# Patient Record
Sex: FEMALE | Race: BLACK OR AFRICAN AMERICAN | NOT HISPANIC OR LATINO | Employment: FULL TIME | URBAN - METROPOLITAN AREA
[De-identification: names, ages, dates, MRNs, and addresses within clinical notes are randomized per-mention and may not be internally consistent; named-entity substitution may affect disease eponyms.]

---

## 2017-01-23 RX ORDER — AMLODIPINE BESYLATE 10 MG/1
TABLET ORAL
Qty: 90 TABLET | Refills: 0 | Status: SHIPPED | OUTPATIENT
Start: 2017-01-23 | End: 2017-06-19 | Stop reason: SDUPTHER

## 2017-01-23 RX ORDER — HYDROCHLOROTHIAZIDE 25 MG/1
TABLET ORAL
Qty: 90 TABLET | Refills: 0 | Status: SHIPPED | OUTPATIENT
Start: 2017-01-23 | End: 2017-06-19 | Stop reason: SDUPTHER

## 2017-02-15 ENCOUNTER — HOSPITAL ENCOUNTER (INPATIENT)
Facility: OTHER | Age: 42
LOS: 7 days | Discharge: HOME | DRG: 075 | End: 2017-02-22
Attending: EMERGENCY MEDICINE | Admitting: HOSPITALIST
Payer: COMMERCIAL

## 2017-02-15 DIAGNOSIS — Z98.890 S/P MYOMECTOMY: ICD-10-CM

## 2017-02-15 DIAGNOSIS — Z72.0 TOBACCO ABUSE: ICD-10-CM

## 2017-02-15 DIAGNOSIS — G03.9 MENINGITIS: ICD-10-CM

## 2017-02-15 DIAGNOSIS — G47.00 INSOMNIA, UNSPECIFIED TYPE: ICD-10-CM

## 2017-02-15 DIAGNOSIS — I10 ESSENTIAL HYPERTENSION: ICD-10-CM

## 2017-02-15 DIAGNOSIS — R51.9 HEADACHE, UNSPECIFIED HEADACHE TYPE: ICD-10-CM

## 2017-02-15 DIAGNOSIS — E87.1 HYPONATREMIA: ICD-10-CM

## 2017-02-15 DIAGNOSIS — E87.6 HYPOKALEMIA: ICD-10-CM

## 2017-02-15 DIAGNOSIS — I63.9 STROKE: ICD-10-CM

## 2017-02-15 DIAGNOSIS — R47.01 APHASIA: Primary | ICD-10-CM

## 2017-02-15 LAB
ALBUMIN SERPL BCP-MCNC: 4.3 G/DL
ALP SERPL-CCNC: 93 U/L
ALT SERPL W/O P-5'-P-CCNC: 18 U/L
AMPHET+METHAMPHET UR QL: NEGATIVE
ANION GAP SERPL CALC-SCNC: 14 MMOL/L
APTT BLDCRRT: 28.5 SEC
AST SERPL-CCNC: 29 U/L
B-HCG UR QL: NEGATIVE
BARBITURATES UR QL SCN>200 NG/ML: NEGATIVE
BASOPHILS # BLD AUTO: 0.02 K/UL
BASOPHILS NFR BLD: 0.2 %
BENZODIAZ UR QL SCN>200 NG/ML: NEGATIVE
BILIRUB SERPL-MCNC: 1.4 MG/DL
BILIRUB UR QL STRIP: NEGATIVE
BUN SERPL-MCNC: 6 MG/DL
BZE UR QL SCN: NEGATIVE
CALCIUM SERPL-MCNC: 9.7 MG/DL
CANNABINOIDS UR QL SCN: NORMAL
CHLORIDE SERPL-SCNC: 100 MMOL/L
CLARITY CSF: ABNORMAL
CLARITY UR: CLEAR
CO2 SERPL-SCNC: 21 MMOL/L
COLOR CSF: COLORLESS
COLOR UR: YELLOW
CREAT SERPL-MCNC: 0.8 MG/DL
CREAT UR-MCNC: 148.4 MG/DL
CTP QC/QA: YES
DIFFERENTIAL METHOD: NORMAL
EOSINOPHIL # BLD AUTO: 0 K/UL
EOSINOPHIL NFR BLD: 0.1 %
ERYTHROCYTE [DISTWIDTH] IN BLOOD BY AUTOMATED COUNT: 13.8 %
EST. GFR  (AFRICAN AMERICAN): >60 ML/MIN/1.73 M^2
EST. GFR  (NON AFRICAN AMERICAN): >60 ML/MIN/1.73 M^2
GLUCOSE CSF-MCNC: 56 MG/DL
GLUCOSE SERPL-MCNC: 105 MG/DL
GLUCOSE UR QL STRIP: NEGATIVE
HCT VFR BLD AUTO: 41.2 %
HGB BLD-MCNC: 14 G/DL
HGB UR QL STRIP: ABNORMAL
INR PPP: 0.9
KETONES UR QL STRIP: NEGATIVE
LEUKOCYTE ESTERASE UR QL STRIP: NEGATIVE
LYMPHOCYTES # BLD AUTO: 2.4 K/UL
LYMPHOCYTES NFR BLD: 28.6 %
LYMPHOCYTES NFR CSF MANUAL: 65 %
MCH RBC QN AUTO: 31 PG
MCHC RBC AUTO-ENTMCNC: 34 %
MCV RBC AUTO: 91 FL
METHADONE UR QL SCN>300 NG/ML: NEGATIVE
MONOCYTES # BLD AUTO: 1 K/UL
MONOCYTES NFR BLD: 11.5 %
MONOS+MACROS NFR CSF MANUAL: 11 %
NEUTROPHILS # BLD AUTO: 4.9 K/UL
NEUTROPHILS NFR BLD: 59.4 %
NEUTROPHILS NFR CSF MANUAL: 24 %
NITRITE UR QL STRIP: NEGATIVE
OPIATES UR QL SCN: NEGATIVE
PCP UR QL SCN>25 NG/ML: NEGATIVE
PH UR STRIP: 7 [PH] (ref 5–8)
PLATELET # BLD AUTO: 246 K/UL
PMV BLD AUTO: 9.3 FL
POTASSIUM SERPL-SCNC: 4.2 MMOL/L
PROT CSF-MCNC: 50 MG/DL
PROT SERPL-MCNC: 8.7 G/DL
PROT UR QL STRIP: NEGATIVE
PROTHROMBIN TIME: 10.5 SEC
RBC # BLD AUTO: 4.52 M/UL
RBC # CSF: 1036.5 /CU MM
SODIUM SERPL-SCNC: 135 MMOL/L
SP GR UR STRIP: 1.01 (ref 1–1.03)
SPECIMEN VOL CSF: 2 ML
TOXICOLOGY INFORMATION: NORMAL
URN SPEC COLLECT METH UR: ABNORMAL
UROBILINOGEN UR STRIP-ACNC: NEGATIVE EU/DL
WBC # BLD AUTO: 8.28 K/UL
WBC # CSF: 23 /CU MM

## 2017-02-15 PROCEDURE — 80053 COMPREHEN METABOLIC PANEL: CPT

## 2017-02-15 PROCEDURE — 96375 TX/PRO/DX INJ NEW DRUG ADDON: CPT

## 2017-02-15 PROCEDURE — 11000001 HC ACUTE MED/SURG PRIVATE ROOM

## 2017-02-15 PROCEDURE — 82570 ASSAY OF URINE CREATININE: CPT

## 2017-02-15 PROCEDURE — 99285 EMERGENCY DEPT VISIT HI MDM: CPT | Mod: 25

## 2017-02-15 PROCEDURE — 009U3ZX DRAINAGE OF SPINAL CANAL, PERCUTANEOUS APPROACH, DIAGNOSTIC: ICD-10-PCS | Performed by: RADIOLOGY

## 2017-02-15 PROCEDURE — 87205 SMEAR GRAM STAIN: CPT

## 2017-02-15 PROCEDURE — 89051 BODY FLUID CELL COUNT: CPT

## 2017-02-15 PROCEDURE — 87529 HSV DNA AMP PROBE: CPT

## 2017-02-15 PROCEDURE — 81003 URINALYSIS AUTO W/O SCOPE: CPT

## 2017-02-15 PROCEDURE — 99223 1ST HOSP IP/OBS HIGH 75: CPT | Mod: ,,, | Performed by: INTERNAL MEDICINE

## 2017-02-15 PROCEDURE — 96361 HYDRATE IV INFUSION ADD-ON: CPT

## 2017-02-15 PROCEDURE — 87070 CULTURE OTHR SPECIMN AEROBIC: CPT

## 2017-02-15 PROCEDURE — 25500020 PHARM REV CODE 255: Performed by: EMERGENCY MEDICINE

## 2017-02-15 PROCEDURE — 85730 THROMBOPLASTIN TIME PARTIAL: CPT

## 2017-02-15 PROCEDURE — 85610 PROTHROMBIN TIME: CPT

## 2017-02-15 PROCEDURE — 25000003 PHARM REV CODE 250: Performed by: INTERNAL MEDICINE

## 2017-02-15 PROCEDURE — 63600175 PHARM REV CODE 636 W HCPCS: Performed by: INTERNAL MEDICINE

## 2017-02-15 PROCEDURE — 82945 GLUCOSE OTHER FLUID: CPT

## 2017-02-15 PROCEDURE — 84157 ASSAY OF PROTEIN OTHER: CPT

## 2017-02-15 PROCEDURE — 63600175 PHARM REV CODE 636 W HCPCS: Performed by: HOSPITALIST

## 2017-02-15 PROCEDURE — 25000003 PHARM REV CODE 250: Performed by: HOSPITALIST

## 2017-02-15 PROCEDURE — 63600175 PHARM REV CODE 636 W HCPCS

## 2017-02-15 PROCEDURE — 96374 THER/PROPH/DIAG INJ IV PUSH: CPT

## 2017-02-15 PROCEDURE — 93010 ELECTROCARDIOGRAM REPORT: CPT | Mod: ,,, | Performed by: INTERNAL MEDICINE

## 2017-02-15 PROCEDURE — 96376 TX/PRO/DX INJ SAME DRUG ADON: CPT

## 2017-02-15 PROCEDURE — 85025 COMPLETE CBC W/AUTO DIFF WBC: CPT

## 2017-02-15 PROCEDURE — A9585 GADOBUTROL INJECTION: HCPCS | Performed by: EMERGENCY MEDICINE

## 2017-02-15 RX ORDER — IBUPROFEN 200 MG
1 TABLET ORAL DAILY
Status: DISCONTINUED | OUTPATIENT
Start: 2017-02-16 | End: 2017-02-22 | Stop reason: HOSPADM

## 2017-02-15 RX ORDER — HYDROMORPHONE HYDROCHLORIDE 1 MG/ML
0.5 INJECTION, SOLUTION INTRAMUSCULAR; INTRAVENOUS; SUBCUTANEOUS EVERY 6 HOURS PRN
Status: DISCONTINUED | OUTPATIENT
Start: 2017-02-15 | End: 2017-02-22 | Stop reason: HOSPADM

## 2017-02-15 RX ORDER — SODIUM CHLORIDE 0.9 % (FLUSH) 0.9 %
3 SYRINGE (ML) INJECTION EVERY 8 HOURS
Status: DISCONTINUED | OUTPATIENT
Start: 2017-02-15 | End: 2017-02-18

## 2017-02-15 RX ORDER — METOCLOPRAMIDE HYDROCHLORIDE 5 MG/ML
10 INJECTION INTRAMUSCULAR; INTRAVENOUS
Status: COMPLETED | OUTPATIENT
Start: 2017-02-15 | End: 2017-02-15

## 2017-02-15 RX ORDER — ASPIRIN 325 MG
325 TABLET ORAL
Status: COMPLETED | OUTPATIENT
Start: 2017-02-15 | End: 2017-02-15

## 2017-02-15 RX ORDER — ENOXAPARIN SODIUM 100 MG/ML
40 INJECTION SUBCUTANEOUS EVERY 24 HOURS
Status: DISCONTINUED | OUTPATIENT
Start: 2017-02-15 | End: 2017-02-18

## 2017-02-15 RX ORDER — AMLODIPINE BESYLATE 5 MG/1
10 TABLET ORAL DAILY
Status: DISCONTINUED | OUTPATIENT
Start: 2017-02-16 | End: 2017-02-22 | Stop reason: HOSPADM

## 2017-02-15 RX ORDER — ASPIRIN 81 MG/1
81 TABLET ORAL DAILY
Status: DISCONTINUED | OUTPATIENT
Start: 2017-02-16 | End: 2017-02-15

## 2017-02-15 RX ORDER — OXYCODONE AND ACETAMINOPHEN 5; 325 MG/1; MG/1
1 TABLET ORAL ONCE
Status: DISCONTINUED | OUTPATIENT
Start: 2017-02-15 | End: 2017-02-22 | Stop reason: HOSPADM

## 2017-02-15 RX ORDER — DIPHENHYDRAMINE HYDROCHLORIDE 50 MG/ML
25 INJECTION INTRAMUSCULAR; INTRAVENOUS
Status: COMPLETED | OUTPATIENT
Start: 2017-02-15 | End: 2017-02-15

## 2017-02-15 RX ORDER — HYDROCHLOROTHIAZIDE 25 MG/1
25 TABLET ORAL DAILY
Status: DISCONTINUED | OUTPATIENT
Start: 2017-02-16 | End: 2017-02-18

## 2017-02-15 RX ORDER — DEXTROSE MONOHYDRATE AND SODIUM CHLORIDE 5; .45 G/100ML; G/100ML
INJECTION, SOLUTION INTRAVENOUS CONTINUOUS
Status: DISCONTINUED | OUTPATIENT
Start: 2017-02-15 | End: 2017-02-17

## 2017-02-15 RX ORDER — OXYCODONE AND ACETAMINOPHEN 7.5; 325 MG/1; MG/1
1 TABLET ORAL EVERY 4 HOURS PRN
Status: DISCONTINUED | OUTPATIENT
Start: 2017-02-15 | End: 2017-02-22 | Stop reason: HOSPADM

## 2017-02-15 RX ORDER — ATORVASTATIN CALCIUM 20 MG/1
40 TABLET, FILM COATED ORAL DAILY
Status: DISCONTINUED | OUTPATIENT
Start: 2017-02-16 | End: 2017-02-22 | Stop reason: HOSPADM

## 2017-02-15 RX ORDER — GADOBUTROL 604.72 MG/ML
10 INJECTION INTRAVENOUS
Status: COMPLETED | OUTPATIENT
Start: 2017-02-15 | End: 2017-02-15

## 2017-02-15 RX ORDER — ACETAMINOPHEN 325 MG/1
650 TABLET ORAL EVERY 6 HOURS PRN
Status: DISCONTINUED | OUTPATIENT
Start: 2017-02-15 | End: 2017-02-22 | Stop reason: HOSPADM

## 2017-02-15 RX ORDER — SODIUM CHLORIDE 9 MG/ML
1000 INJECTION, SOLUTION INTRAVENOUS
Status: COMPLETED | OUTPATIENT
Start: 2017-02-15 | End: 2017-02-15

## 2017-02-15 RX ADMIN — METOCLOPRAMIDE 10 MG: 5 INJECTION, SOLUTION INTRAMUSCULAR; INTRAVENOUS at 09:02

## 2017-02-15 RX ADMIN — DIPHENHYDRAMINE HYDROCHLORIDE 25 MG: 50 INJECTION, SOLUTION INTRAMUSCULAR; INTRAVENOUS at 09:02

## 2017-02-15 RX ADMIN — CEFTRIAXONE 2 G: 2 INJECTION, SOLUTION INTRAVENOUS at 09:02

## 2017-02-15 RX ADMIN — OXYCODONE AND ACETAMINOPHEN 1 TABLET: 7.5; 325 TABLET ORAL at 06:02

## 2017-02-15 RX ADMIN — VANCOMYCIN HYDROCHLORIDE 1000 MG: 1 INJECTION, POWDER, LYOPHILIZED, FOR SOLUTION INTRAVENOUS at 10:02

## 2017-02-15 RX ADMIN — SODIUM CHLORIDE 1000 ML: 0.9 INJECTION, SOLUTION INTRAVENOUS at 09:02

## 2017-02-15 RX ADMIN — SODIUM CHLORIDE, PRESERVATIVE FREE 3 ML: 5 INJECTION INTRAVENOUS at 09:02

## 2017-02-15 RX ADMIN — DIPHENHYDRAMINE HYDROCHLORIDE 25 MG: 50 INJECTION, SOLUTION INTRAMUSCULAR; INTRAVENOUS at 10:02

## 2017-02-15 RX ADMIN — DEXTROSE AND SODIUM CHLORIDE: 5; .45 INJECTION, SOLUTION INTRAVENOUS at 09:02

## 2017-02-15 RX ADMIN — ENOXAPARIN SODIUM 40 MG: 100 INJECTION SUBCUTANEOUS at 07:02

## 2017-02-15 RX ADMIN — OXYCODONE AND ACETAMINOPHEN 1 TABLET: 7.5; 325 TABLET ORAL at 10:02

## 2017-02-15 RX ADMIN — ACYCLOVIR SODIUM 570 MG: 50 INJECTION, SOLUTION INTRAVENOUS at 07:02

## 2017-02-15 RX ADMIN — GADOBUTROL 10 ML: 604.72 INJECTION INTRAVENOUS at 02:02

## 2017-02-15 RX ADMIN — ASPIRIN 325 MG ORAL TABLET 325 MG: 325 PILL ORAL at 11:02

## 2017-02-15 NOTE — H&P
[facility] Hospital Medicine  History & Physical    Patient Name: Tasha Rush  MRN: [id number]  Admission Date: 2/15/2017  Attending Physician: Jennifer Granda MD   Primary Care Provider: Franklin Gonzalez MD         Patient information was obtained from patient and ER records.     Subjective:     Principal Problem:Aphasia    Chief Complaint:   Chief Complaint   Patient presents with    Confusion     Patient stated she has been feeling confused since last night.  Patient stated she is having trouble remembering things.  Patient also c/o a headache, nasal congestion, cough and fatigue.          HPI: 42 y/o female with Hx of HTN presents with c/o of headache , confusion, and difficulty word finding for past 12 hours. Pt is accompanied by her mother. She woke up yesterday with a headache, went to work as normal. Took Excedrin and aleve without relief. Went to bed with the same headache. She has no prior hx of migraine. Rates the headache as 10/10 located mainly in front part of head. Least night felt she was getting sick ( fever, fatigue, cough)  so took an [place] before bed. When she woke up she felt confused and had difficulty finding the correct words she wanted to say. Denies any extremity weakness, gait problems, gi or  sx    Past Medical History   Diagnosis Date    Abnormal Pap smear of cervix     Bacterial vaginosis      RECURRENT    Cervical dysplasia     Hypertension        Past Surgical History   Procedure Laterality Date    Cervical biopsy  w/ loop electrode excision      Hernia repair  2009     umbilical    Fibroid removal         Review of patient's allergies indicates:   Allergen Reactions    Ace inhibitors Other (See Comments)     cough       No current facility-administered medications on file prior to encounter.      Current Outpatient Prescriptions on File Prior to Encounter   Medication Sig    amlodipine (NORVASC) 10 MG tablet TAKE 1 TABLET BY MOUTH EVERY DAY     hydrochlorothiazide (HYDRODIURIL) 25 MG tablet TAKE 1 TABLET BY MOUTH EVERY DAY    docusate sodium (COLACE) 100 MG capsule Take 1 capsule (100 mg total) by mouth 2 (two) times daily as needed for Constipation.    fluticasone (FLONASE) 50 mcg/actuation nasal spray 1 spray by Each Nare route 2 (two) times daily.    ketoconazole (NIZORAL) 2 % cream Apply topically 2 (two) times daily.     Family History     Problem Relation (Age of Onset)    Colon cancer Paternal Uncle    Diabetes Father, Mother    Hyperlipidemia Father    No Known Problems Brother        Social History Main Topics    Smoking status: Current Every Day Smoker    Smokeless tobacco: Never Used      Comment: 1 pack / week    Alcohol use Yes      Comment: occasional    Drug use: No    Sexual activity: No     Review of Systems   Constitutional: Positive for fatigue and fever. Negative for chills.   Respiratory: Positive for cough. Negative for chest tightness and shortness of breath.    Cardiovascular: Negative for chest pain, palpitations and leg swelling.   Gastrointestinal: Negative for abdominal pain and blood in stool.   Genitourinary: Negative for dysuria, flank pain and frequency.   Musculoskeletal: Negative for arthralgias, back pain and joint swelling.   Neurological: Positive for speech difficulty and headaches. Negative for seizures and syncope.   Hematological: Does not bruise/bleed easily.     Objective:     Vital Signs (Most Recent):  Temp: 100.1 °F (37.8 °C) (02/15/17 1220)  Pulse: 90 (02/15/17 1145)  Resp: 19 (02/15/17 1145)  BP: 136/77 (02/15/17 1145)  SpO2: 97 % (02/15/17 1145) Vital Signs (24h Range):  Temp:  [99 °F (37.2 °C)-100.1 °F (37.8 °C)] 100.1 °F (37.8 °C)  Pulse:  [87-92] 90  Resp:  [12-20] 19  SpO2:  [97 %-100 %] 97 %  BP: (136-159)/(77-96) 136/77     Weight: 77.1 kg (170 lb)  Body mass index is 28.29 kg/(m^2).    Physical Exam   Constitutional: She is oriented to person, place, and time. No distress.   HENT:   Head:  Normocephalic and atraumatic.   Eyes: Conjunctivae are normal.   Neck: Neck supple.   Cardiovascular: Regular rhythm and normal heart sounds.    No murmur heard.  Pulmonary/Chest: Effort normal and breath sounds normal.   Abdominal: Soft. Bowel sounds are normal. She exhibits no distension.   Musculoskeletal: She exhibits no edema.   Neurological: She is alert and oriented to person, place, and time. No cranial nerve deficit. Coordination normal.   Expressive aphasia   Skin: Skin is warm and dry. No rash noted.   Psychiatric: She has a normal mood and affect.        Significant Labs:   CBC:   Recent Labs  Lab 02/15/17  0903   WBC 8.28   HGB 14.0   HCT 41.2        CMP:   Recent Labs  Lab 02/15/17  0903   *   K 4.2      CO2 21*      BUN 6   CREATININE 0.8   CALCIUM 9.7   PROT 8.7*   ALBUMIN 4.3   BILITOT 1.4*   ALKPHOS 93   AST 29   ALT 18   ANIONGAP 14   EGFRNONAA >60       Significant Imaging: I have reviewed all pertinent imaging results/findings within the past 24 hours.     Neg head CT    Assessment/Plan:     * Aphasia  Pt presents with multiple neurologic sx of unclear etiology ( confusion, headache, and expressive aphasia)  Ddx includes CVA ( RF are HTN and smoking), complex migraine, encephalitis / meningitis ( given presence of low grade fever)  Initial CT neg.  Pt given ASA  Neurology consult  Obtain MRI and LP ( usual studies plus HSV)      Tobacco abuse  Nicotine patch  Cessation education      Essential hypertension  Home meds and monitor      Hyponatremia  Mild, monitor      Headache  Symptomatic treatment for now pending evaluation      VTE Risk Mitigation      Lovenox (after LP done)        Lloyd Walsh MD  Department of Hospital Medicine   [facility]

## 2017-02-15 NOTE — ED PROVIDER NOTES
Encounter Date: 2/15/2017    SCRIBE #1 NOTE: I, Polina Rob, am scribing for, and in the presence of, Dr. Granda.       History     Chief Complaint   Patient presents with    Confusion     Patient stated she has been feeling confused since last night.  Patient stated she is having trouble remembering things.  Patient also c/o a headache, nasal congestion, cough and fatigue.       Review of patient's allergies indicates:   Allergen Reactions    Ace inhibitors Other (See Comments)     cough     HPI Comments: Time seen by provider: 8:37 AM    This is a 41 y.o. female, with HTN, who presents with complaint of confusion that began last night. She describes the confusion as difficulty finding the words she wants to say and trouble with reading out loud. She is able to understand what the words mean, but she is unable to repeat the words. She complains of associated headache that also began last night. She took veronica seltzer plus without relief. She denies a history of headaches. Despite triage note, pt specifically denies cough, congestion. She also denies urinary symptoms.      The history is provided by the patient and a parent (mother).     Past Medical History   Diagnosis Date    Abnormal Pap smear of cervix     Bacterial vaginosis      RECURRENT    Cervical dysplasia     Hypertension      No past medical history pertinent negatives.  Past Surgical History   Procedure Laterality Date    Cervical biopsy  w/ loop electrode excision      Hernia repair  2009     umbilical    Fibroid removal       Family History   Problem Relation Age of Onset    Diabetes Father     Hyperlipidemia Father     Diabetes Mother     No Known Problems Brother     Colon cancer Paternal Uncle     Breast cancer Neg Hx     Ovarian cancer Neg Hx      Social History   Substance Use Topics    Smoking status: Current Every Day Smoker    Smokeless tobacco: Never Used      Comment: 1 pack / week    Alcohol use Yes      Comment: occasional      Review of Systems   Constitutional: Negative for chills.   HENT: Negative for congestion.    Respiratory: Negative for cough.    Cardiovascular: Negative for chest pain.   Gastrointestinal: Negative for abdominal pain, nausea and vomiting.   Endocrine: Negative for polyuria.   Genitourinary: Negative for dysuria and frequency.   Musculoskeletal: Negative for myalgias.   Skin: Negative for rash.   Neurological: Positive for headaches. Negative for dizziness.   Psychiatric/Behavioral: Positive for confusion.       Physical Exam   Initial Vitals   BP Pulse Resp Temp SpO2   02/15/17 0807 02/15/17 0807 02/15/17 0807 02/15/17 0807 02/15/17 0807   143/91 87 12 99 °F (37.2 °C) 100 %     Physical Exam    Nursing note and vitals reviewed.  Constitutional: She appears well-developed and well-nourished. She is not diaphoretic. No distress.   HENT:   Head: Normocephalic and atraumatic.   Right Ear: External ear normal.   Left Ear: External ear normal.   Eyes: EOM are normal. Pupils are equal, round, and reactive to light. Right eye exhibits no discharge. Left eye exhibits no discharge.   Pupils 4-mm and reactive.    Neck: Normal range of motion. Neck supple.   Cardiovascular: Normal rate, regular rhythm and normal heart sounds.   Pulmonary/Chest: Breath sounds normal. No respiratory distress. She has no wheezes. She has no rhonchi. She has no rales.   Abdominal: Soft. Bowel sounds are normal. She exhibits no distension. There is no tenderness. There is no rebound and no guarding.   Musculoskeletal: Normal range of motion. She exhibits no edema or tenderness.   Neurological: She is alert and oriented to person, place, and time.   Normal strength in all four extremities. Sensations intact. Normal finger-to-nose. No facial asymmetry. No dysarthria. Mild expressive aphasia in conversation, though more pronounced aphasia with reading.     Skin: Skin is warm and dry. No rash and no abscess noted. No erythema. No pallor.    Psychiatric: She has a normal mood and affect. Her behavior is normal. Judgment and thought content normal.         ED Course   Procedures  Labs Reviewed   COMPREHENSIVE METABOLIC PANEL - Abnormal; Notable for the following:        Result Value    Sodium 135 (*)     CO2 21 (*)     Total Protein 8.7 (*)     Total Bilirubin 1.4 (*)     All other components within normal limits   URINALYSIS - Abnormal; Notable for the following:     Occult Blood UA Trace (*)     All other components within normal limits   CBC W/ AUTO DIFFERENTIAL   APTT   PROTIME-INR   DRUG SCREEN PANEL, URINE EMERGENCY   DRUG SCREEN PANEL, URINE EMERGENCY   POCT URINE PREGNANCY     Imaging Results         CT Head Without Contrast (Final result) Result time:  02/15/17 10:01:28    Final result by Scott Carcamo MD (02/15/17 10:01:28)    Impression:        CT scan of the head within normal limits.      Electronically signed by: SCOTT CARCAMO MD  Date:     02/15/17  Time:    10:01     Narrative:    History: Aphasia and confusion.    Procedure: Axial CT scans of the head are performed from the base of the skull to the cranial vertex without contrast.        Findings:    There is no hydrocephalus.  The subarachnoid space is not as prominent however could be within normal limits for the patient's age (41).  Within the parenchyma no hemorrhage is or shift of midline structures.  The gray/white matter delineation is preserved throughout the cerebral hemispheres.  There is a septum cavum pellucidum and cavum vergae.  Basal ganglia are symmetric and normal bilaterally.    4th ventricle is in midline.  Cerebellar hemispheres are normal.  No Chiari malformations.  There is no suprasellar or pineal region masses.    The visualized paranasal air sinuses are clear.             EKG Readings: (Independently Interpreted)   Initial Reading: No STEMI.   09:50: Rate of 84. Normal sinus rhythm. Normal axis. Normal intervals. No ST or ischemic changes.           Medical  Decision Making:   History:   Old Medical Records: I decided to obtain old medical records.  Old Records Summarized: other records, records from clinic visits and records from previous admission(s).  Independently Interpreted Test(s):   I have ordered and independently interpreted EKG Reading(s) - see prior notes  Clinical Tests:   Lab Tests: Ordered and Reviewed  Radiological Study: Ordered and Reviewed  Medical Tests: Ordered and Reviewed    Additional MDM:   EKG: I have independently interpreted EKG(s) - see notes.        10:19 AM:  Pt stable.  Her HA has improved though her aphasia symptoms have not improved.  Her labs and CT are negative for acute findings.  Given her persistent neuro symptoms, will plan to admit with neuro consult. Will continue to follow and reassess.      11:13 AM: I discussed the case with Dr. Piedra, neurology on call, who states the patient's symptoms could possibly related to complex migraine, but would need to rule out ischemic stroke.     11:25 AM: I discussed the case with Dr. Trinh, hospitalist on call. Patient will be admitted to Dr. Byrd.         Scribe Attestation:   Scribe #1: I performed the above scribed service and the documentation accurately describes the services I performed. I attest to the accuracy of the note.    Attending Attestation:           Physician Attestation for Scribe:  Physician Attestation Statement for Scribe #1: I, Dr. Granda, reviewed documentation, as scribed by Polina Rob in my presence, and it is both accurate and complete.                 ED Course     Clinical Impression:     1. Aphasia    2. Stroke             Jennifer Granda MD  02/15/17 1231

## 2017-02-15 NOTE — ASSESSMENT & PLAN NOTE
Pt presents with multiple neurologic sx of unclear etiology ( confusion, headache, and expressive aphasia)  Ddx includes CVA ( RF are HTN and smoking), complex migraine, encephalitis / meningitis ( given presence of low grade fever)  Initial CT neg.  Pt given ASA  Neurology consult  Obtain MRI and LP ( usual studies plus HSV)

## 2017-02-15 NOTE — ED NOTES
Hourly rounding on the patient has been done. The patient has been updated on the plan of care, disposition and current status. Pain was assessed and is currently a 5/10.  Comfort positioning and restroom needs were addressed.  Remains on continuous pulse oximetry monitoring.  Resting with eyes closed.  Arousable to touch and voice.  Blood pressure set to cycle automatically.  Necessary items were placed with in reach and was advised when a reassessment would take place.  The call bell remains at the bedside for any additional patient needs.  The patient is resting comfortably in the recliner chair.  Respirations are even and unlabored.  Skin warm and dry.  Will continue to monitor.

## 2017-02-15 NOTE — ED NOTES
Hourly rounding on the patient has been done. The patient has been updated on the plan of care, disposition and current status. Pain was assessed and is currently a 8/10.  Comfort positioning and restroom needs were addressed.  Remains on continuous pulse oximetry monitoring.  Blood pressure set to cycle automatically.  Necessary items were placed with in reach and was advised when a reassessment would take place.  The call bell remains at the bedside for any additional patient needs.  The patient is resting comfortably and sitting in recliner chair.  Respirations are even and unlabored.  Skin warm and dry.  Normal saline 1 liter infusing without complication.  Will continue to monitor.

## 2017-02-15 NOTE — ED NOTES
Call from Dr. Walsh to page Interventional Radiology to see patient and to release MRI and MRA orders.   ANABELL Santiago notified.

## 2017-02-15 NOTE — SUBJECTIVE & OBJECTIVE
Past Medical History   Diagnosis Date    Abnormal Pap smear of cervix     Bacterial vaginosis      RECURRENT    Cervical dysplasia     Hypertension        Past Surgical History   Procedure Laterality Date    Cervical biopsy  w/ loop electrode excision      Hernia repair  2009     umbilical    Fibroid removal         Review of patient's allergies indicates:   Allergen Reactions    Ace inhibitors Other (See Comments)     cough       No current facility-administered medications on file prior to encounter.      Current Outpatient Prescriptions on File Prior to Encounter   Medication Sig    amlodipine (NORVASC) 10 MG tablet TAKE 1 TABLET BY MOUTH EVERY DAY    hydrochlorothiazide (HYDRODIURIL) 25 MG tablet TAKE 1 TABLET BY MOUTH EVERY DAY    docusate sodium (COLACE) 100 MG capsule Take 1 capsule (100 mg total) by mouth 2 (two) times daily as needed for Constipation.    fluticasone (FLONASE) 50 mcg/actuation nasal spray 1 spray by Each Nare route 2 (two) times daily.    ketoconazole (NIZORAL) 2 % cream Apply topically 2 (two) times daily.     Family History     Problem Relation (Age of Onset)    Colon cancer Paternal Uncle    Diabetes Father, Mother    Hyperlipidemia Father    No Known Problems Brother        Social History Main Topics    Smoking status: Current Every Day Smoker    Smokeless tobacco: Never Used      Comment: 1 pack / week    Alcohol use Yes      Comment: occasional    Drug use: No    Sexual activity: No     Review of Systems   Constitutional: Positive for fatigue and fever. Negative for chills.   Respiratory: Positive for cough. Negative for chest tightness and shortness of breath.    Cardiovascular: Negative for chest pain, palpitations and leg swelling.   Gastrointestinal: Negative for abdominal pain and blood in stool.   Genitourinary: Negative for dysuria, flank pain and frequency.   Musculoskeletal: Negative for arthralgias, back pain and joint swelling.   Neurological: Positive for  speech difficulty and headaches. Negative for seizures and syncope.   Hematological: Does not bruise/bleed easily.     Objective:     Vital Signs (Most Recent):  Temp: 100.1 °F (37.8 °C) (02/15/17 1220)  Pulse: 90 (02/15/17 1145)  Resp: 19 (02/15/17 1145)  BP: 136/77 (02/15/17 1145)  SpO2: 97 % (02/15/17 1145) Vital Signs (24h Range):  Temp:  [99 °F (37.2 °C)-100.1 °F (37.8 °C)] 100.1 °F (37.8 °C)  Pulse:  [87-92] 90  Resp:  [12-20] 19  SpO2:  [97 %-100 %] 97 %  BP: (136-159)/(77-96) 136/77     Weight: 77.1 kg (170 lb)  Body mass index is 28.29 kg/(m^2).    Physical Exam   Constitutional: She is oriented to person, place, and time. No distress.   HENT:   Head: Normocephalic and atraumatic.   Eyes: Conjunctivae are normal.   Neck: Neck supple.   Cardiovascular: Regular rhythm and normal heart sounds.    No murmur heard.  Pulmonary/Chest: Effort normal and breath sounds normal.   Abdominal: Soft. Bowel sounds are normal. She exhibits no distension.   Musculoskeletal: She exhibits no edema.   Neurological: She is alert and oriented to person, place, and time. No cranial nerve deficit. Coordination normal.   Expressive aphasia   Skin: Skin is warm and dry. No rash noted.   Psychiatric: She has a normal mood and affect.        Significant Labs:   CBC:   Recent Labs  Lab 02/15/17  0903   WBC 8.28   HGB 14.0   HCT 41.2        CMP:   Recent Labs  Lab 02/15/17  0903   *   K 4.2      CO2 21*      BUN 6   CREATININE 0.8   CALCIUM 9.7   PROT 8.7*   ALBUMIN 4.3   BILITOT 1.4*   ALKPHOS 93   AST 29   ALT 18   ANIONGAP 14   EGFRNONAA >60       Significant Imaging: I have reviewed all pertinent imaging results/findings within the past 24 hours.     Neg head CT

## 2017-02-15 NOTE — PLAN OF CARE
02/15/17 1113   ED Admissions Case Approval   ED Admissions Case Approval (!) CM Approved  (IP)

## 2017-02-15 NOTE — IP AVS SNAPSHOT
[facility] Location ([facility])  [address] [place], [place] [id number]  Phone: [phone]           Patient Discharge Instructions     Our goal is to set you up for success. This packet includes information on your condition, medications, and your home care. It will help you to care for yourself so you don't get sicker and need to go back to the hospital.     Please ask your nurse if you have any questions.        There are many details to remember when preparing to leave the hospital. Here is what you will need to do:    1. Take your medicine. If you are prescribed medications, review your Medication List in the following pages. You may have new medications to  at the pharmacy and others that you'll need to stop taking. Review the instructions for how and when to take your medications. Talk with your doctor or nurses if you are unsure of what to do.     2. Go to your follow-up appointments. Specific follow-up information is listed in the following pages. Your may be contacted by a transition nurse or clinical provider about future appointments. Be sure we have all of the phone numbers to reach you, if needed. Please contact your provider's office if you are unable to make an appointment.     3. Watch for warning signs. Your doctor or nurse will give you detailed warning signs to watch for and when to call for assistance. These instructions may also include educational information about your condition. If you experience any of warning signs to your health, call your doctor.               [facility]  Unless otherwise directed by your provider, please contact [facility], our nurse care line that is available for 24/7 assistance.     [phone] (toll-free)    Registered nurses in the [facility] provide clinical advisement, health education, appointment booking, and other advisory services.                    ** Verify the list of medication(s) below is accurate and up to  date. Carry this with you in case of emergency. If your medications have changed, please notify your healthcare provider.             Medication List      START taking these medications        Additional Info                      cefTRIAXone 2 g in dextrose 5 % 50 mL 2 g/50 mL Pgbk IVPB   Commonly known as:  ROCEPHIN   Quantity:  6 each   Refills:  0   Dose:  2 g   Indications:  Meningitis    Start Date:  2/23/2017   Last time this was given:  2 g on 2/22/2017  2:31 PM   Instructions:  Inject 50 mLs (2 g total) into the vein every 12 (twelve) hours.     Begin Date    AM    Noon    PM    Bedtime       oxycodone-acetaminophen 5-325 mg per tablet   Commonly known as:  PERCOCET   Quantity:  10 tablet   Refills:  0   Dose:  1 tablet    Instructions:  Take 1 tablet by mouth every 6 (six) hours as needed for Pain.     Begin Date    AM    Noon    PM    Bedtime         CONTINUE taking these medications        Additional Info                      amlodipine 10 MG tablet   Commonly known as:  NORVASC   Quantity:  90 tablet   Refills:  0    Last time this was given:  10 mg on 2/22/2017  9:06 AM   Instructions:  TAKE 1 TABLET BY MOUTH EVERY DAY     Begin Date    AM    Noon    PM    Bedtime       docusate sodium 100 MG capsule   Commonly known as:  COLACE   Quantity:  60 capsule   Refills:  1   Dose:  100 mg    Instructions:  Take 1 capsule (100 mg total) by mouth 2 (two) times daily as needed for Constipation.     Begin Date    AM    Noon    PM    Bedtime       fluticasone 50 mcg/actuation nasal spray   Commonly known as:  FLONASE   Quantity:  16 g   Refills:  3   Dose:  1 spray    Instructions:  1 spray by Each Nare route 2 (two) times daily.     Begin Date    AM    Noon    PM    Bedtime       hydrochlorothiazide 25 MG tablet   Commonly known as:  HYDRODIURIL   Quantity:  90 tablet   Refills:  0    Last time this was given:  25 mg on 2/18/2017  9:54 AM   Instructions:  TAKE 1 TABLET BY MOUTH EVERY DAY     Begin Date    AM     Noon    PM    Bedtime         STOP taking these medications     ketoconazole 2 % cream   Commonly known as:  NIZORAL            Where to Get Your Medications      You can get these medications from any pharmacy     Bring a paper prescription for each of these medications     oxycodone-acetaminophen 5-325 mg per tablet         Information about where to get these medications is not yet available     ! Ask your nurse or doctor about these medications     cefTRIAXone 2 g in dextrose 5 % 50 mL 2 g/50 mL Pgbk IVPB                  Please bring to all follow up appointments:    1. A copy of your discharge instructions.  2. All medicines you are currently taking in their original bottles.  3. Identification and insurance card.    Please arrive 15 minutes ahead of scheduled appointment time.    Please call 24 hours in advance if you must reschedule your appointment and/or time.        Your Scheduled Appointments     Mar 07, 2017 10:00 AM CST   Established Patient Visit with MD Cayetano Salas [facility] - Infectious Diseases ([facility] )    [address]  [place] [address] [phone]              Follow-up Information     Schedule an appointment as soon as possible for a visit with Franklin Gonzalez MD.    Specialty:  Internal Medicine    Contact information:    [address] [place] [address] [phone]          Follow up with [facility].    Specialty:  Dialysis Center    Why:  Infusion    Contact information:    [address] [place] [address] [phone]        Referrals     Future Orders    Ambulatory Referral to Speech Therapy     Questions:    Speech Therapy Eval and Treat:  Yes    Speech Language Eval and Treat:  Yes    Bedside Swallow Study:  No    Modified Barium Swallow Study:  No    Ambulatory Referral to Speech Therapy     Questions:    Speech Therapy Eval and Treat:      Speech Language Eval and Treat:      Bedside Swallow Study:       Modified Barium Swallow Study:          Discharge Instructions     Future Orders    Call MD for:  increased confusion or weakness     Call MD for:  persistent dizziness, light-headedness, or visual disturbances     Call MD for:  persistent nausea and vomiting or diarrhea     Call MD for:  severe persistent headache     Call MD for:  severe uncontrolled pain     Call MD for:  temperature >100.4     Diet general     Questions:    Total calories:      Fat restriction, if any:      Protein restriction, if any:      Na restriction, if any:      Fluid restriction:      Additional restrictions:          Discharge Instructions         Viral Meningitis  Meningitis is an infection in the fluid (cerebrospinal fluid) that covers your brain and spinal cord. It may cause headache, stiff neck, irritability, fever, drowsiness, nausea, and vomiting.  Most cases of meningitis are caused by bacteria or viruses. Bacterial meningitis is more serious. It may cause permanent complications. You would need to be treated in the hospital with antibiotics. But your tests show that you have viral meningitis. This is much less serious. It rarely causes any complications. You can take care of a mild case at home.  Most cases of viral meningitis are passed from person to person through coughing, sneezing, and close contact. West Nile virus is a rare cause of viral meningitis. It is passed by mosquito bites.  Antibiotics are not used to treat viral meningitis. You may be given other medicines to treat your symptoms. It will take 2 to 7 days to recover from viral meningitis. You may have headaches that come and go for up to 2 weeks.  In rare cases, what looks like viral meningitis may turn out to be early bacterial meningitis. Thats why its important to be rechecked. Call your healthcare provider or come back to this facility if your symptoms get worse or new symptoms appear.  Home care  Follow these tips when taking care of yourself at  home:  · Rest in bed until you are feeling better. Stay home from school or work for at least 7 days, or until all symptoms are gone.  · Use acetaminophen or ibuprofen for fever and to relieve pain, unless another pain medicine was prescribed. Note: If you have chronic liver or kidney disease, talk with your healthcare provider before taking these medicines. Also talk with your provider if youve had a stomach ulcer or gastrointestinal bleeding. Dont give aspirin to anyone younger than 18 years old who is ill with a fever. It may cause severe liver damage.  · If you have a fever, drink extra water, sports drinks, or other fluids. This will keep you from getting dehydrated.  · Wash your hands often with soap and water to prevent spreading the infection.  Follow-up care  Follow up with your healthcare provider, or as advised. This is to make sure you are getting better as expected.  When to seek medical advice  Call your healthcare provider right away if any of these occur:  · Headache or stiff neck that gets worse  · Drowsiness, confusion, or bizarre behavior  · You cant keep fluids down because of vomiting  · Fever of 100.4°F (38°C) or higher that doesnt get better after you take fever medicine. or as advised  · Weakness or numbness in an arm or leg  · Difficulty speaking, swallowing, or walking  · Seizure  Date Last Reviewed: 8/1/2016  © 3590-0813 [employer]. [address] [place] [address]. All rights reserved. This information is not intended as a substitute for professional medical care. Always follow your healthcare professional's instructions.            Primary Diagnosis     Your primary diagnosis was:  Meningitis      Admission Information     Date & Time Provider Department CSN    2/15/2017  8:30 AM Stevan Cassidy MD [facility] [id number]      Care Providers     Provider Role Specialty Primary office phone    Stevan Cassidy MD Attending Provider Hospitalist  "[phone]    Speedy Pinedo MD Consulting Physician  Infectious Diseases [phone]      Your Vitals Were     BP Pulse Temp Resp Height Weight    132/64 (BP Location: Left arm, Patient Position: Lying, BP Method: Automatic) 69 97.9 °F (36.6 °C) (Oral) 18 5' 5" (1.651 m) 71.4 kg (157 lb 6.5 oz)    SpO2 BMI             98% 26.19 kg/m2         Recent Lab Values        1/21/2016                           4:34 PM           A1C 5.1                       Pending Labs     Order Current Status    CSF culture Preliminary result      Allergies as of 2/22/2017        Reactions    Ace Inhibitors Other (See Comments)    cough      Advance Directives     An advance directive is a document which, in the event you are no longer able to make decisions for yourself, tells your healthcare team what kind of treatment you do or do not want to receive, or who you would like to make those decisions for you.  If you do not currently have an advance directive, [facility] encourages you to create one.  For more information call:  [phone]), [phone] ([phone]),  or log on to [email or web address].        Smoking Cessation     If you would like to quit smoking:   You may be eligible for free services if you are a [place] resident and started smoking cigarettes before September 1, 1988.  Call the Smoking Cessation Trust ([facility]) toll free at (250) 573-0223 or (317) 142-3702.   Call 5-800-QUIT-NOW if you do not meet the above criteria.            Language Assistance Services     ATTENTION: Language assistance services are available, free of charge. Please call [phone].      ATENCIÓN: Si habla español, tiene a padron disposición servicios gratuitos de asistencia lingüística. Llame al [phone].     CHÚ Ý: N?u b?n nói Ti?ng Vi?t, có các d?ch v? h? tr? ngôn ng? mi?n phí dành cho b?n. G?i s? [phone].        [facility] Sign-Up     Activating your [facility] account is as easy as 1-2-3!     1) Visit " [email or web address], select Sign Up Now, enter this activation code and your date of birth, then select Next.  [email or web address]  Expires: 4/1/2017  9:15 AM      2) Create a username and password to use when you visit [facility] in the future and select a security question in case you lose your password and select Next.    3) Enter your e-mail address and click Sign Up!    Additional Information  If you have questions, please e-mail [email or web address] or call [phone] to talk to our [facility] staff. Remember, [facility] is NOT to be used for urgent needs. For medical emergencies, dial 911.          [facility] complies with applicable Federal civil rights laws and does not discriminate on the basis of race, color, national origin, age, disability, or sex.

## 2017-02-15 NOTE — PROCEDURES
Radiology Post-Procedure Note    Pre Op Diagnosis: confusion, aphasia  Post Op Diagnosis: Same    Procedure: lumbar puncture    Procedure performed by: Jj    Written Informed Consent Obtained: Yes  Specimen Removed: YES 10 cc CSF, slightly blood tinged  Estimated Blood Loss: Minimal    Findings:   Successful LP    Patient tolerated procedure well.    @SIG@

## 2017-02-15 NOTE — PLAN OF CARE
Lumbar puncture performed in specials. Specimens collected and sent. Pt without c/o post procedure, still confused. Returned to ER per stretcher. Report given to ANABELL Santiago.

## 2017-02-15 NOTE — CONSULTS
Consult/H&P Note  Interventional Radiology    Consult Requested By: ED    Reason for Consult: confusion, aphasia    SUBJECTIVE:     Chief Complaint: confusion aphasia    History of Present Illness: 42 yo female with aphasia and confusion since yesterday.    Past Medical History   Diagnosis Date    Abnormal Pap smear of cervix     Bacterial vaginosis      RECURRENT    Cervical dysplasia     Hypertension      Past Surgical History   Procedure Laterality Date    Cervical biopsy  w/ loop electrode excision      Hernia repair  2009     umbilical    Fibroid removal       Family History   Problem Relation Age of Onset    Diabetes Father     Hyperlipidemia Father     Diabetes Mother     No Known Problems Brother     Colon cancer Paternal Uncle     Breast cancer Neg Hx     Ovarian cancer Neg Hx      Social History   Substance Use Topics    Smoking status: Current Every Day Smoker    Smokeless tobacco: Never Used      Comment: 1 pack / week    Alcohol use Yes      Comment: occasional       Review of Systems:  As per H&P      OBJECTIVE:     Vital Signs Range (Last 24H):  Temp:  [99 °F (37.2 °C)-100.1 °F (37.8 °C)]   Pulse:  [87-92]   Resp:  [12-20]   BP: (136-159)/(77-96)   SpO2:  [97 %-100 %]     Physical Exam:  General- Confused, speech normal on conversation  ENT- PERRLA  Neck- No masses  CV- Regular rate and rhythm  Resp-  No increased WOB  GI- Non tender/non-distended  Extrem- No cyanosis, clubbing, edema.   Derm- No rashes, masses, or lesions noted  Neuro-  No focal deficits noted.     Physical Exam  Body mass index is 28.29 kg/(m^2).    Scheduled Meds:    oxycodone-acetaminophen  1 tablet Oral Once     Continuous Infusions:    PRN Meds:    Allergies:   Review of patient's allergies indicates:   Allergen Reactions    Ace inhibitors Other (See Comments)     cough       Labs:    Recent Labs  Lab 02/15/17  0903   INR 0.9       Recent Labs  Lab 02/15/17  0903   WBC 8.28   HGB 14.0   HCT 41.2   MCV 91   PLT  246      Recent Labs  Lab 02/15/17  0903      *   K 4.2      CO2 21*   BUN 6   CREATININE 0.8   CALCIUM 9.7   ALT 18   AST 29   ALBUMIN 4.3   BILITOT 1.4*       Vitals (Most Recent):  Temp: 100.1 °F (37.8 °C) (02/15/17 1220)  Pulse: 90 (02/15/17 1145)  Resp: 19 (02/15/17 1145)  BP: 136/77 (02/15/17 1145)  SpO2: 97 % (02/15/17 1145)    ASA: 2  Mallampati: 2    Consent obtained    ASSESSMENT/PLAN:     Lumbar Puncture    Active Hospital Problems    Diagnosis  POA    Stroke [I63.9]  Yes      Resolved Hospital Problems    Diagnosis Date Resolved POA   No resolved problems to display.           Scottie Gardner MD

## 2017-02-15 NOTE — ED NOTES
"Ambulated to ED room 17.  Presenys to the ED with complaint of confusion.  Tearful and crying.  States difficulty "getting my words out yesterday."  Reports frontal headache x 2 days.  "Been crying in my sleep."  Denies depression.  Denies suicidal ideation.  Denies homicidal ideation.  Denies any numbness or tingling.  Denies blurred vision or double vision.  Reports subjective fever yesterday.  Denies cough or congestion.  Denies any urinary symptoms.   States can recognize people and objects, but unable to state the words she wishes to say.  "

## 2017-02-15 NOTE — PLAN OF CARE
02/15/17 1156   ED Admissions Case Approval   ED Admissions Case Approval (!) CM Approved  (IP )

## 2017-02-16 PROBLEM — R47.01 APHASIA: Status: ACTIVE | Noted: 2017-02-16

## 2017-02-16 PROBLEM — G03.9 MENINGITIS: Status: ACTIVE | Noted: 2017-02-16

## 2017-02-16 PROBLEM — E87.6 HYPOKALEMIA: Status: ACTIVE | Noted: 2017-02-16

## 2017-02-16 LAB
ALBUMIN SERPL BCP-MCNC: 3.7 G/DL
ALP SERPL-CCNC: 74 U/L
ALT SERPL W/O P-5'-P-CCNC: 14 U/L
ANION GAP SERPL CALC-SCNC: 10 MMOL/L
AST SERPL-CCNC: 17 U/L
BASOPHILS # BLD AUTO: 0.02 K/UL
BASOPHILS NFR BLD: 0.4 %
BILIRUB SERPL-MCNC: 1.2 MG/DL
BUN SERPL-MCNC: 7 MG/DL
CALCIUM SERPL-MCNC: 9 MG/DL
CHLORIDE SERPL-SCNC: 101 MMOL/L
CO2 SERPL-SCNC: 23 MMOL/L
CREAT SERPL-MCNC: 0.8 MG/DL
DIASTOLIC DYSFUNCTION: NO
DIFFERENTIAL METHOD: ABNORMAL
EOSINOPHIL # BLD AUTO: 0 K/UL
EOSINOPHIL NFR BLD: 0 %
ERYTHROCYTE [DISTWIDTH] IN BLOOD BY AUTOMATED COUNT: 13.9 %
EST. GFR  (AFRICAN AMERICAN): >60 ML/MIN/1.73 M^2
EST. GFR  (NON AFRICAN AMERICAN): >60 ML/MIN/1.73 M^2
ESTIMATED PA SYSTOLIC PRESSURE: 22.36
GLOBAL PERICARDIAL EFFUSION: NORMAL
GLUCOSE SERPL-MCNC: 144 MG/DL
HCT VFR BLD AUTO: 39 %
HGB BLD-MCNC: 12.9 G/DL
LYMPHOCYTES # BLD AUTO: 1.9 K/UL
LYMPHOCYTES NFR BLD: 38 %
MCH RBC QN AUTO: 30.8 PG
MCHC RBC AUTO-ENTMCNC: 33.1 %
MCV RBC AUTO: 93 FL
MITRAL VALVE REGURGITATION: NORMAL
MONOCYTES # BLD AUTO: 1.1 K/UL
MONOCYTES NFR BLD: 21.7 %
NEUTROPHILS # BLD AUTO: 2 K/UL
NEUTROPHILS NFR BLD: 39.9 %
PLATELET # BLD AUTO: 227 K/UL
PMV BLD AUTO: 10 FL
POTASSIUM SERPL-SCNC: 3.3 MMOL/L
PROT SERPL-MCNC: 7.2 G/DL
RBC # BLD AUTO: 4.19 M/UL
RETIRED EF AND QEF - SEE NOTES: 60 (ref 55–65)
SODIUM SERPL-SCNC: 134 MMOL/L
TRICUSPID VALVE REGURGITATION: NORMAL
WBC # BLD AUTO: 5.08 K/UL

## 2017-02-16 PROCEDURE — 11000001 HC ACUTE MED/SURG PRIVATE ROOM

## 2017-02-16 PROCEDURE — 36415 COLL VENOUS BLD VENIPUNCTURE: CPT

## 2017-02-16 PROCEDURE — G8997 SWALLOW GOAL STATUS: HCPCS | Mod: CH

## 2017-02-16 PROCEDURE — 80053 COMPREHEN METABOLIC PANEL: CPT

## 2017-02-16 PROCEDURE — 99233 SBSQ HOSP IP/OBS HIGH 50: CPT | Mod: ,,, | Performed by: INTERNAL MEDICINE

## 2017-02-16 PROCEDURE — 25000003 PHARM REV CODE 250: Performed by: INTERNAL MEDICINE

## 2017-02-16 PROCEDURE — 25000003 PHARM REV CODE 250: Performed by: HOSPITALIST

## 2017-02-16 PROCEDURE — 97530 THERAPEUTIC ACTIVITIES: CPT

## 2017-02-16 PROCEDURE — 97161 PT EVAL LOW COMPLEX 20 MIN: CPT

## 2017-02-16 PROCEDURE — 97802 MEDICAL NUTRITION INDIV IN: CPT

## 2017-02-16 PROCEDURE — 92523 SPEECH SOUND LANG COMPREHEN: CPT

## 2017-02-16 PROCEDURE — 85025 COMPLETE CBC W/AUTO DIFF WBC: CPT

## 2017-02-16 PROCEDURE — 99223 1ST HOSP IP/OBS HIGH 75: CPT | Mod: ,,, | Performed by: PSYCHIATRY & NEUROLOGY

## 2017-02-16 PROCEDURE — G8996 SWALLOW CURRENT STATUS: HCPCS | Mod: CH

## 2017-02-16 PROCEDURE — 80202 ASSAY OF VANCOMYCIN: CPT

## 2017-02-16 PROCEDURE — 63600175 PHARM REV CODE 636 W HCPCS: Performed by: INTERNAL MEDICINE

## 2017-02-16 PROCEDURE — 63600175 PHARM REV CODE 636 W HCPCS: Performed by: HOSPITALIST

## 2017-02-16 PROCEDURE — 97165 OT EVAL LOW COMPLEX 30 MIN: CPT

## 2017-02-16 PROCEDURE — 93306 TTE W/DOPPLER COMPLETE: CPT

## 2017-02-16 RX ORDER — POTASSIUM CHLORIDE 20 MEQ/1
40 TABLET, EXTENDED RELEASE ORAL ONCE
Status: COMPLETED | OUTPATIENT
Start: 2017-02-16 | End: 2017-02-16

## 2017-02-16 RX ORDER — KETOROLAC TROMETHAMINE 10 MG/1
10 TABLET, FILM COATED ORAL EVERY 6 HOURS PRN
Status: DISPENSED | OUTPATIENT
Start: 2017-02-16 | End: 2017-02-21

## 2017-02-16 RX ADMIN — CEFTRIAXONE 2 G: 2 INJECTION, SOLUTION INTRAVENOUS at 09:02

## 2017-02-16 RX ADMIN — OXYCODONE AND ACETAMINOPHEN 1 TABLET: 7.5; 325 TABLET ORAL at 08:02

## 2017-02-16 RX ADMIN — AMLODIPINE BESYLATE 10 MG: 5 TABLET ORAL at 10:02

## 2017-02-16 RX ADMIN — ACYCLOVIR SODIUM 570 MG: 50 INJECTION, SOLUTION INTRAVENOUS at 05:02

## 2017-02-16 RX ADMIN — OXYCODONE AND ACETAMINOPHEN 1 TABLET: 7.5; 325 TABLET ORAL at 06:02

## 2017-02-16 RX ADMIN — POTASSIUM CHLORIDE 40 MEQ: 1500 TABLET, EXTENDED RELEASE ORAL at 12:02

## 2017-02-16 RX ADMIN — ENOXAPARIN SODIUM 40 MG: 100 INJECTION SUBCUTANEOUS at 12:02

## 2017-02-16 RX ADMIN — ACYCLOVIR SODIUM 570 MG: 50 INJECTION, SOLUTION INTRAVENOUS at 11:02

## 2017-02-16 RX ADMIN — KETOROLAC TROMETHAMINE 10 MG: 10 TABLET, FILM COATED ORAL at 02:02

## 2017-02-16 RX ADMIN — HYDROCHLOROTHIAZIDE 25 MG: 25 TABLET ORAL at 10:02

## 2017-02-16 RX ADMIN — VANCOMYCIN HYDROCHLORIDE 1000 MG: 1 INJECTION, POWDER, LYOPHILIZED, FOR SOLUTION INTRAVENOUS at 05:02

## 2017-02-16 RX ADMIN — ATORVASTATIN CALCIUM 40 MG: 20 TABLET, FILM COATED ORAL at 10:02

## 2017-02-16 RX ADMIN — OXYCODONE AND ACETAMINOPHEN 1 TABLET: 7.5; 325 TABLET ORAL at 12:02

## 2017-02-16 RX ADMIN — ACYCLOVIR SODIUM 570 MG: 50 INJECTION, SOLUTION INTRAVENOUS at 02:02

## 2017-02-16 RX ADMIN — SODIUM CHLORIDE, PRESERVATIVE FREE 3 ML: 5 INJECTION INTRAVENOUS at 01:02

## 2017-02-16 RX ADMIN — PROMETHAZINE HYDROCHLORIDE 25 MG: 25 INJECTION INTRAMUSCULAR; INTRAVENOUS at 05:02

## 2017-02-16 RX ADMIN — SODIUM CHLORIDE, PRESERVATIVE FREE 3 ML: 5 INJECTION INTRAVENOUS at 05:02

## 2017-02-16 RX ADMIN — DEXTROSE AND SODIUM CHLORIDE: 5; .45 INJECTION, SOLUTION INTRAVENOUS at 09:02

## 2017-02-16 RX ADMIN — CEFTRIAXONE 2 G: 2 INJECTION, SOLUTION INTRAVENOUS at 10:02

## 2017-02-16 RX ADMIN — VANCOMYCIN HYDROCHLORIDE 1000 MG: 1 INJECTION, POWDER, LYOPHILIZED, FOR SOLUTION INTRAVENOUS at 01:02

## 2017-02-16 RX ADMIN — OXYCODONE AND ACETAMINOPHEN 1 TABLET: 7.5; 325 TABLET ORAL at 02:02

## 2017-02-16 RX ADMIN — NICOTINE 1 PATCH: 14 PATCH, EXTENDED RELEASE TRANSDERMAL at 10:02

## 2017-02-16 NOTE — PLAN OF CARE
Problem: Patient Care Overview  Goal: Plan of Care Review  Outcome: Ongoing (interventions implemented as appropriate)  Recommendations  1. Continue regular diet   2. RD to monitor  Goals: po intake >50% al meals  Nutrition Goal Status: new  Communication of RD Recs: other (comment) (sticky note)     Continuum of Care Plan  D/C planning: no nutrition discharge needs identified at this time

## 2017-02-16 NOTE — ASSESSMENT & PLAN NOTE
-On Rocpehin / Vanc / Ayclovir  -Clinical suspicion was viral etiology but CSF gram stain with GPC  -cont current Abx / Antivirals pending HSV PCR and CSF culture

## 2017-02-16 NOTE — SUBJECTIVE & OBJECTIVE
Past Medical History   Diagnosis Date    Abnormal Pap smear of cervix     Bacterial vaginosis      RECURRENT    Cervical dysplasia     Hypertension        Past Surgical History   Procedure Laterality Date    Cervical biopsy  w/ loop electrode excision      Hernia repair  2009     umbilical    Fibroid removal      Myomectomy         Review of patient's allergies indicates:   Allergen Reactions    Ace inhibitors Other (See Comments)     cough       Current Neurological Medications: None    No current facility-administered medications on file prior to encounter.      Current Outpatient Prescriptions on File Prior to Encounter   Medication Sig    amlodipine (NORVASC) 10 MG tablet TAKE 1 TABLET BY MOUTH EVERY DAY    hydrochlorothiazide (HYDRODIURIL) 25 MG tablet TAKE 1 TABLET BY MOUTH EVERY DAY    docusate sodium (COLACE) 100 MG capsule Take 1 capsule (100 mg total) by mouth 2 (two) times daily as needed for Constipation.    fluticasone (FLONASE) 50 mcg/actuation nasal spray 1 spray by Each Nare route 2 (two) times daily.    ketoconazole (NIZORAL) 2 % cream Apply topically 2 (two) times daily.     Family History     Problem Relation (Age of Onset)    Colon cancer Paternal Uncle    Diabetes Father, Mother    Hyperlipidemia Father    No Known Problems Brother        Social History Main Topics    Smoking status: Current Every Day Smoker    Smokeless tobacco: Never Used      Comment: 1 pack / week    Alcohol use Yes      Comment: occasional    Drug use: No    Sexual activity: No     Review of Systems   Constitutional: Negative.    HENT: Negative for hearing loss.    Eyes: Negative.  Negative for visual disturbance.   Respiratory: Negative.  Negative for shortness of breath.    Cardiovascular: Negative.  Negative for chest pain and palpitations.   Gastrointestinal: Negative.    Endocrine: Negative.    Genitourinary: Negative.    Musculoskeletal: Negative.  Negative for back pain, gait problem and neck pain.    Skin: Negative.    Allergic/Immunologic: Negative.    Neurological: Positive for speech difficulty and headaches. Negative for tremors, seizures, syncope, weakness and numbness.   Psychiatric/Behavioral: Negative.  Negative for confusion and decreased concentration.     Objective:     Vital Signs (Most Recent):  Temp: 98.3 °F (36.8 °C) (02/16/17 1200)  Pulse: 89 (02/16/17 1400)  Resp: 16 (02/16/17 1200)  BP: 134/77 (02/16/17 1200)  SpO2: 97 % (02/16/17 1200) Vital Signs (24h Range):  Temp:  [98 °F (36.7 °C)-99.8 °F (37.7 °C)] 98.3 °F (36.8 °C)  Pulse:  [61-95] 89  Resp:  [16-22] 16  SpO2:  [95 %-100 %] 97 %  BP: (123-158)/(75-94) 134/77     Weight: 75 kg (165 lb 5.5 oz)  Body mass index is 27.51 kg/(m^2).    Physical Exam   Constitutional: She is oriented to person, place, and time. She appears well-developed and well-nourished.   HENT:   Head: Normocephalic and atraumatic.   Neck: Normal range of motion. Neck supple. Carotid bruit is not present.   Neurological: She is oriented to person, place, and time. She has normal strength. She has a normal Finger-Nose-Finger Test.   Reflex Scores:       Tricep reflexes are 1+ on the right side and 1+ on the left side.       Bicep reflexes are 1+ on the right side and 1+ on the left side.       Brachioradialis reflexes are 1+ on the right side and 1+ on the left side.       Patellar reflexes are 1+ on the right side and 1+ on the left side.       Achilles reflexes are 1+ on the right side and 1+ on the left side.  Psychiatric: Her speech is normal.   Vitals reviewed.      NEUROLOGICAL EXAMINATION:     MENTAL STATUS   Oriented to person, place, and time.   Registration: recalls 3 of 3 objects.   Attention: normal. Concentration: normal.   Speech: speech is normal   Level of consciousness: drowsy  Knowledge: good.   Able to read. Able to repeat. Able to write. Normal comprehension.        Patient is drowsy but easily arousable, makes eye contact and answers questions without  any difficulty.     CRANIAL NERVES   Cranial nerves II through XII intact.     MOTOR EXAM   Muscle bulk: normal  Overall muscle tone: normal    Strength   Strength 5/5 throughout.     REFLEXES     Reflexes   Right brachioradialis: 1+  Left brachioradialis: 1+  Right biceps: 1+  Left biceps: 1+  Right triceps: 1+  Left triceps: 1+  Right patellar: 1+  Left patellar: 1+  Right achilles: 1+  Left achilles: 1+  Right plantar: normal  Left plantar: normal    SENSORY EXAM   Light touch normal.   Pinprick normal.     GAIT AND COORDINATION     Gait  Gait: (Gait not tested.)     Coordination   Finger to nose coordination: normal    Tremor   Resting tremor: absent  Intention tremor: absent  Action tremor: absent      Significant Labs: All pertinent lab results from the past 24 hours have been reviewed.    Significant Imaging: I have reviewed all pertinent imaging results/findings within the past 24 hours.

## 2017-02-16 NOTE — CONSULTS
[facility]  Neurology  Consult Note    Patient Name: Tasha Rush  MRN: [id number]  Admission Date: 2/15/2017  Hospital Length of Stay: 1 days  Code Status: Full Code   Attending Provider: Vincent Byrd MD   Consulting Provider: Raul Piedra MD  Primary Care Physician: Franklin Gonzalez MD  Principal Problem:Meningitis    Consults   Subjective:     Chief Complaint:  meingitis     HPI:   This is a 41-year-old female who presented to emergency room yesterday with complaints of headaches and intermittent confusion with word finding difficulties that started about a day or 2 prior seen in the emergency room yesterday.  She woke up yesterday morning with a headache, but went to work as normal.  The headache did not respond to over-the-counter medications.  She did report that the night prior she felt fatigued and feverish and did take an Ligia-Randolph before bed.  She has no prior history of migraines.  The headaches are primarily frontal becoming generalized.  She denies any weakness or gait instability.    Following admission to the hospital she had an MRI scan of the brain, with and without contrast, that showed no evidence of acute infarct.  Increased flair signal within sulci, with corresponding subtly restricted diffusion along sulci over both convexities and enhancement.  Findings are most suspicious for infectious etiology/meningitis.  MRA of the head and neck was normal. A spinal tap done was done.  CSF glucose was 56, protein was mildly elevated 50 (N 15-40), WBC 23 (perifocal sent segmented neutrophils, 65% lymphocytes and 11% Mono/macrophage. Opening pressure of 17 mm H2O.  CSF culture no growth (02/16/2017).         Past Medical History   Diagnosis Date    Abnormal Pap smear of cervix     Bacterial vaginosis      RECURRENT    Cervical dysplasia     Hypertension        Past Surgical History   Procedure Laterality Date    Cervical biopsy  w/ loop electrode excision       Hernia repair  2009     umbilical    Fibroid removal      Myomectomy         Review of patient's allergies indicates:   Allergen Reactions    Ace inhibitors Other (See Comments)     cough       Current Neurological Medications: None    No current facility-administered medications on file prior to encounter.      Current Outpatient Prescriptions on File Prior to Encounter   Medication Sig    amlodipine (NORVASC) 10 MG tablet TAKE 1 TABLET BY MOUTH EVERY DAY    hydrochlorothiazide (HYDRODIURIL) 25 MG tablet TAKE 1 TABLET BY MOUTH EVERY DAY    docusate sodium (COLACE) 100 MG capsule Take 1 capsule (100 mg total) by mouth 2 (two) times daily as needed for Constipation.    fluticasone (FLONASE) 50 mcg/actuation nasal spray 1 spray by Each Nare route 2 (two) times daily.    ketoconazole (NIZORAL) 2 % cream Apply topically 2 (two) times daily.     Family History     Problem Relation (Age of Onset)    Colon cancer Paternal Uncle    Diabetes Father, Mother    Hyperlipidemia Father    No Known Problems Brother        Social History Main Topics    Smoking status: Current Every Day Smoker    Smokeless tobacco: Never Used      Comment: 1 pack / week    Alcohol use Yes      Comment: occasional    Drug use: No    Sexual activity: No     Review of Systems   Constitutional: Negative.    HENT: Negative for hearing loss.    Eyes: Negative.  Negative for visual disturbance.   Respiratory: Negative.  Negative for shortness of breath.    Cardiovascular: Negative.  Negative for chest pain and palpitations.   Gastrointestinal: Negative.    Endocrine: Negative.    Genitourinary: Negative.    Musculoskeletal: Negative.  Negative for back pain, gait problem and neck pain.   Skin: Negative.    Allergic/Immunologic: Negative.    Neurological: Positive for speech difficulty and headaches. Negative for tremors, seizures, syncope, weakness and numbness.   Psychiatric/Behavioral: Negative.  Negative for confusion and decreased  concentration.     Objective:     Vital Signs (Most Recent):  Temp: 98.3 °F (36.8 °C) (02/16/17 1200)  Pulse: 89 (02/16/17 1400)  Resp: 16 (02/16/17 1200)  BP: 134/77 (02/16/17 1200)  SpO2: 97 % (02/16/17 1200) Vital Signs (24h Range):  Temp:  [98 °F (36.7 °C)-99.8 °F (37.7 °C)] 98.3 °F (36.8 °C)  Pulse:  [61-95] 89  Resp:  [16-22] 16  SpO2:  [95 %-100 %] 97 %  BP: (123-158)/(75-94) 134/77     Weight: 75 kg (165 lb 5.5 oz)  Body mass index is 27.51 kg/(m^2).    Physical Exam   Constitutional: She is oriented to person, place, and time. She appears well-developed and well-nourished.   HENT:   Head: Normocephalic and atraumatic.   Neck: Normal range of motion. Neck supple. Carotid bruit is not present.   Neurological: She is oriented to person, place, and time. She has normal strength. She has a normal Finger-Nose-Finger Test.   Reflex Scores:       Tricep reflexes are 1+ on the right side and 1+ on the left side.       Bicep reflexes are 1+ on the right side and 1+ on the left side.       Brachioradialis reflexes are 1+ on the right side and 1+ on the left side.       Patellar reflexes are 1+ on the right side and 1+ on the left side.       Achilles reflexes are 1+ on the right side and 1+ on the left side.  Psychiatric: Her speech is normal.   Vitals reviewed.      NEUROLOGICAL EXAMINATION:     MENTAL STATUS   Oriented to person, place, and time.   Registration: recalls 3 of 3 objects.   Attention: normal. Concentration: normal.   Speech: speech is normal   Level of consciousness: drowsy  Knowledge: good.   Able to read. Able to repeat. Able to write. Normal comprehension.        Patient is drowsy but easily arousable, makes eye contact and answers questions without any difficulty.     CRANIAL NERVES   Cranial nerves II through XII intact.     MOTOR EXAM   Muscle bulk: normal  Overall muscle tone: normal    Strength   Strength 5/5 throughout.     REFLEXES     Reflexes   Right brachioradialis: 1+  Left  brachioradialis: 1+  Right biceps: 1+  Left biceps: 1+  Right triceps: 1+  Left triceps: 1+  Right patellar: 1+  Left patellar: 1+  Right achilles: 1+  Left achilles: 1+  Right plantar: normal  Left plantar: normal    SENSORY EXAM   Light touch normal.   Pinprick normal.     GAIT AND COORDINATION     Gait  Gait: (Gait not tested.)     Coordination   Finger to nose coordination: normal    Tremor   Resting tremor: absent  Intention tremor: absent  Action tremor: absent      Significant Labs: All pertinent lab results from the past 24 hours have been reviewed.    Significant Imaging: I have reviewed all pertinent imaging results/findings within the past 24 hours.    Assessment and Plan:     * Meningitis  The patient's clinical history and workup done was consistent with an aseptic meningitis most likely viral in origin.  The transient mental status changes may be related to an associated encephalitis, that is now improving.  Recommendations: Continue present medical management.  May discontinue antibiotics if cultures come back negative.  Discussed diagnosis with patient and mother.  Gradual mobilization once patient is feeling better.  ID consultation.      VTE Risk Mitigation         Ordered     enoxaparin injection 40 mg  Daily     Route:  Subcutaneous        02/15/17 1625     Medium Risk of VTE  Once      02/15/17 1625          Thank you for your consult. I will sign off. Please contact us if you have any additional questions.    Raul Piedra MD  Neurology  [facility]

## 2017-02-16 NOTE — PT/OT/SLP EVAL
"Speech Language Pathology Evaluation    Tasha Rush   MRN: [id number]   Admitting Diagnosis: Meningitis    Diet recommendations: Solid Diet Level: Regular  Liquid Diet Level: Thin     SLP Treatment Date: 17  Speech Start Time: 1355     Speech Stop Time: 1415     Speech Total (min): 20 min       TREATMENT BILLABLE MINUTES:  Eval 20     Diagnosis: Meningitis  Aphasia    Past Medical History   Diagnosis Date    Abnormal Pap smear of cervix     Bacterial vaginosis      RECURRENT    Cervical dysplasia     Hypertension      Past Surgical History   Procedure Laterality Date    Cervical biopsy  w/ loop electrode excision      Hernia repair  2009     umbilical    Fibroid removal      Myomectomy         Has the patient been evaluated by SLP for swallowing? : Yes  Keep patient NPO?: No   General Precautions: Standard,            Social Hx: Patient lives with family.    Occupational/hobbies/homemaking: medical assistant    Subjective:  'I could not say my name yesterday"    Pain Ratin/10     Pain Rating Post-Intervention: 0/10    Objective:        Oral Musculature Evaluation  Oral Musculature: WFL  Dentition: present and adequate  Mucosal Quality: good  Mandibular Strength and Mobility: WFL  Oral Labial Strength and Mobility: WFL  Lingual Strength and Mobility: WFL  Velar Elevation: WFL  Buccal Strength and Mobility: WFL  Volitional Cough: strong  Volitional Swallow: no delay  Voice Prior to PO Intake: wfl     Cognitive Status:  Behavioral Observations: alert-  Memory and Orientation: Increased time needed to recall month, year and place with self corrections noted.  Recall of remote temporal and general information was good.     Attention: wfl  Problem Solving: Responses to hypothetical verbal problem solving tasks were impaired secondary to expressive language deficits.  Pt. Required increased time to generate response with word finding deficits noted.  She provided simple solutions to problems but did not " accurately compare or contrast objects or generate multiple solutions to problelms.  Thought organization and categorization skills were impaired due to word finding deficts with pt. Naming 11 animals given one minute and verbal cues when 15-20 are wnl.      Executive Function: mental flexibility and organization    Language: delayed and circumlocutious    Auditory Comprehension: Pt. Responded to complex yes/no questions with 80% accuracy and followed multi step commands with 60% ac    Verbal Expression: Spontaneous speech was good in conversation and pt. Did name objects with 100% acc.  She recalled nounos in response to questions with 100% accuracy.  Word finding deficits were noted in conversation when asked to express more complex ideas/thoughts     Motor Speech: wfl    Voice: wfl      FIM:  Social Interaction: 5 Supervision--The patient requires supervision (e.g., monitoring, verbal control, cueing, or coaxing) only under stressful or unfamiliar conditions, but less than 10% of the time.  The patient may require encouragement to initiate participation.   Problem Solvin Minimal Direction--The patient solves routine problems 75 to 90% of the time.   Comprehension: 5 Standby Prompting--The patient understands directions and conversation about basic daily needs more than 90% of the time.  The patient requires prompting (slowed speech rate, use of repetition, stressing particular words or phrases, pauses, visual or   Expression: 4 Minimal Prompting--The patient expresses basic daily needs and ideas 75 to 90% of the time.   Memory: 4 Minimal Prompting--The patient recognizes and remembers 75 to 90% of the time.     Assessment:  Tasha Rush is a 41 y.o. female with a SLP diagnosis of Aphasia. She present with receptive and expressive language deficits.    Do you have any cultural, spiritual, Confucianism conflicts, given your current situation?: no    Discharge recommendations: Discharge Facility/Level Of Care  Needs: outpatient speech therapy     Goals:   SLP Goals        Problem: SLP Goal    Goal Priority Disciplines Outcome   SLP Goal     SLP    Description:  Speech Language Pathology Goals  Goals expected to be met by 2/23  1.  Respond to complex yes/no questions with 100% accuracy  2.  Follow multi step commands with 90% accuracy  3.  Respond to word retrieval tasks with 80% accuracy  4.  Respond to sentence formulation tasks with 80% accuracy  5.  Respond to problem solving tasks with 80% acc  6.  Assess functional reading and writing skills                    Plan:   Patient to be seen Therapy Frequency:  (3-5 x)   Plan of Care expires: 03/18/17  Plan of Care reviewed with: patient, mother  SLP Follow-up?: Yes              Meaghan Allison MA, CCC-SLP  02/16/2017

## 2017-02-16 NOTE — PLAN OF CARE
Problem: Patient Care Overview  Goal: Plan of Care Review  Outcome: Ongoing (interventions implemented as appropriate)  Pt free of trauma, falls, and injury. Pt VSS and afebrile throughout shift. Pt free of skin breakdown. Pt neurovascular checks are intact. Pt pain has been moderately controlled by PO pain meds and tolerated well. Pt has ambulated with nurse to the restroom in no distress. Pt has been  voiding adequately throughout shift. Purposeful rounding done. Pt has call light in reach, bed alarm on, bed brakes on, side rails up x2, bed in low position, and nonskid socks on. Pt lying in bed in no distress. Will continue to monitor.

## 2017-02-16 NOTE — CONSULTS
[facility]  Adult Nutrition  Consult Note    SUMMARY     Recommendations  1. Continue regular diet   2. RD to monitor  Goals: po intake >50% al meals  Nutrition Goal Status: new  Communication of RD Recs: other (comment) (sticky note)    Continuum of Care Plan  D/C planning: no nutrition discharge needs identified at this time    Reason for Assessment  Reason for Assessment: physician consult  Diagnosis:  (Meningitis)  Relevent Medical History: HTN   Interdisciplinary Rounds: did not attend  General Information Comments: Pt asleep at time of visit    Nutrition Prescription Ordered  Current Diet Order: Regular  Oral Nutrition Supplement: none       Nutrition Risk Screen   Nutrition Risk Screen: no indicators present    Nutrition/Diet History  Patient Reported Diet/Restrictions/Preferences: general     Labs/Tests/Procedures/Meds  Pertinent Labs Reviewed: reviewed  Pertinent Medications Reviewed: reviewed       Physical Findings  Overall Physical Appearance: overweight       Anthropometrics  Height (inches): 65 in  Weight Method: Stated  Weight (kg): 75 kg  Ideal Body Weight (IBW), Female: 125 lb  % Ideal Body Weight, Female (lb): 132.28 lb  BMI (kg/m2): 27.51  BMI Grade: 25 - 29.9 - overweight        Estimated/Assessed Needs  Weight Used For Calorie Calculations: 75 kg (165 lb 5.5 oz)   Height (cm): 165.1 cm  Energy Need Method: Pender-St Jeor (1760kcal/day (X 1.25))  RMR (Pender-St. Jeor Equation): 1408.57  Weight Used For Protein Calculations: 75 kg (165 lb 5.5 oz)  Protein Requirements: 60-75g/day  0.8 gm Protein (gm): 60.13 and 1.0 gm Protein (gm): 75.16  Fluid Need Method: RDA Method (1 ml per kcal or per MD)     No nutrition diagnosis at this time    Monitor and Evaluation  Food and Nutrient Intake: energy intake, food and beverage intake  Food and Nutrient Adminstration: diet order  Physical Activity and Function: nutrition-related ADLs and IADLs  Anthropometric Measurements: weight  change, weight  Biochemical Data, Medical Tests and Procedures: electrolyte and renal panel, lipid profile  Nutrition-Focused Physical Findings: overall appearance    Nutrition Risk    Level of Risk: low, follow once weekly    Nutrition Follow-Up     yes    Assessment and Plan    No new Assessment & Plan notes have been filed under this hospital service since the last note was generated.  Service: Nutrition

## 2017-02-16 NOTE — PROGRESS NOTES
[facility] Hospital Medicine  Progress Note    Patient Name: Tasha Rush  MRN: [id number]  Patient Class: IP- Inpatient   Admission Date: 2/15/2017  Length of Stay: 1 days  Attending Physician: Vincent Byrd MD  Primary Care Provider: Franklin Gonzalez MD        Subjective:     Principal Problem:Meningitis       Interval History:   feeling better. Speech more fluent. HA now 8/10  CSF g-stain with GPC    Review of Systems   Constitutional: Negative for chills and fever.     PE: alert/ oriented  Neck supple  More fluent speech  No focal neuro deficits    Objective:     Vital Signs (Most Recent):  Temp: 98.4 °F (36.9 °C) (02/16/17 0905)  Pulse: 63 (02/16/17 0905)  Resp: 18 (02/16/17 0401)  BP: (!) 136/94 (02/16/17 0905)  SpO2: 100 % (02/16/17 0905) Vital Signs (24h Range):  Temp:  [98 °F (36.7 °C)-100.1 °F (37.8 °C)] 98.4 °F (36.9 °C)  Pulse:  [61-95] 63  Resp:  [16-22] 18  SpO2:  [95 %-100 %] 100 %  BP: (123-158)/(75-94) 136/94     Weight: 75 kg (165 lb 5.5 oz)  Body mass index is 27.51 kg/(m^2).    Intake/Output Summary (Last 24 hours) at 02/16/17 1118  Last data filed at 02/16/17 0545   Gross per 24 hour   Intake              625 ml   Output              300 ml   Net              325 ml      Physical Exam    Significant Labs:   BMP:   Recent Labs  Lab 02/16/17  0539   *   *   K 3.3*      CO2 23   BUN 7   CREATININE 0.8   CALCIUM 9.0     CBC:   Recent Labs  Lab 02/15/17  0903 02/16/17  0539   WBC 8.28 5.08   HGB 14.0 12.9   HCT 41.2 39.0    227     Results for TASHA RUSH (MRN [id number]) as of 2/16/2017 11:20   Ref. Range 2/15/2017 15:48   Color, CSF Latest Ref Range: Colorless  Colorless   Heme Aliquot Latest Units: mL 2.0   Appearance, CSF Latest Ref Range: Clear  Slightly hazy (A)   WBC, CSF Latest Ref Range: 0 - 5 /cu mm 23 (H)   RBC, CSF Latest Ref Range: 0 /cu mm 1036.5 (A)   Segmented Neutrophils, CSF Latest Ref Range: 0 - 6 % 24 (H)   Lymphs, CSF Latest  Ref Range: 40 - 80 % 65   Mono/Macrophage, CSF Latest Ref Range: 15 - 45 % 11 (L)   Glucose, CSF Latest Ref Range: 40 - 70 mg/dL 56   Protein, CSF Latest Ref Range: 15 - 40 mg/dL 50 (H)       Significant Imaging: I have reviewed all pertinent imaging results/findings within the past 24 hours.     MRI:No evidence of acute infarct.    Increased flair signal within sulci, with corresponding subtly restricted diffusion along sulci over both convexities and enhancement.  Findings are most suspicious for infectious etiology/meningitis.    Assessment/Plan:      * Meningitis  -On Rocpehin / Vanc / Ayclovir  -Clinical suspicion was viral etiology but CSF gram stain with GPC  -cont current Abx / Antivirals pending HSV PCR and CSF culture      Tobacco abuse  Nicotine patch  Cessation education      Essential hypertension  Home meds and monitor      Hyponatremia  Mild, monitor  IVF    Headache  Symptomatic treatment f with nsaids / opioids prn      Hypokalemia  replace    Aphasia  -improved   -speech therapy consult      VTE Risk Mitigation         Ordered     enoxaparin injection 40 mg  Daily     Route:  Subcutaneous        02/15/17 1625     Medium Risk of VTE  Once      02/15/17 1625          Lloyd Walsh MD  Department of Hospital Medicine   [facility]

## 2017-02-16 NOTE — PLAN OF CARE
"Problem: Patient Care Overview  Goal: Plan of Care Review  Outcome: Ongoing (interventions implemented as appropriate)  1745 Patient arrived from ED, beside report done with ANABELL Santiago. Patient remains supine on stretcher for the remainder of 2 hour time past LP. Mother at bedside. Patient unable to state name or birthdate at this time. C/o being cold and then hot, c/o headache. Mom would like to order dinner for patient. Paged Dr. Byrd for orders regarding pain and diet.   1800 Moved patient from stretcher to bed. Crying inconsolably asking for head ache relief. MD paged again.   1830 Unable to reach Dr. Byrd, paged Dr. Walsh to address patient issues as Dr. Walsh has written orders on this patient. Obtained orders for medication and diet.   1836 Returned to room with IV Dilauded for pain. Patient states that she is allergic. When asked what kind of reaction patient unable to articulate. She said, "tickles".   Medicine wasted with another RN and patient was given PO pain medication.       "

## 2017-02-16 NOTE — ASSESSMENT & PLAN NOTE
The patient's clinical history and workup done was consistent with an aseptic meningitis most likely viral in origin.  The transient mental status changes may be related to an associated encephalitis, that is now improving.  Recommendations: Continue present medical management.  May discontinue antibiotics if cultures come back negative.  Discussed diagnosis with patient and mother.  Gradual mobilization once patient is feeling better.  ID consultation.

## 2017-02-16 NOTE — PLAN OF CARE
Problem: Physical Therapy Goal  Goal: Physical Therapy Goal  Goals to be met by: 17     Patient will increase functional independence with mobility by performin. Gait > 300 feet independently.   2. Ascend/descend 12 stairs with unilateral handrails with supervision.  Outcome: Ongoing (interventions implemented as appropriate)  PT evaluation completed. Good strength and balance noted with mobility including ambulation in halls > 350 ft. Noted some difficulty answering questions and processing multi-step commands. Will continue to follow and progress as tolerated. Please see progress note for detailed plan of care and recommendations.

## 2017-02-16 NOTE — SUBJECTIVE & OBJECTIVE
Interval History:   feeling better. Speech more fluent. HA now 8/10  CSF g-stain with GPC    Review of Systems   Constitutional: Negative for chills and fever.     Objective:     Vital Signs (Most Recent):  Temp: 98.4 °F (36.9 °C) (02/16/17 0905)  Pulse: 63 (02/16/17 0905)  Resp: 18 (02/16/17 0401)  BP: (!) 136/94 (02/16/17 0905)  SpO2: 100 % (02/16/17 0905) Vital Signs (24h Range):  Temp:  [98 °F (36.7 °C)-100.1 °F (37.8 °C)] 98.4 °F (36.9 °C)  Pulse:  [61-95] 63  Resp:  [16-22] 18  SpO2:  [95 %-100 %] 100 %  BP: (123-158)/(75-94) 136/94     Weight: 75 kg (165 lb 5.5 oz)  Body mass index is 27.51 kg/(m^2).    Intake/Output Summary (Last 24 hours) at 02/16/17 1118  Last data filed at 02/16/17 0545   Gross per 24 hour   Intake              625 ml   Output              300 ml   Net              325 ml      Physical Exam    Significant Labs:   BMP:   Recent Labs  Lab 02/16/17  0539   *   *   K 3.3*      CO2 23   BUN 7   CREATININE 0.8   CALCIUM 9.0     CBC:   Recent Labs  Lab 02/15/17  0903 02/16/17  0539   WBC 8.28 5.08   HGB 14.0 12.9   HCT 41.2 39.0    227     Results for DAX GAYLE (MRN [id number]) as of 2/16/2017 11:20   Ref. Range 2/15/2017 15:48   Color, CSF Latest Ref Range: Colorless  Colorless   Heme Aliquot Latest Units: mL 2.0   Appearance, CSF Latest Ref Range: Clear  Slightly hazy (A)   WBC, CSF Latest Ref Range: 0 - 5 /cu mm 23 (H)   RBC, CSF Latest Ref Range: 0 /cu mm 1036.5 (A)   Segmented Neutrophils, CSF Latest Ref Range: 0 - 6 % 24 (H)   Lymphs, CSF Latest Ref Range: 40 - 80 % 65   Mono/Macrophage, CSF Latest Ref Range: 15 - 45 % 11 (L)   Glucose, CSF Latest Ref Range: 40 - 70 mg/dL 56   Protein, CSF Latest Ref Range: 15 - 40 mg/dL 50 (H)       Significant Imaging: I have reviewed all pertinent imaging results/findings within the past 24 hours.     MRI:No evidence of acute infarct.    Increased flair signal within sulci, with corresponding subtly restricted diffusion  along sulci over both convexities and enhancement.  Findings are most suspicious for infectious etiology/meningitis.

## 2017-02-16 NOTE — PT/OT/SLP EVAL
"Physical Therapy  Evaluation and Treatment    Tasha Rush   MRN: [id number]   Admitting Diagnosis: Meningitis  Pre-op Diagnosis: Confusion [R41.0] s/p Procedure(s):  PUNCTURE-LUMBAR     PT Received On: 17  PT Start Time: 1033     PT Stop Time: 1052    PT Total Time (min): 19 min       Billable Minutes:  Evaluation 10 and Therapeutic Activity 9    Diagnosis: Meningitis  Pre-op Diagnosis: Confusion [R41.0] s/p Procedure(s):  PUNCTURE-LUMBAR       Past Medical History   Diagnosis Date    Abnormal Pap smear of cervix     Bacterial vaginosis      RECURRENT    Cervical dysplasia     Hypertension       Past Surgical History   Procedure Laterality Date    Cervical biopsy  w/ loop electrode excision      Hernia repair  2009     umbilical    Fibroid removal      Myomectomy         Referring physician: 2/15/17  Date referred to PT: Gayathri    General Precautions: Standard,  (fall risk)  Orthopedic Precautions: N/A   Braces: N/A       Do you have any cultural, spiritual, Judaism conflicts, given your current situation?: none specified    Patient History:  Living Environment Comment: Pt reports she lives alone in a 2 story apartment/townhouse with 0 steps to enter and 12 steps with unilateral to second floor bedroom and bathroom with tub/shower. 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 She goes to her mother's house next door for meals.  Equipment Currently Used at Home: none  DME owned (not currently used): none    Previous Level of Function:  Ambulation Skills: independent  Transfer Skills: independent  ADL Skills: independent  Work/Leisure Activity: independent    Subjective:  Communicated with nurse prior to session.  Pt stated "can you ask me again? Sorry." re: orientation to time. Reported she felt "confused."    Chief Complaint: confusion   Patient goals: go home    Pain Ratin/10   Location:  (headache)  Pain Rating Post-Intervention: " 6/10    Objective:    Pt found supine in bed with HOB elevated.     Cognitive Exam:  Oriented to: Person and Place    Follows Commands/attention: Follows one-step commands  Communication: decreased rate and fluency of speech  Safety awareness/insight to disability: intact    Physical Exam:  Postural examination/scapula alignment: No postural abnormalities identified    Skin integrity: Visible skin intact  Edema: None noted     Sensation:   No c/o paresthesias    Lower Extremity Range of Motion:  Right Lower Extremity: WNL  Left Lower Extremity: WNL    Lower Extremity Strength:   5/5 grossly throughout hips, knees, ankles       Fine motor coordination:  Impaired finger thumb opposition bilateral R>L, impaired finger to nose bilaterally. Intact heel to shin bilaterally.     Functional Mobility:  Bed Mobility:  Supine to Sit: Independent  Sit to Supine: Independent    Transfers:  Sit <> Stand Assistance: Stand By Assistance  Sit <> Stand Assistive Device: No Assistive Device    Gait:   Gait Distance: > 350 ft on level tile, grossly symmetrical step length and good foot clearance during swing bilaterally  Assistance 1: Contact Guard Assistance  Gait Assistive Device: No device  Gait Pattern: reciprocal      Balance:   Static Sit: NORMAL: No deviations seen in posture held statically  Dynamic Sit: NORMAL: No deviations seen in posture held dynamically  Static Stand: NORMAL: No deviations seen in posture held statically  Dynamic stand: NORMAL: No deviations seen in posture held dynamically    Pt demonstrated ability to doff and on socks. Discussed role of PT and safety for OOB mobility with peripheral IV.     AM-PAC 6 CLICK MOBILITY  How much help from another person does this patient currently need?   1 = Unable, Total/Dependent Assistance  2 = A lot, Maximum/Moderate Assistance  3 = A little, Minimum/Contact Guard/Supervision  4 = None, Modified [place]/Independent    Turning over in bed (including adjusting  bedclothes, sheets and blankets)?: 4  Sitting down on and standing up from a chair with arms (e.g., wheelchair, bedside commode, etc.): 3  Moving from lying on back to sitting on the side of the bed?: 4  Moving to and from a bed to a chair (including a wheelchair)?: 3  Need to walk in hospital room?: 3  Climbing 3-5 steps with a railing?: 3  Total Score: 20     AM-PAC Raw Score CMS G-Code Modifier Level of Impairment Assistance   6 % Total / Unable   7 - 9 CM 80 - 100% Maximal Assist   10 - 14 CL 60 - 80% Moderate Assist   15 - 19 CK 40 - 60% Moderate Assist   20 - 22 CJ 20 - 40% Minimal Assist   23 CI 1-20% SBA / CGA   24 CH 0% Independent/ Mod I     Patient left supine with all lines intact, call button in reach, bed alarm on and nurse notified.    Assessment:   Tasha Rush is a 41 y.o. female with a medical diagnosis of Meningitis. PT evaluation completed. Good strength and balance noted with mobility including ambulation in halls > 350 ft. Noted some difficulty answering questions and processing multi-step commands. Pt would benefit from continued skilled PT to maximize independence and safety with functional mobility.      Rehab identified problem list/impairments: Rehab identified problem list/impairments: impaired coordination, impaired cognition    Rehab potential is good.    Activity tolerance: Excellent    Discharge recommendations: Discharge Facility/Level Of Care Needs: outpatient OT, outpatient speech therapy, outpatient PT     Barriers to discharge: Barriers to Discharge: None    Equipment recommendations: Equipment Needed After Discharge: none     GOALS:   Physical Therapy Goals        Problem: Physical Therapy Goal    Goal Priority Disciplines Outcome Goal Variances Interventions   Physical Therapy Goal     PT/OT, PT Ongoing (interventions implemented as appropriate)     Description:  Goals to be met by: 2/28/17     Patient will increase functional independence with mobility by  performin. Gait > 300 feet independently.    2. Ascend/descend 12 stairs with unilateral handrails with supervision.                PLAN:    Patient to be seen 4 x/week to address the above listed problems via gait training, therapeutic activities, therapeutic exercises, neuromuscular re-education  Plan of Care expires: 17  Plan of Care reviewed with: patient          Erica Mandel, PT  2017

## 2017-02-16 NOTE — PROGRESS NOTES
Results of MRI and LP reviewed  MRI shows no acute infarct but  Increased flair signal within sulci, with corresponding subtly restricted diffusion along sulci over both convexities and enhancement.  Findings are most suspicious for infectious etiology/meningitis    LP  Results for DAX GAYLE (MRN [id number]) as of 2/15/2017 19:35   Ref. Range 2/15/2017 15:48   Color, CSF Latest Ref Range: Colorless  Colorless   Heme [place] Latest Units: mL 2.0   Appearance, CSF Latest Ref Range: Clear  Slightly hazy (A)   WBC, CSF Latest Ref Range: 0 - 5 /cu mm 23 (H)   RBC, CSF Latest Ref Range: 0 /cu mm 1036.5 (A)   Segmented Neutrophils, CSF Latest Ref Range: 0 - 6 % 24 (H)   Lymphs, CSF Latest Ref Range: 40 - 80 % 65   Mono/Macrophage, CSF Latest Ref Range: 15 - 45 % 11 (L)   Glucose, CSF Latest Ref Range: 40 - 70 mg/dL 56   Protein, CSF Latest Ref Range: 15 - 40 mg/dL 50 (H)     Clinical suspicion is viral meningitis / encephalitis given normal glucose and lymphocytic  predominance  Will start Acyclovir ( HSV pending)  Empiric [place] and Vanc pending neg CSF cultures

## 2017-02-16 NOTE — PLAN OF CARE
CM met with pt and her mother at bedside for initial assessment.  Pt was independent with no needs prior to this hospital admission.   Tests in progress.  CM to follow.     02/16/17 0830   Discharge Assessment   Assessment Type Discharge Planning Assessment   Confirmed/corrected address and phone number on facesheet? Yes   Assessment information obtained from? Patient;Caregiver;Medical Record   Expected Length of Stay (days) 3   Communicated expected length of stay with patient/caregiver yes   Type of Healthcare Directive Received (Pt has no MPOA or living will at this time)   Prior to hospitilization cognitive status: Alert/Oriented   Prior to hospitalization functional status: Independent   Current cognitive status: Alert/Oriented   Current Functional Status: Independent   Arrived From home or self-care   Lives With alone   Able to Return to Prior Arrangements unable to determine at this time (comments)   Is patient able to care for self after discharge? Unable to determine at this time (comments)   Who are your caregiver(s) and their phone number(s)? Maycol Rush, father, [phone]   Patient's perception of discharge disposition other (comments)  (unsure at this time)   Readmission Within The Last 30 Days no previous admission in last 30 days   Patient currently being followed by outpatient case management? No   Patient currently receives home health services? No   Does the patient currently use HME? No   Patient currently receives private duty nursing? No   Patient currently receives any other outside agency services? No   Equipment Currently Used at Home none   Do you have any problems affording any of your prescribed medications? No   Is the patient taking medications as prescribed? yes   Do you have any financial concerns preventing you from receiving the healthcare you need? No   Does the patient have transportation to healthcare appointments? Yes   Transportation Available family or friend will  provide;car   On Dialysis? No   Does the patient receive services at the [facility]? No   Are there any open cases? No   Discharge Plan A Home   Discharge Plan B Home with family   Patient/Family In Agreement With Plan yes

## 2017-02-17 LAB
ALBUMIN SERPL BCP-MCNC: 3.8 G/DL
ALP SERPL-CCNC: 77 U/L
ALT SERPL W/O P-5'-P-CCNC: 14 U/L
ANION GAP SERPL CALC-SCNC: 17 MMOL/L
AST SERPL-CCNC: 19 U/L
BASOPHILS # BLD AUTO: 0.03 K/UL
BASOPHILS NFR BLD: 0.5 %
BILIRUB SERPL-MCNC: 0.9 MG/DL
BUN SERPL-MCNC: 7 MG/DL
CALCIUM SERPL-MCNC: 9.1 MG/DL
CHLORIDE SERPL-SCNC: 101 MMOL/L
CO2 SERPL-SCNC: 18 MMOL/L
CREAT SERPL-MCNC: 0.8 MG/DL
DIFFERENTIAL METHOD: NORMAL
EOSINOPHIL # BLD AUTO: 0 K/UL
EOSINOPHIL NFR BLD: 0.5 %
ERYTHROCYTE [DISTWIDTH] IN BLOOD BY AUTOMATED COUNT: 13.8 %
EST. GFR  (AFRICAN AMERICAN): >60 ML/MIN/1.73 M^2
EST. GFR  (NON AFRICAN AMERICAN): >60 ML/MIN/1.73 M^2
GLUCOSE SERPL-MCNC: 126 MG/DL
HCT VFR BLD AUTO: 41.4 %
HGB BLD-MCNC: 13.6 G/DL
LYMPHOCYTES # BLD AUTO: 2.4 K/UL
LYMPHOCYTES NFR BLD: 42.9 %
MCH RBC QN AUTO: 30.4 PG
MCHC RBC AUTO-ENTMCNC: 32.9 %
MCV RBC AUTO: 92 FL
MONOCYTES # BLD AUTO: 0.7 K/UL
MONOCYTES NFR BLD: 12.8 %
NEUTROPHILS # BLD AUTO: 2.4 K/UL
NEUTROPHILS NFR BLD: 42.9 %
PLATELET # BLD AUTO: 236 K/UL
PMV BLD AUTO: 9.5 FL
POTASSIUM SERPL-SCNC: 2.9 MMOL/L
PROT SERPL-MCNC: 7.3 G/DL
RBC # BLD AUTO: 4.48 M/UL
SODIUM SERPL-SCNC: 136 MMOL/L
VANCOMYCIN TROUGH SERPL-MCNC: 7.5 UG/ML
WBC # BLD AUTO: 5.62 K/UL

## 2017-02-17 PROCEDURE — 11000001 HC ACUTE MED/SURG PRIVATE ROOM

## 2017-02-17 PROCEDURE — 97116 GAIT TRAINING THERAPY: CPT

## 2017-02-17 PROCEDURE — 25000003 PHARM REV CODE 250: Performed by: HOSPITALIST

## 2017-02-17 PROCEDURE — 63600175 PHARM REV CODE 636 W HCPCS: Performed by: HOSPITALIST

## 2017-02-17 PROCEDURE — C1751 CATH, INF, PER/CENT/MIDLINE: HCPCS

## 2017-02-17 PROCEDURE — 36569 INSJ PICC 5 YR+ W/O IMAGING: CPT

## 2017-02-17 PROCEDURE — 97110 THERAPEUTIC EXERCISES: CPT

## 2017-02-17 PROCEDURE — 36415 COLL VENOUS BLD VENIPUNCTURE: CPT

## 2017-02-17 PROCEDURE — 80053 COMPREHEN METABOLIC PANEL: CPT

## 2017-02-17 PROCEDURE — 97532 *HC OT COG SKL DEV EA 15: CPT

## 2017-02-17 PROCEDURE — 97112 NEUROMUSCULAR REEDUCATION: CPT

## 2017-02-17 PROCEDURE — 85025 COMPLETE CBC W/AUTO DIFF WBC: CPT

## 2017-02-17 PROCEDURE — 63600175 PHARM REV CODE 636 W HCPCS: Performed by: INTERNAL MEDICINE

## 2017-02-17 PROCEDURE — 97530 THERAPEUTIC ACTIVITIES: CPT

## 2017-02-17 PROCEDURE — 25000003 PHARM REV CODE 250: Performed by: INTERNAL MEDICINE

## 2017-02-17 PROCEDURE — 02HV33Z INSERTION OF INFUSION DEVICE INTO SUPERIOR VENA CAVA, PERCUTANEOUS APPROACH: ICD-10-PCS | Performed by: HOSPITALIST

## 2017-02-17 RX ORDER — POTASSIUM CHLORIDE 20 MEQ/1
40 TABLET, EXTENDED RELEASE ORAL ONCE
Status: COMPLETED | OUTPATIENT
Start: 2017-02-17 | End: 2017-02-17

## 2017-02-17 RX ORDER — SODIUM CHLORIDE AND POTASSIUM CHLORIDE 150; 450 MG/100ML; MG/100ML
INJECTION, SOLUTION INTRAVENOUS CONTINUOUS
Status: DISCONTINUED | OUTPATIENT
Start: 2017-02-17 | End: 2017-02-18

## 2017-02-17 RX ADMIN — OXYCODONE AND ACETAMINOPHEN 1 TABLET: 7.5; 325 TABLET ORAL at 01:02

## 2017-02-17 RX ADMIN — HYDROCHLOROTHIAZIDE 25 MG: 25 TABLET ORAL at 09:02

## 2017-02-17 RX ADMIN — ACYCLOVIR SODIUM 570 MG: 50 INJECTION, SOLUTION INTRAVENOUS at 12:02

## 2017-02-17 RX ADMIN — SODIUM CHLORIDE, PRESERVATIVE FREE 3 ML: 5 INJECTION INTRAVENOUS at 12:02

## 2017-02-17 RX ADMIN — VANCOMYCIN HYDROCHLORIDE 1000 MG: 1 INJECTION, POWDER, LYOPHILIZED, FOR SOLUTION INTRAVENOUS at 09:02

## 2017-02-17 RX ADMIN — POTASSIUM CHLORIDE 40 MEQ: 1500 TABLET, EXTENDED RELEASE ORAL at 09:02

## 2017-02-17 RX ADMIN — ATORVASTATIN CALCIUM 40 MG: 20 TABLET, FILM COATED ORAL at 09:02

## 2017-02-17 RX ADMIN — OXYCODONE AND ACETAMINOPHEN 1 TABLET: 7.5; 325 TABLET ORAL at 09:02

## 2017-02-17 RX ADMIN — KETOROLAC TROMETHAMINE 10 MG: 10 TABLET, FILM COATED ORAL at 06:02

## 2017-02-17 RX ADMIN — NICOTINE 1 PATCH: 14 PATCH, EXTENDED RELEASE TRANSDERMAL at 09:02

## 2017-02-17 RX ADMIN — PROMETHAZINE HYDROCHLORIDE 25 MG: 25 INJECTION INTRAMUSCULAR; INTRAVENOUS at 08:02

## 2017-02-17 RX ADMIN — SODIUM CHLORIDE AND POTASSIUM CHLORIDE: 4.5; 1.49 INJECTION, SOLUTION INTRAVENOUS at 03:02

## 2017-02-17 RX ADMIN — CEFTRIAXONE 2 G: 2 INJECTION, SOLUTION INTRAVENOUS at 07:02

## 2017-02-17 RX ADMIN — SODIUM CHLORIDE, PRESERVATIVE FREE 3 ML: 5 INJECTION INTRAVENOUS at 09:02

## 2017-02-17 RX ADMIN — OXYCODONE AND ACETAMINOPHEN 1 TABLET: 7.5; 325 TABLET ORAL at 04:02

## 2017-02-17 RX ADMIN — POTASSIUM CHLORIDE 40 MEQ: 1500 TABLET, EXTENDED RELEASE ORAL at 03:02

## 2017-02-17 RX ADMIN — VANCOMYCIN HYDROCHLORIDE 1000 MG: 1 INJECTION, POWDER, LYOPHILIZED, FOR SOLUTION INTRAVENOUS at 12:02

## 2017-02-17 RX ADMIN — ENOXAPARIN SODIUM 40 MG: 100 INJECTION SUBCUTANEOUS at 12:02

## 2017-02-17 RX ADMIN — ACYCLOVIR SODIUM 570 MG: 50 INJECTION, SOLUTION INTRAVENOUS at 05:02

## 2017-02-17 RX ADMIN — SODIUM CHLORIDE, PRESERVATIVE FREE 3 ML: 5 INJECTION INTRAVENOUS at 04:02

## 2017-02-17 RX ADMIN — SODIUM CHLORIDE, PRESERVATIVE FREE 3 ML: 5 INJECTION INTRAVENOUS at 02:02

## 2017-02-17 RX ADMIN — KETOROLAC TROMETHAMINE 10 MG: 10 TABLET, FILM COATED ORAL at 12:02

## 2017-02-17 RX ADMIN — CEFTRIAXONE 2 G: 2 INJECTION, SOLUTION INTRAVENOUS at 09:02

## 2017-02-17 RX ADMIN — ACETAMINOPHEN 650 MG: 325 TABLET ORAL at 08:02

## 2017-02-17 RX ADMIN — AMLODIPINE BESYLATE 10 MG: 5 TABLET ORAL at 09:02

## 2017-02-17 RX ADMIN — VANCOMYCIN HYDROCHLORIDE 1000 MG: 1 INJECTION, POWDER, LYOPHILIZED, FOR SOLUTION INTRAVENOUS at 06:02

## 2017-02-17 RX ADMIN — ACYCLOVIR SODIUM 570 MG: 50 INJECTION, SOLUTION INTRAVENOUS at 02:02

## 2017-02-17 NOTE — PT/OT/SLP EVAL
Occupational Therapy  Evaluation    Tasha Rush   MRN: [id number]   Admitting Diagnosis: Meningitis    OT Date of Treatment: 02/16/17   OT Start Time: 1335  OT Stop Time: 1406  OT Total Time (min): 31 min    Billable Minutes:  Evaluation 31  Total Time 31    Diagnosis: Meningitis   The primary encounter diagnosis was Aphasia. Diagnoses of Stroke and Meningitis were also pertinent to this visit.      Past Medical History   Diagnosis Date    Abnormal Pap smear of cervix     Bacterial vaginosis      RECURRENT    Cervical dysplasia     Hypertension       Past Surgical History   Procedure Laterality Date    Cervical biopsy  w/ loop electrode excision      Hernia repair  2009     umbilical    Fibroid removal      Myomectomy         Referring physician: Carson  Date referred to OT: 2/15/2017    General Precautions: Standard, fall, aphasia  Orthopedic Precautions: N/A  Braces:      Do you have any cultural, spiritual, [employer]aritan conflicts, given your current situation?: none     Patient History:  Living Environment  Lives With: alone  Living Arrangements: house (next door to her mom)  Home Accessibility: stairs to enter home, stairs within home  Home Layout: Bedroom on 2nd floor, Bathroom on 2nd floor  Number of Stairs to Enter Home: 0 (none)  Number of Stairs Within Home: 12  Stair Railings at Home: other (see comments) (1 side)  Transportation Available: car, family or friend will provide  Living Environment Comment: Per PT note, mom and pt interview, pt. lives alone in a 2 story apartment/townhouse with 0 steps to enter and 12 steps with unilateral to second floor bedroom and bathroom with tub/shower. 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 She goes to her mother's house next door for meals.  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    Chief Complaint: headache  Patient/Family stated goals: none                        Objective:   Pt. Found sitting EOB visiting with mom.    Cognitive Exam:  Oriented to: Person, Place and Situation  Follows Commands/attention: Follows one-step commands and Follows two-step commands  Communication: expressive aphasia  Memory:  Impaired STM and Poor immediate recall  Safety awareness/insight to disability: impaired  Coping skills/emotional control: flat    Visual/perceptual:  Pt. Wears glasses but not wearing during time of eval; says they made her vision worse; increased headache pain with conversion assessment; otherwise tracking and scanning wfl; acuity wfl    Physical Exam:  Postural examination/scapula alignment: No postural abnormalities identified  Skin integrity: Visible skin intact  Edema: None noted BUE    Sensation:   Intact to light touch 4/4; but c/o R hand numbness    Upper Extremity Range of Motion:  Right Upper Extremity: WNL  Left Upper Extremity: WNL    Upper Extremity Strength:  Right Upper Extremity: WFL  Left Upper Extremity: WFL   Strength: WFL-slightly weaker in R hand as compared to R hand    Fine motor coordination:   Impaired  Right hand thumb/finger opposition skills mild slowness of movement    Gross motor coordination: WFL    Functional Mobility:  Bed Mobility:       Transfers:       Functional Ambulation: Supervision to bathroom and  "back to bed; pt squnting; c/o headache pain; slow reaction time with turns     Activities of Daily Living:     Feeding: indep  Grooming: supervision standing at sink for balance safety and slow mentation  Upper Body Dressing:  indep  Lower Body Dressing:  indep socks  Toileting: supervision  Bathing: NT      Balance:   Static Sit: NORMAL: No deviations seen in posture held statically  Dynamic Sit: NORMAL: No deviations seen in posture held dynamically  Static Stand: GOOD+: Takes MAXIMAL challenges from all directions  Dynamic stand: FAIR+: Needs CLOSE SUPERVISION during gait and is able to right self with minor LOB    Therapeutic Activities and Exercises:  Role of OT And POC    AM-PAC 6 CLICK ADL  How much help from another person does this patient currently need?  1 = Unable, Total/Dependent Assistance  2 = A lot, Maximum/Moderate Assistance  3 = A little, Minimum/Contact Guard/Supervision  4 = None, Modified [place]/Independent    Putting on and taking off regular lower body clothing? : 3  Bathing (including washing, rinsing, drying)?: 3  Toileting, which includes using toilet, bedpan, or urinal? : 3  Putting on and taking off regular upper body clothing?: 4  Taking care of personal grooming such as brushing teeth?: 4  Eating meals?: 4  Total Score: 21    AM-PAC Raw Score CMS "G-Code Modifier Level of Impairment Assistance   6 % Total / Unable   7 - 9 CM 80 - 100% Maximal Assist   10 - 14 CL 60 - 80% Moderate Assist   15 - 19 CK 40 - 60% Moderate Assist   20 - 22 CJ 20 - 40% Minimal Assist   23 CI 1-20% SBA / CGA   24 CH 0% Independent/ Mod I       Patient left seated EOB with ST and mon in room with all lines intact, call button in reach, nurse notified and ST and mom present    Assessment:  Tasha Rush is a 41 y.o. female with a medical diagnosis of Meningitis and presents with impaired memory, aphasia, word finding difficulty and general incoordination mostly slow response time in R hand and " mobility. Pt. requires supervision for toileting, mobility and transfers. She would benefit from OP ST and OT for IADLS and higher level cognitive tasks/retraining and further assessment. Continue with OT POC.    Rehab identified problem list/impairments: Rehab identified problem list/impairments: impaired self care skills, impaired functional mobilty, impaired cognition, decreased coordination    Rehab potential is good.    Activity tolerance: Fair    Discharge recommendations: Discharge Facility/Level Of Care Needs: outpatient speech therapy, outpatient OT     Barriers to discharge: Barriers to Discharge: None    Equipment recommendations: none     GOALS:   Occupational Therapy Goals        Problem: Occupational Therapy Goal    Goal Priority Disciplines Outcome Interventions   Occupational Therapy Goal     OT, PT/OT Ongoing (interventions implemented as appropriate)    Description:  Goals to be met by: 2/26/2017    Patient will increase functional independence with ADLs by performing:    Toileting from toilet with [place] for hygiene and clothing management.   Bathing from  shower chair/bench with [place].  Stand pivot transfers with [place].  Toilet transfer to toilet with [place].  Recall 3/3 words within 1 minute.                PLAN:  Patient to be seen 5 x/week to address the above listed problems via self-care/home management, therapeutic activities, therapeutic exercises  Plan of Care expires: 03/17/17  Plan of Care reviewed with: patient, mother         Ruby Ruth, OT  02/17/2017

## 2017-02-17 NOTE — PROCEDURES
"Tasha Rush is a 41 y.o. female patient.    Temp: 98.6 °F (37 °C) (02/17/17 1225)  Pulse: 72 (02/17/17 1225)  Resp: 18 (02/17/17 1225)  BP: 130/79 (02/17/17 1225)  SpO2: 99 % (02/17/17 0755)  Weight: 69.5 kg (153 lb 3.5 oz) (02/17/17 0755)  Height: 5' 5" (165.1 cm) (02/15/17 0807)    PICC  Date/Time: 2/17/2017 1:20 PM  Performed by: BERONICA CABALLERO  Pre-operative Diagnosis: septicemia  Consent Done: Yes  Indications: med administration  Local anesthetic: lidocaine 1% without epinephrine  Anesthetic Total (mL): 2  Preparation: skin prepped with ChloraPrep  Skin prep agent dried: skin prep agent completely dried prior to procedure  Sterile barriers: all five maximum sterile barriers used - cap, mask, sterile gown, sterile gloves, and large sterile sheet  Hand hygiene: hand hygiene performed prior to central venous catheter insertion  Location details: right brachial  Catheter type: double lumen  Catheter size: 5 Fr  Catheter Length: 36cm    Ultrasound guidance: yes  Vessel Caliber: medium, compressibility normal  Needle advanced into vessel with real time Ultrasound guidance.  Guidewire confirmed in vessel.  Image recorded and saved.  Sterile sheath used.  Number of attempts: 1  Post-procedure: blood return through all ports, chlorhexidine patch and sterile dressing applied  Technical procedures used: 3CG and ultrasound guidance    Complications: none        Beronica Caballero  2/17/2017    "

## 2017-02-17 NOTE — PT/OT/SLP PROGRESS
Physical Therapy  Treatment    Tasha Rush   MRN: [id number]   Admitting Diagnosis: Meningitis    PT Received On: 17  PT Start Time: 1130     PT Stop Time: 1153    PT Total Time (min): 23 min       Billable Minutes:  Gait [id number] and Therapeutic Exercise 8    Treatment Type: Treatment  PT/PTA: PT             General Precautions: Standard, fall  Orthopedic Precautions: N/A   Braces:      Do you have any cultural, spiritual, Muslim conflicts, given your current situation?: none specified    Subjective:  Communicated with patient prior to session.      Pain Ratin/10              Pain Rating Post-Intervention: 0/10    Objective:        Functional Mobility:  Bed Mobility:   Supine to Sit: Independent  Sit to Supine: Independent    Transfers:  Sit <> Stand Assistance: Stand By Assistance  Sit <> Stand Assistive Device: No Assistive Device    Gait:   Gait Distance: 300  Assistance 1: Contact Guard Assistance  Gait Pattern: reciprocal  Gait Deviation(s):  (tendency to scizzor gait with small ROXY)      Balance:   Static Sit: GOOD: Takes MODERATE challenges from all directions  Dynamic Sit: GOOD: Maintains balance through MODERATE excursions of active trunk movement  Static Stand: FAIR+: Takes MINIMAL challenges from all directions  Dynamic stand: FAIR+: Needs CLOSE SUPERVISION during gait and is able to right self with minor LOB     Therapeutic Activities and Exercises:  Tandem walking, TKE in chair B x 15 with QS     AM-PAC 6 CLICK MOBILITY  How much help from another person does this patient currently need?   1 = Unable, Total/Dependent Assistance  2 = A lot, Maximum/Moderate Assistance  3 = A little, Minimum/Contact Guard/Supervision  4 = None, Modified [place]/Independent    Turning over in bed (including adjusting bedclothes, sheets and blankets)?: 4  Sitting down on and standing up from a chair with arms (e.g., wheelchair, bedside commode, etc.): 4  Moving from lying on back to sitting on the  side of the bed?: 4  Moving to and from a bed to a chair (including a wheelchair)?: 3  Need to walk in hospital room?: 3  Climbing 3-5 steps with a railing?: 3  Total Score: 21    AM-PAC Raw Score CMS G-Code Modifier Level of Impairment Assistance   6 % Total / Unable   7 - 9 CM 80 - 100% Maximal Assist   10 - 14 CL 60 - 80% Moderate Assist   15 - 19 CK 40 - 60% Moderate Assist   20 - 22 CJ 20 - 40% Minimal Assist   23 CI 1-20% SBA / CGA   24 CH 0% Independent/ Mod I     Patient left up in chair with all lines intact, call button in reach and nurse notified.    Assessment:  Tasha Rush is a 41 y.o. female with a medical diagnosis of Meningitis and presents with impaired balance and gait instability.  She will continue to benefit from therapy to regain full independent mobility.      Rehab identified problem list/impairments: Rehab identified problem list/impairments: impaired balance, gait instability    Rehab potential is excellent.    Activity tolerance: Excellent    Discharge recommendations:   none    Barriers to discharge: Barriers to Discharge: None    Equipment recommendations: Equipment Needed After Discharge: none     GOALS:   Physical Therapy Goals        Problem: Physical Therapy Goal    Goal Priority Disciplines Outcome Goal Variances Interventions   Physical Therapy Goal     PT/OT, PT Ongoing (interventions implemented as appropriate)     Description:  Goals to be met by: 17     Patient will increase functional independence with mobility by performin. Gait > 300 feet independently.    2. Ascend/descend 12 stairs with unilateral handrails with supervision.                PLAN:    Patient to be seen 4 x/week  to address the above listed problems via gait training, therapeutic activities, therapeutic exercises  Plan of Care expires: 17  Plan of Care reviewed with: patient         Pato Perez, PT  2017

## 2017-02-17 NOTE — PT/OT/SLP PROGRESS
Speech Language Pathology      Tasha Rush  MRN: [id number]     1705  Pt declined speech therapy this date. Requesting to be allowed to rest. Agreeable to therapy tomorroe    Lana Glover, ALINA-SLP

## 2017-02-17 NOTE — NURSING
Some pre picc placement assessments were accidentally charted, not really and no picc placed in this patient nor any communication to pt regarding picc placement.  Any charting related to picc is null and void.

## 2017-02-17 NOTE — PLAN OF CARE
Problem: Occupational Therapy Goal  Goal: Occupational Therapy Goal  Goals to be met by: 2/26/2017    Patient will increase functional independence with ADLs by performing:    Toileting from toilet with [place] for hygiene and clothing management.   Bathing from shower chair/bench with [place].  Stand pivot transfers with [place].  Toilet transfer to toilet with [place].  Recall 3/3 words within 1 minute.  Outcome: Ongoing (interventions implemented as appropriate)  Pt. limited by impaired memory, aphasia, word finding difficulty and general incoordination mostly slow response time in R hand and mobility.  Pt. requires supervision for toileting, mobility and transfers.  She would benefit from OP ST and OT for IADLS and higher level cognitive tasks/retraining and further assessment. Continue with OT POC.

## 2017-02-17 NOTE — PT/OT/SLP PROGRESS
"Occupational Therapy  Treatment    Tasha Rush   MRN: [id number]   Admitting Diagnosis: Meningitis    OT Date of Treatment: 17   OT Start Time: 153  OT Stop Time: 1557  OT Total Time (min): 26 min    Billable Minutes:  Therapeutic Activity 8, Neuromuscular Re-education 8 and Cognitive Retraining 10    General Precautions: Standard, fall  Orthopedic Precautions: N/A  Braces: N/A    Do you have any cultural, spiritual, [place]ian conflicts, given your current situation?: none    Subjective:  Communicated with nursing prior to session.  "I feel weak and that I am going to drop things." "My legs feel like they're going to give out."    Pain Ratin/10              Pain Rating Post-Intervention: 0/10    Objective:        Functional Mobility:  Bed Mobility:       Transfers:   Sit <> Stand Assistance: Supervision  Sit <> Stand Assistive Device: No Assistive Device                  Balance:   Static Sit: GOOD-: Takes MODERATE challenges from all directions but inconsistently  Dynamic Sit: GOOD-: Maintains balance through MODERATE excursions of active trunk movement,     Static Stand: GOOD-: Takes MODERATE challenges from all directions inconsistently  Dynamic stand: FAIR+: Needs CLOSE SUPERVISION during gait and is able to right self with minor LOB    Therapeutic Activities and Exercises:  She was supervision with functional mobility and SBA for reaching in changing targets. Pt ambulated around room with supervision and finished using restroom and ambulating back to bed after OT arrival.  Pt presented with light tremors in BUE's and LE's (in standing)Pt demonstrated dynamic standing balance and tolerance weight shifting, reaching on high shelf, picking object off floor with self adjusted LOB. Pt completed dialing of various 9-7 digit numbers with errors dialing and recalling. Pt presented with more errors utilizing RUE. Pt presenting with dialing wrong number, omitting numbers. Pt c/o of not being able to type or " text appropriately. Pt required verbal prompting to read from text as she read a few short lines with x3 repeat as pt directed reading with right pointer finger. Pt encouraged to read with out finger and presented with decreased errors. Pt also wears glasses which she did not have.     AM-PAC 6 CLICK ADL   How much help from another person does this patient currently need?   1 = Unable, Total/Dependent Assistance  2 = A lot, Maximum/Moderate Assistance  3 = A little, Minimum/Contact Guard/Supervision  4 = None, Modified [place]/Independent    Putting on and taking off regular lower body clothing? : 3  Bathing (including washing, rinsing, drying)?: 3  Toileting, which includes using toilet, bedpan, or urinal? : 3  Putting on and taking off regular upper body clothing?: 4  Taking care of personal grooming such as brushing teeth?: 4  Eating meals?: 4  Total Score: 21     AM-PAC Raw Score CMS G-Code Modifier Level of Impairment Assistance   6 % Total / Unable   7 - 9 CM 80 - 100% Maximal Assist   10 - 14 CL 60 - 80% Moderate Assist   15 - 19 CK 40 - 60% Moderate Assist   20 - 22 CJ 20 - 40% Minimal Assist   23 CI 1-20% SBA / CGA   24 CH 0% Independent/ Mod I     Patient left EOB with nursing present    ASSESSMENT:  Tasha Rush is a 41 y.o. female with a medical diagnosis of Meningitis Pt continues to make progress towards goals. Pt continues to benefit from skilled occupational therapy intervention for increased higher level cognitive tasks, functional mobility and independence in ADL's.    Rehab identified problem list/impairments: Rehab identified problem list/impairments: weakness, impaired balance, gait instability, decreased coordination, impaired fine motor    Rehab potential is good.    Activity tolerance: Good    Discharge recommendations: Discharge Facility/Level Of Care Needs: outpatient OT, outpatient speech therapy     Barriers to discharge: Barriers to Discharge: None    Equipment  recommendations: none     GOALS:   Occupational Therapy Goals        Problem: Occupational Therapy Goal    Goal Priority Disciplines Outcome Interventions   Occupational Therapy Goal     OT, PT/OT Ongoing (interventions implemented as appropriate)    Description:  Goals to be met by: 2/26/2017    Patient will increase functional independence with ADLs by performing:    Toileting from toilet with [place] for hygiene and clothing management.   Bathing from  shower chair/bench with [place].  Stand pivot transfers with Cherry.  Toilet transfer to toilet with Cherry.  Recall 3/3 words within 1 minute.                Plan:  Patient to be seen 5 x/week to address the above listed problems via self-care/home management, therapeutic activities, therapeutic exercises, neuromuscular re-education, cognitive retraining,   Plan of Care expires: 03/17/17  Plan of Care reviewed with: patient         Rodriharshil OGDEN Joan, OT  02/17/2017

## 2017-02-17 NOTE — PROGRESS NOTES
[facility] Hospital Medicine  Progress Note    Patient Name: Tasha Rush  MRN: [id number]  Patient Class: IP- Inpatient   Admission Date: 2/15/2017  Length of Stay: 2 days  Attending Physician: Vincent Byrd MD  Primary Care Provider: Franklin Gonzalez MD        Subjective:     Principal Problem:Meningitis       Interval History:   feeling better.   Speech more fluent. HA now 6/10  CSF g-stain with GPC yet to be ID'ed (? Contaminant?)    Review of Systems   Constitutional: Negative for chills and fever.     PE: alert/ oriented  Neck supple  More fluent speech  No focal neuro deficits    Objective:     Vital Signs (Most Recent):  Temp: 98.4 °F (36.9 °C) (02/16/17 0905)  Pulse: 63 (02/16/17 0905)  Resp: 18 (02/16/17 0401)  BP: (!) 136/94 (02/16/17 0905)  SpO2: 100 % (02/16/17 0905) Vital Signs (24h Range):  Temp:  [98 °F (36.7 °C)-100.1 °F (37.8 °C)] 98.4 °F (36.9 °C)  Pulse:  [61-95] 63  Resp:  [16-22] 18  SpO2:  [95 %-100 %] 100 %  BP: (123-158)/(75-94) 136/94     Weight: 75 kg (165 lb 5.5 oz)  Body mass index is 27.51 kg/(m^2).    Intake/Output Summary (Last 24 hours) at 02/16/17 1118  Last data filed at 02/16/17 0545   Gross per 24 hour   Intake              625 ml   Output              300 ml   Net              325 ml      Physical Exam    Significant Labs:   BMP:   Recent Labs  Lab 02/16/17  0539   *   *   K 3.3*      CO2 23   BUN 7   CREATININE 0.8   CALCIUM 9.0     CBC:   Recent Labs  Lab 02/15/17  0903 02/16/17  0539   WBC 8.28 5.08   HGB 14.0 12.9   HCT 41.2 39.0    227     Results for TASHA RUSH (MRN [id number]) as of 2/16/2017 11:20   Ref. Range 2/15/2017 15:48   Color, CSF Latest Ref Range: Colorless  Colorless   Heme Aliquot Latest Units: mL 2.0   Appearance, CSF Latest Ref Range: Clear  Slightly hazy (A)   WBC, CSF Latest Ref Range: 0 - 5 /cu mm 23 (H)   RBC, CSF Latest Ref Range: 0 /cu mm 1036.5 (A)   Segmented Neutrophils, CSF Latest Ref Range: 0  - 6 % 24 (H)   Lymphs, CSF Latest Ref Range: 40 - 80 % 65   Mono/Macrophage, CSF Latest Ref Range: 15 - 45 % 11 (L)   Glucose, CSF Latest Ref Range: 40 - 70 mg/dL 56   Protein, CSF Latest Ref Range: 15 - 40 mg/dL 50 (H)       Significant Imaging: I have reviewed all pertinent imaging results/findings within the past 24 hours.     MRI:No evidence of acute infarct.    Increased flair signal within sulci, with corresponding subtly restricted diffusion along sulci over both convexities and enhancement.  Findings are most suspicious for infectious etiology/meningitis.    Assessment/Plan:      * Meningitis  -On Rocpehin / Vanc / Ayclovir  -Clinical suspicion was viral etiology but CSF gram stain with GPC  -cont current Abx / Antivirals pending HSV PCR and CSF culture        Tobacco abuse  Nicotine patch  Cessation education        Essential hypertension  Home meds and monitor        Hyponatremia  Mild, monitor  IVF     Headache  Symptomatic treatment f with nsaids / opioids prn        Hypokalemia  replace     Aphasia  -improved   -speech therapy consult            VTE Risk Mitigation         Ordered     enoxaparin injection 40 mg  Daily     Route:  Subcutaneous        02/15/17 1625     Medium Risk of VTE  Once      02/15/17 1625          Lloyd Walsh MD  Department of Hospital Medicine   [facility]

## 2017-02-18 LAB
ANION GAP SERPL CALC-SCNC: 8 MMOL/L
BASOPHILS # BLD AUTO: 0.02 K/UL
BASOPHILS NFR BLD: 0.3 %
BUN SERPL-MCNC: 13 MG/DL
CALCIUM SERPL-MCNC: 9.4 MG/DL
CHLORIDE SERPL-SCNC: 102 MMOL/L
CO2 SERPL-SCNC: 28 MMOL/L
CREAT SERPL-MCNC: 1.3 MG/DL
DIFFERENTIAL METHOD: ABNORMAL
EOSINOPHIL # BLD AUTO: 0 K/UL
EOSINOPHIL NFR BLD: 0.6 %
ERYTHROCYTE [DISTWIDTH] IN BLOOD BY AUTOMATED COUNT: 13.3 %
EST. GFR  (AFRICAN AMERICAN): 58 ML/MIN/1.73 M^2
EST. GFR  (NON AFRICAN AMERICAN): 50 ML/MIN/1.73 M^2
GLUCOSE SERPL-MCNC: 103 MG/DL
HCT VFR BLD AUTO: 39.5 %
HGB BLD-MCNC: 13.3 G/DL
LYMPHOCYTES # BLD AUTO: 2.2 K/UL
LYMPHOCYTES NFR BLD: 31.2 %
MAGNESIUM SERPL-MCNC: 2.2 MG/DL
MCH RBC QN AUTO: 30.8 PG
MCHC RBC AUTO-ENTMCNC: 33.7 %
MCV RBC AUTO: 91 FL
MONOCYTES # BLD AUTO: 0.8 K/UL
MONOCYTES NFR BLD: 11.1 %
NEUTROPHILS # BLD AUTO: 4.1 K/UL
NEUTROPHILS NFR BLD: 56.5 %
PHOSPHATE SERPL-MCNC: 4.5 MG/DL
PLATELET # BLD AUTO: 236 K/UL
PMV BLD AUTO: 9.1 FL
POTASSIUM SERPL-SCNC: 3.6 MMOL/L
RBC # BLD AUTO: 4.32 M/UL
SODIUM SERPL-SCNC: 138 MMOL/L
WBC # BLD AUTO: 7.18 K/UL

## 2017-02-18 PROCEDURE — 25000003 PHARM REV CODE 250: Performed by: INTERNAL MEDICINE

## 2017-02-18 PROCEDURE — 99223 1ST HOSP IP/OBS HIGH 75: CPT | Mod: ,,, | Performed by: INTERNAL MEDICINE

## 2017-02-18 PROCEDURE — 84100 ASSAY OF PHOSPHORUS: CPT

## 2017-02-18 PROCEDURE — 99233 SBSQ HOSP IP/OBS HIGH 50: CPT | Mod: ,,, | Performed by: HOSPITALIST

## 2017-02-18 PROCEDURE — 92507 TX SP LANG VOICE COMM INDIV: CPT

## 2017-02-18 PROCEDURE — 80048 BASIC METABOLIC PNL TOTAL CA: CPT

## 2017-02-18 PROCEDURE — 63600175 PHARM REV CODE 636 W HCPCS: Performed by: HOSPITALIST

## 2017-02-18 PROCEDURE — 85025 COMPLETE CBC W/AUTO DIFF WBC: CPT

## 2017-02-18 PROCEDURE — 83735 ASSAY OF MAGNESIUM: CPT

## 2017-02-18 PROCEDURE — 63600175 PHARM REV CODE 636 W HCPCS: Performed by: INTERNAL MEDICINE

## 2017-02-18 PROCEDURE — 25000003 PHARM REV CODE 250: Performed by: HOSPITALIST

## 2017-02-18 PROCEDURE — 93005 ELECTROCARDIOGRAM TRACING: CPT

## 2017-02-18 PROCEDURE — 93010 ELECTROCARDIOGRAM REPORT: CPT | Mod: ,,, | Performed by: INTERNAL MEDICINE

## 2017-02-18 PROCEDURE — 36415 COLL VENOUS BLD VENIPUNCTURE: CPT

## 2017-02-18 PROCEDURE — 11000001 HC ACUTE MED/SURG PRIVATE ROOM

## 2017-02-18 RX ORDER — SODIUM CHLORIDE 9 MG/ML
INJECTION, SOLUTION INTRAVENOUS CONTINUOUS
Status: DISCONTINUED | OUTPATIENT
Start: 2017-02-18 | End: 2017-02-20

## 2017-02-18 RX ORDER — HEPARIN SODIUM 5000 [USP'U]/ML
5000 INJECTION, SOLUTION INTRAVENOUS; SUBCUTANEOUS EVERY 8 HOURS
Status: DISCONTINUED | OUTPATIENT
Start: 2017-02-19 | End: 2017-02-22 | Stop reason: HOSPADM

## 2017-02-18 RX ADMIN — ACYCLOVIR SODIUM 570 MG: 50 INJECTION, SOLUTION INTRAVENOUS at 10:02

## 2017-02-18 RX ADMIN — CEFTRIAXONE 2 G: 2 INJECTION, SOLUTION INTRAVENOUS at 09:02

## 2017-02-18 RX ADMIN — ACYCLOVIR SODIUM 570 MG: 50 INJECTION, SOLUTION INTRAVENOUS at 04:02

## 2017-02-18 RX ADMIN — HYDROCHLOROTHIAZIDE 25 MG: 25 TABLET ORAL at 09:02

## 2017-02-18 RX ADMIN — OXYCODONE AND ACETAMINOPHEN 1 TABLET: 7.5; 325 TABLET ORAL at 11:02

## 2017-02-18 RX ADMIN — SODIUM CHLORIDE: 0.9 INJECTION, SOLUTION INTRAVENOUS at 04:02

## 2017-02-18 RX ADMIN — NICOTINE 1 PATCH: 14 PATCH, EXTENDED RELEASE TRANSDERMAL at 09:02

## 2017-02-18 RX ADMIN — ATORVASTATIN CALCIUM 40 MG: 20 TABLET, FILM COATED ORAL at 09:02

## 2017-02-18 RX ADMIN — SODIUM CHLORIDE AND POTASSIUM CHLORIDE: 4.5; 1.49 INJECTION, SOLUTION INTRAVENOUS at 12:02

## 2017-02-18 RX ADMIN — ENOXAPARIN SODIUM 40 MG: 100 INJECTION SUBCUTANEOUS at 12:02

## 2017-02-18 RX ADMIN — KETOROLAC TROMETHAMINE 10 MG: 10 TABLET, FILM COATED ORAL at 07:02

## 2017-02-18 RX ADMIN — VANCOMYCIN HYDROCHLORIDE 1000 MG: 1 INJECTION, POWDER, LYOPHILIZED, FOR SOLUTION INTRAVENOUS at 01:02

## 2017-02-18 RX ADMIN — AMLODIPINE BESYLATE 10 MG: 5 TABLET ORAL at 09:02

## 2017-02-18 RX ADMIN — ACYCLOVIR SODIUM 570 MG: 50 INJECTION, SOLUTION INTRAVENOUS at 11:02

## 2017-02-18 RX ADMIN — ACYCLOVIR SODIUM 570 MG: 50 INJECTION, SOLUTION INTRAVENOUS at 12:02

## 2017-02-18 RX ADMIN — CEFTRIAXONE 2 G: 2 INJECTION, SOLUTION INTRAVENOUS at 07:02

## 2017-02-18 RX ADMIN — OXYCODONE AND ACETAMINOPHEN 1 TABLET: 7.5; 325 TABLET ORAL at 12:02

## 2017-02-18 NOTE — PLAN OF CARE
Problem: Patient Care Overview  Goal: Plan of Care Review  Outcome: Ongoing (interventions implemented as appropriate)  No significant events this shift. Remains free from fall, injury, and skin breakdown. Ambulates independently to bathroom; good output noted. VSS stable on RA and afebrile. Telemetry maintained; all alarms on and audible. Positions self independently. Pain controlled with PO PRN meds. Neuro checks WDL. Reported hand tremors.  Poor appetite at this shift. Denied N&V. IV site WNL. IV fluids maintained. Plan of care reviewed with patient and all questions answered. Bed low, locked w/ bed alarm on. Call light within reach. Purposeful rounding performed. No other complaints at this time.

## 2017-02-18 NOTE — SUBJECTIVE & OBJECTIVE
Past Medical History   Diagnosis Date    Abnormal Pap smear of cervix     Bacterial vaginosis      RECURRENT    Cervical dysplasia     Hypertension        Past Surgical History   Procedure Laterality Date    Cervical biopsy  w/ loop electrode excision      Hernia repair  2009     umbilical    Fibroid removal      Myomectomy         Review of patient's allergies indicates:   Allergen Reactions    Ace inhibitors Other (See Comments)     cough       Medications:  Prescriptions Prior to Admission   Medication Sig    amlodipine (NORVASC) 10 MG tablet TAKE 1 TABLET BY MOUTH EVERY DAY    hydrochlorothiazide (HYDRODIURIL) 25 MG tablet TAKE 1 TABLET BY MOUTH EVERY DAY    docusate sodium (COLACE) 100 MG capsule Take 1 capsule (100 mg total) by mouth 2 (two) times daily as needed for Constipation.    fluticasone (FLONASE) 50 mcg/actuation nasal spray 1 spray by Each Nare route 2 (two) times daily.    ketoconazole (NIZORAL) 2 % cream Apply topically 2 (two) times daily.     Antibiotics     Start     Stop Route Frequency Ordered    02/15/17 2045  cefTRIAXone (ROCEPHIN) 2 g in dextrose 5 % 50 mL IVPB      -- IV Every 12 hours (non-standard times) 02/15/17 1934        Antifungals     None        Antivirals     None             There is no immunization history on file for this patient.    Family History     Problem Relation (Age of Onset)    Colon cancer Paternal Uncle    Diabetes Father, Mother    Hyperlipidemia Father    No Known Problems Brother        Social History     Social History    Marital status: Single     Spouse name: N/A    Number of children: N/A    Years of education: N/A     Occupational History    Medical asst at [employer]      Social History Main Topics    Smoking status: Current Every Day Smoker    Smokeless tobacco: Never Used      Comment: 1 pack / week    Alcohol use Yes      Comment: occasional    Drug use: No    Sexual activity: No     Other Topics Concern    None     Social History  Narrative    Single.  Lives w/adopted 5 yr old daughter.  Pt lives next door to her own parents.       Travel History:   Has patient traveled outside of the [place]?  No  Has patient traveled outside of [place]? No      Review of Systems   Constitutional: Negative.    Eyes: Negative.    Respiratory: Negative.    Cardiovascular: Negative.    Gastrointestinal: Negative.    Endocrine: Negative.    Genitourinary: Negative.    Musculoskeletal: Negative.    Skin: Negative.    Allergic/Immunologic: Negative.    Neurological: Positive for headaches.   Hematological: Negative.    Psychiatric/Behavioral: Negative.      Objective:     Vital Signs (Most Recent):  Temp: 99 °F (37.2 °C) (02/18/17 1159)  Pulse: 95 (02/18/17 1400)  Resp: 18 (02/18/17 0730)  BP: (!) 144/93 (02/18/17 1159)  SpO2: 98 % (02/18/17 0730) Vital Signs (24h Range):  Temp:  [97.5 °F (36.4 °C)-99.2 °F (37.3 °C)] 99 °F (37.2 °C)  Pulse:  [] 95  Resp:  [18-20] 18  SpO2:  [98 %-100 %] 98 %  BP: (120-145)/(79-97) 144/93     Weight: 69.5 kg (153 lb 3.5 oz)  Body mass index is 25.5 kg/(m^2).    Estimated Creatinine Clearance: 88.7 mL/min (based on Cr of 0.8).    Physical Exam   Constitutional: She is oriented to person, place, and time. She appears well-developed and well-nourished. No distress.   HENT:   Head: Normocephalic and atraumatic.   Right Ear: External ear normal.   Left Ear: External ear normal.   Nose: Nose normal.   Mouth/Throat: Oropharynx is clear and moist. No oropharyngeal exudate.   Eyes: Conjunctivae and EOM are normal. Pupils are equal, round, and reactive to light. Right eye exhibits no discharge. Left eye exhibits no discharge. No scleral icterus.   Neck: Normal range of motion. Neck supple. No JVD present. No tracheal deviation present. No thyromegaly present.   Cardiovascular: Normal rate, regular rhythm, normal heart sounds and intact distal pulses.  Exam reveals no gallop and no friction rub.    No murmur  heard.  Pulmonary/Chest: Effort normal and breath sounds normal. No stridor. No respiratory distress. She has no wheezes. She has no rales. She exhibits no tenderness.   Abdominal: Soft. Bowel sounds are normal. She exhibits no distension and no mass. There is no tenderness. There is no rebound and no guarding.   Musculoskeletal: Normal range of motion. She exhibits no edema or tenderness.   Lymphadenopathy:     She has no cervical adenopathy.   Neurological: She is alert and oriented to person, place, and time. She displays normal reflexes. No cranial nerve deficit. She exhibits normal muscle tone. Coordination normal.   Skin: Skin is warm. No rash noted. She is not diaphoretic. No erythema. No pallor.   Psychiatric: She has a normal mood and affect. Her behavior is normal. Judgment and thought content normal.   Nursing note and vitals reviewed.      Significant Labs:   Blood Culture: No results for input(s): LABBLOO in the last 4320 hours.  CBC:   Recent Labs  Lab 02/17/17  0513   WBC 5.62   HGB 13.6   HCT 41.4        CMP:   Recent Labs  Lab 02/17/17  0513      K 2.9*      CO2 18*   *   BUN 7   CREATININE 0.8   CALCIUM 9.1   PROT 7.3   ALBUMIN 3.8   BILITOT 0.9   ALKPHOS 77   AST 19   ALT 14   ANIONGAP 17*   EGFRNONAA >60       Significant Imaging: I have reviewed all pertinent imaging results/findings within the past 24 hours.

## 2017-02-18 NOTE — CONSULTS
[facility]  Infectious Disease  Consult Note    Patient Name: Tasha Rush  MRN: [id number]  Admission Date: 2/15/2017  Hospital Length of Stay: 3 days  Attending Physician: Vincent Byrd MD  Primary Care Provider: Franklin Gonzalez MD     Isolation Status: No active isolations    Patient information was obtained from patient and ER records.      Inpatient consult to Infectious Diseases  Consult performed by: TWAN MOROCHO  Consult ordered by: LOR DELGADO        Assessment/Plan:     * Meningitis  Ms. Rush is a 40 y/o female admitted with a severe headache.  CSF had a mildly elevated wbc count (23), a high rbc count (1036).  The csf protein and glucose are really not suggestive of bacterial or viral meningitis.  Her brain imaging is suggestive of a meningitis.  The direct gram stain from CSF fluid showed rare gram positive cocci but the cultures didn't grow anything.  The differential includes bacterial or aseptic meningitis.  I am unsure if she had an encephalitis (focal neurologic deficits and/or confusion) at the time of admission. My recommendation would be continue ceftriaxone given the gram stain findings and continue acyclovir pending the result of the HSV pcr.      Thank you for the consult.      Thank you for your consult. I will follow-up with patient. Please contact us if you have any additional questions.    Twan Morocho MD  Infectious Disease  [facility]  Pager # [phone]      Subjective:     Principal Problem: Meningitis    HPI: Ms. Rush is a 40 y/o female with no significant past medical history who presented to the hospital on February 15 with a severe headache X 2 days.  She states that the headache started gradually and progressively worsened.  She rates the pain as a 12 on a scale of 1 to 10.  She states that her headache was so severe that she couldn't get words out of her mouth and express herself.  She was admitted and started on  vancomycin, ceftriaxone and acyclovir.  She reports that her headaches are much better now but not completely resolved.        Past Medical History   Diagnosis Date    Abnormal Pap smear of cervix     Bacterial vaginosis      RECURRENT    Cervical dysplasia     Hypertension        Past Surgical History   Procedure Laterality Date    Cervical biopsy  w/ loop electrode excision      Hernia repair  2009     umbilical    Fibroid removal      Myomectomy         Review of patient's allergies indicates:   Allergen Reactions    Ace inhibitors Other (See Comments)     cough       Medications:  Prescriptions Prior to Admission   Medication Sig    amlodipine (NORVASC) 10 MG tablet TAKE 1 TABLET BY MOUTH EVERY DAY    hydrochlorothiazide (HYDRODIURIL) 25 MG tablet TAKE 1 TABLET BY MOUTH EVERY DAY    docusate sodium (COLACE) 100 MG capsule Take 1 capsule (100 mg total) by mouth 2 (two) times daily as needed for Constipation.    fluticasone (FLONASE) 50 mcg/actuation nasal spray 1 spray by Each Nare route 2 (two) times daily.    ketoconazole (NIZORAL) 2 % cream Apply topically 2 (two) times daily.     Antibiotics     Start     Stop Route Frequency Ordered    02/15/17 2045  cefTRIAXone (ROCEPHIN) 2 g in dextrose 5 % 50 mL IVPB      -- IV Every 12 hours (non-standard times) 02/15/17 1934        Antifungals     None        Antivirals     None             There is no immunization history on file for this patient.    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  Lives w/adopted 5 yr old daughter.  Pt lives next door to her own parents.       Travel History:   Has patient traveled outside of the [place]?  No  Has patient traveled outside of [place]? No      Review of Systems   Constitutional: Negative.    Eyes: Negative.    Respiratory: Negative.    Cardiovascular: Negative.    Gastrointestinal: Negative.    Endocrine: Negative.    Genitourinary: Negative.    Musculoskeletal: Negative.    Skin: Negative.    Allergic/Immunologic: Negative.    Neurological: Positive for headaches.   Hematological: Negative.    Psychiatric/Behavioral: Negative.      Objective:     Vital Signs (Most Recent):  Temp: 99 °F (37.2 °C) (02/18/17 1159)  Pulse: 95 (02/18/17 1400)  Resp: 18 (02/18/17 0730)  BP: (!) 144/93 (02/18/17 1159)  SpO2: 98 % (02/18/17 0730) Vital Signs (24h Range):  Temp:  [97.5 °F (36.4 °C)-99.2 °F (37.3 °C)] 99 °F (37.2 °C)  Pulse:  [] 95  Resp:  [18-20] 18  SpO2:  [98 %-100 %] 98 %  BP: (120-145)/(79-97) 144/93     Weight: 69.5 kg (153 lb 3.5 oz)  Body mass index is 25.5 kg/(m^2).    Estimated Creatinine Clearance: 88.7 mL/min (based on Cr of 0.8).    Physical Exam   Constitutional: She is oriented to person, place, and time. She appears well-developed and well-nourished. No distress.   HENT:   Head: Normocephalic and atraumatic.   Right Ear: External ear normal.   Left Ear: External ear normal.   Nose: Nose normal.   Mouth/Throat: Oropharynx is clear and moist. No oropharyngeal exudate.   Eyes: Conjunctivae and EOM are normal. Pupils are equal, round, and reactive to light. Right eye exhibits no discharge. Left eye exhibits no discharge. No scleral icterus.   Neck: Normal range of motion. Neck supple. No JVD present. No tracheal deviation present. No thyromegaly present.   Cardiovascular: Normal rate, regular rhythm,  normal heart sounds and intact distal pulses.  Exam reveals no gallop and no friction rub.    No murmur heard.  Pulmonary/Chest: Effort normal and breath sounds normal. No stridor. No respiratory distress. She has no wheezes. She has no rales. She exhibits no tenderness.   Abdominal: Soft. Bowel sounds are normal. She exhibits no distension and no mass. There is no tenderness. There is no rebound and no guarding.   Musculoskeletal: Normal range of motion. She exhibits no edema or tenderness.   Lymphadenopathy:     She has no cervical adenopathy.   Neurological: She is alert and oriented to person, place, and time. She displays normal reflexes. No cranial nerve deficit. She exhibits normal muscle tone. Coordination normal.   Skin: Skin is warm. No rash noted. She is not diaphoretic. No erythema. No pallor.   Psychiatric: She has a normal mood and affect. Her behavior is normal. Judgment and thought content normal.   Nursing note and vitals reviewed.      Significant Labs:   Blood Culture: No results for input(s): LABBLOO in the last 4320 hours.  CBC:   Recent Labs  Lab 02/17/17  0513   WBC 5.62   HGB 13.6   HCT 41.4        CMP:   Recent Labs  Lab 02/17/17  0513      K 2.9*      CO2 18*   *   BUN 7   CREATININE 0.8   CALCIUM 9.1   PROT 7.3   ALBUMIN 3.8   BILITOT 0.9   ALKPHOS 77   AST 19   ALT 14   ANIONGAP 17*   EGFRNONAA >60       Significant Imaging: I have reviewed all pertinent imaging results/findings within the past 24 hours.

## 2017-02-18 NOTE — PLAN OF CARE
Problem: Patient Care Overview  Goal: Plan of Care Review  Outcome: Ongoing (interventions implemented as appropriate)  Remains free from fall, injury, and skin breakdown. Ambulates independently to bathroom.  VSS stable on RA and afebrile. Positions self independently. Pain controlled with PO PRN meds. Neuro checks WDL. Pt had an episode of emesis; felt better after vomiting. IV site WNL. Plan of care reviewed with patient and all questions answered. Bed low, locked w/ bed alarm on. Call light within reach. Purposeful rounding performed. No other complaints at this time.

## 2017-02-18 NOTE — PROGRESS NOTES
Progress Note  [facility] Medicine      Admit Date: 2/15/2017 (LOS: 3)    SUBJECTIVE:     Follow-up For:  Meningitis    HPI/Interval history: Mild headache but otherwise feeling better with improving speech.    Review of Systems: Patient denies chest pain, shortness of breath.  No fevers, chills.  No nausea, vomiting, or diarrhea.    Medications: Reviewed and documented in the MAR.    OBJECTIVE:     Vital Signs (Most Recent)  Temp: 99 °F (37.2 °C) (02/18/17 1159)  Pulse: 95 (02/18/17 1400)  Resp: 18 (02/18/17 0730)  BP: (!) 144/93 (02/18/17 1159)  SpO2: 98 % (02/18/17 0730)    Vital Signs Range (Last 24H):  Temp:  [97.5 °F (36.4 °C)-99.2 °F (37.3 °C)]   Pulse:  []   Resp:  [18-20]   BP: (120-145)/(79-97)   SpO2:  [98 %-100 %]     I & O (Last 24H):  Intake/Output Summary (Last 24 hours) at 02/18/17 1425  Last data filed at 02/18/17 1425   Gross per 24 hour   Intake             4022 ml   Output                0 ml   Net             4022 ml       BMI: Body mass index is 25.5 kg/(m^2).    Physical Exam:  General: Patient is awake, alert, and oriented, and in no acute distress.  Eyes: Pupils equal, round, and reactive to light, anicteric sclera.  Mouth: No lesions, dry mucous membranes.  Respiratory: Lungs clear to ausculation, no crackles or wheezing.  Cardiovascular: Regular rate and rythmn, no murmurs appreciated.  Abdomen: Soft, non-tender, non-distended, normal bowel sounds.  Skin: No rashes or breakdown.  No rash.  Extremities: No edema, cyanosis, or clubbing.  Neuro: No focal weakness.  Speech fairly fluid for me.    Diagnostic Results:  CBC:    Recent Labs  Lab 02/16/17 0539 02/17/17 0513   WBC 5.08 5.62   HGB 12.9 13.6   HCT 39.0 41.4    236   MCV 93 92     BMP:    Recent Labs  Lab 02/16/17 0539 02/17/17 0513   * 126*   * 136   K 3.3* 2.9*    101   CO2 23 18*   BUN 7 7   CREATININE 0.8 0.8   CALCIUM 9.0 9.1       ASSESSMENT/PLAN:     Active Hospital Problems    Diagnosis  POA     *Meningitis [G03.9]  Yes    Hypokalemia [E87.6]  Yes    Aphasia [R47.01]  Yes    Hyponatremia [E87.1]  Yes    Headache [R51]  Yes    Essential hypertension [I10]  Yes    Tobacco abuse [Z72.0]  Yes      Resolved Hospital Problems    Diagnosis Date Resolved POA   No resolved problems to display.     Meningitis with possible encephalitis with aphagia.  Mother at the bedside reports mental status and speech continues to improve.  Speech appears normal to me this morning.  Headache also improving.  Meningitis is most likely viral in nature however CSF gram stain showed gram positive cocci.   Discussed with Dr. Ramon Morocho.  Plan to continue with intravenous acyclovir and ceftriaxone for now pending clarification with HSV PCR and CSF culture results.  Continue with speech therapy.  Oxycodone/aceaminophen as needed for headache.    Acute kidney injury.  Serum creatinine elevated from 0.8 mg/dL to 1.3 mg/dL today.  Patient reports oral intake and also possibly secondary to anti-infective therapy.  Will switch to isotonic fluids.  Discontinue diuretic therapy.  Repeat labs tomorrow morning.    Hypertension.  Reasonably controlled with current regimen.  Discontinue with diuretic therapy in light of elevation in serum creatinine today.       Tobacco use.   Patient counseled on the importance of smoking cessation.  Nicotine replacement ordered.    Hyponatremia and hypokalemia.  Resolved.    DVT prophylaxis.  Switch from subcutaneous enoxaparin to heparin in light of worsening kidney function.

## 2017-02-18 NOTE — ASSESSMENT & PLAN NOTE
Ms. Rush is a 40 y/o female admitted with a severe headache.  CSF had a mildly elevated wbc count (23), a high rbc count (1036).  The csf protein and glucose are really not suggestive of bacterial or viral meningitis.  Her brain imaging is suggestive of a meningitis.  The direct gram stain from CSF fluid showed rare gram positive cocci but the cultures didn't grow anything.  The differential includes bacterial or aseptic meningitis.  I am unsure if she had an encephalitis (focal neurologic deficits and/or confusion) at the time of admission. My recommendation would be continue ceftriaxone given the gram stain findings and continue acyclovir pending the result of the HSV pcr.      Thank you for the consult.

## 2017-02-18 NOTE — PLAN OF CARE
A/o x4 .Some confusion and aphasia noted .VSS. Afebrile. Able to make wants and needs known to staff.  Voiding without difficulty.  Some nausea and vomiting noted.Tolerating PO at this time. IV dressing clean, dry, and intact. Free from falls, injury, and trauma during this shift.  Purposeful hourly rounding completed.

## 2017-02-19 LAB
ANION GAP SERPL CALC-SCNC: 8 MMOL/L
BASOPHILS # BLD AUTO: 0.02 K/UL
BASOPHILS NFR BLD: 0.3 %
BUN SERPL-MCNC: 12 MG/DL
CALCIUM SERPL-MCNC: 8.8 MG/DL
CHLORIDE SERPL-SCNC: 104 MMOL/L
CO2 SERPL-SCNC: 24 MMOL/L
CREAT SERPL-MCNC: 1.1 MG/DL
DIFFERENTIAL METHOD: NORMAL
EOSINOPHIL # BLD AUTO: 0 K/UL
EOSINOPHIL NFR BLD: 0.3 %
ERYTHROCYTE [DISTWIDTH] IN BLOOD BY AUTOMATED COUNT: 13.2 %
EST. GFR  (AFRICAN AMERICAN): >60 ML/MIN/1.73 M^2
EST. GFR  (NON AFRICAN AMERICAN): >60 ML/MIN/1.73 M^2
GLUCOSE SERPL-MCNC: 104 MG/DL
HCT VFR BLD AUTO: 37.7 %
HGB BLD-MCNC: 12.4 G/DL
LYMPHOCYTES # BLD AUTO: 2.2 K/UL
LYMPHOCYTES NFR BLD: 37.5 %
MAGNESIUM SERPL-MCNC: 1.9 MG/DL
MCH RBC QN AUTO: 30 PG
MCHC RBC AUTO-ENTMCNC: 32.9 %
MCV RBC AUTO: 91 FL
MONOCYTES # BLD AUTO: 0.6 K/UL
MONOCYTES NFR BLD: 10.7 %
NEUTROPHILS # BLD AUTO: 3 K/UL
NEUTROPHILS NFR BLD: 51 %
PHOSPHATE SERPL-MCNC: 4 MG/DL
PLATELET # BLD AUTO: 225 K/UL
PMV BLD AUTO: 9.2 FL
POTASSIUM SERPL-SCNC: 3.4 MMOL/L
RBC # BLD AUTO: 4.13 M/UL
SODIUM SERPL-SCNC: 136 MMOL/L
WBC # BLD AUTO: 5.82 K/UL

## 2017-02-19 PROCEDURE — 84100 ASSAY OF PHOSPHORUS: CPT

## 2017-02-19 PROCEDURE — 83735 ASSAY OF MAGNESIUM: CPT

## 2017-02-19 PROCEDURE — 25000003 PHARM REV CODE 250: Performed by: HOSPITALIST

## 2017-02-19 PROCEDURE — 36415 COLL VENOUS BLD VENIPUNCTURE: CPT

## 2017-02-19 PROCEDURE — 63600175 PHARM REV CODE 636 W HCPCS: Performed by: INTERNAL MEDICINE

## 2017-02-19 PROCEDURE — 80048 BASIC METABOLIC PNL TOTAL CA: CPT

## 2017-02-19 PROCEDURE — 85025 COMPLETE CBC W/AUTO DIFF WBC: CPT

## 2017-02-19 PROCEDURE — 25000003 PHARM REV CODE 250: Performed by: INTERNAL MEDICINE

## 2017-02-19 PROCEDURE — 11000001 HC ACUTE MED/SURG PRIVATE ROOM

## 2017-02-19 PROCEDURE — 99232 SBSQ HOSP IP/OBS MODERATE 35: CPT | Mod: ,,, | Performed by: HOSPITALIST

## 2017-02-19 RX ADMIN — NICOTINE 1 PATCH: 14 PATCH, EXTENDED RELEASE TRANSDERMAL at 08:02

## 2017-02-19 RX ADMIN — HEPARIN SODIUM 5000 UNITS: 5000 INJECTION, SOLUTION INTRAVENOUS; SUBCUTANEOUS at 02:02

## 2017-02-19 RX ADMIN — SODIUM CHLORIDE: 0.9 INJECTION, SOLUTION INTRAVENOUS at 08:02

## 2017-02-19 RX ADMIN — SODIUM CHLORIDE: 0.9 INJECTION, SOLUTION INTRAVENOUS at 07:02

## 2017-02-19 RX ADMIN — ACYCLOVIR SODIUM 570 MG: 50 INJECTION, SOLUTION INTRAVENOUS at 08:02

## 2017-02-19 RX ADMIN — ACETAMINOPHEN 650 MG: 325 TABLET ORAL at 04:02

## 2017-02-19 RX ADMIN — ACYCLOVIR SODIUM 570 MG: 50 INJECTION, SOLUTION INTRAVENOUS at 11:02

## 2017-02-19 RX ADMIN — AMLODIPINE BESYLATE 10 MG: 5 TABLET ORAL at 08:02

## 2017-02-19 RX ADMIN — SODIUM CHLORIDE: 0.9 INJECTION, SOLUTION INTRAVENOUS at 02:02

## 2017-02-19 RX ADMIN — OXYCODONE AND ACETAMINOPHEN 1 TABLET: 7.5; 325 TABLET ORAL at 07:02

## 2017-02-19 RX ADMIN — CEFTRIAXONE 2 G: 2 INJECTION, SOLUTION INTRAVENOUS at 07:02

## 2017-02-19 RX ADMIN — HEPARIN SODIUM 5000 UNITS: 5000 INJECTION, SOLUTION INTRAVENOUS; SUBCUTANEOUS at 09:02

## 2017-02-19 RX ADMIN — ACYCLOVIR SODIUM 570 MG: 50 INJECTION, SOLUTION INTRAVENOUS at 05:02

## 2017-02-19 RX ADMIN — CEFTRIAXONE 2 G: 2 INJECTION, SOLUTION INTRAVENOUS at 09:02

## 2017-02-19 RX ADMIN — ATORVASTATIN CALCIUM 40 MG: 20 TABLET, FILM COATED ORAL at 08:02

## 2017-02-19 NOTE — PLAN OF CARE
Problem: Patient Care Overview  Goal: Plan of Care Review  Outcome: Ongoing (interventions implemented as appropriate)  Patient remains free from injury or falls. A&O x4. No aphasia. No c/o tremors. Vital signs stable throughout night on room air. Positions self independently and up to bathroom. Pain managed with PO medication x1. IV abx maintained. Telemetry maintained. Bed in low locked position and call light within reach. Will continue to monitor.

## 2017-02-19 NOTE — PROGRESS NOTES
Progress Note  [facility]      Admit Date: 2/15/2017 (LOS: 4)    SUBJECTIVE:     Follow-up For:  Meningitis    HPI/Interval history: She reports that she continues to feel better this morning.    Review of Systems: Patient denies chest pain, shortness of breath.  No fevers, chills.  No nausea, vomiting, or diarrhea.    Medications: Reviewed and documented in the MAR.    OBJECTIVE:     Vital Signs (Most Recent)  Temp: 98 °F (36.7 °C) (02/19/17 0400)  Pulse: 68 (02/19/17 0600)  Resp: 18 (02/19/17 0400)  BP: (!) 142/84 (02/19/17 0400)  SpO2: 98 % (02/19/17 0400)    Vital Signs Range (Last 24H):  Temp:  [98 °F (36.7 °C)-99 °F (37.2 °C)]   Pulse:  [61-95]   Resp:  [17-18]   BP: (131-144)/(84-93)   SpO2:  [98 %]     I & O (Last 24H):    Intake/Output Summary (Last 24 hours) at 02/19/17 0738  Last data filed at 02/19/17 0420   Gross per 24 hour   Intake           3583.5 ml   Output                0 ml   Net           3583.5 ml       BMI: Body mass index is 26.12 kg/(m^2).    Physical Exam:  General: Patient is awake, alert, and oriented, and in no acute distress.  Eyes: Pupils equal, round, and reactive to light, anicteric sclera.  Mouth: No lesions, dry mucous membranes.  Respiratory: Lungs clear to ausculation, no crackles or wheezing.  Cardiovascular: Regular rate and rythmn, no murmurs appreciated.  Abdomen: Soft, non-tender, non-distended, normal bowel sounds.  Skin: No rashes or breakdown.  No rash.  Extremities: No edema, cyanosis, or clubbing.  Neuro: No focal weakness.  Speech fairly fluid for me.    Diagnostic Results:  CBC:    Recent Labs  Lab 02/17/17  0513 02/18/17  1441 02/19/17  0546   WBC 5.62 7.18 5.82   HGB 13.6 13.3 12.4   HCT 41.4 39.5 37.7    236 225   MCV 92 91 91     BMP:    Recent Labs  Lab 02/17/17  0513 02/18/17  1441 02/19/17  0546   * 103 104    138 136   K 2.9* 3.6 3.4*    102 104   CO2 18* 28 24   BUN 7 13 12   CREATININE 0.8 1.3 1.1   CALCIUM 9.1 9.4 8.8   MG   --  2.2 1.9   PHOS  --  4.5 4.0       ASSESSMENT/PLAN:     Active Hospital Problems    Diagnosis  POA    *Meningitis [G03.9]  Yes    Hypokalemia [E87.6]  Yes    Aphasia [R47.01]  Yes    Hyponatremia [E87.1]  Yes    Headache [R51]  Yes    Essential hypertension [I10]  Yes    Tobacco abuse [Z72.0]  Yes      Resolved Hospital Problems    Diagnosis Date Resolved POA   No resolved problems to display.     Meningitis with possible encephalitis with aphagia.  She continue to improve.  Meningitis is most likely viral in nature however CSF gram stain showed gram positive cocci.   Plan to continue with intravenous acyclovir and ceftriaxone for now pending clarification with HSV PCR and CSF culture results.  Continue with speech therapy.  Oxycodone/aceaminophen as needed for headache.    Acute kidney injury.  Poor oral intake.  Improving with fluids, will continue.    Hypertension.  Reasonably controlled with current regimen.       Tobacco use.   Patient counseled on the importance of smoking cessation.  Nicotine replacement ordered.    Hyponatremia and hypokalemia.  Resolved.    DVT prophylaxis.  Switch from subcutaneous enoxaparin to heparin in light of worsening kidney function.

## 2017-02-19 NOTE — PT/OT/SLP PROGRESS
Speech Language Pathology  Treatment    Tasha Rush   MRN: [id number]   Admitting Diagnosis: Meningitis    Diet recommendations: Solid Diet Level: Regular  Liquid Diet Level: Thin No dysphagia. Pt being seen for remediation of  Receptive and expressive language deficits    SLP Treatment Date: 02/18/17  Speech Start Time: 1410     Speech Stop Time: 1425     Speech Total (min): 15 min       TREATMENT BILLABLE MINUTES:  Speech Therapy Individual 15            General Precautions: Standard,            Subjective:  Pt is alert, cooperative, walking around room    Objective:   AUDITORY COMPREHENSION: Pt able to follow multi step commands and follow turns of a conversation with 100% acc, mild processing delays noted. Need for repetition and clarification needed less than 10% of time.    VERBAL EXPRESSION: Verbal expression is fluent and pt is able to express basic wants and needs 100% of time. Mild hesitations and anomia noted on higher level expression task and during conversation. Pt states she feels her expression is much improved but still not back to baseline.  Assessment:  Tasha Rush is a 41 y.o. female with a medical diagnosis of Meningitis and presents with cognitive communication deficits, dcr verbal expression      Discharge recommendations: Discharge Facility/Level Of Care Needs: outpatient speech therapy     Goals:   SLP Goals        Problem: SLP Goal    Goal Priority Disciplines Outcome   SLP Goal     SLP    Description:  Speech Language Pathology Goals  Goals expected to be met by 2/23  1.  Respond to complex yes/no questions with 100% accuracy  2.  Follow multi step commands with 90% accuracy  3.  Respond to word retrieval tasks with 80% accuracy  4.  Respond to sentence formulation tasks with 80% accuracy  5.  Respond to problem solving tasks with 80% acc  6.  Assess functional reading and writing skills                    Plan:   Patient to be seen Therapy Frequency: 2 x/week, 3 x/week   Plan of  Care expires: 03/18/17  Plan of Care reviewed with: patient  SLP Follow-up?: Yes              Lana Glover CCC-SLP  02/19/2017

## 2017-02-20 DIAGNOSIS — G03.9 MENINGITIS: Primary | ICD-10-CM

## 2017-02-20 LAB
ANION GAP SERPL CALC-SCNC: 8 MMOL/L
BASOPHILS # BLD AUTO: 0.02 K/UL
BASOPHILS NFR BLD: 0.3 %
BUN SERPL-MCNC: 11 MG/DL
CALCIUM SERPL-MCNC: 8.7 MG/DL
CHLORIDE SERPL-SCNC: 107 MMOL/L
CO2 SERPL-SCNC: 22 MMOL/L
CREAT SERPL-MCNC: 1 MG/DL
DIFFERENTIAL METHOD: ABNORMAL
EOSINOPHIL # BLD AUTO: 0 K/UL
EOSINOPHIL NFR BLD: 0.4 %
ERYTHROCYTE [DISTWIDTH] IN BLOOD BY AUTOMATED COUNT: 13.2 %
EST. GFR  (AFRICAN AMERICAN): >60 ML/MIN/1.73 M^2
EST. GFR  (NON AFRICAN AMERICAN): >60 ML/MIN/1.73 M^2
GLUCOSE SERPL-MCNC: 100 MG/DL
HCT VFR BLD AUTO: 35.3 %
HGB BLD-MCNC: 11.5 G/DL
LYMPHOCYTES # BLD AUTO: 2.3 K/UL
LYMPHOCYTES NFR BLD: 33.8 %
MAGNESIUM SERPL-MCNC: 1.9 MG/DL
MCH RBC QN AUTO: 30 PG
MCHC RBC AUTO-ENTMCNC: 32.6 %
MCV RBC AUTO: 92 FL
MONOCYTES # BLD AUTO: 0.6 K/UL
MONOCYTES NFR BLD: 8.9 %
NEUTROPHILS # BLD AUTO: 3.8 K/UL
NEUTROPHILS NFR BLD: 56.5 %
PHOSPHATE SERPL-MCNC: 3 MG/DL
PLATELET # BLD AUTO: 234 K/UL
PMV BLD AUTO: 9.2 FL
POTASSIUM SERPL-SCNC: 3.3 MMOL/L
RBC # BLD AUTO: 3.83 M/UL
SODIUM SERPL-SCNC: 137 MMOL/L
WBC # BLD AUTO: 6.72 K/UL

## 2017-02-20 PROCEDURE — 25000003 PHARM REV CODE 250: Performed by: PHYSICIAN ASSISTANT

## 2017-02-20 PROCEDURE — 63600175 PHARM REV CODE 636 W HCPCS: Performed by: INTERNAL MEDICINE

## 2017-02-20 PROCEDURE — 85025 COMPLETE CBC W/AUTO DIFF WBC: CPT

## 2017-02-20 PROCEDURE — 97530 THERAPEUTIC ACTIVITIES: CPT

## 2017-02-20 PROCEDURE — 97535 SELF CARE MNGMENT TRAINING: CPT

## 2017-02-20 PROCEDURE — 11000001 HC ACUTE MED/SURG PRIVATE ROOM

## 2017-02-20 PROCEDURE — 25000003 PHARM REV CODE 250: Performed by: HOSPITALIST

## 2017-02-20 PROCEDURE — 80048 BASIC METABOLIC PNL TOTAL CA: CPT

## 2017-02-20 PROCEDURE — 25000003 PHARM REV CODE 250: Performed by: INTERNAL MEDICINE

## 2017-02-20 PROCEDURE — 83735 ASSAY OF MAGNESIUM: CPT

## 2017-02-20 PROCEDURE — 99232 SBSQ HOSP IP/OBS MODERATE 35: CPT | Mod: ,,, | Performed by: PHYSICIAN ASSISTANT

## 2017-02-20 PROCEDURE — 36415 COLL VENOUS BLD VENIPUNCTURE: CPT

## 2017-02-20 PROCEDURE — 84100 ASSAY OF PHOSPHORUS: CPT

## 2017-02-20 PROCEDURE — 63600175 PHARM REV CODE 636 W HCPCS: Performed by: HOSPITALIST

## 2017-02-20 RX ORDER — POTASSIUM CHLORIDE 20 MEQ/1
20 TABLET, EXTENDED RELEASE ORAL DAILY
Status: DISCONTINUED | OUTPATIENT
Start: 2017-02-20 | End: 2017-02-22 | Stop reason: HOSPADM

## 2017-02-20 RX ADMIN — AMLODIPINE BESYLATE 10 MG: 5 TABLET ORAL at 09:02

## 2017-02-20 RX ADMIN — NICOTINE 1 PATCH: 14 PATCH, EXTENDED RELEASE TRANSDERMAL at 01:02

## 2017-02-20 RX ADMIN — HEPARIN SODIUM 5000 UNITS: 5000 INJECTION, SOLUTION INTRAVENOUS; SUBCUTANEOUS at 02:02

## 2017-02-20 RX ADMIN — CEFTRIAXONE 2 G: 2 INJECTION, SOLUTION INTRAVENOUS at 11:02

## 2017-02-20 RX ADMIN — HEPARIN SODIUM 5000 UNITS: 5000 INJECTION, SOLUTION INTRAVENOUS; SUBCUTANEOUS at 05:02

## 2017-02-20 RX ADMIN — PROMETHAZINE HYDROCHLORIDE 25 MG: 25 INJECTION INTRAMUSCULAR; INTRAVENOUS at 09:02

## 2017-02-20 RX ADMIN — ATORVASTATIN CALCIUM 40 MG: 20 TABLET, FILM COATED ORAL at 09:02

## 2017-02-20 RX ADMIN — ACYCLOVIR SODIUM 570 MG: 50 INJECTION, SOLUTION INTRAVENOUS at 10:02

## 2017-02-20 RX ADMIN — OXYCODONE AND ACETAMINOPHEN 1 TABLET: 7.5; 325 TABLET ORAL at 02:02

## 2017-02-20 RX ADMIN — HEPARIN SODIUM 5000 UNITS: 5000 INJECTION, SOLUTION INTRAVENOUS; SUBCUTANEOUS at 10:02

## 2017-02-20 RX ADMIN — POTASSIUM CHLORIDE 20 MEQ: 1500 TABLET, EXTENDED RELEASE ORAL at 01:02

## 2017-02-20 RX ADMIN — OXYCODONE AND ACETAMINOPHEN 1 TABLET: 7.5; 325 TABLET ORAL at 05:02

## 2017-02-20 RX ADMIN — OXYCODONE AND ACETAMINOPHEN 1 TABLET: 7.5; 325 TABLET ORAL at 10:02

## 2017-02-20 RX ADMIN — ACYCLOVIR SODIUM 570 MG: 50 INJECTION, SOLUTION INTRAVENOUS at 05:02

## 2017-02-20 NOTE — PROGRESS NOTES
No significant events throughout the night. AAO x4. VSS on RA and afebrile. Telemetry monitor in place, NSR. Neurovascular checks done Q4H- WDL. Medicated once for back pain. IVFs maintained. Ambulating without difficulty. Repositions self independently. Fall precautions ongoing. Purposeful rounding performed. Bed low, locked and call light is within reach. Patient is sleeping.

## 2017-02-20 NOTE — PLAN OF CARE
Problem: Occupational Therapy Goal  Goal: Occupational Therapy Goal  Goals to be met by: 2/26/2017    Patient will increase functional independence with ADLs by performing:    Toileting from toilet with [place] for hygiene and clothing management.- Met (per pt report)  Bathing from shower chair/bench with [place]. -Met (per pt report, bathing with no AD)  Stand pivot transfers with [place]. -Met  Toilet transfer to toilet with [place]. -Met (per pt report)  Recall 3/3 words within 1 minute.-Not Met   Outcome: Outcome(s) achieved Date Met:  02/20/17     Pt performs functional mobility and ADLs independently. Pt has no further need for acute OT services. Recommend OP OT/speech upon discharge.

## 2017-02-20 NOTE — PLAN OF CARE
CM met with pt to discuss discharge plan. OT is recommending out pt OT and speech. Pt in agreement with this plan.  Cm to follow for arrangements.     02/20/17 1154   Discharge Assessment   Assessment Type Discharge Planning Reassessment   Assessment information obtained from? Patient;Medical Record   Discharge Plan A Other  (Out pt OT and speech)   Discharge Plan B Home;Other  (out pt OT and speech)   Patient/Family In Agreement With Plan yes

## 2017-02-20 NOTE — PROGRESS NOTES
[facility] Hospital Medicine  Progress Note    Patient Name: Tasha Rush  MRN: [id number]  Patient Class: IP- Inpatient   Admission Date: 2/15/2017  Length of Stay: 5 days  Attending Physician: Vincent Byrd MD  Primary Care Provider: Franklin Gonzalez MD        Subjective:     Principal Problem:Meningitis    HPI:  42 y/o female with Hx of HTN presents with c/o of headache , confusion, and difficulty word finding for past 12 hours. Pt is accompanied by her mother. She woke up yesterday with a headache, went to work as normal. Took Excedrin and aleve without relief. Went to bed with the same headache. She has no prior hx of migraine. Rates the headache as 10/10 located mainly in front part of head. Least night felt she was getting sick ( fever, fatigue, cough)  so took an Ligia-Howe before bed. When she woke up she felt confused and had difficulty finding the correct words she wanted to say. Denies any extremity weakness, gait problems, gi or  sx    Hospital Course:  Initial CT negative. MRI with increased flair signal within sulci, with corresponding subtly restricted diffusion along sulci over both convexities and enhancement.  Findings are most suspicious for infectious etiology/meningitis. The patient was begun on Acyclovir, Rocephin, and Vanc. 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 ID consult on 2/16/17 recommeneded to continue ceftriaxone given the gram stain findings and continue acyclovir pending the result of the HSV pcr. HSV pcr still pending.     Interval History: Patient reports mild mid back pain, no further symptoms or new concerns at this time. Nursing reports patient had 3 episodes of emesis during PT. No BM x 7 days. Appetite improving.     Review of Systems   Constitutional: Positive for appetite change (improving). Negative for activity change, chills, fatigue  and fever.   Eyes: Negative for photophobia, pain and visual disturbance.   Respiratory: Negative for cough, shortness of breath and wheezing.    Cardiovascular: Negative for chest pain and palpitations.   Gastrointestinal: Positive for vomiting. Negative for abdominal pain, constipation, diarrhea and nausea.   Genitourinary: Negative for dysuria, flank pain, frequency, hematuria, urgency, vaginal discharge and vaginal pain.   Musculoskeletal: Positive for back pain. Negative for arthralgias, neck pain and neck stiffness.   Skin: Negative for color change and rash.   Neurological: Negative for dizziness, syncope, weakness, light-headedness, numbness and headaches.   Psychiatric/Behavioral: Negative for confusion and decreased concentration.     Objective:     Vital Signs (Most Recent):  Temp: 97.5 °F (36.4 °C) (02/20/17 0400)  Pulse: 72 (02/20/17 1000)  Resp: 20 (02/20/17 0701)  BP: 132/82 (02/20/17 0939)  SpO2: 99 % (02/20/17 0701) Vital Signs (24h Range):  Temp:  [97.5 °F (36.4 °C)-99.1 °F (37.3 °C)] 97.5 °F (36.4 °C)  Pulse:  [] 72  Resp:  [20] 20  SpO2:  [98 %-100 %] 99 %  BP: (117-136)/() 132/82     Weight: 70.2 kg (154 lb 12.2 oz)  Body mass index is 25.75 kg/(m^2).    Intake/Output Summary (Last 24 hours) at 02/20/17 1212  Last data filed at 02/20/17 0700   Gross per 24 hour   Intake             1522 ml   Output             1050 ml   Net              472 ml      Physical Exam   Constitutional: She is oriented to person, place, and time. She appears well-developed and well-nourished. No distress.   HENT:   Head: Normocephalic and atraumatic.   Right Ear: External ear normal.   Left Ear: External ear normal.   Eyes: Conjunctivae and EOM are normal. Pupils are equal, round, and reactive to light. No scleral icterus.   Neck: Normal range of motion. Neck supple. No tracheal deviation present.   Cardiovascular: Normal rate, regular rhythm, normal heart sounds and intact distal pulses.  Exam reveals no  gallop and no friction rub.    No murmur heard.  Pulmonary/Chest: Effort normal and breath sounds normal. No stridor. No respiratory distress. She has no wheezes. She has no rales. She exhibits no tenderness.   Bilateral lung fields CTA.     Abdominal: Soft. Bowel sounds are normal. She exhibits no distension. There is no tenderness. There is no guarding.   Musculoskeletal: Normal range of motion. She exhibits no deformity.   No midline cervical, thoracic, lumbar spinal tenderness to palpation, crepitus, step-offs, deformities.    Neurological: She is alert and oriented to person, place, and time. No cranial nerve deficit. She exhibits normal muscle tone.   The patient was alert, relaxed and cooperative with coherent thought process. AAOx4. CN II-XII were grossly intact.    Skin: Skin is warm and dry. No rash noted. She is not diaphoretic. No erythema. No pallor.   Psychiatric: She has a normal mood and affect. Her behavior is normal. Judgment and thought content normal.   Nursing note and vitals reviewed.      Significant Labs:   BMP:   Recent Labs  Lab 02/20/17  0529         K 3.3*      CO2 22*   BUN 11   CREATININE 1.0   CALCIUM 8.7   MG 1.9     CBC:   Recent Labs  Lab 02/18/17  1441 02/19/17  0546 02/20/17  0529   WBC 7.18 5.82 6.72   HGB 13.3 12.4 11.5*   HCT 39.5 37.7 35.3*    225 234     CMP:   Recent Labs  Lab 02/18/17  1441 02/19/17  0546 02/20/17  0529    136 137   K 3.6 3.4* 3.3*    104 107   CO2 28 24 22*    104 100   BUN 13 12 11   CREATININE 1.3 1.1 1.0   CALCIUM 9.4 8.8 8.7   ANIONGAP 8 8 8   EGFRNONAA 50* >60 >60     Results for DAX GAYLE (MRN [id number]) as of 2/16/2017 11:20    Ref. Range 2/15/2017 15:48   Color, CSF Latest Ref Range: Colorless  Colorless   Heme Aliquot Latest Units: mL 2.0   Appearance, CSF Latest Ref Range: Clear  Slightly hazy (A)   WBC, CSF Latest Ref Range: 0 - 5 /cu mm 23 (H)   RBC, CSF Latest Ref Range: 0 /cu mm 1036.5 (A)    Segmented Neutrophils, CSF Latest Ref Range: 0 - 6 % 24 (H)   Lymphs, CSF Latest Ref Range: 40 - 80 % 65   Mono/Macrophage, CSF Latest Ref Range: 15 - 45 % 11 (L)   Glucose, CSF Latest Ref Range: 40 - 70 mg/dL 56   Protein, CSF Latest Ref Range: 15 - 40 mg/dL 50 (H)      All pertinent labs within the past 24 hours have been reviewed.    Significant Imaging: I have reviewed all pertinent imaging results/findings within the past 24 hours.     Imaging Results         IR Lumbar Puncture Diagnostic (Final result) Result time:  02/15/17 16:35:44    Final result by Scottie Gardner MD (02/15/17 16:35:44)    Impression:       Successful fluoroscopic guided lumbar puncture.    Opening pressure of 17 mm H2O.       Electronically signed by: SCOTTIE GARDNER MD  Date:     02/15/17  Time:    16:35     Narrative:      Procedure: Fluoroscopic guided lumbar puncture    Clinical Indication: Diffusion, initiation    Operators: Jj    Technique: Informed consent was obtained.  The patient was then placed on the fluoroscopic table in the prone position. The patient's back was then prepped and draped in sterile fashion. The L4-5 level was localized with fluoroscopic guidance. Local anesthesia with 1% lidocaine was administered. Subsequently using a 22-gauge spinal needle and fluoroscopic guidance access was obtained into the thecal sac via a L4-5 translaminar approach. Following removal of the stylet clear cerebrospinal fluid was identified spontaneously. Opening pressure was measured.  Approximately 10 cc was collected, placed in sample bottles, and sent to the lab for analysis. The stylet was then reinserted and the spinal needle was removed in full.  The patient tolerated the procedure well. No immediate postprocedure complications. Adhesive bandage placed over the puncture site.   Order placed in chart for strict bed rest next 2 hours.            MRA Neck with contrast (Final result) Result time:  02/15/17 15:32:34     Procedure changed from MRA Neck        Final result by Fatou Almazan MD (02/15/17 15:32:34)    Impression:     No significant abnormality demonstrated.      Electronically signed by: Fatou Almazan MD  Date:     02/15/17  Time:    15:32     Narrative:    MRA of the neck was performed.  Contrast utilized in conjunction with brain imaging today.  Origins of the great vessels are within normal limits.  Vertebral arteries demonstrate no significant narrowing.  Common carotid and internal carotid arteries are patent without significant stenosis.            MRI Brain W WO Contrast (Final result)    Abnormal Result time:  02/15/17 15:33:22    Procedure changed from MRI Brain Without Contrast        Final result by Fatou Almazan MD (02/15/17 15:33:22)    Impression:     No evidence of acute infarct.    Increased flair signal within sulci, with corresponding subtly restricted diffusion along sulci over both convexities and enhancement.  Findings are most suspicious for infectious etiology/meningitis.    epic notify      Electronically signed by: Fatou Almazan MD  Date:     02/15/17  Time:    15:33     Narrative:    Routine imaging through the brain was performed with and without the use of intravenous contrast material.  For the contrast enhanced portion of the study, 10 cc gadavist was administered intravenously.    Comparison: Head CT from today    Clinical history: Speech abnormality    Findings: Paranasal sinuses demonstrate no significant abnormality, with a subcentimeter mucous retention cyst or polyp in the right maxillary sinus.  Orbits are within normal limits.  The ventricular system is normal in size and not distorted by mass effect.  The craniocervical junction and pituitary region are within normal limits.  There is no evidence of acute infarct.  No abnormal gradient signal is present.  Along sulci, most pronounced over both convexities, there is increased flair signal, some slightly restricted  diffusion and corresponding enhancement.  There is a more subtle degree of flair signal infratentorial along the cerebellum with corresponding subtle enhancement.            MRA Brain (Final result) Result time:  02/15/17 15:34:47    Final result by John Robles MD (02/15/17 15:34:47)    Impression:     Normal MRA brain study.      Electronically signed by: LEONARDO ORBLES MD  Date:     02/15/17  Time:    15:34     Narrative:    MRA brain without contrast, comparison  hours same day.  The right vertebral dominant.  No aneurysm, AVM.  Left posterior communicating and anterior communicating artery noted.            X-Ray Chest PA And Lateral (Final result) Result time:  02/15/17 13:43:34    Final result by Fatou Almazan MD (02/15/17 13:43:34)    Impression:     No finding to correlate with history.      Electronically signed by: Fatou Almazan MD  Date:     02/15/17  Time:    13:43     Narrative:    2 views obtained.  The cardiac size and pulmonary vascularity are normal.  There is no pleural effusion.  Osseous structures are intact and the lungs are clear.  Osseous structures demonstrate mild spinal curvature.            CT Head Without Contrast (Final result) Result time:  02/15/17 10:01:28    Final result by Scott Lau MD (02/15/17 10:01:28)    Impression:        CT scan of the head within normal limits.      Electronically signed by: SCOTT LAU MD  Date:     02/15/17  Time:    10:01     Narrative:    History: Aphasia and confusion.    Procedure: Axial CT scans of the head are performed from the base of the skull to the cranial vertex without contrast.        Findings:    There is no hydrocephalus.  The subarachnoid space is not as prominent however could be within normal limits for the patient's age (41).  Within the parenchyma no hemorrhage is or shift of midline structures.  The gray/white matter delineation is preserved throughout the cerebral hemispheres.  There is a septum cavum  pellucidum and cavum vergae.  Basal ganglia are symmetric and normal bilaterally.    4th ventricle is in midline.  Cerebellar hemispheres are normal.  No Chiari malformations.  There is no suprasellar or pineal region masses.    The visualized paranasal air sinuses are clear.               Assessment/Plan:      * Meningitis  -On Rocpehin / Ayclovir  - Vanc discontinued   -Clinical suspicion was viral etiology but CSF gram stain with GPC  -cont current Abx / Antivirals pending HSV PCR and CSF culture      Tobacco abuse  - Nicotine patch  - Cessation education      Essential hypertension  - Continue home meds and monitor  -Amlodipine 10 mg QD, HCTZ 25 mg QD       Hyponatremia  - resolved    Headache  - Symptomatic treatment with nsaids / opioids prn      Hypokalemia  - Potassium 3.3 today.   - Replace    Aphasia  -improved   -speech therapy consult & recommends outpatient speech therapy.       VTE Risk Mitigation         Ordered     heparin (porcine) injection 5,000 Units  Every 8 hours     Route:  Subcutaneous        02/18/17 1627     Medium Risk of VTE  Once      02/15/17 1625          Yuko Sol PA-C  Department of Hospital Medicine   [facility]

## 2017-02-20 NOTE — PLAN OF CARE
[facility]  Department of Hospital Medicine  [facility] [place] [address]  [phone] after hours  [phone] fax            Diagnoses: Aphasia    Allergies:[unfilled]    Diet: Regular    Acitivities: As tolerated.    Occupational Therapy to evaluate and treat. Evaluate home environment for safety and equipment needs. Perform/Instruct on transfers, ADL training, ROM, and therapeutic exercises.    Speech Therapy  to evaluate and treat for: Language                                                            _________________________________  Yuko Sol PA-C

## 2017-02-20 NOTE — PT/OT/SLP PROGRESS
"Occupational Therapy  Treatment/Discharge Summary    Tasha Rush   MRN: [id number]   Admitting Diagnosis: Meningitis    OT Date of Treatment: 17   OT Start Time: 806  OT Stop Time: 820  OT Total Time (min): 14 min    Billable Minutes:  Self Care/Home Management 14    General Precautions: Standard, fall  Orthopedic Precautions:    Braces:      Do you have any cultural, spiritual, Taoist conflicts, given your current situation?: none    Subjective:  Communicated with RN prior to session.  Pt stated "I washed my hair yesterday." Pt reports standing in shower to bathe (I)ly. Pt also states "I used the bathroom about 30 minutes ago." Pt reports she may be discharged today.    Pain Ratin/10        Location: back  Pain Addressed: Reposition, Nurse notified  Pain Rating Post-Intervention: 2/10    Objective:  Patient found with: peripheral IV, telemetry     Functional Mobility:  Bed Mobility:  Supine to Sit: Independent    Transfers:   Sit <> Stand Assistance: Independent  Sit <> Stand Assistive Device: No Assistive Device    Functional Ambulation: Pt performed functional mobility within room and down hallway with no AD independently.     Activities of Daily Living:     LE Dressing Level of Assistance: Independent to retrieve shoe, don and tie shoelaces, and doff shoe    Grooming Position: Standing at sink to brush teeth and wash face  Grooming Level of Assistance: Independent          Balance:   Static Sit: NORMAL: No deviations seen in posture held statically  Dynamic Sit: GOOD: Maintains balance through MODERATE excursions of active trunk movement  Static Stand: GOOD+: Takes MAXIMAL challenges from all directions  Dynamic stand: GOOD+: Independent gait (with or without assistive device)    Therapeutic Activities and Exercises:  Discussed progression with therapy and d/c plans re: outpatient therapy.     AM-PAC 6 CLICK ADL   How much help from another person does this patient currently need?   1 = Unable, " Total/Dependent Assistance  2 = A lot, Maximum/Moderate Assistance  3 = A little, Minimum/Contact Guard/Supervision  4 = None, Modified [place]/Independent    Putting on and taking off regular lower body clothing? : 4  Bathing (including washing, rinsing, drying)?: 4  Toileting, which includes using toilet, bedpan, or urinal? : 4  Putting on and taking off regular upper body clothing?: 4  Taking care of personal grooming such as brushing teeth?: 4  Eating meals?: 4  Total Score: 24     AM-PAC Raw Score CMS G-Code Modifier Level of Impairment Assistance   6 % Total / Unable   7 - 9 CM 80 - 100% Maximal Assist   10 - 14 CL 60 - 80% Moderate Assist   15 - 19 CK 40 - 60% Moderate Assist   20 - 22 CJ 20 - 40% Minimal Assist   23 CI 1-20% SBA / CGA   24 CH 0% Independent/ Mod I     Patient left up in chair with all lines intact and call button in reach    ASSESSMENT:  Tasha Rush is a 41 y.o. female with a medical diagnosis of Meningitis and presents with independence in bed mobility, transfers, and functional mobility. Pt also demonstrated (I) with ADLs and reports (I) with other ADLs (i.e. Toileting, showering). Pt reports improvement in gait, coordination, and strength. Pt denying need for assistance with mobility and ADLs but agreeable to OP services upon hospital d/c. Pt will be d/c'ed from acute OT services.     Rehab potential is excellent.    Activity tolerance: Excellent    Discharge recommendations: Discharge Facility/Level Of Care Needs: outpatient OT, outpatient speech therapy     Barriers to discharge: Barriers to Discharge: None    Equipment recommendations: none     GOALS:   Occupational Therapy Goals        Problem: Occupational Therapy Goal    Goal Priority Disciplines Outcome Interventions   Occupational Therapy Goal     OT, PT/OT Ongoing (interventions implemented as appropriate)    Description:  Goals to be met by: 2/26/2017    Patient will increase functional independence with ADLs  by performing:    Toileting from toilet with [place] for hygiene and clothing management.- Met (per pt report)  Bathing from  shower chair/bench with [place]. -Met (per pt report, bathing with no AD)  Stand pivot transfers with [place]. -Met  Toilet transfer to toilet with [place]. -Met (per pt report)  Recall 3/3 words within 1 minute.-Not Met                 Plan:  Patient to be discharged from acute OT services.   Plan of Care reviewed with: patient       ANAMARIA Hawk  02/20/2017

## 2017-02-20 NOTE — SUBJECTIVE & OBJECTIVE
Interval History: Patient reports mild mid back pain, no further symptoms or new concerns at this time. Nursing reports patient had 3 episodes of emesis during PT. No BM x 7 days. Appetite improving.     Review of Systems   Constitutional: Positive for appetite change (improving). Negative for activity change, chills, fatigue and fever.   Eyes: Negative for photophobia, pain and visual disturbance.   Respiratory: Negative for cough, shortness of breath and wheezing.    Cardiovascular: Negative for chest pain and palpitations.   Gastrointestinal: Positive for vomiting. Negative for abdominal pain, constipation, diarrhea and nausea.   Genitourinary: Negative for dysuria, flank pain, frequency, hematuria, urgency, vaginal discharge and vaginal pain.   Musculoskeletal: Positive for back pain. Negative for arthralgias, neck pain and neck stiffness.   Skin: Negative for color change and rash.   Neurological: Negative for dizziness, syncope, weakness, light-headedness, numbness and headaches.   Psychiatric/Behavioral: Negative for confusion and decreased concentration.     Objective:     Vital Signs (Most Recent):  Temp: 97.5 °F (36.4 °C) (02/20/17 0400)  Pulse: 72 (02/20/17 1000)  Resp: 20 (02/20/17 0701)  BP: 132/82 (02/20/17 0939)  SpO2: 99 % (02/20/17 0701) Vital Signs (24h Range):  Temp:  [97.5 °F (36.4 °C)-99.1 °F (37.3 °C)] 97.5 °F (36.4 °C)  Pulse:  [] 72  Resp:  [20] 20  SpO2:  [98 %-100 %] 99 %  BP: (117-136)/() 132/82     Weight: 70.2 kg (154 lb 12.2 oz)  Body mass index is 25.75 kg/(m^2).    Intake/Output Summary (Last 24 hours) at 02/20/17 1212  Last data filed at 02/20/17 0700   Gross per 24 hour   Intake             1522 ml   Output             1050 ml   Net              472 ml      Physical Exam   Constitutional: She is oriented to person, place, and time. She appears well-developed and well-nourished. No distress.   HENT:   Head: Normocephalic and atraumatic.   Right Ear: External ear normal.    Left Ear: External ear normal.   Eyes: Conjunctivae and EOM are normal. Pupils are equal, round, and reactive to light. No scleral icterus.   Neck: Normal range of motion. Neck supple. No tracheal deviation present.   Cardiovascular: Normal rate, regular rhythm, normal heart sounds and intact distal pulses.  Exam reveals no gallop and no friction rub.    No murmur heard.  Pulmonary/Chest: Effort normal and breath sounds normal. No stridor. No respiratory distress. She has no wheezes. She has no rales. She exhibits no tenderness.   Bilateral lung fields CTA.     Abdominal: Soft. Bowel sounds are normal. She exhibits no distension. There is no tenderness. There is no guarding.   Musculoskeletal: Normal range of motion. She exhibits no deformity.   No midline cervical, thoracic, lumbar spinal tenderness to palpation, crepitus, step-offs, deformities.    Neurological: She is alert and oriented to person, place, and time. No cranial nerve deficit. She exhibits normal muscle tone.   The patient was alert, relaxed and cooperative with coherent thought process. AAOx4. CN II-XII were grossly intact.    Skin: Skin is warm and dry. No rash noted. She is not diaphoretic. No erythema. No pallor.   Psychiatric: She has a normal mood and affect. Her behavior is normal. Judgment and thought content normal.   Nursing note and vitals reviewed.      Significant Labs:   BMP:   Recent Labs  Lab 02/20/17  0529         K 3.3*      CO2 22*   BUN 11   CREATININE 1.0   CALCIUM 8.7   MG 1.9     CBC:   Recent Labs  Lab 02/18/17  1441 02/19/17  0546 02/20/17  0529   WBC 7.18 5.82 6.72   HGB 13.3 12.4 11.5*   HCT 39.5 37.7 35.3*    225 234     CMP:   Recent Labs  Lab 02/18/17  1441 02/19/17  0546 02/20/17  0529    136 137   K 3.6 3.4* 3.3*    104 107   CO2 28 24 22*    104 100   BUN 13 12 11   CREATININE 1.3 1.1 1.0   CALCIUM 9.4 8.8 8.7   ANIONGAP 8 8 8   EGFRNONAA 50* >60 >60     Results for  DAX GAYLE (MRN [id number]) as of 2/16/2017 11:20    Ref. Range 2/15/2017 15:48   Color, CSF Latest Ref Range: Colorless  Colorless   Heme Aliquot Latest Units: mL 2.0   Appearance, CSF Latest Ref Range: Clear  Slightly hazy (A)   WBC, CSF Latest Ref Range: 0 - 5 /cu mm 23 (H)   RBC, CSF Latest Ref Range: 0 /cu mm 1036.5 (A)   Segmented Neutrophils, CSF Latest Ref Range: 0 - 6 % 24 (H)   Lymphs, CSF Latest Ref Range: 40 - 80 % 65   Mono/Macrophage, CSF Latest Ref Range: 15 - 45 % 11 (L)   Glucose, CSF Latest Ref Range: 40 - 70 mg/dL 56   Protein, CSF Latest Ref Range: 15 - 40 mg/dL 50 (H)      All pertinent labs within the past 24 hours have been reviewed.    Significant Imaging: I have reviewed all pertinent imaging results/findings within the past 24 hours.     Imaging Results         IR Lumbar Puncture Diagnostic (Final result) Result time:  02/15/17 16:35:44    Final result by Luis Daniel Gardner MD (02/15/17 16:35:44)    Impression:       Successful fluoroscopic guided lumbar puncture.    Opening pressure of 17 mm H2O.       Electronically signed by: LUIS DANIEL GARDNER MD  Date:     02/15/17  Time:    16:35     Narrative:      Procedure: Fluoroscopic guided lumbar puncture    Clinical Indication: Diffusion, initiation    Operators: Jj    Technique: Informed consent was obtained.  The patient was then placed on the fluoroscopic table in the prone position. The patient's back was then prepped and draped in sterile fashion. The L4-5 level was localized with fluoroscopic guidance. Local anesthesia with 1% lidocaine was administered. Subsequently using a 22-gauge spinal needle and fluoroscopic guidance access was obtained into the thecal sac via a L4-5 translaminar approach. Following removal of the stylet clear cerebrospinal fluid was identified spontaneously. Opening pressure was measured.  Approximately 10 cc was collected, placed in sample bottles, and sent to the lab for analysis. The stylet was then  reinserted and the spinal needle was removed in full.  The patient tolerated the procedure well. No immediate postprocedure complications. Adhesive bandage placed over the puncture site.   Order placed in chart for strict bed rest next 2 hours.            MRA Neck with contrast (Final result) Result time:  02/15/17 15:32:34    Procedure changed from MRA Neck        Final result by Fatou Almazan MD (02/15/17 15:32:34)    Impression:     No significant abnormality demonstrated.      Electronically signed by: Fatou Almazan MD  Date:     02/15/17  Time:    15:32     Narrative:    MRA of the neck was performed.  Contrast utilized in conjunction with brain imaging today.  Origins of the great vessels are within normal limits.  Vertebral arteries demonstrate no significant narrowing.  Common carotid and internal carotid arteries are patent without significant stenosis.            MRI Brain W WO Contrast (Final result)    Abnormal Result time:  02/15/17 15:33:22    Procedure changed from MRI Brain Without Contrast        Final result by Fatou Almazan MD (02/15/17 15:33:22)    Impression:     No evidence of acute infarct.    Increased flair signal within sulci, with corresponding subtly restricted diffusion along sulci over both convexities and enhancement.  Findings are most suspicious for infectious etiology/meningitis.    epic notify      Electronically signed by: Fatou Almazan MD  Date:     02/15/17  Time:    15:33     Narrative:    Routine imaging through the brain was performed with and without the use of intravenous contrast material.  For the contrast enhanced portion of the study, 10 cc gadavist was administered intravenously.    Comparison: Head CT from today    Clinical history: Speech abnormality    Findings: Paranasal sinuses demonstrate no significant abnormality, with a subcentimeter mucous retention cyst or polyp in the right maxillary sinus.  Orbits are within normal limits.  The ventricular  system is normal in size and not distorted by mass effect.  The craniocervical junction and pituitary region are within normal limits.  There is no evidence of acute infarct.  No abnormal gradient signal is present.  Along sulci, most pronounced over both convexities, there is increased flair signal, some slightly restricted diffusion and corresponding enhancement.  There is a more subtle degree of flair signal infratentorial along the cerebellum with corresponding subtle enhancement.            MRA Brain (Final result) Result time:  02/15/17 15:34:47    Final result by John Robles MD (02/15/17 15:34:47)    Impression:     Normal MRA brain study.      Electronically signed by: LEONARDO ROBLES MD  Date:     02/15/17  Time:    15:34     Narrative:    MRA brain without contrast, comparison  hours same day.  The right vertebral dominant.  No aneurysm, AVM.  Left posterior communicating and anterior communicating artery noted.            X-Ray Chest PA And Lateral (Final result) Result time:  02/15/17 13:43:34    Final result by Fatou Almazan MD (02/15/17 13:43:34)    Impression:     No finding to correlate with history.      Electronically signed by: Fatou Almazan MD  Date:     02/15/17  Time:    13:43     Narrative:    2 views obtained.  The cardiac size and pulmonary vascularity are normal.  There is no pleural effusion.  Osseous structures are intact and the lungs are clear.  Osseous structures demonstrate mild spinal curvature.            CT Head Without Contrast (Final result) Result time:  02/15/17 10:01:28    Final result by Scott Lau MD (02/15/17 10:01:28)    Impression:        CT scan of the head within normal limits.      Electronically signed by: SCOTT LAU MD  Date:     02/15/17  Time:    10:01     Narrative:    History: Aphasia and confusion.    Procedure: Axial CT scans of the head are performed from the base of the skull to the cranial vertex without  contrast.        Findings:    There is no hydrocephalus.  The subarachnoid space is not as prominent however could be within normal limits for the patient's age (41).  Within the parenchyma no hemorrhage is or shift of midline structures.  The gray/white matter delineation is preserved throughout the cerebral hemispheres.  There is a septum cavum pellucidum and cavum vergae.  Basal ganglia are symmetric and normal bilaterally.    4th ventricle is in midline.  Cerebellar hemispheres are normal.  No Chiari malformations.  There is no suprasellar or pineal region masses.    The visualized paranasal air sinuses are clear.

## 2017-02-20 NOTE — PLAN OF CARE
Problem: Patient Care Overview  Goal: Plan of Care Review  Outcome: Ongoing (interventions implemented as appropriate)  Remains free from fall, injury, and skin breakdown. Ambulates independently to bathroom; good output noted. VSS stable on RA and afebrile. Telemetry maintained; all alarms on and audible. Positions self independently. Pain controlled with PO PRN meds. Neuro checks WDL. Improved appetite at this shift. Denied N&V. IV site WNL. IV fluids maintained. Plan of care reviewed with patient and all questions answered. Bed low, locked w/ bed alarm on. Call light within reach. Purposeful rounding performed. No other complaints at this time.

## 2017-02-21 LAB
ANION GAP SERPL CALC-SCNC: 11 MMOL/L
ANION GAP SERPL CALC-SCNC: 9 MMOL/L
BASOPHILS # BLD AUTO: 0.01 K/UL
BASOPHILS NFR BLD: 0.2 %
BUN SERPL-MCNC: 10 MG/DL
BUN SERPL-MCNC: 8 MG/DL
CALCIUM SERPL-MCNC: 9.1 MG/DL
CALCIUM SERPL-MCNC: 9.9 MG/DL
CHLORIDE SERPL-SCNC: 103 MMOL/L
CHLORIDE SERPL-SCNC: 99 MMOL/L
CO2 SERPL-SCNC: 22 MMOL/L
CO2 SERPL-SCNC: 28 MMOL/L
CREAT SERPL-MCNC: 0.9 MG/DL
CREAT SERPL-MCNC: 1.1 MG/DL
DIFFERENTIAL METHOD: ABNORMAL
EOSINOPHIL # BLD AUTO: 0 K/UL
EOSINOPHIL NFR BLD: 0.7 %
ERYTHROCYTE [DISTWIDTH] IN BLOOD BY AUTOMATED COUNT: 13.2 %
EST. GFR  (AFRICAN AMERICAN): >60 ML/MIN/1.73 M^2
EST. GFR  (AFRICAN AMERICAN): >60 ML/MIN/1.73 M^2
EST. GFR  (NON AFRICAN AMERICAN): >60 ML/MIN/1.73 M^2
EST. GFR  (NON AFRICAN AMERICAN): >60 ML/MIN/1.73 M^2
GLUCOSE SERPL-MCNC: 95 MG/DL
GLUCOSE SERPL-MCNC: 96 MG/DL
HCT VFR BLD AUTO: 35.5 %
HGB BLD-MCNC: 11.6 G/DL
HSV1, PCR, CSF: NEGATIVE
HSV2, PCR, CSF: NEGATIVE
LYMPHOCYTES # BLD AUTO: 2.4 K/UL
LYMPHOCYTES NFR BLD: 38.5 %
MAGNESIUM SERPL-MCNC: 1.7 MG/DL
MCH RBC QN AUTO: 29.9 PG
MCHC RBC AUTO-ENTMCNC: 32.7 %
MCV RBC AUTO: 92 FL
MONOCYTES # BLD AUTO: 0.6 K/UL
MONOCYTES NFR BLD: 10.5 %
NEUTROPHILS # BLD AUTO: 3.1 K/UL
NEUTROPHILS NFR BLD: 49.9 %
PHOSPHATE SERPL-MCNC: 3.3 MG/DL
PLATELET # BLD AUTO: 244 K/UL
PMV BLD AUTO: 9.4 FL
POTASSIUM SERPL-SCNC: 3.2 MMOL/L
POTASSIUM SERPL-SCNC: 3.4 MMOL/L
RBC # BLD AUTO: 3.88 M/UL
SODIUM SERPL-SCNC: 136 MMOL/L
SODIUM SERPL-SCNC: 136 MMOL/L
WBC # BLD AUTO: 6.1 K/UL

## 2017-02-21 PROCEDURE — 11000001 HC ACUTE MED/SURG PRIVATE ROOM

## 2017-02-21 PROCEDURE — 99233 SBSQ HOSP IP/OBS HIGH 50: CPT | Mod: ,,, | Performed by: PHYSICIAN ASSISTANT

## 2017-02-21 PROCEDURE — 80048 BASIC METABOLIC PNL TOTAL CA: CPT | Mod: 91

## 2017-02-21 PROCEDURE — 25000003 PHARM REV CODE 250: Performed by: HOSPITALIST

## 2017-02-21 PROCEDURE — 36415 COLL VENOUS BLD VENIPUNCTURE: CPT

## 2017-02-21 PROCEDURE — 63600175 PHARM REV CODE 636 W HCPCS: Performed by: INTERNAL MEDICINE

## 2017-02-21 PROCEDURE — 99233 SBSQ HOSP IP/OBS HIGH 50: CPT | Mod: ,,, | Performed by: INTERNAL MEDICINE

## 2017-02-21 PROCEDURE — 85025 COMPLETE CBC W/AUTO DIFF WBC: CPT

## 2017-02-21 PROCEDURE — 25000003 PHARM REV CODE 250: Performed by: INTERNAL MEDICINE

## 2017-02-21 PROCEDURE — 84100 ASSAY OF PHOSPHORUS: CPT

## 2017-02-21 PROCEDURE — 99232 SBSQ HOSP IP/OBS MODERATE 35: CPT | Mod: ,,, | Performed by: INTERNAL MEDICINE

## 2017-02-21 PROCEDURE — 80048 BASIC METABOLIC PNL TOTAL CA: CPT

## 2017-02-21 PROCEDURE — 83735 ASSAY OF MAGNESIUM: CPT

## 2017-02-21 PROCEDURE — 25000003 PHARM REV CODE 250: Performed by: PHYSICIAN ASSISTANT

## 2017-02-21 RX ORDER — POTASSIUM CHLORIDE 20 MEQ/1
20 TABLET, EXTENDED RELEASE ORAL
Status: COMPLETED | OUTPATIENT
Start: 2017-02-21 | End: 2017-02-21

## 2017-02-21 RX ORDER — AMOXICILLIN 250 MG
1 CAPSULE ORAL ONCE
Status: COMPLETED | OUTPATIENT
Start: 2017-02-21 | End: 2017-02-21

## 2017-02-21 RX ADMIN — OXYCODONE AND ACETAMINOPHEN 1 TABLET: 7.5; 325 TABLET ORAL at 10:02

## 2017-02-21 RX ADMIN — OXYCODONE AND ACETAMINOPHEN 1 TABLET: 7.5; 325 TABLET ORAL at 03:02

## 2017-02-21 RX ADMIN — POTASSIUM CHLORIDE 20 MEQ: 1500 TABLET, EXTENDED RELEASE ORAL at 09:02

## 2017-02-21 RX ADMIN — ATORVASTATIN CALCIUM 40 MG: 20 TABLET, FILM COATED ORAL at 09:02

## 2017-02-21 RX ADMIN — HEPARIN SODIUM 5000 UNITS: 5000 INJECTION, SOLUTION INTRAVENOUS; SUBCUTANEOUS at 09:02

## 2017-02-21 RX ADMIN — HEPARIN SODIUM 5000 UNITS: 5000 INJECTION, SOLUTION INTRAVENOUS; SUBCUTANEOUS at 01:02

## 2017-02-21 RX ADMIN — CEFTRIAXONE 2 G: 2 INJECTION, SOLUTION INTRAVENOUS at 12:02

## 2017-02-21 RX ADMIN — POTASSIUM CHLORIDE 20 MEQ: 1500 TABLET, EXTENDED RELEASE ORAL at 03:02

## 2017-02-21 RX ADMIN — HEPARIN SODIUM 5000 UNITS: 5000 INJECTION, SOLUTION INTRAVENOUS; SUBCUTANEOUS at 06:02

## 2017-02-21 RX ADMIN — STANDARDIZED SENNA CONCENTRATE AND DOCUSATE SODIUM 1 TABLET: 8.6; 5 TABLET, FILM COATED ORAL at 11:02

## 2017-02-21 RX ADMIN — CEFTRIAXONE 2 G: 2 INJECTION, SOLUTION INTRAVENOUS at 04:02

## 2017-02-21 RX ADMIN — AMLODIPINE BESYLATE 10 MG: 5 TABLET ORAL at 09:02

## 2017-02-21 RX ADMIN — POTASSIUM CHLORIDE 20 MEQ: 1500 TABLET, EXTENDED RELEASE ORAL at 01:02

## 2017-02-21 RX ADMIN — OXYCODONE AND ACETAMINOPHEN 1 TABLET: 7.5; 325 TABLET ORAL at 08:02

## 2017-02-21 RX ADMIN — ACYCLOVIR SODIUM 570 MG: 50 INJECTION, SOLUTION INTRAVENOUS at 02:02

## 2017-02-21 NOTE — PLAN OF CARE
Problem: Patient Care Overview  Goal: Plan of Care Review  Outcome: Ongoing (interventions implemented as appropriate)  Pt remains free from injury or falls. Vital signs stable throughout night on room air. Positions self independently. Telemetry maintained, pain managed with PO medications. Bed in low locked position and call light within reach.  Will continue to monitor.

## 2017-02-21 NOTE — SUBJECTIVE & OBJECTIVE
Interval History: Appetite improving. Back pain resolved. Left-sided headache today. Constipated. Nursing informs patient has infiltrated 6th peripheral IV in 4 shifts. Patient desires another attempt at peripheral IV. No further concerns.     Review of Systems   Constitutional: Positive for appetite change (improving). Negative for chills and fever.   Eyes: Negative for photophobia, pain and visual disturbance.   Respiratory: Negative for cough, shortness of breath and wheezing.    Cardiovascular: Negative for chest pain and palpitations.   Gastrointestinal: Positive for constipation. Negative for abdominal pain, diarrhea, nausea and vomiting.   Genitourinary: Negative for dysuria, flank pain, frequency, hematuria and urgency.   Musculoskeletal: Negative for back pain, neck pain and neck stiffness.   Neurological: Positive for headaches. Negative for dizziness, speech difficulty, weakness and light-headedness.   Psychiatric/Behavioral: Negative for agitation, behavioral problems, confusion and decreased concentration.     Objective:     Vital Signs (Most Recent):  Temp: 98.5 °F (36.9 °C) (02/21/17 0720)  Pulse: 63 (02/21/17 0800)  Resp: 18 (02/21/17 0720)  BP: (!) 144/89 (02/21/17 0720)  SpO2: 97 % (02/21/17 0720) Vital Signs (24h Range):  Temp:  [98.3 °F (36.8 °C)-98.7 °F (37.1 °C)] 98.5 °F (36.9 °C)  Pulse:  [59-80] 63  Resp:  [18-20] 18  SpO2:  [96 %-99 %] 97 %  BP: (117-151)/(75-89) 144/89     Weight: 72 kg (158 lb 11.7 oz)  Body mass index is 26.41 kg/(m^2).    Intake/Output Summary (Last 24 hours) at 02/21/17 1122  Last data filed at 02/21/17 0454   Gross per 24 hour   Intake             1080 ml   Output             1900 ml   Net             -820 ml      Physical Exam   Constitutional: She is oriented to person, place, and time. She appears well-developed and well-nourished. No distress.   HENT:   Head: Normocephalic and atraumatic.   Right Ear: External ear normal.   Left Ear: External ear normal.   Eyes:  Conjunctivae and EOM are normal. Pupils are equal, round, and reactive to light. No scleral icterus.   Neck: Normal range of motion. Neck supple. No tracheal deviation present.   Cardiovascular: Normal rate, regular rhythm, normal heart sounds and intact distal pulses.  Exam reveals no gallop and no friction rub.    No murmur heard.  Pulmonary/Chest: Effort normal and breath sounds normal. No stridor. No respiratory distress. She has no wheezes. She has no rales. She exhibits no tenderness.   Bilateral lung fields CTA.     Abdominal: Soft. Bowel sounds are normal. She exhibits no distension. There is no tenderness. There is no guarding.   Flat abdomen, non-distended, normal bowel sounds, non-TTP throughout. No CVA tenderness.     Musculoskeletal: Normal range of motion. She exhibits no deformity.   No midline cervical, thoracic, lumbar spinal tenderness to palpation, crepitus, step-offs, deformities.    Neurological: She is alert and oriented to person, place, and time. No cranial nerve deficit. She exhibits normal muscle tone.   The patient was alert, relaxed and cooperative with coherent thought process. AAOx4. CN II-XII were grossly intact.    Skin: Skin is warm and dry. No rash noted. She is not diaphoretic. No erythema. No pallor.   Psychiatric: She has a normal mood and affect. Her behavior is normal. Judgment and thought content normal.   Nursing note and vitals reviewed.      Significant Labs:   BMP:   Recent Labs  Lab 02/21/17  0530   GLU 96      K 3.2*      CO2 22*   BUN 8   CREATININE 0.9   CALCIUM 9.1   MG 1.7     CBC:   Recent Labs  Lab 02/20/17  0529 02/21/17  0530   WBC 6.72 6.10   HGB 11.5* 11.6*   HCT 35.3* 35.5*    244     CMP:   Recent Labs  Lab 02/20/17  0529 02/21/17  0530    136   K 3.3* 3.2*    103   CO2 22* 22*    96   BUN 11 8   CREATININE 1.0 0.9   CALCIUM 8.7 9.1   ANIONGAP 8 11   EGFRNONAA >60 >60     All pertinent labs within the past 24 hours have  been reviewed.    Significant Imaging: I have reviewed all pertinent imaging results/findings within the past 24 hours.   Imaging Results         IR Lumbar Puncture Diagnostic (Final result) Result time:  02/15/17 16:35:44    Final result by Scottie Gardner MD (02/15/17 16:35:44)    Impression:       Successful fluoroscopic guided lumbar puncture.    Opening pressure of 17 mm H2O.       Electronically signed by: SCOTTIE GARDNER MD  Date:     02/15/17  Time:    16:35     Narrative:      Procedure: Fluoroscopic guided lumbar puncture    Clinical Indication: Diffusion, initiation    Operators: Jj    Technique: Informed consent was obtained.  The patient was then placed on the fluoroscopic table in the prone position. The patient's back was then prepped and draped in sterile fashion. The L4-5 level was localized with fluoroscopic guidance. Local anesthesia with 1% lidocaine was administered. Subsequently using a 22-gauge spinal needle and fluoroscopic guidance access was obtained into the thecal sac via a L4-5 translaminar approach. Following removal of the stylet clear cerebrospinal fluid was identified spontaneously. Opening pressure was measured.  Approximately 10 cc was collected, placed in sample bottles, and sent to the lab for analysis. The stylet was then reinserted and the spinal needle was removed in full.  The patient tolerated the procedure well. No immediate postprocedure complications. Adhesive bandage placed over the puncture site.   Order placed in chart for strict bed rest next 2 hours.            MRA Neck with contrast (Final result) Result time:  02/15/17 15:32:34    Procedure changed from MRA Neck        Final result by aFtou Almazan MD (02/15/17 15:32:34)    Impression:     No significant abnormality demonstrated.      Electronically signed by: Fatou Almazan MD  Date:     02/15/17  Time:    15:32     Narrative:    MRA of the neck was performed.  Contrast utilized in conjunction with  brain imaging today.  Origins of the great vessels are within normal limits.  Vertebral arteries demonstrate no significant narrowing.  Common carotid and internal carotid arteries are patent without significant stenosis.            MRI Brain W WO Contrast (Final result)    Abnormal Result time:  02/15/17 15:33:22    Procedure changed from MRI Brain Without Contrast        Final result by Fatou Almazan MD (02/15/17 15:33:22)    Impression:     No evidence of acute infarct.    Increased flair signal within sulci, with corresponding subtly restricted diffusion along sulci over both convexities and enhancement.  Findings are most suspicious for infectious etiology/meningitis.    epic notify      Electronically signed by: Fatou Almazan MD  Date:     02/15/17  Time:    15:33     Narrative:    Routine imaging through the brain was performed with and without the use of intravenous contrast material.  For the contrast enhanced portion of the study, 10 cc gadavist was administered intravenously.    Comparison: Head CT from today    Clinical history: Speech abnormality    Findings: Paranasal sinuses demonstrate no significant abnormality, with a subcentimeter mucous retention cyst or polyp in the right maxillary sinus.  Orbits are within normal limits.  The ventricular system is normal in size and not distorted by mass effect.  The craniocervical junction and pituitary region are within normal limits.  There is no evidence of acute infarct.  No abnormal gradient signal is present.  Along sulci, most pronounced over both convexities, there is increased flair signal, some slightly restricted diffusion and corresponding enhancement.  There is a more subtle degree of flair signal infratentorial along the cerebellum with corresponding subtle enhancement.            MRA Brain (Final result) Result time:  02/15/17 15:34:47    Final result by John Robles MD (02/15/17 15:34:47)    Impression:     Normal MRA brain  study.      Electronically signed by: LEONARDO HANNAH MD  Date:     02/15/17  Time:    15:34     Narrative:    MRA brain without contrast, comparison  hours same day.  The right vertebral dominant.  No aneurysm, AVM.  Left posterior communicating and anterior communicating artery noted.            X-Ray Chest PA And Lateral (Final result) Result time:  02/15/17 13:43:34    Final result by Fatou Almazan MD (02/15/17 13:43:34)    Impression:     No finding to correlate with history.      Electronically signed by: Fatou Almazan MD  Date:     02/15/17  Time:    13:43     Narrative:    2 views obtained.  The cardiac size and pulmonary vascularity are normal.  There is no pleural effusion.  Osseous structures are intact and the lungs are clear.  Osseous structures demonstrate mild spinal curvature.            CT Head Without Contrast (Final result) Result time:  02/15/17 10:01:28    Final result by Mariposa Carcamo MD (02/15/17 10:01:28)    Impression:        CT scan of the head within normal limits.      Electronically signed by: MARIPOSA CARCAMO MD  Date:     02/15/17  Time:    10:01     Narrative:    History: Aphasia and confusion.    Procedure: Axial CT scans of the head are performed from the base of the skull to the cranial vertex without contrast.        Findings:    There is no hydrocephalus.  The subarachnoid space is not as prominent however could be within normal limits for the patient's age (41).  Within the parenchyma no hemorrhage is or shift of midline structures.  The gray/white matter delineation is preserved throughout the cerebral hemispheres.  There is a septum cavum pellucidum and cavum vergae.  Basal ganglia are symmetric and normal bilaterally.    4th ventricle is in midline.  Cerebellar hemispheres are normal.  No Chiari malformations.  There is no suprasellar or pineal region masses.    The visualized paranasal air sinuses are clear.

## 2017-02-21 NOTE — PROGRESS NOTES
[facility]  Infectious Disease  Progress Note    Patient Name: Tasha Rush  MRN: [id number]  Admission Date: 2/15/2017  Length of Stay: 6 days  Attending Physician: Stevan Cassidy MD  Primary Care Provider: Franklin Gonzalez MD    Isolation Status: No active isolations  Assessment/Plan:      * Meningitis  Ms. Rush is a 40 y/o female admitted with a severe headache.  CSF had a mildly elevated wbc count (23), a high rbc count (1036).  The csf protein and glucose are really not suggestive of bacterial or viral meningitis.  Her brain imaging is suggestive of a meningitis.  The direct gram stain from CSF fluid showed rare gram positive cocci.  I spoke to the microbiology lab.  There is apparently organism growing in the broth but this needs to be grown on solid media before identification.  Per lab, they are having trouble growing it on solid media.  I recommend IV ceftriaxone 2 grams q12 for 10 days.  HSV pcr is pending.  Okay from my standpoint to discontinue acyclovir as HSV encephalitis is lower on my differential.  I favor her being discharged to spend time at home with her daughter on her daughter's birthday.  I will follow up on results in my clinic.      Arrange follow up in my clinic with me next week.    Anticipated Disposition: TBD    Thank you for your consult. I will follow-up with patient. Please contact us if you have any additional questions.    Ramon Morocho MD  Infectious Disease  [facility]    Subjective:     Principal Problem:Meningitis    Interval History:   Patient is visibly tearful this morning.  She is worried that she will miss her 7 year old daughter's birthday.    HPI:  Ms. Rush is a 40 y/o female with no significant past medical history who presented to the hospital on February 15 with a severe headache X 2 days.  She states that the headache started gradually and progressively worsened.  She rates the pain as a 12 on a scale of 1 to 10.  She states  that her headache was so severe that she couldn't get words out of her mouth and express herself.  She was admitted and started on vancomycin, ceftriaxone and acyclovir.  She reports that her headaches are much better now but not completely resolved.        Review of Systems   Constitutional: Negative.    Eyes: Negative.    Respiratory: Negative.    Cardiovascular: Negative.    Gastrointestinal: Negative.    Endocrine: Negative.    Genitourinary: Negative.    Musculoskeletal: Negative.    Skin: Negative.    Allergic/Immunologic: Negative.    Neurological: Negative for headaches (no headaches today).   Hematological: Negative.    Psychiatric/Behavioral: Negative.      Objective:     Vital Signs (Most Recent):  Temp: 98.5 °F (36.9 °C) (02/21/17 0720)  Pulse: 65 (02/21/17 1200)  Resp: 18 (02/21/17 0720)  BP: (!) 144/89 (02/21/17 0720)  SpO2: 97 % (02/21/17 0720) Vital Signs (24h Range):  Temp:  [98.3 °F (36.8 °C)-98.7 °F (37.1 °C)] 98.5 °F (36.9 °C)  Pulse:  [56-80] 65  Resp:  [18-20] 18  SpO2:  [96 %-99 %] 97 %  BP: (117-151)/(75-89) 144/89     Weight: 72 kg (158 lb 11.7 oz)  Body mass index is 26.41 kg/(m^2).    Estimated Creatinine Clearance: 80.2 mL/min (based on Cr of 0.9).    Physical Exam   Constitutional: She is oriented to person, place, and time. She appears well-developed and well-nourished. No distress.   HENT:   Head: Normocephalic and atraumatic.   Right Ear: External ear normal.   Left Ear: External ear normal.   Nose: Nose normal.   Mouth/Throat: Oropharynx is clear and moist. No oropharyngeal exudate.   Eyes: Conjunctivae and EOM are normal. Pupils are equal, round, and reactive to light. Right eye exhibits no discharge. Left eye exhibits no discharge. No scleral icterus.   Neck: Normal range of motion. Neck supple. No JVD present. No tracheal deviation present. No thyromegaly present.   Cardiovascular: Normal rate, regular rhythm, normal heart sounds and intact distal pulses.  Exam reveals no gallop  and no friction rub.    No murmur heard.  Pulmonary/Chest: Effort normal and breath sounds normal. No stridor. No respiratory distress. She has no wheezes. She has no rales. She exhibits no tenderness.   Abdominal: Soft. Bowel sounds are normal. She exhibits no distension and no mass. There is no tenderness. There is no rebound and no guarding.   Musculoskeletal: Normal range of motion. She exhibits no edema or tenderness.   Lymphadenopathy:     She has no cervical adenopathy.   Neurological: She is alert and oriented to person, place, and time. She displays normal reflexes. No cranial nerve deficit. She exhibits normal muscle tone. Coordination normal.   Skin: Skin is warm. No rash noted. She is not diaphoretic. No erythema. No pallor.   Psychiatric: She has a normal mood and affect. Her behavior is normal. Judgment and thought content normal.   Nursing note and vitals reviewed.      Significant Labs:   Blood Culture: No results for input(s): LABBLOO in the last 4320 hours.  CBC:   Recent Labs  Lab 02/20/17  0529 02/21/17  0530   WBC 6.72 6.10   HGB 11.5* 11.6*   HCT 35.3* 35.5*    244     CMP:   Recent Labs  Lab 02/20/17  0529 02/21/17  0530    136   K 3.3* 3.2*    103   CO2 22* 22*    96   BUN 11 8   CREATININE 1.0 0.9   CALCIUM 8.7 9.1   ANIONGAP 8 11   EGFRNONAA >60 >60       Significant Imaging: I have reviewed all pertinent imaging results/findings within the past 24 hours.

## 2017-02-21 NOTE — PT/OT/SLP PROGRESS
Physical Therapy  Treatment/ dc summary      Tasha Rush   MRN: [id number]   Admitting Diagnosis: Meningitis    PT Received On: 17  PT Start Time:      PT Stop Time:     PT Total Time (min): 8 min       Billable Minutes:  Therapeutic Activity 8    Treatment Type: Treatment  PT/PTA: PT             General Precautions: Standard, fall  Orthopedic Precautions: N/A   Braces:      Do you have any cultural, spiritual, Amish conflicts, given your current situation?: none specified    Subjective: states she threw up this morning after the other lady left (OT)  Communicated with nursing prior to session.      Pain Ratin/10              Pain Rating Post-Intervention: 0/10    Objective:   Patient found with: peripheral IV, telemetry    Functional Mobility:  Bed Mobility:   Rolling/Turning to Left: Independent  Rolling/Turning Right: Independent  Scooting/Bridging: Independent  Supine to Sit: Independent    Transfers:  Sit <> Stand Assistance: Modified Independent  Sit <> Stand Assistive Device: No Assistive Device    Gait:   Gait Distance: 250  Assistance 1: Modified Independent  Gait Assistive Device: No device  Gait Pattern: reciprocal    Stairs:  Pt ascended/descend 12 stair(s) with No Assistive Device with left with Supervision or Set-up Assistance.     Balance:   Static Sit: NORMAL: No deviations seen in posture held statically  Dynamic Sit: NORMAL: No deviations seen in posture held dynamically  Static Stand: GOOD: Takes MODERATE challenges from all directions  Dynamic stand: GOOD+: Independent gait (with or without assistive device)       AM-PAC 6 CLICK MOBILITY  How much help from another person does this patient currently need?   1 = Unable, Total/Dependent Assistance  2 = A lot, Maximum/Moderate Assistance  3 = A little, Minimum/Contact Guard/Supervision  4 = None, Modified [place]/Independent    Turning over in bed (including adjusting bedclothes, sheets and blankets)?: 4  Sitting down on  and standing up from a chair with arms (e.g., wheelchair, bedside commode, etc.): 4  Moving from lying on back to sitting on the side of the bed?: 4  Moving to and from a bed to a chair (including a wheelchair)?: 4  Need to walk in hospital room?: 4  Climbing 3-5 steps with a railing?: 4  Total Score: 24    AM-PAC Raw Score CMS G-Code Modifier Level of Impairment Assistance   6 % Total / Unable   7 - 9 CM 80 - 100% Maximal Assist   10 - 14 CL 60 - 80% Moderate Assist   15 - 19 CK 40 - 60% Moderate Assist   20 - 22 CJ 20 - 40% Minimal Assist   23 CI 1-20% SBA / CGA   24 CH 0% Independent/ Mod I     Patient left HOB elevated with all lines intact, call button in reach and nursing notified.    Assessment:  Tasha Rush is a 41 y.o. female with a medical diagnosis of Meningitis and presents with safe amb on level surface and stairs.  Pt had achieved goals and no longer has any PT needs.  Plan dc PT services.  .    Rehab identified problem list/impairments: Rehab identified problem list/impairments: impaired fine motor    Rehab potential is excellent.    Activity tolerance: Good    Discharge recommendations: Discharge Facility/Level Of Care Needs:  (per chart OP OT and speech, has no PT needs)     Barriers to discharge: Barriers to Discharge: None    Equipment recommendations: Equipment Needed After Discharge: none for amb, defer bathing to OT     GOALS: all goals met   Physical Therapy Goals     Not on file      Multidisciplinary Problems (Resolved)        Problem: Physical Therapy Goal    Goal Priority Disciplines Outcome Goal Variances Interventions   Physical Therapy Goal   (Resolved)     PT/OT, PT Outcome(s) achieved     Description:  Goals to be met by: 17     Patient will increase functional independence with mobility by performin. Gait > 300 feet independently.-met  17    2. Ascend/descend 12 stairs with unilateral handrails with supervision.met 17                 PLAN:  Dc acute  PT-pt will stay in hospital until medically stable for DC,   Patient to be seen  (DC acute PT)  to address the above listed problems via gait training, therapeutic activities, therapeutic exercises  Plan of Care expires: 03/18/17  Plan of Care reviewed with: patient         Rocío Veliz, PT  02/20/2017

## 2017-02-21 NOTE — SUBJECTIVE & OBJECTIVE
Interval History:   Patient is visibly tearful this morning.  She is worried that she will miss her 7 year old daughter's birthday.    HPI:  Ms. Rush is a 40 y/o female with no significant past medical history who presented to the hospital on February 15 with a severe headache X 2 days.  She states that the headache started gradually and progressively worsened.  She rates the pain as a 12 on a scale of 1 to 10.  She states that her headache was so severe that she couldn't get words out of her mouth and express herself.  She was admitted and started on vancomycin, ceftriaxone and acyclovir.  She reports that her headaches are much better now but not completely resolved.        Review of Systems   Constitutional: Negative.    Eyes: Negative.    Respiratory: Negative.    Cardiovascular: Negative.    Gastrointestinal: Negative.    Endocrine: Negative.    Genitourinary: Negative.    Musculoskeletal: Negative.    Skin: Negative.    Allergic/Immunologic: Negative.    Neurological: Negative for headaches (no headaches today).   Hematological: Negative.    Psychiatric/Behavioral: Negative.      Objective:     Vital Signs (Most Recent):  Temp: 98.5 °F (36.9 °C) (02/21/17 0720)  Pulse: 65 (02/21/17 1200)  Resp: 18 (02/21/17 0720)  BP: (!) 144/89 (02/21/17 0720)  SpO2: 97 % (02/21/17 0720) Vital Signs (24h Range):  Temp:  [98.3 °F (36.8 °C)-98.7 °F (37.1 °C)] 98.5 °F (36.9 °C)  Pulse:  [56-80] 65  Resp:  [18-20] 18  SpO2:  [96 %-99 %] 97 %  BP: (117-151)/(75-89) 144/89     Weight: 72 kg (158 lb 11.7 oz)  Body mass index is 26.41 kg/(m^2).    Estimated Creatinine Clearance: 80.2 mL/min (based on Cr of 0.9).    Physical Exam   Constitutional: She is oriented to person, place, and time. She appears well-developed and well-nourished. No distress.   HENT:   Head: Normocephalic and atraumatic.   Right Ear: External ear normal.   Left Ear: External ear normal.   Nose: Nose normal.   Mouth/Throat: Oropharynx is clear and moist. No  oropharyngeal exudate.   Eyes: Conjunctivae and EOM are normal. Pupils are equal, round, and reactive to light. Right eye exhibits no discharge. Left eye exhibits no discharge. No scleral icterus.   Neck: Normal range of motion. Neck supple. No JVD present. No tracheal deviation present. No thyromegaly present.   Cardiovascular: Normal rate, regular rhythm, normal heart sounds and intact distal pulses.  Exam reveals no gallop and no friction rub.    No murmur heard.  Pulmonary/Chest: Effort normal and breath sounds normal. No stridor. No respiratory distress. She has no wheezes. She has no rales. She exhibits no tenderness.   Abdominal: Soft. Bowel sounds are normal. She exhibits no distension and no mass. There is no tenderness. There is no rebound and no guarding.   Musculoskeletal: Normal range of motion. She exhibits no edema or tenderness.   Lymphadenopathy:     She has no cervical adenopathy.   Neurological: She is alert and oriented to person, place, and time. She displays normal reflexes. No cranial nerve deficit. She exhibits normal muscle tone. Coordination normal.   Skin: Skin is warm. No rash noted. She is not diaphoretic. No erythema. No pallor.   Psychiatric: She has a normal mood and affect. Her behavior is normal. Judgment and thought content normal.   Nursing note and vitals reviewed.      Significant Labs:   Blood Culture: No results for input(s): LABBLOO in the last 4320 hours.  CBC:   Recent Labs  Lab 02/20/17  0529 02/21/17  0530   WBC 6.72 6.10   HGB 11.5* 11.6*   HCT 35.3* 35.5*    244     CMP:   Recent Labs  Lab 02/20/17  0529 02/21/17  0530    136   K 3.3* 3.2*    103   CO2 22* 22*    96   BUN 11 8   CREATININE 1.0 0.9   CALCIUM 8.7 9.1   ANIONGAP 8 11   EGFRNONAA >60 >60       Significant Imaging: I have reviewed all pertinent imaging results/findings within the past 24 hours.

## 2017-02-21 NOTE — ASSESSMENT & PLAN NOTE
-On Rocpehin / Ayclovir  - Vanc discontinued   -Clinical suspicion was viral etiology but CSF gram stain with GPC  -cont current Abx / Antivirals pending HSV PCR and CSF culture  - CSF culture without growth as of this AM  - HSV PCR should result today   - Awaiting further ID antibiotic recommendations based upon HSV PCR results

## 2017-02-21 NOTE — PLAN OF CARE
Problem: Physical Therapy Goal  Goal: Physical Therapy Goal  Goals to be met by: 17     Patient will increase functional independence with mobility by performin. Gait > 300 feet independently.-met 17    2. Ascend/descend 12 stairs with unilateral handrails with supervision.met 17   Outcome: Outcome(s) achieved Date Met:  17  Pt has met goals.  Has no acute PT needs .  Will DC PT  REC: no OP PT needs  No DME for amb. , defer bathing to OT

## 2017-02-21 NOTE — PROGRESS NOTES
[facility] Hospital Medicine  Progress Note    Patient Name: Tasha Rush  MRN: [id number]  Patient Class: IP- Inpatient   Admission Date: 2/15/2017  Length of Stay: 6 days  Attending Physician: Stevan Cassidy MD  Primary Care Provider: Franklin Gonzalez MD        Subjective:     Principal Problem:Meningitis    HPI:  40 y/o female with Hx of HTN presents with c/o of headache , confusion, and difficulty word finding for past 12 hours. Pt is accompanied by her mother. She woke up yesterday with a headache, went to work as normal. Took Excedrin and aleve without relief. Went to bed with the same headache. She has no prior hx of migraine. Rates the headache as 10/10 located mainly in front part of head. Least night felt she was getting sick ( fever, fatigue, cough)  so took an [place] before bed. When she woke up she felt confused and had difficulty finding the correct words she wanted to say. Denies any extremity weakness, gait problems, gi or  sx    Hospital Course:  Initial CT negative. MRI with increased flair signal within sulci, with corresponding subtly restricted diffusion along sulci over both convexities and enhancement.  Findings are most suspicious for infectious etiology/meningitis. The patient was begun on Acyclovir, Rocephin, and Vanc. 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 ID consult on 2/16/17 recommeneded to continue ceftriaxone given the gram stain findings and continue acyclovir pending the result of the HSV pcr. HSV pcr still pending.     Interval History: Appetite improving. Back pain resolved. Left-sided headache today. Constipated. Nursing informs patient has infiltrated 6th peripheral IV in 4 shifts. Patient desires another attempt at peripheral IV. No further concerns.     Review of Systems   Constitutional: Positive for appetite change (improving). Negative  for chills and fever.   Eyes: Negative for photophobia, pain and visual disturbance.   Respiratory: Negative for cough, shortness of breath and wheezing.    Cardiovascular: Negative for chest pain and palpitations.   Gastrointestinal: Positive for constipation. Negative for abdominal pain, diarrhea, nausea and vomiting.   Genitourinary: Negative for dysuria, flank pain, frequency, hematuria and urgency.   Musculoskeletal: Negative for back pain, neck pain and neck stiffness.   Neurological: Positive for headaches. Negative for dizziness, speech difficulty, weakness and light-headedness.   Psychiatric/Behavioral: Negative for agitation, behavioral problems, confusion and decreased concentration.     Objective:     Vital Signs (Most Recent):  Temp: 98.5 °F (36.9 °C) (02/21/17 0720)  Pulse: 63 (02/21/17 0800)  Resp: 18 (02/21/17 0720)  BP: (!) 144/89 (02/21/17 0720)  SpO2: 97 % (02/21/17 0720) Vital Signs (24h Range):  Temp:  [98.3 °F (36.8 °C)-98.7 °F (37.1 °C)] 98.5 °F (36.9 °C)  Pulse:  [59-80] 63  Resp:  [18-20] 18  SpO2:  [96 %-99 %] 97 %  BP: (117-151)/(75-89) 144/89     Weight: 72 kg (158 lb 11.7 oz)  Body mass index is 26.41 kg/(m^2).    Intake/Output Summary (Last 24 hours) at 02/21/17 1122  Last data filed at 02/21/17 0454   Gross per 24 hour   Intake             1080 ml   Output             1900 ml   Net             -820 ml      Physical Exam   Constitutional: She is oriented to person, place, and time. She appears well-developed and well-nourished. No distress.   HENT:   Head: Normocephalic and atraumatic.   Right Ear: External ear normal.   Left Ear: External ear normal.   Eyes: Conjunctivae and EOM are normal. Pupils are equal, round, and reactive to light. No scleral icterus.   Neck: Normal range of motion. Neck supple. No tracheal deviation present.   Cardiovascular: Normal rate, regular rhythm, normal heart sounds and intact distal pulses.  Exam reveals no gallop and no friction rub.    No murmur  heard.  Pulmonary/Chest: Effort normal and breath sounds normal. No stridor. No respiratory distress. She has no wheezes. She has no rales. She exhibits no tenderness.   Bilateral lung fields CTA.     Abdominal: Soft. Bowel sounds are normal. She exhibits no distension. There is no tenderness. There is no guarding.   Flat abdomen, non-distended, normal bowel sounds, non-TTP throughout. No CVA tenderness.     Musculoskeletal: Normal range of motion. She exhibits no deformity.   No midline cervical, thoracic, lumbar spinal tenderness to palpation, crepitus, step-offs, deformities.    Neurological: She is alert and oriented to person, place, and time. No cranial nerve deficit. She exhibits normal muscle tone.   The patient was alert, relaxed and cooperative with coherent thought process. AAOx4. CN II-XII were grossly intact.    Skin: Skin is warm and dry. No rash noted. She is not diaphoretic. No erythema. No pallor.   Psychiatric: She has a normal mood and affect. Her behavior is normal. Judgment and thought content normal.   Nursing note and vitals reviewed.      Significant Labs:   BMP:   Recent Labs  Lab 02/21/17  0530   GLU 96      K 3.2*      CO2 22*   BUN 8   CREATININE 0.9   CALCIUM 9.1   MG 1.7     CBC:   Recent Labs  Lab 02/20/17  0529 02/21/17  0530   WBC 6.72 6.10   HGB 11.5* 11.6*   HCT 35.3* 35.5*    244     CMP:   Recent Labs  Lab 02/20/17  0529 02/21/17  0530    136   K 3.3* 3.2*    103   CO2 22* 22*    96   BUN 11 8   CREATININE 1.0 0.9   CALCIUM 8.7 9.1   ANIONGAP 8 11   EGFRNONAA >60 >60     All pertinent labs within the past 24 hours have been reviewed.    Significant Imaging: I have reviewed all pertinent imaging results/findings within the past 24 hours.   Imaging Results         IR Lumbar Puncture Diagnostic (Final result) Result time:  02/15/17 16:35:44    Final result by Scottie Gardner MD (02/15/17 16:35:44)    Impression:       Successful fluoroscopic  guided lumbar puncture.    Opening pressure of 17 mm H2O.       Electronically signed by: LUIS DANIEL PATEL MD  Date:     02/15/17  Time:    16:35     Narrative:      Procedure: Fluoroscopic guided lumbar puncture    Clinical Indication: Diffusion, initiation    Operators: Jj    Technique: Informed consent was obtained.  The patient was then placed on the fluoroscopic table in the prone position. The patient's back was then prepped and draped in sterile fashion. The L4-5 level was localized with fluoroscopic guidance. Local anesthesia with 1% lidocaine was administered. Subsequently using a 22-gauge spinal needle and fluoroscopic guidance access was obtained into the thecal sac via a L4-5 translaminar approach. Following removal of the stylet clear cerebrospinal fluid was identified spontaneously. Opening pressure was measured.  Approximately 10 cc was collected, placed in sample bottles, and sent to the lab for analysis. The stylet was then reinserted and the spinal needle was removed in full.  The patient tolerated the procedure well. No immediate postprocedure complications. Adhesive bandage placed over the puncture site.   Order placed in chart for strict bed rest next 2 hours.            MRA Neck with contrast (Final result) Result time:  02/15/17 15:32:34    Procedure changed from MRA Neck        Final result by Fatou Almazan MD (02/15/17 15:32:34)    Impression:     No significant abnormality demonstrated.      Electronically signed by: Fatou Almazan MD  Date:     02/15/17  Time:    15:32     Narrative:    MRA of the neck was performed.  Contrast utilized in conjunction with brain imaging today.  Origins of the great vessels are within normal limits.  Vertebral arteries demonstrate no significant narrowing.  Common carotid and internal carotid arteries are patent without significant stenosis.            MRI Brain W WO Contrast (Final result)    Abnormal Result time:  02/15/17 15:33:22    Procedure  changed from MRI Brain Without Contrast        Final result by Fatou Almazan MD (02/15/17 15:33:22)    Impression:     No evidence of acute infarct.    Increased flair signal within sulci, with corresponding subtly restricted diffusion along sulci over both convexities and enhancement.  Findings are most suspicious for infectious etiology/meningitis.    epic notify      Electronically signed by: Fatou Almazan MD  Date:     02/15/17  Time:    15:33     Narrative:    Routine imaging through the brain was performed with and without the use of intravenous contrast material.  For the contrast enhanced portion of the study, 10 cc gadavist was administered intravenously.    Comparison: Head CT from today    Clinical history: Speech abnormality    Findings: Paranasal sinuses demonstrate no significant abnormality, with a subcentimeter mucous retention cyst or polyp in the right maxillary sinus.  Orbits are within normal limits.  The ventricular system is normal in size and not distorted by mass effect.  The craniocervical junction and pituitary region are within normal limits.  There is no evidence of acute infarct.  No abnormal gradient signal is present.  Along sulci, most pronounced over both convexities, there is increased flair signal, some slightly restricted diffusion and corresponding enhancement.  There is a more subtle degree of flair signal infratentorial along the cerebellum with corresponding subtle enhancement.            MRA Brain (Final result) Result time:  02/15/17 15:34:47    Final result by John Hannah MD (02/15/17 15:34:47)    Impression:     Normal MRA brain study.      Electronically signed by: LEONARDO HANNAH MD  Date:     02/15/17  Time:    15:34     Narrative:    MRA brain without contrast, comparison  hours same day.  The right vertebral dominant.  No aneurysm, AVM.  Left posterior communicating and anterior communicating artery noted.            X-Ray Chest PA And Lateral  (Final result) Result time:  02/15/17 13:43:34    Final result by Fatou Almazan MD (02/15/17 13:43:34)    Impression:     No finding to correlate with history.      Electronically signed by: Fatou Almazan MD  Date:     02/15/17  Time:    13:43     Narrative:    2 views obtained.  The cardiac size and pulmonary vascularity are normal.  There is no pleural effusion.  Osseous structures are intact and the lungs are clear.  Osseous structures demonstrate mild spinal curvature.            CT Head Without Contrast (Final result) Result time:  02/15/17 10:01:28    Final result by Scott Carcamo MD (02/15/17 10:01:28)    Impression:        CT scan of the head within normal limits.      Electronically signed by: SCOTT CARCAMO MD  Date:     02/15/17  Time:    10:01     Narrative:    History: Aphasia and confusion.    Procedure: Axial CT scans of the head are performed from the base of the skull to the cranial vertex without contrast.        Findings:    There is no hydrocephalus.  The subarachnoid space is not as prominent however could be within normal limits for the patient's age (41).  Within the parenchyma no hemorrhage is or shift of midline structures.  The gray/white matter delineation is preserved throughout the cerebral hemispheres.  There is a septum cavum pellucidum and cavum vergae.  Basal ganglia are symmetric and normal bilaterally.    4th ventricle is in midline.  Cerebellar hemispheres are normal.  No Chiari malformations.  There is no suprasellar or pineal region masses.    The visualized paranasal air sinuses are clear.               Assessment/Plan:      * Meningitis  -On Rocpehin / Ayclovir  - Vanc discontinued   -Clinical suspicion was viral etiology but CSF gram stain with GPC  -cont current Abx / Antivirals pending HSV PCR and CSF culture  - CSF culture without growth as of this AM  - HSV PCR should result today   - Awaiting further ID antibiotic recommendations based upon HSV PCR  results      Tobacco abuse  - Nicotine patch  - Cessation education      Essential hypertension  - Continue home meds and monitor  -Amlodipine 10 mg QD, HCTZ 25 mg QD       Hyponatremia  - resolved    Headache  - Symptomatic treatment with nsaids / opioids prn      Hypokalemia  - Potassium 3.2 today.   - Replace & check labs this afternoon.    Aphasia  -improved   -speech therapy consult & recommends outpatient speech therapy.       VTE Risk Mitigation         Ordered     heparin (porcine) injection 5,000 Units  Every 8 hours     Route:  Subcutaneous        02/18/17 1627     Medium Risk of VTE  Once      02/15/17 1625          Yuko Sol PA-C  Department of Hospital Medicine   [facility]

## 2017-02-21 NOTE — ASSESSMENT & PLAN NOTE
Ms. Rush is a 40 y/o female admitted with a severe headache.  CSF had a mildly elevated wbc count (23), a high rbc count (1036).  The csf protein and glucose are really not suggestive of bacterial or viral meningitis.  Her brain imaging is suggestive of a meningitis.  The direct gram stain from CSF fluid showed rare gram positive cocci.  I spoke to the microbiology lab.  There is apparently organism growing in the broth but this needs to be grown on solid media before identification.  Per lab, they are having trouble growing it on solid media.  I recommend IV ceftriaxone 2 grams q12 for 10 days.  HSV pcr is pending.  Okay from my standpoint to discontinue acyclovir as HSV encephalitis is lower on my differential.  I favor her being discharged to spend time at home with her daughter on her daughter's birthday.  I will follow up on results in my clinic.      Arrange follow up in my clinic with me next week.

## 2017-02-22 VITALS
HEART RATE: 69 BPM | HEIGHT: 65 IN | WEIGHT: 157.44 LBS | OXYGEN SATURATION: 98 % | RESPIRATION RATE: 18 BRPM | BODY MASS INDEX: 26.23 KG/M2 | DIASTOLIC BLOOD PRESSURE: 64 MMHG | SYSTOLIC BLOOD PRESSURE: 132 MMHG | TEMPERATURE: 98 F

## 2017-02-22 LAB
ANION GAP SERPL CALC-SCNC: 10 MMOL/L
BASOPHILS # BLD AUTO: 0.02 K/UL
BASOPHILS NFR BLD: 0.3 %
BUN SERPL-MCNC: 11 MG/DL
CALCIUM SERPL-MCNC: 9.8 MG/DL
CHLORIDE SERPL-SCNC: 105 MMOL/L
CO2 SERPL-SCNC: 22 MMOL/L
CREAT SERPL-MCNC: 1 MG/DL
DIFFERENTIAL METHOD: NORMAL
EOSINOPHIL # BLD AUTO: 0.1 K/UL
EOSINOPHIL NFR BLD: 0.9 %
ERYTHROCYTE [DISTWIDTH] IN BLOOD BY AUTOMATED COUNT: 12.9 %
EST. GFR  (AFRICAN AMERICAN): >60 ML/MIN/1.73 M^2
EST. GFR  (NON AFRICAN AMERICAN): >60 ML/MIN/1.73 M^2
GLUCOSE SERPL-MCNC: 106 MG/DL
HCT VFR BLD AUTO: 38.7 %
HGB BLD-MCNC: 12.8 G/DL
LYMPHOCYTES # BLD AUTO: 2.3 K/UL
LYMPHOCYTES NFR BLD: 40.2 %
MAGNESIUM SERPL-MCNC: 1.8 MG/DL
MCH RBC QN AUTO: 30 PG
MCHC RBC AUTO-ENTMCNC: 33.1 %
MCV RBC AUTO: 91 FL
MONOCYTES # BLD AUTO: 0.5 K/UL
MONOCYTES NFR BLD: 8.4 %
NEUTROPHILS # BLD AUTO: 2.9 K/UL
NEUTROPHILS NFR BLD: 50 %
PHOSPHATE SERPL-MCNC: 3.8 MG/DL
PLATELET # BLD AUTO: 262 K/UL
PMV BLD AUTO: 9.4 FL
POTASSIUM SERPL-SCNC: 3.7 MMOL/L
RBC # BLD AUTO: 4.26 M/UL
SODIUM SERPL-SCNC: 137 MMOL/L
WBC # BLD AUTO: 5.74 K/UL

## 2017-02-22 PROCEDURE — 80048 BASIC METABOLIC PNL TOTAL CA: CPT

## 2017-02-22 PROCEDURE — 25000003 PHARM REV CODE 250: Performed by: PHYSICIAN ASSISTANT

## 2017-02-22 PROCEDURE — 85025 COMPLETE CBC W/AUTO DIFF WBC: CPT

## 2017-02-22 PROCEDURE — 84100 ASSAY OF PHOSPHORUS: CPT

## 2017-02-22 PROCEDURE — 25000003 PHARM REV CODE 250: Performed by: HOSPITALIST

## 2017-02-22 PROCEDURE — 63600175 PHARM REV CODE 636 W HCPCS: Performed by: INTERNAL MEDICINE

## 2017-02-22 PROCEDURE — 36415 COLL VENOUS BLD VENIPUNCTURE: CPT

## 2017-02-22 PROCEDURE — 25000003 PHARM REV CODE 250: Performed by: INTERNAL MEDICINE

## 2017-02-22 PROCEDURE — 63600175 PHARM REV CODE 636 W HCPCS: Performed by: PHYSICIAN ASSISTANT

## 2017-02-22 PROCEDURE — 83735 ASSAY OF MAGNESIUM: CPT

## 2017-02-22 RX ORDER — DOCUSATE SODIUM 100 MG/1
200 CAPSULE, LIQUID FILLED ORAL DAILY
Status: DISCONTINUED | OUTPATIENT
Start: 2017-02-22 | End: 2017-02-22 | Stop reason: HOSPADM

## 2017-02-22 RX ORDER — OXYCODONE AND ACETAMINOPHEN 5; 325 MG/1; MG/1
1 TABLET ORAL EVERY 6 HOURS PRN
Qty: 10 TABLET | Refills: 0 | Status: SHIPPED | OUTPATIENT
Start: 2017-02-22 | End: 2017-08-02

## 2017-02-22 RX ADMIN — POTASSIUM CHLORIDE 20 MEQ: 1500 TABLET, EXTENDED RELEASE ORAL at 09:02

## 2017-02-22 RX ADMIN — CEFTRIAXONE 2 G: 2 INJECTION, SOLUTION INTRAVENOUS at 02:02

## 2017-02-22 RX ADMIN — CEFTRIAXONE 2 G: 2 INJECTION, SOLUTION INTRAVENOUS at 04:02

## 2017-02-22 RX ADMIN — HEPARIN SODIUM 5000 UNITS: 5000 INJECTION, SOLUTION INTRAVENOUS; SUBCUTANEOUS at 05:02

## 2017-02-22 RX ADMIN — AMLODIPINE BESYLATE 10 MG: 5 TABLET ORAL at 09:02

## 2017-02-22 RX ADMIN — ATORVASTATIN CALCIUM 40 MG: 20 TABLET, FILM COATED ORAL at 09:02

## 2017-02-22 RX ADMIN — NICOTINE 1 PATCH: 14 PATCH, EXTENDED RELEASE TRANSDERMAL at 09:02

## 2017-02-22 NOTE — PLAN OF CARE
Pt discharging home today after [facility] team, [phone],  completes teaching.  Issa to arrange HH RN if needed.   Pt will be on Ceftrixone 2 gms Q 12 hours for 3 more days.  Pt understands and is agreeable with plan.  No further CM needs at this time.     02/22/17 1409   Final Note   Assessment Type Final Discharge Note   Discharge Disposition Home-Health   Discharge planning education complete? Yes   What phone number can be called within the next 1-3 days to see how you are doing after discharge? [phone]   Hospital Follow Up  Appt(s) scheduled? Yes  (per patient and her mother)   Discharge plans and expectations educations in teach back method with documentation complete? Yes   Offered [facility] Bedside Delivery? n/a   Discharge/Hospital Encounter Summary to ([facility]) PCP Yes   Referral to Outpatient Case Management complete? Yes   Referral to / orders for Home Health Complete? Yes   30 day supply of medicines given at discharge, if documented non-compliance / non-adherence? n/a   Any social issues identified prior to discharge? No   Did you assess the readiness or willingness of the family or caregiver to support self management of care? Yes

## 2017-02-22 NOTE — PLAN OF CARE
[facility]  [address] [place]. [address]           HOME  HEALTH ORDERS        Admit to Home Health    Diagnoses:  Active Hospital Problems    Diagnosis  POA    *Meningitis [G03.9]  Yes    Hypokalemia [E87.6]  Yes    Aphasia [R47.01]  Yes    Hyponatremia [E87.1]  Yes    Headache [R51]  Yes    Essential hypertension [I10]  Yes    Tobacco abuse [Z72.0]  Yes      Resolved Hospital Problems    Diagnosis Date Resolved POA   No resolved problems to display.       Patient is homebound due to: Pt requires home health services due to taxing effort to leave the home as a result of weakness/debility from Meningitis    Face to face services were provided on 2/22/2017    Allergies:  Review of patient's allergies indicates:   Allergen Reactions    Ace inhibitors Other (See Comments)     cough       Diet: regular    Activity: as tolerated    Nursing:   SN to complete comprehensive assessment including routine vital signs. Instruct on disease process and s/s of complications to report to MD. Review/verify medication list sent home with the patient at time of discharge  and instruct patient/caregiver as needed. Frequency may be adjusted depending on start of care date.    Notify MD if SBP > 160 or < 90; DBP > 90 or < 50; HR > 120 or < 50; Temp > 101;     HOME INFUSION THERAPY:   SN to perform Infusion Therapy/Central Line Care.  Review Central Line Care & Central Line Flush with patient.    Administer (drug and dose): Ceftriaxone 2g    Last dose given: 1600     Home dose due: 2/23/2017  @ 0400 Last home dose due: 2/25/2017 1600  Scrub the Hub: Prior to accessing the line, always perform a 30 second alcohol scrub  Each lumen of the central line is to be flushed at least daily with 10 mL Normal Saline and 3 mL Heparin flush (100 units/mL)  Skilled Nurse (SN) may draw blood from IV access  Blood Draw Procedure:   - Aspirate at least 5 mL of blood   - Discard   - Obtain specimen   - Change  posiflow cap   - Flush with 20 mL Normal Saline followed by a                 3-5 mL Heparin flush (100 units/mL)  Central :   - Sterile dressing changes are done weekly and as needed.   - Use chlor-hexadine scrub to cleanse site, apply Biopatch to insertion site,       apply securement device dressing   - Posi-flow caps are changed weekly and after EVERY lab draw.   - If sterile gauze is under dressing to control oozing,                 dressing change must be performed every 24 hours until gauze is not needed.   - Discontinue central line once last home dose of antibiotics is administered.     Medications: Review discharge medications with patient and family and provide education.         Medication List      START taking these medications          cefTRIAXone 2 g in dextrose 5 % 50 mL 2 g/50 mL Pgbk IVPB   Commonly known as:  ROCEPHIN   Inject 50 mLs (2 g total) into the vein every 12 (twelve) hours.   Start taking on:  2/23/2017 - through pm dose 2/25/2017       oxycodone-acetaminophen 5-325 mg per tablet   Commonly known as:  PERCOCET   Take 1 tablet by mouth every 6 (six) hours as needed for Pain.         CONTINUE taking these medications          amlodipine 10 MG tablet   Commonly known as:  NORVASC   TAKE 1 TABLET BY MOUTH EVERY DAY       docusate sodium 100 MG capsule   Commonly known as:  COLACE   Take 1 capsule (100 mg total) by mouth 2 (two) times daily as needed for Constipation.       fluticasone 50 mcg/actuation nasal spray   Commonly known as:  FLONASE   1 spray by Each Nare route 2 (two) times daily.       hydrochlorothiazide 25 MG tablet   Commonly known as:  HYDRODIURIL   TAKE 1 TABLET BY MOUTH EVERY DAY         STOP taking these medications          ketoconazole 2 % cream   Commonly known as:  NIZORAL            Where to Get Your Medications      You can get these medications from any pharmacy     Bring a paper prescription for each of these medications      oxycodone-acetaminophen 5-325 mg per tablet         Information about where to get these medications is not yet available     ! Ask your nurse or doctor about these medications     cefTRIAXone 2 g in dextrose 5 % 50 mL 2 g/50 mL Pgbk IVPB               _________________________________  Stevan Cassidy MD      2/22/2017

## 2017-02-22 NOTE — ASSESSMENT & PLAN NOTE
-On Rocephin  - Vanc discontinued   - Clinical suspicion was viral etiology but CSF gram stain with GPC  - cont current Abx    - CSF culture without growth as of this AM  - HSV PCR negative, acyclovir dc  - ID recs for 10 days ceftriaxone, PICC placed, home Abx being arranged

## 2017-02-22 NOTE — NURSING
Discharge instructions given, time allowed for questions, and pt verbalized understanding.  Pt being discharged home with family.

## 2017-02-22 NOTE — PROGRESS NOTES
[facility] Hospital Medicine  Progress Note    Patient Name: Tasha Rush  MRN: [id number]  Patient Class: IP- Inpatient   Admission Date: 2/15/2017  Length of Stay: 7 days  Attending Physician: Stevan Cassidy MD  Primary Care Provider: Franklin Gonzalez MD        Subjective:     Principal Problem:Meningitis    HPI:  40 y/o female with Hx of HTN presents with c/o of headache , confusion, and difficulty word finding for past 12 hours. Pt is accompanied by her mother. She woke up yesterday with a headache, went to work as normal. Took Excedrin and aleve without relief. Went to bed with the same headache. She has no prior hx of migraine. Rates the headache as 10/10 located mainly in front part of head. Least night felt she was getting sick ( fever, fatigue, cough)  so took an [employer] before bed. When she woke up she felt confused and had difficulty finding the correct words she wanted to say. Denies any extremity weakness, gait problems, gi or  sx    Hospital Course:  Initial CT negative. MRI with increased flair signal within sulci, with corresponding subtly restricted diffusion along sulci over both convexities and enhancement.  Findings are most suspicious for infectious etiology/meningitis. The patient was begun on Acyclovir, Rocephin, and Vanc. 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 ID consult on 2/16/17 the direct gram stain from CSF fluid showed rare gram positive cocci but the cultures didn't grow anything.  The differential includes bacterial or aseptic meningitis. Recommendation would be continue ceftriaxone given the gram stain findings. PICC line placed. Acyclovir discontinued HSV negative    Interval History: feels good, no HA    Review of Systems   Constitutional: Positive for appetite change (improving). Negative for chills and fever.   Eyes: Negative for photophobia,  pain and visual disturbance.   Respiratory: Negative for cough, shortness of breath and wheezing.    Cardiovascular: Negative for chest pain and palpitations.   Gastrointestinal: Positive for constipation. Negative for abdominal pain, diarrhea, nausea and vomiting.   Genitourinary: Negative for dysuria, flank pain, frequency, hematuria and urgency.   Musculoskeletal: Negative for back pain, neck pain and neck stiffness.   Neurological: Negative for dizziness, speech difficulty, weakness and light-headedness.   Psychiatric/Behavioral: Negative for agitation, behavioral problems, confusion and decreased concentration.     Objective:     Vital Signs (Most Recent):  Temp: 97.9 °F (36.6 °C) (02/22/17 1204)  Pulse: 69 (02/22/17 1204)  Resp: 18 (02/22/17 1204)  BP: 132/64 (02/22/17 1204)  SpO2: 98 % (02/22/17 1204) Vital Signs (24h Range):  Temp:  [97.9 °F (36.6 °C)-98.8 °F (37.1 °C)] 97.9 °F (36.6 °C)  Pulse:  [] 69  Resp:  [18] 18  SpO2:  [96 %-100 %] 98 %  BP: (123-132)/(60-87) 132/64     Weight: 71.4 kg (157 lb 6.5 oz)  Body mass index is 26.19 kg/(m^2).    Intake/Output Summary (Last 24 hours) at 02/22/17 1220  Last data filed at 02/22/17 0412   Gross per 24 hour   Intake             1540 ml   Output              900 ml   Net              640 ml      Physical Exam   Constitutional: She is oriented to person, place, and time. She appears well-developed and well-nourished. No distress.   HENT:   Head: Normocephalic and atraumatic.   Right Ear: External ear normal.   Left Ear: External ear normal.   Eyes: Conjunctivae and EOM are normal. Pupils are equal, round, and reactive to light. No scleral icterus.   Neck: Normal range of motion. Neck supple. No tracheal deviation present.   Cardiovascular: Normal rate, regular rhythm, normal heart sounds and intact distal pulses.  Exam reveals no gallop and no friction rub.    No murmur heard.  Pulmonary/Chest: Effort normal and breath sounds normal. No stridor. No  respiratory distress. She has no wheezes. She has no rales. She exhibits no tenderness.   Abdominal: Soft. Bowel sounds are normal. She exhibits no distension. There is no tenderness. There is no guarding.   Musculoskeletal: Normal range of motion. She exhibits no deformity.   Neurological: She is alert and oriented to person, place, and time. No cranial nerve deficit.   Skin: Skin is warm and dry. No rash noted. She is not diaphoretic. No erythema. No pallor.   Psychiatric: She has a normal mood and affect.   Nursing note and vitals reviewed.      Significant Labs:   CBC:   Recent Labs  Lab 02/21/17  0530 02/22/17  0508   WBC 6.10 5.74   HGB 11.6* 12.8   HCT 35.5* 38.7    262     CMP:   Recent Labs  Lab 02/21/17  0530 02/21/17  1722 02/22/17  0508    136 137   K 3.2* 3.4* 3.7    99 105   CO2 22* 28 22*   GLU 96 95 106   BUN 8 10 11   CREATININE 0.9 1.1 1.0   CALCIUM 9.1 9.9 9.8   ANIONGAP 11 9 10   EGFRNONAA >60 >60 >60     Microbiology Results (last 7 days)     Procedure Component Value Units Date/Time    CSF culture [[id number]] Collected:  02/15/17 1548    Order Status:  Completed Specimen:  CSF (Spinal Fluid) from CSF Tap, Tube 3 Updated:  02/22/17 0725     CSF CULTURE No Growth to date     Gram Stain Result Few WBC's      No epithelial cells      Rare Gram positive cocci    Narrative:       On which sequentially labeled tube should this analysis be  performed?->2        All pertinent labs within the past 24 hours have been reviewed.         Assessment/Plan:      * Meningitis  -On Rocephin  - Vanc discontinued   - Clinical suspicion was viral etiology but CSF gram stain with GPC  - cont current Abx    - CSF culture without growth as of this AM  - HSV PCR negative, acyclovir dc  - ID recs for 10 days ceftriaxone, PICC placed, home Abx being arranged         Tobacco abuse  - Nicotine patch  - Cessation education      Essential hypertension  - Continue home meds and monitor, stable  -Amlodipine  10 mg QD, HCTZ 25 mg QD       Hyponatremia  - resolved    Headache  - Symptomatic treatment with nsaids / opioids prn  - improved      Hypokalemia  -  resolved    Aphasia  -improved   -speech therapy consult & recommends outpatient speech therapy.       VTE Risk Mitigation         Ordered     heparin (porcine) injection 5,000 Units  Every 8 hours     Route:  Subcutaneous        02/18/17 1627     Medium Risk of VTE  Once      02/15/17 1625          Roro Hooper PA-C  Department of Hospital Medicine   [facility]

## 2017-02-22 NOTE — ASSESSMENT & PLAN NOTE
- On Rocephin  - Vanc discontinued   - Clinical suspicion was viral etiology but CSF gram stain with GPC  - cont current Abx    - CSF culture No Growth to date   - HSV PCR negative, acyclovir dc  - ID recs for 10 days ceftriaxone, PICC placed, home Abx arranged

## 2017-02-22 NOTE — SUBJECTIVE & OBJECTIVE
Interval History: feels good, no HA    Review of Systems   Constitutional: Positive for appetite change (improving). Negative for chills and fever.   Eyes: Negative for photophobia, pain and visual disturbance.   Respiratory: Negative for cough, shortness of breath and wheezing.    Cardiovascular: Negative for chest pain and palpitations.   Gastrointestinal: Positive for constipation. Negative for abdominal pain, diarrhea, nausea and vomiting.   Genitourinary: Negative for dysuria, flank pain, frequency, hematuria and urgency.   Musculoskeletal: Negative for back pain, neck pain and neck stiffness.   Neurological: Negative for dizziness, speech difficulty, weakness and light-headedness.   Psychiatric/Behavioral: Negative for agitation, behavioral problems, confusion and decreased concentration.     Objective:     Vital Signs (Most Recent):  Temp: 97.9 °F (36.6 °C) (02/22/17 1204)  Pulse: 69 (02/22/17 1204)  Resp: 18 (02/22/17 1204)  BP: 132/64 (02/22/17 1204)  SpO2: 98 % (02/22/17 1204) Vital Signs (24h Range):  Temp:  [97.9 °F (36.6 °C)-98.8 °F (37.1 °C)] 97.9 °F (36.6 °C)  Pulse:  [] 69  Resp:  [18] 18  SpO2:  [96 %-100 %] 98 %  BP: (123-132)/(60-87) 132/64     Weight: 71.4 kg (157 lb 6.5 oz)  Body mass index is 26.19 kg/(m^2).    Intake/Output Summary (Last 24 hours) at 02/22/17 1220  Last data filed at 02/22/17 0412   Gross per 24 hour   Intake             1540 ml   Output              900 ml   Net              640 ml      Physical Exam   Constitutional: She is oriented to person, place, and time. She appears well-developed and well-nourished. No distress.   HENT:   Head: Normocephalic and atraumatic.   Right Ear: External ear normal.   Left Ear: External ear normal.   Eyes: Conjunctivae and EOM are normal. Pupils are equal, round, and reactive to light. No scleral icterus.   Neck: Normal range of motion. Neck supple. No tracheal deviation present.   Cardiovascular: Normal rate, regular rhythm, normal  heart sounds and intact distal pulses.  Exam reveals no gallop and no friction rub.    No murmur heard.  Pulmonary/Chest: Effort normal and breath sounds normal. No stridor. No respiratory distress. She has no wheezes. She has no rales. She exhibits no tenderness.   Abdominal: Soft. Bowel sounds are normal. She exhibits no distension. There is no tenderness. There is no guarding.   Musculoskeletal: Normal range of motion. She exhibits no deformity.   Neurological: She is alert and oriented to person, place, and time. No cranial nerve deficit.   Skin: Skin is warm and dry. No rash noted. She is not diaphoretic. No erythema. No pallor.   Psychiatric: She has a normal mood and affect.   Nursing note and vitals reviewed.      Significant Labs:   CBC:   Recent Labs  Lab 02/21/17  0530 02/22/17  0508   WBC 6.10 5.74   HGB 11.6* 12.8   HCT 35.5* 38.7    262     CMP:   Recent Labs  Lab 02/21/17  0530 02/21/17  1722 02/22/17  0508    136 137   K 3.2* 3.4* 3.7    99 105   CO2 22* 28 22*   GLU 96 95 106   BUN 8 10 11   CREATININE 0.9 1.1 1.0   CALCIUM 9.1 9.9 9.8   ANIONGAP 11 9 10   EGFRNONAA >60 >60 >60     Microbiology Results (last 7 days)     Procedure Component Value Units Date/Time    CSF culture [[id number]] Collected:  02/15/17 1548    Order Status:  Completed Specimen:  CSF (Spinal Fluid) from CSF Tap, Tube 3 Updated:  02/22/17 0725     CSF CULTURE No Growth to date     Gram Stain Result Few WBC's      No epithelial cells      Rare Gram positive cocci    Narrative:       On which sequentially labeled tube should this analysis be  performed?->2        All pertinent labs within the past 24 hours have been reviewed.

## 2017-02-22 NOTE — PLAN OF CARE
Problem: SLP Goal  Goal: SLP Goal  Speech Language Pathology Goals  Goals expected to be met by 2/23  1. Respond to complex yes/no questions with 100% accuracy  2. Follow multi step commands with 90% accuracy  3. Respond to word retrieval tasks with 80% accuracy  4. Respond to sentence formulation tasks with 80% accuracy  5. Respond to problem solving tasks with 80% acc  6. Assess functional reading and writing skills      Outcome: Outcome(s) achieved Date Met:  02/22/17  Patient requested D/C of ST services secondary to resolving aphasia. ST to d/c at this time.

## 2017-02-22 NOTE — DISCHARGE SUMMARY
[facility] Hospital Medicine  Discharge Summary      Patient Name: Tasha Rush  MRN: [id number]  Admission Date: 2/15/2017  Hospital Length of Stay: 7 days  Discharge Date and Time:  02/22/2017 3:13 PM  Attending Physician: Stevan Cassidy MD   Discharging Provider: Roro Hooper PA-C  Primary Care Provider: Franklin Gonzalez MD      HPI:   42 y/o female with Hx of HTN presents with c/o of headache , confusion, and difficulty word finding for past 12 hours. Pt is accompanied by her mother. She woke up yesterday with a headache, went to work as normal. Took Excedrin and aleve without relief. Went to bed with the same headache. She has no prior hx of migraine. Rates the headache as 10/10 located mainly in front part of head. Least night felt she was getting sick ( fever, fatigue, cough)  so took an [employer] before bed. When she woke up she felt confused and had difficulty finding the correct words she wanted to say. Denies any extremity weakness, gait problems, gi or  sx    Procedure(s) (LRB):  PUNCTURE-LUMBAR (N/A)      Indwelling Lines/Drains at time of discharge:   Lines/Drains/Airways          No matching active lines, drains, or airways        Hospital Course:   Initial CT negative. MRI with increased flair signal within sulci, with corresponding subtly restricted diffusion along sulci over both convexities and enhancement.  Findings are most suspicious for infectious etiology/meningitis. lumbar puncture 2/15/2017. The patient was started on Acyclovir, Rocephin, and Vanc. 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 ID consult on 2/16/17 with impression the direct gram stain from CSF fluid showed rare gram positive cocci but the cultures didn't grow anything.  The differential includes bacterial or aseptic meningitis. Recommendation would be continue ceftriaxone given the gram stain  findings. PICC line placed. Acyclovir discontinued HSV negative. Home Ceftriaxone arranged for 3 more days. Patient clinically improved, VSS, discharged home.      Consults:   Consults         Status Ordering Provider     Inpatient consult to Infectious Diseases  Once     Provider:  Speedy Pinedo MD    Completed LOR DELGADO     Inpatient consult to Interventional Radiology  Once     Provider:  Scottie Gardner MD    Completed LOR DELGADO     Inpatient consult to Neurology  Once     Provider:  Raul Piedra MD    Acknowledged NIC QUIROZ     Inpatient consult to PICC team ([facility] [place])  Once     Provider:  (Not yet assigned)    Acknowledged KASI NAVARRO     Inpatient consult to Social Work/Case Management  Once     Provider:  (Not yet assigned)    Acknowledged KASI NAVARRO     IP consult to dietary  Once     Provider:  (Not yet assigned)    Completed NIC QUIROZ          Significant Diagnostic Studies: Labs:   CMP   Recent Labs  Lab 02/21/17  0530 02/21/17  1722 02/22/17  0508    136 137   K 3.2* 3.4* 3.7    99 105   CO2 22* 28 22*   GLU 96 95 106   BUN 8 10 11   CREATININE 0.9 1.1 1.0   CALCIUM 9.1 9.9 9.8   ANIONGAP 11 9 10   ESTGFRAFRICA >60 >60 >60   EGFRNONAA >60 >60 >60   , CBC   Recent Labs  Lab 02/21/17  0530 02/22/17  0508   WBC 6.10 5.74   HGB 11.6* 12.8   HCT 35.5* 38.7    262    and All labs within the past 24 hours have been reviewed  Microbiology:   Microbiology Results (last 7 days)     Procedure Component Value Units Date/Time    CSF culture [[id number]] Collected:  02/15/17 1548    Order Status:  Completed Specimen:  CSF (Spinal Fluid) from CSF Tap, Tube 3 Updated:  02/22/17 0725     CSF CULTURE No Growth to date     Gram Stain Result Few WBC's      No epithelial cells      Rare Gram positive cocci    Narrative:       On which sequentially labeled tube should this analysis be  performed?->2        Radiology:   CT Head 2/15/2017   There is no  hydrocephalus.  The subarachnoid space is not as prominent however could be within normal limits for the patient's age (41).  Within the parenchyma no hemorrhage is or shift of midline structures.  The gray/white matter delineation is preserved throughout the cerebral hemispheres.  There is a septum cavum pellucidum and cavum vergae.  Basal ganglia are symmetric and normal bilaterally.    4th ventricle is in midline.  Cerebellar hemispheres are normal.  No Chiari malformations.  There is no suprasellar or pineal region masses.    The visualized paranasal air sinuses are clear.    MRI:  2/15/2017 Paranasal sinuses demonstrate no significant abnormality, with a subcentimeter mucous retention cyst or polyp in the right maxillary sinus.  Orbits are within normal limits.  The ventricular system is normal in size and not distorted by mass effect.  The craniocervical junction and pituitary region are within normal limits.  There is no evidence of acute infarct.  No abnormal gradient signal is present.  Along sulci, most pronounced over both convexities, there is increased flair signal, some slightly restricted diffusion and corresponding enhancement.  There is a more subtle degree of flair signal infratentorial along the cerebellum with corresponding subtle enhancement.    MRA Neck: 2/15/2017  MRA of the neck was performed.  Contrast utilized in conjunction with brain imaging today.  Origins of the great vessels are within normal limits.  Vertebral arteries demonstrate no significant narrowing.  Common carotid and internal carotid arteries are patent without significant stenosis.         Pending Diagnostic Studies:     None        Final Active Diagnoses:    Diagnosis Date Noted POA    PRINCIPAL PROBLEM:  Meningitis [G03.9] 02/16/2017 Yes    Hypokalemia [E87.6] 02/16/2017 Yes    Aphasia [R47.01] 02/16/2017 Yes    Hyponatremia [E87.1] 02/15/2017 Yes    Headache [R51] 02/15/2017 Yes    Essential hypertension [I10]  01/21/2016 Yes    Tobacco abuse [Z72.0] 01/21/2016 Yes      Problems Resolved During this Admission:    Diagnosis Date Noted Date Resolved POA      * Meningitis  - On Rocephin  - Vanc discontinued   - Clinical suspicion was viral etiology but CSF gram stain with GPC  - cont current Abx    - CSF culture No Growth to date   - HSV PCR negative, acyclovir dc  - ID recs for 10 days ceftriaxone, PICC placed, home Abx arranged         Tobacco abuse  - Nicotine patch  - Cessation education      Essential hypertension  - Continue home meds and monitor, stable  -Amlodipine 10 mg QD, HCTZ 25 mg QD       Hyponatremia  - resolved    Headache  - Symptomatic treatment with nsaids / opioids prn  - improved      Hypokalemia  -  resolved    Aphasia  -improved   -speech therapy consult & recommends outpatient speech therapy.         Discharged Condition: stable    Disposition: Home or Self Care    Follow Up:  Follow-up Information     Schedule an appointment as soon as possible for a visit with Franklin Gonzalez MD.    Specialty:  Internal Medicine    Contact information:    [address] [place] [address] [phone]          Follow up with [facility].    Specialty:  Dialysis Center    Why:  Infusion    Contact information:    [address] [place] [address] [phone]          Patient Instructions:     Ambulatory Referral to Speech Therapy   Referral Priority: Routine Referral Type: Speech Therapy   Referral Reason: Specialty Services Required    Requested Specialty: Speech Pathology    Number of Visits Requested: 1      Ambulatory Referral to Speech Therapy   Referral Priority: Routine Referral Type: Speech Therapy   Referral Reason: Specialty Services Required    Requested Specialty: Speech Pathology    Number of Visits Requested: 1      Diet general     Call MD for:  temperature >100.4     Call MD for:  persistent nausea and vomiting or diarrhea     Call MD for:  severe  uncontrolled pain     Call MD for:  severe persistent headache     Call MD for:  persistent dizziness, light-headedness, or visual disturbances     Call MD for:  increased confusion or weakness       Medications:  Reconciled Home Medications:   Current Discharge Medication List      START taking these medications    Details   cefTRIAXone 2 g in dextrose 5 % 50 mL (ROCEPHIN) 2 g/50 mL PgBk IVPB Inject 50 mLs (2 g total) into the vein every 12 (twelve) hours.  Qty: 6 each, Refills: 0      oxycodone-acetaminophen (PERCOCET) 5-325 mg per tablet Take 1 tablet by mouth every 6 (six) hours as needed for Pain.  Qty: 10 tablet, Refills: 0         CONTINUE these medications which have NOT CHANGED    Details   amlodipine (NORVASC) 10 MG tablet TAKE 1 TABLET BY MOUTH EVERY DAY  Qty: 90 tablet, Refills: 0      hydrochlorothiazide (HYDRODIURIL) 25 MG tablet TAKE 1 TABLET BY MOUTH EVERY DAY  Qty: 90 tablet, Refills: 0      docusate sodium (COLACE) 100 MG capsule Take 1 capsule (100 mg total) by mouth 2 (two) times daily as needed for Constipation.  Qty: 60 capsule, Refills: 1      fluticasone (FLONASE) 50 mcg/actuation nasal spray 1 spray by Each Nare route 2 (two) times daily.  Qty: 16 g, Refills: 3    Associated Diagnoses: URI, acute         STOP taking these medications       ketoconazole (NIZORAL) 2 % cream Comments:   Reason for Stopping:             Time spent on the discharge of patient: > 30  minutes    Roro Hooper PA-C  Department of Hospital Medicine  [facility]

## 2017-02-22 NOTE — DISCHARGE INSTRUCTIONS
Viral Meningitis  Meningitis is an infection in the fluid (cerebrospinal fluid) that covers your brain and spinal cord. It may cause headache, stiff neck, irritability, fever, drowsiness, nausea, and vomiting.  Most cases of meningitis are caused by bacteria or viruses. Bacterial meningitis is more serious. It may cause permanent complications. You would need to be treated in the hospital with antibiotics. But your tests show that you have viral meningitis. This is much less serious. It rarely causes any complications. You can take care of a mild case at home.  Most cases of viral meningitis are passed from person to person through coughing, sneezing, and close contact. West Nile virus is a rare cause of viral meningitis. It is passed by mosquito bites.  Antibiotics are not used to treat viral meningitis. You may be given other medicines to treat your symptoms. It will take 2 to 7 days to recover from viral meningitis. You may have headaches that come and go for up to 2 weeks.  In rare cases, what looks like viral meningitis may turn out to be early bacterial meningitis. Thats why its important to be rechecked. Call your healthcare provider or come back to this facility if your symptoms get worse or new symptoms appear.  Home care  Follow these tips when taking care of yourself at home:  · Rest in bed until you are feeling better. Stay home from school or work for at least 7 days, or until all symptoms are gone.  · Use acetaminophen or ibuprofen for fever and to relieve pain, unless another pain medicine was prescribed. Note: If you have chronic liver or kidney disease, talk with your healthcare provider before taking these medicines. Also talk with your provider if youve had a stomach ulcer or gastrointestinal bleeding. Dont give aspirin to anyone younger than 18 years old who is ill with a fever. It may cause severe liver damage.  · If you have a fever, drink extra water, sports drinks, or other fluids. This  will keep you from getting dehydrated.  · Wash your hands often with soap and water to prevent spreading the infection.  Follow-up care  Follow up with your healthcare provider, or as advised. This is to make sure you are getting better as expected.  When to seek medical advice  Call your healthcare provider right away if any of these occur:  · Headache or stiff neck that gets worse  · Drowsiness, confusion, or bizarre behavior  · You cant keep fluids down because of vomiting  · Fever of 100.4°F (38°C) or higher that doesnt get better after you take fever medicine. or as advised  · Weakness or numbness in an arm or leg  · Difficulty speaking, swallowing, or walking  · Seizure  Date Last Reviewed: 8/1/2016  © 2372-4556 [employer]. [address] [place] [address], [place] [address]. All rights reserved. This information is not intended as a substitute for professional medical care. Always follow your healthcare professional's instructions.

## 2017-02-22 NOTE — PLAN OF CARE
Problem: Patient Care Overview  Goal: Plan of Care Review  Outcome: Outcome(s) achieved Date Met:  02/22/17  No significant events this shift. Remains free from fall, injury, and skin breakdown. Ambulates independently to bathroom. VSS stable on RA and afebrile. Telemetry in place. Positions self independently. Denied pain. Neuro checks WDL. Tolerating ordered diet. IV site WNL. Plan of care reviewed with patient and all questions answered. Bed low, locked w/ bed alarm on. Call light within reach. Purposeful rounding performed. No other complaints at this time.     Peripheral IV and telemetry removed. DC instructions given by ANABELL Mobley.

## 2017-02-22 NOTE — PLAN OF CARE
CM sent plan of care for home infusion to [employer]  Pt's insurance contracts with [employer]. Per Dixie at Replaced by [facility], [phone], pt can use [facility] or [facility].  Information provided to cor.

## 2017-02-25 LAB
CMV SPEC QL SHELL VIAL CULT: NO GROWTH
GRAM STN SPEC: NORMAL

## 2017-02-27 ENCOUNTER — TELEPHONE (OUTPATIENT)
Dept: FAMILY MEDICINE | Facility: CLINIC | Age: 42
End: 2017-02-27

## 2017-02-27 NOTE — TELEPHONE ENCOUNTER
Spoke with patient. She states that she was admitted to the hospital 2-15-17 for viral meningitis. She was discharge 2-22-17 with IV antibiotics. She went to the ED yesterday to get her IV out and has an appointment for hospital follow up scheduled 3-3-2017.    Patient [place] papers [place] for her [place]. She will have forms faxed to clinic

## 2017-02-27 NOTE — TELEPHONE ENCOUNTER
----- Message from Lyric Wilder sent at 2/27/2017  9:26 AM CST -----  Contact: self  Pt would like to speak the nurse. Please contact the pt at [phone]. Thanks!

## 2017-03-03 ENCOUNTER — OFFICE VISIT (OUTPATIENT)
Dept: FAMILY MEDICINE | Facility: CLINIC | Age: 42
End: 2017-03-03
Payer: COMMERCIAL

## 2017-03-03 VITALS
HEIGHT: 65 IN | WEIGHT: 167.31 LBS | HEART RATE: 69 BPM | BODY MASS INDEX: 27.88 KG/M2 | DIASTOLIC BLOOD PRESSURE: 72 MMHG | RESPIRATION RATE: 17 BRPM | OXYGEN SATURATION: 99 % | TEMPERATURE: 98 F | SYSTOLIC BLOOD PRESSURE: 124 MMHG

## 2017-03-03 DIAGNOSIS — N76.0 ACUTE VAGINITIS: ICD-10-CM

## 2017-03-03 DIAGNOSIS — G03.9 MENINGITIS: Primary | ICD-10-CM

## 2017-03-03 PROCEDURE — 3078F DIAST BP <80 MM HG: CPT | Mod: S$GLB,,, | Performed by: INTERNAL MEDICINE

## 2017-03-03 PROCEDURE — 99999 PR PBB SHADOW E&M-EST. PATIENT-LVL III: CPT | Mod: PBBFAC,,, | Performed by: INTERNAL MEDICINE

## 2017-03-03 PROCEDURE — 1160F RVW MEDS BY RX/DR IN RCRD: CPT | Mod: S$GLB,,, | Performed by: INTERNAL MEDICINE

## 2017-03-03 PROCEDURE — 3074F SYST BP LT 130 MM HG: CPT | Mod: S$GLB,,, | Performed by: INTERNAL MEDICINE

## 2017-03-03 PROCEDURE — 99214 OFFICE O/P EST MOD 30 MIN: CPT | Mod: S$GLB,,, | Performed by: INTERNAL MEDICINE

## 2017-03-03 RX ORDER — FLUCONAZOLE 150 MG/1
150 TABLET ORAL DAILY
Qty: 1 TABLET | Refills: 0 | Status: SHIPPED | OUTPATIENT
Start: 2017-03-03 | End: 2017-03-04

## 2017-03-03 RX ORDER — CLOTRIMAZOLE 1 %
CREAM (GRAM) TOPICAL 2 TIMES DAILY
Qty: 15 G | Refills: 0 | Status: SHIPPED | OUTPATIENT
Start: 2017-03-03 | End: 2018-01-09 | Stop reason: ALTCHOICE

## 2017-03-03 NOTE — PROGRESS NOTES
"Subjective:       Patient ID: Tasha Rush is a 41 y.o. female.    Chief Complaint: Hospital Follow Up    HPI Comments: Hospital follow up for menigitis    HPI:42 y/o w/ HTN presents after recent hospitalization for bacterial menigitis. Went to ED 2/15 for inability to speak fluently and abnormal behavior. LP initially showed GPC on gram stain, culture was negative, HSV was negative. She has completed 14 day course of rocephin (PICC line removed 2/27). She feels well, back to base line ambulating without difficulty no headache or vision changes. Primary complaint is some vaginal itching. She has been takign left over flagyl (prescribed for BV) without relief. No diarrhea, no nausea or abdominal pain. No fevers chills. No headache or vision changes    Review of Systems   Constitutional: Negative for activity change, appetite change, fatigue, fever and unexpected weight change.   HENT: Negative for ear pain, rhinorrhea and sore throat.    Eyes: Negative for discharge and visual disturbance.   Respiratory: Negative for chest tightness, shortness of breath and wheezing.    Cardiovascular: Negative for chest pain, palpitations and leg swelling.   Gastrointestinal: Negative for abdominal pain, constipation and diarrhea.   Endocrine: Negative for cold intolerance and heat intolerance.   Genitourinary: Negative for dysuria and hematuria.   Musculoskeletal: Negative for joint swelling and neck stiffness.   Skin: Negative for rash.   Neurological: Negative for dizziness, syncope, weakness and headaches.   Psychiatric/Behavioral: Negative for suicidal ideas.       Objective:     Vitals:    03/03/17 1054   BP: 124/72   BP Location: Left arm   Patient Position: Sitting   BP Method: Manual   Pulse: 69   Resp: 17   Temp: 98.2 °F (36.8 °C)   TempSrc: Oral   SpO2: 99%   Weight: 75.9 kg (167 lb 5.3 oz)   Height: 5' 5" (1.651 m)          Physical Exam   Constitutional: She is oriented to person, place, and time. She appears " well-developed and well-nourished.   HENT:   Head: Normocephalic and atraumatic.   Eyes: Conjunctivae are normal. Pupils are equal, round, and reactive to light.   Neck: Normal range of motion.   Cardiovascular: Normal rate and regular rhythm.  Exam reveals no gallop and no friction rub.    No murmur heard.  Pulmonary/Chest: Effort normal and breath sounds normal. She has no wheezes. She has no rales.   Abdominal: Soft. Bowel sounds are normal. There is no tenderness. There is no rebound and no guarding.   Musculoskeletal: Normal range of motion. She exhibits no edema or tenderness.   Neurological: She is alert and oriented to person, place, and time. No cranial nerve deficit.   Normal gait, negative pronator drift. Full AROM of neck flexion/extension. Cranial nerves II-XII intact   Skin: Skin is warm and dry.   Psychiatric: She has a normal mood and affect.       Assessment:       1. Meningitis    2. Acute vaginitis        Plan:    1. Resolved completed full course of IV antibiotics without neuro deficit today. [facility] paper work for acute medical leave completed (and scanned to chart)    2. Suspect fungal overgrowth in setting of recent antibiotic use fluconazole x one

## 2017-03-07 ENCOUNTER — OFFICE VISIT (OUTPATIENT)
Dept: INFECTIOUS DISEASES | Facility: CLINIC | Age: 42
End: 2017-03-07
Payer: COMMERCIAL

## 2017-03-07 VITALS
SYSTOLIC BLOOD PRESSURE: 132 MMHG | TEMPERATURE: 98 F | WEIGHT: 165.56 LBS | DIASTOLIC BLOOD PRESSURE: 85 MMHG | BODY MASS INDEX: 26.61 KG/M2 | HEIGHT: 66 IN | HEART RATE: 75 BPM

## 2017-03-07 DIAGNOSIS — G03.9 MENINGITIS: Primary | ICD-10-CM

## 2017-03-07 PROCEDURE — 3079F DIAST BP 80-89 MM HG: CPT | Mod: S$GLB,,, | Performed by: INTERNAL MEDICINE

## 2017-03-07 PROCEDURE — 1160F RVW MEDS BY RX/DR IN RCRD: CPT | Mod: S$GLB,,, | Performed by: INTERNAL MEDICINE

## 2017-03-07 PROCEDURE — 99999 PR PBB SHADOW E&M-EST. PATIENT-LVL III: CPT | Mod: PBBFAC,,, | Performed by: INTERNAL MEDICINE

## 2017-03-07 PROCEDURE — 3075F SYST BP GE 130 - 139MM HG: CPT | Mod: S$GLB,,, | Performed by: INTERNAL MEDICINE

## 2017-03-07 PROCEDURE — 99213 OFFICE O/P EST LOW 20 MIN: CPT | Mod: S$GLB,,, | Performed by: INTERNAL MEDICINE

## 2017-03-07 NOTE — MR AVS SNAPSHOT
[facility] - Infectious Diseases  [address]  [facility] [address]  Phone: [phone]  Fax: [phone]                  Tasha Rush   3/7/2017 10:00 AM   Office Visit    Description:  Female : 1975   Provider:  Ramon Morocho MD   Department:  [facility] - Infectious Diseases           Diagnoses this Visit        Comments    Meningitis    -  Primary            To Do List           Goals (5 Years of Data)     None      [facility] On Call     [facility] On Call Nurse Care Line -  Assistance  Registered nurses in the [facility] On Call Center provide clinical advisement, health education, appointment booking, and other advisory services.  Call for this free service at [phone].             Medications           Message regarding Medications     Verify the changes and/or additions to your medication regime listed below are the same as discussed with your clinician today.  If any of these changes or additions are incorrect, please notify your healthcare provider.             Verify that the below list of medications is an accurate representation of the medications you are currently taking.  If none reported, the list may be blank. If incorrect, please contact your healthcare provider. Carry this list with you in case of emergency.           Current Medications     amlodipine (NORVASC) 10 MG tablet TAKE 1 TABLET BY MOUTH EVERY DAY    clotrimazole (LOTRIMIN) 1 % cream Apply topically 2 (two) times daily.    docusate sodium (COLACE) 100 MG capsule Take 1 capsule (100 mg total) by mouth 2 (two) times daily as needed for Constipation.    fluticasone (FLONASE) 50 mcg/actuation nasal spray 1 spray by Each Nare route 2 (two) times daily.    hydrochlorothiazide (HYDRODIURIL) 25 MG tablet TAKE 1 TABLET BY MOUTH EVERY DAY    oxycodone-acetaminophen (PERCOCET) 5-325 mg per tablet Take 1 tablet by mouth every 6 (six) hours as needed for Pain.           Clinical Reference Information           Your  "Vitals Were     BP Pulse Temp Height Weight Last Period    132/85 (BP Location: Left arm, Patient Position: Sitting, BP Method: Automatic) 75 98.2 °F (36.8 °C) (Oral) 5' 6" (1.676 m) 75.1 kg (165 lb 9.1 oz) 02/17/2017    BMI                26.72 kg/m2          Blood Pressure          Most Recent Value    BP  132/85      Allergies as of 3/7/2017     Ace Inhibitors      Immunizations Administered on Date of Encounter - 3/7/2017     None      [facility] Sign-Up     Activating your [facility] account is as easy as 1-2-3!     1) Visit [email or web address], select Sign Up Now, enter this activation code and your date of birth, then select Next.  [email or web address]  Expires: 4/1/2017  9:15 AM      2) Create a username and password to use when you visit [facility] in the future and select a security question in case you lose your password and select Next.    3) Enter your e-mail address and click Sign Up!    Additional Information  If you have questions, please e-mail [email or web address] or call [phone] to talk to our [facility] staff. Remember, [facility] is NOT to be used for urgent needs. For medical emergencies, dial 911.         Smoking Cessation     If you would like to quit smoking:   You may be eligible for free services if you are a [place] resident and started smoking cigarettes before September 1, 1988.  Call the Smoking Cessation Trust ([facility]) toll free at (846) 326-4602 or (970) 531-1290.   Call 1-800-QUIT-NOW if you do not meet the above criteria.            Language Assistance Services     ATTENTION: Language assistance services are available, free of charge. Please call [phone].      ATENCIÓN: Si habla español, tiene a padron disposición servicios gratuitos de asistencia lingüística. Llame al [phone].     CHÚ Ý: N?u b?n nói Ti?ng Vi?t, có các d?ch v? h? tr? ngôn ng? mi?n phí dành cho b?n. G?i s? [phone].         [facility] complies with applicable Federal civil rights laws " and does not discriminate on the basis of race, color, national origin, age, disability, or sex.

## 2017-03-07 NOTE — PROGRESS NOTES
Subjective:      Patient ID: Tasha Rush is a 41 y.o. female.    Chief Complaint:No chief complaint on file.      History of Present Illness    40 y/o female with a history of recent hospitalization for severe headaches.  Lumbar puncture revealed 23 wbc and a gram stain showed rare GPC.  HSV PCR on the CSF fluid was negative.  She completed a 10 day course of ceftriaxone for possible bacterial meningitis.  She is here today for follow up.      Review of Systems   Constitution: Negative for chills, decreased appetite, fever, weakness, malaise/fatigue, night sweats, weight gain and weight loss.   HENT: Negative for congestion, ear pain, headaches, hearing loss, hoarse voice, sore throat and tinnitus.    Eyes: Negative for blurred vision, redness and visual disturbance.   Cardiovascular: Negative for chest pain, leg swelling and palpitations.   Respiratory: Negative for cough, hemoptysis, shortness of breath and sputum production.    Hematologic/Lymphatic: Negative for adenopathy. Does not bruise/bleed easily.   Skin: Negative for dry skin, itching, rash and suspicious lesions.   Musculoskeletal: Positive for back pain. Negative for joint pain, myalgias and neck pain.   Gastrointestinal: Negative for abdominal pain, constipation, diarrhea, heartburn, nausea and vomiting.   Genitourinary: Negative for dysuria, flank pain, frequency, hematuria, hesitancy and urgency.   Neurological: Negative for dizziness, numbness and paresthesias.   Psychiatric/Behavioral: Negative for depression and memory loss. The patient does not have insomnia and is not nervous/anxious.      Objective:   Physical Exam   Constitutional: She is oriented to person, place, and time. She appears well-developed and well-nourished. No distress.   HENT:   Head: Normocephalic and atraumatic.   Right Ear: External ear normal.   Left Ear: External ear normal.   Nose: Nose normal.   Mouth/Throat: Oropharynx is clear and moist. No oropharyngeal exudate.    Eyes: Conjunctivae and EOM are normal. Pupils are equal, round, and reactive to light. Right eye exhibits no discharge. Left eye exhibits no discharge. No scleral icterus.   Neck: Normal range of motion. Neck supple. No JVD present. No tracheal deviation present. No thyromegaly present.   Cardiovascular: Normal rate, regular rhythm, normal heart sounds and intact distal pulses.  Exam reveals no gallop and no friction rub.    No murmur heard.  Pulmonary/Chest: Effort normal and breath sounds normal. No stridor. No respiratory distress. She has no wheezes. She has no rales. She exhibits no tenderness.   Abdominal: Soft. Bowel sounds are normal. She exhibits no distension and no mass. There is no tenderness. There is no rebound and no guarding.   Musculoskeletal: Normal range of motion. She exhibits no edema or tenderness.   Lymphadenopathy:     She has no cervical adenopathy.   Neurological: She is alert and oriented to person, place, and time. She displays normal reflexes. No cranial nerve deficit. She exhibits normal muscle tone. Coordination normal.   Skin: Skin is warm. No rash noted. She is not diaphoretic. No erythema. No pallor.   Psychiatric: She has a normal mood and affect. Her behavior is normal. Judgment and thought content normal.   Nursing note and vitals reviewed.    Assessment:       1. Meningitis        Ms. Rush is a 42 y/o recently admitted to the hospital with severe headaches and possible bacterial meningitis.  She has now completed 10 days of IV ceftriaxone.  She is feeling well and has no residual deficits.  No further follow up is required.  Plan:       Meningitis        - Follow up as needed.        - She may return to work.

## 2017-03-08 ENCOUNTER — TELEPHONE (OUTPATIENT)
Dept: FAMILY MEDICINE | Facility: CLINIC | Age: 42
End: 2017-03-08

## 2017-03-08 NOTE — TELEPHONE ENCOUNTER
----- Message from Roger Perez sent at 3/8/2017  9:28 AM CST -----  Contact: self  Pt requesting a call back regarding a clearance letter to return to work on 3/9/17.  Please call pt at

## 2017-05-19 DIAGNOSIS — B96.89 BV (BACTERIAL VAGINOSIS): ICD-10-CM

## 2017-05-19 DIAGNOSIS — N76.0 BV (BACTERIAL VAGINOSIS): ICD-10-CM

## 2017-05-20 RX ORDER — METRONIDAZOLE 500 MG/1
TABLET ORAL
Qty: 28 TABLET | Refills: 0 | Status: SHIPPED | OUTPATIENT
Start: 2017-05-20 | End: 2017-10-22 | Stop reason: SDUPTHER

## 2017-06-20 RX ORDER — HYDROCHLOROTHIAZIDE 25 MG/1
TABLET ORAL
Qty: 90 TABLET | Refills: 1 | Status: SHIPPED | OUTPATIENT
Start: 2017-06-20 | End: 2017-08-15 | Stop reason: SDUPTHER

## 2017-06-20 RX ORDER — AMLODIPINE BESYLATE 10 MG/1
TABLET ORAL
Qty: 90 TABLET | Refills: 1 | Status: SHIPPED | OUTPATIENT
Start: 2017-06-20 | End: 2017-08-15 | Stop reason: SDUPTHER

## 2017-07-24 ENCOUNTER — OFFICE VISIT (OUTPATIENT)
Dept: OBSTETRICS AND GYNECOLOGY | Facility: CLINIC | Age: 42
End: 2017-07-24
Payer: COMMERCIAL

## 2017-07-24 VITALS
SYSTOLIC BLOOD PRESSURE: 126 MMHG | HEIGHT: 66 IN | BODY MASS INDEX: 26.93 KG/M2 | DIASTOLIC BLOOD PRESSURE: 86 MMHG | WEIGHT: 167.56 LBS

## 2017-07-24 DIAGNOSIS — N89.8 VAGINAL DISCHARGE: Primary | ICD-10-CM

## 2017-07-24 PROCEDURE — 99999 PR PBB SHADOW E&M-EST. PATIENT-LVL III: CPT | Mod: PBBFAC,,, | Performed by: OBSTETRICS & GYNECOLOGY

## 2017-07-24 PROCEDURE — 99213 OFFICE O/P EST LOW 20 MIN: CPT | Mod: S$GLB,,, | Performed by: OBSTETRICS & GYNECOLOGY

## 2017-07-24 RX ORDER — METRONIDAZOLE 500 MG/1
500 TABLET ORAL EVERY 12 HOURS
Qty: 28 TABLET | Refills: 0 | Status: SHIPPED | OUTPATIENT
Start: 2017-07-24 | End: 2017-10-22 | Stop reason: SDUPTHER

## 2017-07-24 RX ORDER — FLUCONAZOLE 150 MG/1
150 TABLET ORAL
Qty: 3 TABLET | Refills: 3 | Status: SHIPPED | OUTPATIENT
Start: 2017-07-24 | End: 2018-01-09 | Stop reason: ALTCHOICE

## 2017-07-24 NOTE — PROGRESS NOTES
"[facility]  Ambulatory Clinic  Obstetrics & Gynecology    Visit Date:  7/24/2017    Chief Complaint:  Vaginal discharge    History of Present Illness:      Tasha Rush is a 41 y.o. G0 here with c/o vaginal yeast infection.    Periods are regular, not heavy or painful.    Pt denies any abnormal vaginal bleeding, dysmenorrhea, dyspareunia, pelvic pain, breast mass/skin changes, GI or urinary complaints.      Review of Systems:      GENERAL:  No fever, fatigue, excessive weight gain or loss  GENITOURINARY:  See HPI    Physical Exam:     /86 (BP Location: Right arm, Patient Position: Sitting, BP Method: Manual)   Ht 5' 6" (1.676 m)   Wt 76 kg (167 lb 8.8 oz)   LMP 07/04/2017 (Exact Date)   BMI 27.04 kg/m² Pulse 70, Resp rate 16    GENERAL:  No acute distress, well-nourished  ABDOMEN:  Soft, non-tender, non-distended, no obvious organomegaly  GENITOURINARY:  NFEG no lesion. No vaginal or cervical lesion. No bleeding. Moderate white discharge. No CMT. Uterus and ovaries small, NT. Wet prep +yeast. Declined rectal exam. No obvious external lesions.    Chaperone present for exam.    Assessment:     41 y.o. G0 with h/o LEEP x 2 and abdominal myomectomy:    1. Vaginal yeast infection    Plan:    Diflucan for yeast infection.  If unresolved, use monistat 7.  Pt also requesting an Rx for flagyl for future use for bacterial vaginosis.  No alcohol while on flagyl.  Hygiene advice.  F/u with PCP for health maintenance  Return 1 year for annual GYN exam, or sooner as needed.    Pt voiced understanding.        Hardy Bueno MD      "

## 2017-08-02 ENCOUNTER — HOSPITAL ENCOUNTER (EMERGENCY)
Facility: OTHER | Age: 42
Discharge: HOME | End: 2017-08-02
Attending: EMERGENCY MEDICINE
Payer: COMMERCIAL

## 2017-08-02 ENCOUNTER — TELEPHONE (OUTPATIENT)
Dept: FAMILY MEDICINE | Facility: CLINIC | Age: 42
End: 2017-08-02

## 2017-08-02 ENCOUNTER — NURSE TRIAGE (OUTPATIENT)
Dept: ADMINISTRATIVE | Facility: CLINIC | Age: 42
End: 2017-08-02

## 2017-08-02 ENCOUNTER — OFFICE VISIT (OUTPATIENT)
Dept: FAMILY MEDICINE | Facility: CLINIC | Age: 42
End: 2017-08-02
Payer: COMMERCIAL

## 2017-08-02 VITALS
RESPIRATION RATE: 17 BRPM | WEIGHT: 160.06 LBS | HEIGHT: 65 IN | BODY MASS INDEX: 26.67 KG/M2 | SYSTOLIC BLOOD PRESSURE: 130 MMHG | OXYGEN SATURATION: 99 % | HEART RATE: 67 BPM | TEMPERATURE: 98 F | DIASTOLIC BLOOD PRESSURE: 80 MMHG

## 2017-08-02 VITALS
HEART RATE: 64 BPM | TEMPERATURE: 99 F | HEIGHT: 66 IN | BODY MASS INDEX: 25.71 KG/M2 | RESPIRATION RATE: 16 BRPM | SYSTOLIC BLOOD PRESSURE: 133 MMHG | DIASTOLIC BLOOD PRESSURE: 85 MMHG | WEIGHT: 160 LBS | OXYGEN SATURATION: 99 %

## 2017-08-02 DIAGNOSIS — R07.89 ATYPICAL CHEST PAIN: Primary | ICD-10-CM

## 2017-08-02 DIAGNOSIS — F43.22 ADJUSTMENT DISORDER WITH ANXIOUS MOOD: Primary | ICD-10-CM

## 2017-08-02 DIAGNOSIS — R07.9 CHEST PAIN: ICD-10-CM

## 2017-08-02 DIAGNOSIS — R05.9 COUGH: Primary | ICD-10-CM

## 2017-08-02 DIAGNOSIS — R07.9 LEFT SIDED CHEST PAIN: ICD-10-CM

## 2017-08-02 LAB
ANION GAP SERPL CALC-SCNC: 10 MMOL/L
B-HCG UR QL: NEGATIVE
BASOPHILS # BLD AUTO: 0.03 K/UL
BASOPHILS NFR BLD: 0.6 %
BUN SERPL-MCNC: 15 MG/DL
CALCIUM SERPL-MCNC: 9.2 MG/DL
CHLORIDE SERPL-SCNC: 107 MMOL/L
CO2 SERPL-SCNC: 22 MMOL/L
CREAT SERPL-MCNC: 0.8 MG/DL
CTP QC/QA: YES
D DIMER PPP IA.FEU-MCNC: 0.33 MG/L FEU
DIFFERENTIAL METHOD: ABNORMAL
EOSINOPHIL # BLD AUTO: 0.1 K/UL
EOSINOPHIL NFR BLD: 1.7 %
ERYTHROCYTE [DISTWIDTH] IN BLOOD BY AUTOMATED COUNT: 13.8 %
EST. GFR  (AFRICAN AMERICAN): >60 ML/MIN/1.73 M^2
EST. GFR  (NON AFRICAN AMERICAN): >60 ML/MIN/1.73 M^2
GLUCOSE SERPL-MCNC: 106 MG/DL
HCT VFR BLD AUTO: 40 %
HGB BLD-MCNC: 13.2 G/DL
LYMPHOCYTES # BLD AUTO: 2.3 K/UL
LYMPHOCYTES NFR BLD: 47.5 %
MCH RBC QN AUTO: 30.8 PG
MCHC RBC AUTO-ENTMCNC: 33 G/DL
MCV RBC AUTO: 94 FL
MONOCYTES # BLD AUTO: 0.6 K/UL
MONOCYTES NFR BLD: 13 %
NEUTROPHILS # BLD AUTO: 1.8 K/UL
NEUTROPHILS NFR BLD: 37 %
PLATELET # BLD AUTO: 230 K/UL
PMV BLD AUTO: 9.5 FL
POTASSIUM SERPL-SCNC: 3.6 MMOL/L
RBC # BLD AUTO: 4.28 M/UL
SODIUM SERPL-SCNC: 139 MMOL/L
WBC # BLD AUTO: 4.76 K/UL

## 2017-08-02 PROCEDURE — 93005 ELECTROCARDIOGRAM TRACING: CPT

## 2017-08-02 PROCEDURE — 85379 FIBRIN DEGRADATION QUANT: CPT

## 2017-08-02 PROCEDURE — 3008F BODY MASS INDEX DOCD: CPT | Mod: S$GLB,,, | Performed by: INTERNAL MEDICINE

## 2017-08-02 PROCEDURE — 93010 ELECTROCARDIOGRAM REPORT: CPT | Mod: ,,, | Performed by: INTERNAL MEDICINE

## 2017-08-02 PROCEDURE — 63600175 PHARM REV CODE 636 W HCPCS: Performed by: EMERGENCY MEDICINE

## 2017-08-02 PROCEDURE — 3075F SYST BP GE 130 - 139MM HG: CPT | Mod: S$GLB,,, | Performed by: INTERNAL MEDICINE

## 2017-08-02 PROCEDURE — 96374 THER/PROPH/DIAG INJ IV PUSH: CPT

## 2017-08-02 PROCEDURE — 99214 OFFICE O/P EST MOD 30 MIN: CPT | Mod: S$GLB,,, | Performed by: INTERNAL MEDICINE

## 2017-08-02 PROCEDURE — 81025 URINE PREGNANCY TEST: CPT | Performed by: EMERGENCY MEDICINE

## 2017-08-02 PROCEDURE — 3079F DIAST BP 80-89 MM HG: CPT | Mod: S$GLB,,, | Performed by: INTERNAL MEDICINE

## 2017-08-02 PROCEDURE — 80048 BASIC METABOLIC PNL TOTAL CA: CPT

## 2017-08-02 PROCEDURE — 99284 EMERGENCY DEPT VISIT MOD MDM: CPT | Mod: 25

## 2017-08-02 PROCEDURE — 99999 PR PBB SHADOW E&M-EST. PATIENT-LVL III: CPT | Mod: PBBFAC,,, | Performed by: INTERNAL MEDICINE

## 2017-08-02 PROCEDURE — 85025 COMPLETE CBC W/AUTO DIFF WBC: CPT

## 2017-08-02 RX ORDER — KETOROLAC TROMETHAMINE 30 MG/ML
30 INJECTION, SOLUTION INTRAMUSCULAR; INTRAVENOUS
Status: COMPLETED | OUTPATIENT
Start: 2017-08-02 | End: 2017-08-02

## 2017-08-02 RX ORDER — LORAZEPAM 0.5 MG/1
0.5 TABLET ORAL EVERY 8 HOURS PRN
Qty: 30 TABLET | Refills: 0 | Status: SHIPPED | OUTPATIENT
Start: 2017-08-02 | End: 2017-09-26 | Stop reason: ALTCHOICE

## 2017-08-02 RX ORDER — BENZONATATE 200 MG/1
200 CAPSULE ORAL 3 TIMES DAILY PRN
Qty: 30 CAPSULE | Refills: 0 | Status: SHIPPED | OUTPATIENT
Start: 2017-08-02 | End: 2017-08-12

## 2017-08-02 RX ADMIN — KETOROLAC TROMETHAMINE 30 MG: 30 INJECTION, SOLUTION INTRAMUSCULAR at 09:08

## 2017-08-02 NOTE — TELEPHONE ENCOUNTER
Reason for Disposition   SEVERE chest pain    Protocols used: ST CHEST PAIN-A-OH    Tasha is calling to report pain over the left side of her chest.  States has been off and on for a couple of days.  Is present now.  Right now is an 8 out of 10.  Per protocol advised ER.

## 2017-08-02 NOTE — TELEPHONE ENCOUNTER
Pt. States that she forgot to mention that she has been having a cough and would like something called in for this issue as well.

## 2017-08-02 NOTE — LETTER
August 2, 2017      [facility]  [address]  [place] [address]  Phone: [phone]       Patient: Tasha Rush   YOB: 1975  Date of Visit: 08/02/2017    To Whom It May Concern:    Tasha Greenwood was at [facility] on 08/02/2017. She may return to work/school on 08/16/17 with no restrictions. If you have any questions or concerns, or if I can be of further assistance, please do not hesitate to contact me.    Sincerely,    Dr. Adams

## 2017-08-02 NOTE — TELEPHONE ENCOUNTER
----- Message from Tatianna Leal sent at 8/2/2017 10:19 AM CDT -----  Contact: self  Pt calling to discuss ED visit. Please call [phone]

## 2017-08-02 NOTE — ED PROVIDER NOTES
Encounter Date: 8/2/2017    SCRIBE #1 NOTE: INguyen, juancarlos scribing for, and in the presence of, Dr. Calderon.       History     Chief Complaint   Patient presents with    Chest Pain     pt reports intermittent left sided chest pain that started about 3 days ago; pain is non-radiating       Time seen by provider: 8:35 AM on 08/02/2017    Tasha Rush is a 41 y.o. female with HTN who presents to the ED with an onset of intermittent left sided chest pain for the last 3 days with associated nighttime sweats and cough. The pain is described as non-radiating aching pain. She reports having an episode once everyday lasting 8-9 minutes and occurs with activity and/or rest. The patient has massaged the area with no relief. 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 Her last PPD test was a couple of months ago. The patient denies prior similar symptoms, alleviating or exacerbating factors, taking medication for the pain, being on birth control, recent injury/trauma or fall, history of DM, HLD, or blood clots, fever, chills, leg swelling, hemoptysis, body aches, or any other symptoms at this time. No pertinent SHx noted.       The history is provided by the patient.     Review of patient's allergies indicates:   Allergen Reactions    Ace inhibitors Other (See Comments)     cough     Past Medical History:   Diagnosis Date    Abnormal Pap smear of cervix     Bacterial vaginosis     RECURRENT    Cervical dysplasia     Hypertension      Past Surgical History:   Procedure Laterality Date    CERVICAL BIOPSY  W/ LOOP ELECTRODE EXCISION      fibroid removal      HERNIA REPAIR  2009    umbilical    MYOMECTOMY       Family History   Problem Relation Age of Onset    Diabetes Father     Hyperlipidemia Father     Diabetes Mother     No Known Problems Brother     Colon cancer Paternal Uncle     Breast cancer Neg Hx     Ovarian cancer  Neg Hx      Social History   Substance Use Topics    Smoking status: Current Every Day Smoker    Smokeless tobacco: Never Used      Comment: 1 pack / week    Alcohol use Yes      Comment: occasional     Review of Systems   Constitutional: Positive for diaphoresis (nighttime) and fever. Negative for chills.   HENT: Negative for nosebleeds.    Eyes: Negative for visual disturbance.   Respiratory: Positive for cough. Negative for shortness of breath.         Negative for hemoptysis.    Cardiovascular: Positive for chest pain (left sided). Negative for palpitations and leg swelling.   Gastrointestinal: Negative for abdominal pain, diarrhea, nausea and vomiting.   Genitourinary: Negative for dysuria and hematuria.   Musculoskeletal: Negative for back pain, myalgias (generalized body aches) and neck pain.   Skin: Negative for rash.   Neurological: Negative for seizures, syncope and headaches.     Physical Exam     Initial Vitals [08/02/17 0817]   BP Pulse Resp Temp SpO2   (!) 146/93 65 18 99.2 °F (37.3 °C) 100 %      MAP       110.67         Physical Exam    Nursing note and vitals reviewed.  Constitutional: She appears well-developed and well-nourished. She is not diaphoretic. No distress.   HENT:   Head: Normocephalic and atraumatic.   Mouth/Throat: Oropharynx is clear and moist.   Eyes: Conjunctivae are normal.   Neck: Neck supple. No JVD present.   Cardiovascular: Normal rate, regular rhythm, normal heart sounds and intact distal pulses. Exam reveals no gallop and no friction rub.    No murmur heard.  Pulmonary/Chest: Breath sounds normal. She has no wheezes. She has no rhonchi. She has no rales. She exhibits no tenderness.   Abdominal: Soft. She exhibits no distension. There is no tenderness.   Musculoskeletal: Normal range of motion. She exhibits no tenderness.   No calf tenderness or palpable cords or masses.    Neurological: She is alert and oriented to person, place, and time.   Skin: No rash noted. No  erythema.   Psychiatric: She has a normal mood and affect. Her speech is normal and behavior is normal. Judgment and thought content normal.       ED Course   Procedures  Labs Reviewed   CBC W/ AUTO DIFFERENTIAL - Abnormal; Notable for the following:        Result Value    Gran% 37.0 (*)     All other components within normal limits   BASIC METABOLIC PANEL - Abnormal; Notable for the following:     CO2 22 (*)     All other components within normal limits   D DIMER, QUANTITATIVE   POCT URINE PREGNANCY     Imaging Results          X-Ray Chest 1 View (Final result)  Result time 08/02/17 09:36:07    Final result by Ben Jacobo MD (08/02/17 09:36:07)                 Impression:      No acute abnormality.      Electronically signed by: BEN JACOBO  Date:     08/02/17  Time:    09:36              Narrative:    CLINICAL HISTORY:  Chest pain    TECHNIQUE: Single view portable frontal radiograph of the chest    COMPARISON: Chest radiograph 2/22/17      FINDINGS:  Support devices: None    Chest: Cardiac and mediastinal silhouettes are normal.  Lungs are well aerated and clear.  No pleural effusion or pneumothorax.    Upper Abdomen: Normal.    Other: N/A.                            EKG Readings: (Independently Interpreted)   Initial Reading: No STEMI.   Sinus rhythm at a rate of 63 bpm. No ST or T-wave changes.      X-Rays:   Independently Interpreted Readings:   Chest X-Ray: No effusion, consolidation, or pneumothorax.      Medical Decision Making:   History:   Old Medical Records: I decided to obtain old medical records.  Clinical Tests:   Lab Tests: Reviewed and Ordered  Radiological Study: Ordered and Reviewed  Medical Tests: Reviewed and Ordered    Additional MDM:   Comments: 40 y/o female with c/o left atypical chest pain x 3-4 days in the setting of increased stress at work.  She also reported night sweats and coughing for the same about of time.  She does report having a negative PPD test ~ 6 months ago.  On exam she  was well-appearing, afebrile and HD stable with an unremarkable exam.  EKG WNL, labs including CBC, BMP, and dimer without significant abnormalities.  CXR also WNL.  Upon reassessment she was resting comfortably pain-free.  Her symptoms appear to be exacerbated if not caused by her increased stress.  However, she was instructed to f/u with her PCP for further evaluation if the pain continues or changes as well as to have a repeat PPD.  She voiced understanding and agreement with this plan and will be d/c'ed home in stable condition.  .          Scribe Attestation:   Scribe #1: I performed the above scribed service and the documentation accurately describes the services I performed. I attest to the accuracy of the note.    Attending Attestation:           Physician Attestation for Scribe:  Physician Attestation Statement for Scribe #1: I, Dr. Calderon, reviewed documentation, as scribed by Nguyen Boyer in my presence, and it is both accurate and complete.                 ED Course     Clinical Impression:     1. Atypical chest pain    2. Chest pain    3. Left sided chest pain          Disposition:   Disposition: Discharged  Condition: Stable                        Evelyn Calderon MD  08/02/17 1024

## 2017-08-10 ENCOUNTER — TELEPHONE (OUTPATIENT)
Dept: FAMILY MEDICINE | Facility: CLINIC | Age: 42
End: 2017-08-10

## 2017-08-10 NOTE — TELEPHONE ENCOUNTER
----- Message from Milagros Becker sent at 8/10/2017  2:20 PM CDT -----  Contact: Self/[phone]  Patient would like to know if the staff received her Short term disability paperwork from [employer]. Thank you.

## 2017-08-15 ENCOUNTER — OFFICE VISIT (OUTPATIENT)
Dept: FAMILY MEDICINE | Facility: CLINIC | Age: 42
End: 2017-08-15
Payer: COMMERCIAL

## 2017-08-15 VITALS
WEIGHT: 161.63 LBS | TEMPERATURE: 98 F | HEIGHT: 65 IN | BODY MASS INDEX: 26.93 KG/M2 | HEART RATE: 66 BPM | RESPIRATION RATE: 14 BRPM | OXYGEN SATURATION: 99 % | SYSTOLIC BLOOD PRESSURE: 120 MMHG | DIASTOLIC BLOOD PRESSURE: 84 MMHG

## 2017-08-15 DIAGNOSIS — F43.22 ADJUSTMENT DISORDER WITH ANXIETY: Primary | ICD-10-CM

## 2017-08-15 DIAGNOSIS — I10 HYPERTENSION, ESSENTIAL: ICD-10-CM

## 2017-08-15 DIAGNOSIS — Z56.6 WORK-RELATED STRESS: ICD-10-CM

## 2017-08-15 PROCEDURE — 99214 OFFICE O/P EST MOD 30 MIN: CPT | Mod: S$GLB,,, | Performed by: INTERNAL MEDICINE

## 2017-08-15 PROCEDURE — 3079F DIAST BP 80-89 MM HG: CPT | Mod: S$GLB,,, | Performed by: INTERNAL MEDICINE

## 2017-08-15 PROCEDURE — 3008F BODY MASS INDEX DOCD: CPT | Mod: S$GLB,,, | Performed by: INTERNAL MEDICINE

## 2017-08-15 PROCEDURE — 99999 PR PBB SHADOW E&M-EST. PATIENT-LVL III: CPT | Mod: PBBFAC,,, | Performed by: INTERNAL MEDICINE

## 2017-08-15 PROCEDURE — 3074F SYST BP LT 130 MM HG: CPT | Mod: S$GLB,,, | Performed by: INTERNAL MEDICINE

## 2017-08-15 RX ORDER — SERTRALINE HYDROCHLORIDE 50 MG/1
50 TABLET, FILM COATED ORAL DAILY
Qty: 30 TABLET | Refills: 1 | Status: SHIPPED | OUTPATIENT
Start: 2017-08-15 | End: 2017-09-26 | Stop reason: ALTCHOICE

## 2017-08-15 RX ORDER — HYDROCHLOROTHIAZIDE 25 MG/1
25 TABLET ORAL DAILY
Qty: 90 TABLET | Refills: 1 | Status: SHIPPED | OUTPATIENT
Start: 2017-08-15 | End: 2018-08-29 | Stop reason: SDUPTHER

## 2017-08-15 RX ORDER — AMLODIPINE BESYLATE 10 MG/1
10 TABLET ORAL DAILY
Qty: 90 TABLET | Refills: 1 | Status: SHIPPED | OUTPATIENT
Start: 2017-08-15 | End: 2018-10-01 | Stop reason: SDUPTHER

## 2017-08-15 SDOH — SOCIAL DETERMINANTS OF HEALTH (SDOH): OTHER PHYSICAL AND MENTAL STRAIN RELATED TO WORK: Z56.6

## 2017-08-15 NOTE — PROGRESS NOTES
Subjective:       Patient ID: Tasha Rush is a 41 y.o. female.    Chief Complaint: Anxiety and Stress    She presents today with complaints of increasing stress and anxiety.  She has been having a lot of issues at work as well as at home.  She is taking care of a quadriplegic brother as well as her children.  She has been having problems with a supervisor at work.  She feels she is being harassed on a daily basis.  She is having symptoms of anxiety and she believes panic attacks.  She's when she thinks of work or when she is to return over the weekend she has sweats with chest pain and headache.  She reports that her eye jumps.  She feels jittery often.  Her speech is often pressured.  She was on trazodone a few months ago but did not refill the medication.  She did not want to take pills at the time.  She does feel that she needs some time off work.      Review of Systems   Cardiovascular: Positive for chest pain.   Neurological: Positive for headaches.   Psychiatric/Behavioral: Positive for dysphoric mood and sleep disturbance. The patient is nervous/anxious.        Objective:      Physical Exam   Constitutional: She is oriented to person, place, and time. She appears well-developed and well-nourished. No distress.   HENT:   Head: Normocephalic and atraumatic.   Eyes: Conjunctivae are normal. No scleral icterus.   Neurological: She is alert and oriented to person, place, and time.   Skin: Skin is warm and dry.   Psychiatric: Her speech is normal and behavior is normal. Judgment and thought content normal. Cognition and memory are normal. She exhibits a depressed mood.   Vitals reviewed.      Assessment:       1. Adjustment disorder with anxious mood        Plan:       Tasha was seen today for anxiety and stress.    Diagnoses and all orders for this visit:    Adjustment disorder with anxious mood - he complains of several weeks of worsening symptoms in relation to work and home stressors.  We'll give her a  short course of Ativan when necessary as well as some time off work.  She does have significant symptoms of panic.  Relation to work.  We have discussed potential solutions for her concerns at work.  Reassess next visit  -     lorazepam (ATIVAN) 0.5 MG tablet; Take 1 tablet (0.5 mg total) by mouth every 8 (eight) hours as needed for Anxiety.       follow-up in 2 weeks

## 2017-08-15 NOTE — LETTER
August 15, 2017    Tasha Rush  [address] [facility] [place] [address]         [facility]  [address]  [place] [address]  Phone: [phone] August 15, 2017     Patient: Tasha Rush   YOB: 1975   Date of Visit: 8/15/2017       To Whom It May Concern:    It is my medical opinion that Tasha Rush may return to work on August 18 2017.    If you have any questions or concerns, please don't hesitate to call.    Sincerely,        Franklin Gonzalez MD

## 2017-08-15 NOTE — PROGRESS NOTES
"Subjective:       Patient ID: Tasha Rush is a 41 y.o. female.    Chief Complaint: Anxiety    F/u anxiety    HPI: 40 y/o seen two weeks ago for acute stress reaction and anxiety given script for lorazepam she reports this has helped with some of her acute anxiety but much of her symptoms stem for interaction with supervisor at work. She feels well when not working btu will awaken early night prior to going to work with racing thoughts and excessvie worry about her job. Denies SI/HI no manic symptoms. Sleep "okay"       Review of Systems   Constitutional: Negative for activity change, appetite change, fatigue, fever and unexpected weight change.   HENT: Negative for ear pain, rhinorrhea and sore throat.    Eyes: Negative for discharge and visual disturbance.   Respiratory: Negative for chest tightness, shortness of breath and wheezing.    Cardiovascular: Negative for chest pain, palpitations and leg swelling.   Gastrointestinal: Negative for abdominal pain, constipation and diarrhea.   Endocrine: Negative for cold intolerance and heat intolerance.   Genitourinary: Negative for dysuria and hematuria.   Musculoskeletal: Negative for joint swelling and neck stiffness.   Skin: Negative for rash.   Neurological: Negative for dizziness, syncope, weakness and headaches.   Psychiatric/Behavioral: Positive for dysphoric mood. Negative for sleep disturbance and suicidal ideas. The patient is nervous/anxious.        Objective:     Vitals:    08/15/17 1154   BP: 120/84   BP Location: Left arm   Patient Position: Sitting   BP Method: Medium (Automatic)   Pulse: 66   Resp: 14   Temp: 98.1 °F (36.7 °C)   TempSrc: Oral   SpO2: 99%   Weight: 73.3 kg (161 lb 9.6 oz)   Height: 5' 5" (1.651 m)          Physical Exam   Constitutional: She is oriented to person, place, and time. She appears well-developed and well-nourished.   HENT:   Head: Normocephalic and atraumatic.   Eyes: Conjunctivae are normal. Pupils are equal, round, and " reactive to light.   Neck: Normal range of motion.   Cardiovascular: Normal rate and regular rhythm.  Exam reveals no gallop and no friction rub.    No murmur heard.  Pulmonary/Chest: Effort normal and breath sounds normal. She has no wheezes. She has no rales.   Abdominal: Soft. Bowel sounds are normal. There is no tenderness. There is no rebound and no guarding.   Musculoskeletal: Normal range of motion. She exhibits no edema or tenderness.   Neurological: She is alert and oriented to person, place, and time. No cranial nerve deficit.   Skin: Skin is warm and dry.   Psychiatric: She has a normal mood and affect.       Assessment:       1. Adjustment disorder with anxiety    2. Work-related stress    3. Hypertension, essential        Plan:    1/2. Short term disability paper work completed today to fax to employer, add sertraline discussed coping strategies deep breathing exercises. Supportive psychotherapy    3. At goal continue current medications

## 2017-09-06 ENCOUNTER — TELEPHONE (OUTPATIENT)
Dept: FAMILY MEDICINE | Facility: CLINIC | Age: 42
End: 2017-09-06

## 2017-09-06 NOTE — TELEPHONE ENCOUNTER
Left message on answering machine that form is ready for  and that form was also faxed to [employer].

## 2017-09-26 ENCOUNTER — OFFICE VISIT (OUTPATIENT)
Dept: FAMILY MEDICINE | Facility: CLINIC | Age: 42
End: 2017-09-26
Payer: COMMERCIAL

## 2017-09-26 VITALS
DIASTOLIC BLOOD PRESSURE: 80 MMHG | RESPIRATION RATE: 17 BRPM | WEIGHT: 164 LBS | HEART RATE: 63 BPM | OXYGEN SATURATION: 99 % | TEMPERATURE: 99 F | SYSTOLIC BLOOD PRESSURE: 122 MMHG | BODY MASS INDEX: 27.32 KG/M2 | HEIGHT: 65 IN

## 2017-09-26 DIAGNOSIS — F43.22 ADJUSTMENT DISORDER WITH ANXIOUS MOOD: Primary | ICD-10-CM

## 2017-09-26 DIAGNOSIS — N39.0 URINARY TRACT INFECTION WITHOUT HEMATURIA, SITE UNSPECIFIED: ICD-10-CM

## 2017-09-26 PROCEDURE — 3008F BODY MASS INDEX DOCD: CPT | Mod: S$GLB,,, | Performed by: INTERNAL MEDICINE

## 2017-09-26 PROCEDURE — 3074F SYST BP LT 130 MM HG: CPT | Mod: S$GLB,,, | Performed by: INTERNAL MEDICINE

## 2017-09-26 PROCEDURE — 3079F DIAST BP 80-89 MM HG: CPT | Mod: S$GLB,,, | Performed by: INTERNAL MEDICINE

## 2017-09-26 PROCEDURE — 99999 PR PBB SHADOW E&M-EST. PATIENT-LVL III: CPT | Mod: PBBFAC,,, | Performed by: INTERNAL MEDICINE

## 2017-09-26 PROCEDURE — 99213 OFFICE O/P EST LOW 20 MIN: CPT | Mod: S$GLB,,, | Performed by: INTERNAL MEDICINE

## 2017-09-26 RX ORDER — NITROFURANTOIN 25; 75 MG/1; MG/1
100 CAPSULE ORAL 2 TIMES DAILY
Qty: 10 CAPSULE | Refills: 0 | Status: SHIPPED | OUTPATIENT
Start: 2017-09-26 | End: 2018-10-02 | Stop reason: SDUPTHER

## 2017-09-26 NOTE — PROGRESS NOTES
Subjective:       Patient ID: Tasha Rush is a 41 y.o. female.    Chief Complaint: Hypertension and Follow-up (1 month)    She presents for follow up.  She discontinued Zoloft after 2 doses.  She reports having headaches 4 weeks after taking the medication.  She completed the entire prescription of Ativan.  She took 1 tablet daily.  She is now better able to cope with her situation though it has not changed.  Her chest pain has resolved.  She does not worry over the weekend anymore.  She does complain of a one and a half to 2 week history of frequent urination associated with burning and cramping in her lower abdomen.       Review of Systems   Psychiatric/Behavioral: Negative for sleep disturbance. The patient is not nervous/anxious.        Objective:      Physical Exam   Constitutional: She is oriented to person, place, and time. She appears well-developed and well-nourished. No distress.   HENT:   Head: Normocephalic and atraumatic.   Eyes: Conjunctivae are normal. No scleral icterus.   Neurological: She is alert and oriented to person, place, and time.   Skin: Skin is warm and dry.   Psychiatric: She has a normal mood and affect.   Vitals reviewed.      Assessment:       1. Adjustment disorder with anxious mood    2. Urinary tract infection without hematuria, site unspecified        Plan:       Tasha was seen today for hypertension and follow-up.    Diagnoses and all orders for this visit:    Adjustment disorder with anxious mood - she was unable to tolerate Zoloft due to headache.  Her symptoms are significantly improved now without any medication.  Will monitor for now.  She has started a new job which I believe has also helped.  Follow-up when necessary    Urinary tract infection without hematuria, site unspecified - treating empirically with Macrobid  -     nitrofurantoin, macrocrystal-monohydrate, (MACROBID) 100 MG capsule; Take 1 capsule (100 mg total) by mouth 2 (two) times daily.       follow-up  when necessary

## 2017-10-22 DIAGNOSIS — N89.8 VAGINAL DISCHARGE: ICD-10-CM

## 2017-10-22 RX ORDER — METRONIDAZOLE 500 MG/1
TABLET ORAL
Qty: 28 TABLET | Refills: 0 | Status: SHIPPED | OUTPATIENT
Start: 2017-10-22 | End: 2017-11-24

## 2017-11-13 ENCOUNTER — TELEPHONE (OUTPATIENT)
Dept: OBSTETRICS AND GYNECOLOGY | Facility: CLINIC | Age: 42
End: 2017-11-13

## 2017-11-13 NOTE — TELEPHONE ENCOUNTER
----- Message from Celine Myers sent at 11/13/2017  1:33 PM CST -----  Contact: Pt/ [phone]  Calling to get something else called in for bladder inf. Pt states medication have now ,not working

## 2017-11-14 ENCOUNTER — TELEPHONE (OUTPATIENT)
Dept: FAMILY MEDICINE | Facility: CLINIC | Age: 42
End: 2017-11-14

## 2017-11-14 NOTE — TELEPHONE ENCOUNTER
----- Message from Delfin Lincoln sent at 11/14/2017 12:08 PM CST -----  Contact: self  Refill: nitrofurantoin, macrocrystal-monohydrate, (MACROBID) 100 MG capsule. Send to [facility]   [address]  [facility] [address] [phone]    Contact her at [phone].    Thanks-

## 2017-11-21 ENCOUNTER — TELEPHONE (OUTPATIENT)
Dept: OBSTETRICS AND GYNECOLOGY | Facility: CLINIC | Age: 42
End: 2017-11-21

## 2017-11-21 NOTE — TELEPHONE ENCOUNTER
Pt scheduled an apt with Dr. Bueno on 12/8 but wanted to be sooner due her symptoms. Offered another physician in the office and she was ok with that. Apt booked for 11/2 with Dr. Cisse. kt

## 2017-11-21 NOTE — TELEPHONE ENCOUNTER
----- Message from Cathie Mora sent at 11/21/2017 12:29 PM CST -----  Contact: self  Patient would like to talk with someone regarding stomach cramps. Patient can be reached at [phone].      Thanks,

## 2017-11-24 ENCOUNTER — OFFICE VISIT (OUTPATIENT)
Dept: OBSTETRICS AND GYNECOLOGY | Facility: CLINIC | Age: 42
End: 2017-11-24
Payer: COMMERCIAL

## 2017-11-24 VITALS
BODY MASS INDEX: 27.15 KG/M2 | DIASTOLIC BLOOD PRESSURE: 72 MMHG | SYSTOLIC BLOOD PRESSURE: 118 MMHG | WEIGHT: 162.94 LBS | HEIGHT: 65 IN

## 2017-11-24 DIAGNOSIS — N89.8 VAGINAL DISCHARGE: ICD-10-CM

## 2017-11-24 DIAGNOSIS — B96.89 BV (BACTERIAL VAGINOSIS): Primary | ICD-10-CM

## 2017-11-24 DIAGNOSIS — N76.0 BV (BACTERIAL VAGINOSIS): Primary | ICD-10-CM

## 2017-11-24 DIAGNOSIS — R39.9 UTI SYMPTOMS: ICD-10-CM

## 2017-11-24 PROCEDURE — 87147 CULTURE TYPE IMMUNOLOGIC: CPT

## 2017-11-24 PROCEDURE — 99999 PR PBB SHADOW E&M-EST. PATIENT-LVL III: CPT | Mod: PBBFAC,,, | Performed by: OBSTETRICS & GYNECOLOGY

## 2017-11-24 PROCEDURE — 87480 CANDIDA DNA DIR PROBE: CPT

## 2017-11-24 PROCEDURE — 87660 TRICHOMONAS VAGIN DIR PROBE: CPT

## 2017-11-24 PROCEDURE — 99214 OFFICE O/P EST MOD 30 MIN: CPT | Mod: S$GLB,,, | Performed by: OBSTETRICS & GYNECOLOGY

## 2017-11-24 PROCEDURE — 87591 N.GONORRHOEAE DNA AMP PROB: CPT

## 2017-11-24 PROCEDURE — 87088 URINE BACTERIA CULTURE: CPT

## 2017-11-24 PROCEDURE — 87086 URINE CULTURE/COLONY COUNT: CPT

## 2017-11-24 RX ORDER — METRONIDAZOLE 7.5 MG/G
GEL VAGINAL
Qty: 25 G | Refills: 1 | Status: SHIPPED | OUTPATIENT
Start: 2017-11-24 | End: 2018-01-09 | Stop reason: ALTCHOICE

## 2017-11-24 NOTE — PROGRESS NOTES
SUBJECTIVE:   42 y.o. female   for vaginal discharge    Patient's last menstrual period was 2017 (exact date)..     She was prescribed macrobid because burning sensation urination, 1 month ago  Still has lower abdominal cramp, the UTI symptoms resolved.  Also c/o of b/l lower back pain, intermitent - makes she thinks she still has a bladder infection. Denies fever/chills  Now pt c/o vaginal discharge yellow, thin.  Mild odor.  Denies itching and burning. Getting BV every 3-4 months  Co of loose stool x 3 weeks. Having BM 1-2 x day. Denies changes in her diet.      OB History    Para Term  AB Living   0 0 0 0 0 0   SAB TAB Ectopic Multiple Live Births   0 0 0 0           Obstetric Comments   Denies STD     Past Medical History:   Diagnosis Date    Abnormal Pap smear of cervix     Bacterial vaginosis     RECURRENT    Cervical dysplasia     Hypertension      Past Surgical History:   Procedure Laterality Date    CERVICAL BIOPSY  W/ LOOP ELECTRODE EXCISION      fibroid removal      HERNIA REPAIR  2009    umbilical    MYOMECTOMY       Social History     Social History    Marital status: Single     Spouse name: N/A    Number of children: N/A    Years of education: N/A     Occupational History    Medical asst at [employer]      Social History Main Topics    Smoking status: Current Every Day Smoker    Smokeless tobacco: Never Used      Comment: 1 pack / week    Alcohol use Yes      Comment: occasional    Drug use: No    Sexual activity: No     Other Topics Concern    Not on file     Social History Narrative    Single.  Lives w/adopted 5 yr old daughter.  Pt lives next door to her own parents.       Family History   Problem Relation Age of Onset    Diabetes Father     Hyperlipidemia Father     Diabetes Mother     No Known Problems Brother     Colon cancer Paternal Uncle     Breast cancer Neg Hx     Ovarian cancer Neg Hx          Current Outpatient Prescriptions   Medication Sig  "Dispense Refill    amlodipine (NORVASC) 10 MG tablet Take 1 tablet (10 mg total) by mouth once daily. 90 tablet 1    hydrochlorothiazide (HYDRODIURIL) 25 MG tablet Take 1 tablet (25 mg total) by mouth once daily. 90 tablet 1    clotrimazole (LOTRIMIN) 1 % cream Apply topically 2 (two) times daily. 15 g 0    fluconazole (DIFLUCAN) 150 MG Tab Take 1 tablet (150 mg total) by mouth as needed. Take one pill weekly as needed for yeast infection. 3 tablet 3    metronidazole (FLAGYL) 500 MG tablet TAKE 1 TABLET(500 MG) BY MOUTH EVERY 12 HOURS 28 tablet 0     No current facility-administered medications for this visit.      Allergies: Ace inhibitors       ROS:  GENERAL: Denies weight gain or weight loss. Feeling well overall.   SKIN: Denies rash or lesions.   HEAD: Denies head injury or headache.   NODES: Denies enlarged lymph nodes.   CHEST: Denies chest pain or shortness of breath.   CARDIOVASCULAR: Denies palpitations or left sided chest pain.   ABDOMEN: No abdominal pain, constipation, diarrhea, nausea, vomiting or rectal bleeding.   URINARY: No frequency, dysuria, hematuria, or burning on urination.  REPRODUCTIVE: see HPI  BREASTS: The patient performs breast self-examination and denies pain, lumps, or nipple discharge.   HEMATOLOGIC: No easy bruisability or excessive bleeding.  MUSCULOSKELETAL: Denies joint pain or swelling.   NEUROLOGIC: Denies syncope or weakness.   PSYCHIATRIC: Denies depression, anxiety or mood swings.      OBJECTIVE:   /72   Ht 5' 5" (1.651 m)   Wt 73.9 kg (162 lb 14.7 oz)   LMP 11/12/2017 (Exact Date)   BMI 27.11 kg/m²   The patient appears well, alert, oriented x 3, in no distress.  NECK: negative, no thyromegaly, trachea midline  SKIN: normal and no acne, striae, hirsutism  BREAST EXAM: not examined  ABDOMEN: soft, non-tender; bowel sounds normal; no masses,  no organomegaly and no hernias, masses, or hepatosplenomegaly  GENITALIA: normal external genitalia, no erythema, no " discharge  URETHRA: normal appearing urethra with no masses, tenderness or lesions and normal urethra, normal urethral meatus  VAGINA: vaginal discharge white, frothy and malodorous  CERVIX: no lesions or cervical motion tenderness  UTERUS: normal size, contour, position, consistency, mobility, non-tender  ADNEXA: no mass, fullness, tenderness      ASSESSMENT:   1.  Vaginal discharge:  Affirm/gc/ct sent.  Suspect BV - rx for flagyl gel. Advised pt to return in one week for CHRISTOPHE, if negative will need 6 months of suppression with boric acid x flagyl gel. Pt agreed  2.  Check UA C&S  3.  rtc in one week

## 2017-11-25 LAB
C TRACH DNA SPEC QL NAA+PROBE: NOT DETECTED
N GONORRHOEA DNA SPEC QL NAA+PROBE: NOT DETECTED

## 2017-11-26 LAB
BACTERIA UR CULT: NORMAL
CANDIDA RRNA VAG QL PROBE: NEGATIVE
G VAGINALIS RRNA GENITAL QL PROBE: POSITIVE
T VAGINALIS RRNA GENITAL QL PROBE: NEGATIVE

## 2017-11-27 RX ORDER — CEPHALEXIN 500 MG/1
500 CAPSULE ORAL EVERY 12 HOURS
Qty: 14 CAPSULE | Refills: 0 | Status: SHIPPED | OUTPATIENT
Start: 2017-11-27 | End: 2017-12-08 | Stop reason: SDUPTHER

## 2017-11-30 ENCOUNTER — TELEPHONE (OUTPATIENT)
Dept: OBSTETRICS AND GYNECOLOGY | Facility: CLINIC | Age: 42
End: 2017-11-30

## 2017-11-30 NOTE — TELEPHONE ENCOUNTER
----- Message from Cathie Mora sent at 11/30/2017  1:27 PM CST -----  Contact: self  Please call patient with test result and patient can be reached aat [phone].        Thanks,

## 2017-12-01 ENCOUNTER — TELEPHONE (OUTPATIENT)
Dept: OBSTETRICS AND GYNECOLOGY | Facility: CLINIC | Age: 42
End: 2017-12-01

## 2017-12-01 NOTE — TELEPHONE ENCOUNTER
----- Message from Monica Miradna sent at 12/1/2017  9:59 AM CST -----  Contact: self  Pt returning Ludwigverónicar's call. Pt call back [phone].

## 2017-12-01 NOTE — TELEPHONE ENCOUNTER
----- Message from Monica Miranda sent at 12/1/2017  9:59 AM CST -----  Contact: self  Pt returning Ludwigverónicar's call. Pt call back [phone].

## 2017-12-12 RX ORDER — CEPHALEXIN 500 MG/1
CAPSULE ORAL
Qty: 14 CAPSULE | Refills: 0 | Status: SHIPPED | OUTPATIENT
Start: 2017-12-12 | End: 2018-01-09 | Stop reason: ALTCHOICE

## 2017-12-13 ENCOUNTER — TELEPHONE (OUTPATIENT)
Dept: OBSTETRICS AND GYNECOLOGY | Facility: CLINIC | Age: 42
End: 2017-12-13

## 2017-12-13 NOTE — TELEPHONE ENCOUNTER
----- Message from Gabriel Galdamez sent at 12/13/2017 12:37 PM CST -----  Contact: Self  Pt called regarding issue with medication. Pt can be reached @ [phone] or [phone].

## 2017-12-13 NOTE — TELEPHONE ENCOUNTER
All questions were answered advised pt to repeat the metrogel for 5 days. Her results did come back BV, STD and urine culture was neg. Advised to call the office back if she didn't get any relief and Dr. Bueno may prescribe something else. Pt voiced her understanding. kt

## 2017-12-13 NOTE — TELEPHONE ENCOUNTER
----- Message from Gabby Milton sent at 12/13/2017  3:46 PM CST -----  Patient returning missed call. She can be reached at [phone]. Thank you!

## 2018-01-09 ENCOUNTER — OFFICE VISIT (OUTPATIENT)
Dept: URGENT CARE | Facility: CLINIC | Age: 43
End: 2018-01-09
Payer: COMMERCIAL

## 2018-01-09 VITALS
HEIGHT: 66 IN | OXYGEN SATURATION: 100 % | WEIGHT: 165 LBS | RESPIRATION RATE: 19 BRPM | SYSTOLIC BLOOD PRESSURE: 140 MMHG | TEMPERATURE: 99 F | HEART RATE: 80 BPM | DIASTOLIC BLOOD PRESSURE: 90 MMHG | BODY MASS INDEX: 26.52 KG/M2

## 2018-01-09 DIAGNOSIS — R05.9 COUGH: Primary | ICD-10-CM

## 2018-01-09 DIAGNOSIS — N76.0 BV (BACTERIAL VAGINOSIS): ICD-10-CM

## 2018-01-09 DIAGNOSIS — B96.89 BV (BACTERIAL VAGINOSIS): ICD-10-CM

## 2018-01-09 DIAGNOSIS — J06.9 UPPER RESPIRATORY TRACT INFECTION, UNSPECIFIED TYPE: ICD-10-CM

## 2018-01-09 LAB
CTP QC/QA: YES
FLUAV AG NPH QL: NEGATIVE
FLUBV AG NPH QL: NEGATIVE

## 2018-01-09 PROCEDURE — 87804 INFLUENZA ASSAY W/OPTIC: CPT | Mod: QW,S$GLB,, | Performed by: EMERGENCY MEDICINE

## 2018-01-09 PROCEDURE — 99214 OFFICE O/P EST MOD 30 MIN: CPT | Mod: S$GLB,,, | Performed by: EMERGENCY MEDICINE

## 2018-01-09 RX ORDER — BENZONATATE 200 MG/1
200 CAPSULE ORAL 3 TIMES DAILY PRN
Qty: 30 CAPSULE | Refills: 0 | Status: SHIPPED | OUTPATIENT
Start: 2018-01-09 | End: 2018-01-19

## 2018-01-09 RX ORDER — CODEINE PHOSPHATE AND GUAIFENESIN 10; 100 MG/5ML; MG/5ML
10 SOLUTION ORAL EVERY 6 HOURS PRN
Qty: 120 ML | Refills: 0 | Status: SHIPPED | OUTPATIENT
Start: 2018-01-09 | End: 2018-01-19

## 2018-01-09 RX ORDER — METRONIDAZOLE 500 MG/1
500 TABLET ORAL EVERY 12 HOURS
Qty: 14 TABLET | Refills: 0 | Status: SHIPPED | OUTPATIENT
Start: 2018-01-09 | End: 2018-01-16

## 2018-01-09 NOTE — PROGRESS NOTES
"Subjective:       Patient ID: Tasha Rush is a 42 y.o. female.    Vitals:  height is 5' 6" (1.676 m) and weight is 74.8 kg (165 lb). Her tympanic temperature is 98.8 °F (37.1 °C). Her blood pressure is 140/90 (abnormal) and her pulse is 80. Her respiration is 19 and oxygen saturation is 100%.     Chief Complaint: Cough    Patient with chills and body aches x 2 days. Patient with cough, runny nose, and sore throat. 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 No fever. She does smoke and does have the cough. The pelvic pain is ongoing.    Patient also complaining of a pelvic issue. She was placed on Keflex,  Diflucan and metrogel. No improvement with rx. Patient complaining of rt side abdominal pain. She says she has had this BV multiple times over the years and flagyl po works for her. She says the gel did not work. She says she doesn't know why they put her on Keflex as they told her she did not have a UTI. She knows she has BV and doesn't need another pelvic      Cough   This is a new problem. Episode onset: 2 days. The problem has been unchanged. The problem occurs every few minutes. Associated symptoms include chills and a sore throat. Pertinent negatives include no chest pain, ear pain, eye redness, fever, headaches, myalgias, shortness of breath or wheezing. Treatments tried: nyquil/alkaseltzer. The treatment provided no relief.     Review of Systems   Constitution: Positive for chills. Negative for fever and malaise/fatigue.   HENT: Positive for congestion and sore throat. Negative for ear pain and hoarse voice.    Eyes: Negative for discharge and redness.   Cardiovascular: Negative for chest pain, dyspnea on exertion and leg swelling.   Respiratory: Positive for cough and sputum production. Negative for shortness of breath and wheezing.    Musculoskeletal: Negative for myalgias.   Gastrointestinal: Negative for abdominal pain and nausea.   Neurological: Negative for headaches.     "   Objective:      Physical Exam   Constitutional: She is oriented to person, place, and time. She appears well-developed and well-nourished. She is cooperative.  Non-toxic appearance. She does not appear ill. No distress.   HENT:   Head: Normocephalic and atraumatic.   Right Ear: Hearing, tympanic membrane, external ear and ear canal normal.   Left Ear: Hearing, tympanic membrane, external ear and ear canal normal.   Nose: Nose normal. No mucosal edema, rhinorrhea or nasal deformity. No epistaxis. Right sinus exhibits no maxillary sinus tenderness and no frontal sinus tenderness. Left sinus exhibits no maxillary sinus tenderness and no frontal sinus tenderness.   Mouth/Throat: Uvula is midline, oropharynx is clear and moist and mucous membranes are normal. No trismus in the jaw. Normal dentition. No uvula swelling. No posterior oropharyngeal erythema.   Eyes: Conjunctivae, EOM and lids are normal. Pupils are equal, round, and reactive to light. No scleral icterus.   Sclera clear bilat   Neck: Trachea normal, full passive range of motion without pain and phonation normal. Neck supple.   Cardiovascular: Normal rate, regular rhythm, normal heart sounds, intact distal pulses and normal pulses.    Pulmonary/Chest: Effort normal and breath sounds normal. No respiratory distress.   Abdominal: Soft. Normal appearance and bowel sounds are normal. She exhibits no distension. There is no tenderness.   Musculoskeletal: Normal range of motion. She exhibits no edema or deformity.   Neurological: She is alert and oriented to person, place, and time. She exhibits normal muscle tone. Coordination normal.   Skin: Skin is warm, dry and intact. She is not diaphoretic. No pallor.   Psychiatric: She has a normal mood and affect. Her speech is normal and behavior is normal. Judgment and thought content normal. Cognition and memory are normal.   Nursing note and vitals reviewed.      Office Visit on 01/09/2018   Component Date Value Ref  Range Status    Rapid Influenza A Ag 01/09/2018 Negative  Negative Final    Rapid Influenza B Ag 01/09/2018 Negative  Negative Final     Acceptable 01/09/2018 Yes   Final     Assessment:       1. Cough    2. BV (bacterial vaginosis)    3. Upper respiratory tract infection, unspecified type        Plan:         Cough  -     POCT Influenza A/B    BV (bacterial vaginosis)    Upper respiratory tract infection, unspecified type    Other orders  -     benzonatate (TESSALON) 200 MG capsule; Take 1 capsule (200 mg total) by mouth 3 (three) times daily as needed for Cough.  Dispense: 30 capsule; Refill: 0  -     guaifenesin-codeine 100-10 mg/5 ml (TUSSI-ORGANIDIN NR)  mg/5 mL syrup; Take 10 mLs by mouth every 6 (six) hours as needed for Cough.  Dispense: 120 mL; Refill: 0  -     metroNIDAZOLE (FLAGYL) 500 MG tablet; Take 1 tablet (500 mg total) by mouth every 12 (twelve) hours.  Dispense: 14 tablet; Refill: 0

## 2018-01-09 NOTE — PATIENT INSTRUCTIONS
Kicking the Smoking Habit  If you smoke, quitting is one of the best changes you can make for your heart and your overall health. Your risk of heart attack goes down within one day of putting out that last cigarette. As you go longer without smoking, your risk goes down even more. Quitting isnt easy, but millions of people have done it. You can, too. Its never too late to quit.  Getting started  Boost your chances of success by deciding on your quit plan. Your health care provider and cardiac rehab team can help you develop this plan. Even if youve already quit, its easy to slip back into smoking.  Your plan can help you avoid and recover from relapse.  In any case, start by setting a date to quit within a month, and do it.    Keys to your quit plan  · Talk to your healthcare provider about prescription medicines and nicotine replacement products that help stop the urge to smoke.   · Join a support group or quit smoking program. Talking with others about the challenges of quitting can help you get through them.  · Ask other smokers in your household to quit with you.  · Look for the cues in your life that you associate with smoking and avoid them.  Track your triggers  What gives you that S-dblm-m-cigarette feeling? List all the situations that make you want a cigarette. Then think of other ways to deal with these situations. Here are some examples:  Situation How I'll handle it   Finishing a meal Get up from the table and take a walk   Having an argument Find a quiet place and breathe deeply   Feeling lonely or bored Call a friend to talk         Tips for quitting successfully  · List the benefits of quitting such as reducing heart risks and saving money. Keep this list and review it whenever you feel like smoking.  · Get support. Let your friends know you may call them to chat when you have an urge to smoke.  · If youve tried to quit before without success, this time avoid the triggers that may cause  "the relapse.  · Make the most of slip-ups. Try to learn from them, and then get back on track.  · Be accountable to your friends and your calendar so that you stay on track.  For family and friends  · Be supportive and patient. Quitting smoking can be difficult and stressful.  · If you smoke, nows a great time to quit. Even if you dont quit, never smoke around your loved one. Secondhand smoke is dangerous to his or her heart.  · The best goals are accomplished in teams. Remember that when your loved one states he or she wants to stop smoking.  Date Last Reviewed: 7/1/2016  © 9565-6489 [employer]. [address] [place] [address], [place] [address]. All rights reserved. This information is not intended as a substitute for professional medical care. Always follow your healthcare professional's instructions.      Please be aware as we discussed that narcotics can be addictive. I have given you a limited quantity to take as it is needed at this time. However take it sparingly and only when needed.                                                         URI   If your condition worsens or fails to improve we recommend that you receive another evaluation at the ER immediately or contact your PCP to discuss your concerns or return here. You must understand that you've received an urgent care treatment only and that you may be released before all your medical problems are known or treated. You the patient will arrange for follouwp care as instructed.   If we discussed that I think your illness is viral, it will not respond to antibiotics and will last 10-14 days. If we discussed "wait and see" antibiotics and if over the next few days the symptoms worsen start the antibiotics I have given you.   If you are female and on BCP and do take the antibiotics, use additional methods to prevent pregnancy while on the antibiotics and for one cycle after.   Flonase (fluticasone) is a nasal spray which is available over the " "counter and may help with your symptoms.   Zyrtec D, Claritin D or Allegra D can also help with symptoms of congestion and drainage.   If you have hypertension avoid using the "D" which is the decongestant   If you just have drainage you can take plain zyrtec, claritin or allegra   If you just have a congested feeling you can take pseudoephedrine (unless you have high blood pressure) which you have to sign for behind the counter. Do not buy the phenylephrine which is on the shelf as it is not effective   Rest and fluids are also important.   Tylenol or ibuprofen can also be used as directed for pain unless you have an allergy to them or medical condition such as stomach ulcers, kidney or liver disease or blood thinners etc for which you should not be taking these type of medications.   If you are flying in the next few days Afrin nose drops for the airplane flight upon take off and landing may help. Other than at those times refrain from using afrin.   If you were prescribed a narcotic do not drive or operate heavy machinery while taking these medications.     Bacterial Vaginosis    You have a vaginal infection called bacterial vaginosis (BV). Both good and bad bacteria are present in a healthy vagina. BV occurs when these bacteria get out of balance. The number of bad bacteria increase. And the number of good bacteria decrease.  BV may or may not cause symptoms. If symptoms do occur, they can include:  · Thin, gray, milky-white, or sometimes green discharge  · Unpleasant odor or fishy smell  · Itching, burning, or pain in or around the vagina  It is not known what causes BV, but certain factors can make the problem more likely. This can include:  · Douching  · Having sex with a new partner  · Having sex with more than one partner  BV will sometimes go away on its own. But treatment is usually recommended. This is because untreated BV can increase the risk of more serious health problems such as:  · Pelvic " inflammatory disease (PID)  ·  delivery (giving birth to a baby early if youre pregnant)  · HIV and certain other sexually transmitted diseases (STDs)  · Infection after surgery on the reproductive organs  Home care  General care  · BV is most often treated with medicines called antibiotics. These may be given as pills or as a vaginal cream. If antibiotics are prescribed, be sure to use them exactly as directed. Also, be sure to complete all of the medicine, even if your symptoms go away.  · Avoid douching or having sex during treatment.  · If you have sex with a female partner, ask your healthcare provider if she should also be treated.  Prevention  · Limit or avoid douching.  · Avoid having sex. If you do have sex, then take steps to lower your risk:  ¨ Use condoms when having sex.  ¨ Limit the number of partners you have sex with.  Follow-up care  Follow up with your healthcare provider, or as advised.  When to seek medical advice  Call your healthcare provider right away if:  · You have a fever of 100.4ºF (38ºC) or higher, or as directed by your provider.  · Your symptoms worsen, or they dont go away within a few days of starting treatment.  · You have new pain in the lower belly or pelvic region.  · You have side effects that bother you or a reaction to the pills or cream youre prescribed.  · You or any partners you have sex with have new symptoms, such as a rash, joint pain, or sores.  Date Last Reviewed: 2015  © 1981-9217 [employer]. [address] [place] [address], [place] [address]. All rights reserved. This information is not intended as a substitute for professional medical care. Always follow your healthcare professional's instructions.

## 2018-01-09 NOTE — LETTER
January 9, 2018      [facility] [place] [id number] [facility] [address]  Phone: [phone]  Fax: [phone]       Patient: Tasha Rush   YOB: 1975  Date of Visit: 01/09/2018    To Whom It May Concern:    Trisha Rush  was at [facility] on 01/09/2018. She is sick with a URI and needs to be off works for a few days. If she is not running fever she can return to work 1/11/18.  If you have any questions or concerns, or if I can be of further assistance, please do not hesitate to contact me.    Sincerely,    Telma Shipman MD

## 2018-01-11 ENCOUNTER — OFFICE VISIT (OUTPATIENT)
Dept: OBSTETRICS AND GYNECOLOGY | Facility: CLINIC | Age: 43
End: 2018-01-11
Payer: COMMERCIAL

## 2018-01-11 ENCOUNTER — TELEPHONE (OUTPATIENT)
Dept: OBSTETRICS AND GYNECOLOGY | Facility: CLINIC | Age: 43
End: 2018-01-11

## 2018-01-11 VITALS
BODY MASS INDEX: 25.51 KG/M2 | SYSTOLIC BLOOD PRESSURE: 120 MMHG | HEIGHT: 66 IN | WEIGHT: 158.75 LBS | DIASTOLIC BLOOD PRESSURE: 78 MMHG

## 2018-01-11 DIAGNOSIS — R10.2 PELVIC PAIN IN FEMALE: ICD-10-CM

## 2018-01-11 DIAGNOSIS — Z12.39 BREAST CANCER SCREENING: ICD-10-CM

## 2018-01-11 DIAGNOSIS — Z01.419 WELL WOMAN EXAM WITH ROUTINE GYNECOLOGICAL EXAM: Primary | ICD-10-CM

## 2018-01-11 PROCEDURE — 99999 PR PBB SHADOW E&M-EST. PATIENT-LVL III: CPT | Mod: PBBFAC,,, | Performed by: OBSTETRICS & GYNECOLOGY

## 2018-01-11 PROCEDURE — 99396 PREV VISIT EST AGE 40-64: CPT | Mod: S$GLB,,, | Performed by: OBSTETRICS & GYNECOLOGY

## 2018-01-11 NOTE — TELEPHONE ENCOUNTER
----- Message from Gabriel Galdamez sent at 1/11/2018  3:33 PM CST -----  Contact: Self  Pt called regarding pelvic pain. Pt states she called earlier today as well.  Pt can be reached @ [phone].   ----------------------------------------------------------  1/11/18 @ 1610 ([facility])  PT NOT ANSWERING PHONE SHE HAS A 3:40 APPT WITH DR MILLER FOR HER PELVIC PAIN

## 2018-01-12 NOTE — PROGRESS NOTES
[facility]  Ambulatory Clinic  Obstetrics & Gynecology    Visit Date:  1/11/2018    Chief Complaint:  Annual GYN exam, pelvic cramping    History of Present Illness:      Tasha Rush is a 42 y.o. G0 here for a gynecologic exam with c/o pelvic cramping.  Pain is dull and crampy, episodic in nature, no particular aggravating factors, and relieved with rest.    Pt has h/o abdominal myomectomy and bilateral salpingostomy and drainage of hydrosalpinx secondary to symptomatic uterine fibroids on 8/2/16.  Menses are regular, not heavy or painful.  Pt current method of family planning is condoms, and reports no problems with this method.    Last pap 10/2016 was normal.  Pt denies active sexually transmitted infections.  Pt denies h/o abnormal mammogram, last mammo 10/2015.  Pt performs monthly self breast examination, smokes tobacco, uses seat belts, and denies abuse.   Pt denies abnormal vaginal bleeding, vaginal discharge, dysmenorrhea, bloating, early satiety, unintentional weight loss, breast mass/skin changes, incontinence, GI or urinary complaints.    Otherwise, the pt is in her usual state of health and has good follow-up with her PCP.    Past History:  Gynecologic history as noted above.    Review of Systems:      GENERAL:  No fever, fatigue, excessive weight gain or loss  HEENT:  No headaches, hearing changes, visual disturbance  RESPIRATORY:  No cough, shortness of breath  CARDIOVASCULAR:  No chest pain, heart palpitations, leg swelling  BREAST:  No lump, pain, nipple discharge, skin changes  GASTROINTESTINAL:  No nausea, vomiting, constipation, diarrhea, abd pain, rectal bleeding   GENITOURINARY:  See HPI  ENDOCRINE:  No heat or cold intolerance  HEMATOLOGIC:  No easy bruisability or bleeding   LYMPHATICS:  No enlarged nodes  MUSCULOSKELETAL:  No joint pain or swelling  SKIN:  No rash, lesions, jaundice  NEUROLOGIC:  No dizziness, weakness, syncope  PSYCHIATRIC:  No depression,  "homicidal/suicidal ideations, anxiety or mood swings    Physical Exam:     /78 (BP Location: Right arm, Patient Position: Sitting, BP Method: Large (Manual))   Ht 5' 6" (1.676 m)   Wt 72 kg (158 lb 11.7 oz)   LMP 01/02/2018 (Exact Date)   BMI 25.62 kg/m²   Pulse 60, Resp rate 18  Patient's last menstrual period was 01/02/2018 (exact date).     GENERAL:  No acute distress, well-nourished  HEENT:  Atraumatic, anicteric, moist mucus membranes, neck supple w/o masses.  BREAST:  Symmetric, nontender, no obvious masses, adenopathy, skin changes or nipple discharge.  LUNGS:  Clear to auscultation  HEART:  Regular rate and rhythm, no murmurs, gallops, or rubs  ABDOMEN:  Soft, non-tender, non-distended, normoactive bowel sounds, no obvious organomegaly  EXT:  Symmetric w/o cramping, claudication, or edema. +2 distal pulses.  SKIN:  No rashes or bruising  PSYCH:  Mood and affect appropriate    GENITOURINARY:    VULVAR:  Female external genitalia w/o obvious lesions. Normal urethral meatus. No gross lymphadenopathy.   VAGINA:  Pink, moist, well-rugated. Good support. No obvious lesion. No discharge.  CERVIX:  No cervical motion tenderness, discharge, or obvious lesions.   UTERUS:  Small, non-tender, normal contour  ADNEXA:  No masses, non-tender    RECTAL:  Declined. No obvious external lesions  WET PREP:  Negative     Chaperone present for exam.    Assessment:     42 y.o. G0 with h/o abdominal myomectomy and bilateral salpingostomy and drainage of hydrosalpinx secondary to symptomatic uterine fibroids on 8/2/16:    1. Well woman gynecologic exam  2. Pelvic pain    Plan:    A gynecologic health assessment was performed with age appropriate counseling.    Cervical cancer screening - pap up to date.    STI screening - pt declined.    Screening mammogram ordered, pt advised to call to schedule.    We discussed her pelvic pain. Order pelvic u/s. No acute GYN findings on today's exam. NSAIDs prn. Pelvic precautions. Go to " ED if pelvic pain acute.    Encourage healthy lifestyle modifications, monthly self breast exams, Ca/Vit D.    Encourage tobacco cessation, pt not ready to quit, declined help.    F/u with PCP for health maintenance.    Pt was advised to call office to discuss f/u pending pelvic u/s results.  All questions answered, pt voiced understanding.        Hardy Bueno MD

## 2018-01-17 DIAGNOSIS — N89.8 VAGINAL DISCHARGE: ICD-10-CM

## 2018-01-17 RX ORDER — METRONIDAZOLE 500 MG/1
TABLET ORAL
Qty: 28 TABLET | Refills: 0 | Status: SHIPPED | OUTPATIENT
Start: 2018-01-17 | End: 2018-01-24 | Stop reason: ALTCHOICE

## 2018-01-19 ENCOUNTER — OFFICE VISIT (OUTPATIENT)
Dept: URGENT CARE | Facility: CLINIC | Age: 43
End: 2018-01-19
Payer: COMMERCIAL

## 2018-01-19 ENCOUNTER — HOSPITAL ENCOUNTER (EMERGENCY)
Facility: OTHER | Age: 43
Discharge: HOME | End: 2018-01-20
Attending: EMERGENCY MEDICINE
Payer: COMMERCIAL

## 2018-01-19 VITALS
BODY MASS INDEX: 25.39 KG/M2 | RESPIRATION RATE: 18 BRPM | TEMPERATURE: 99 F | WEIGHT: 158 LBS | SYSTOLIC BLOOD PRESSURE: 119 MMHG | HEIGHT: 66 IN | HEART RATE: 84 BPM | DIASTOLIC BLOOD PRESSURE: 80 MMHG | OXYGEN SATURATION: 97 %

## 2018-01-19 VITALS
SYSTOLIC BLOOD PRESSURE: 117 MMHG | HEART RATE: 80 BPM | HEIGHT: 66 IN | TEMPERATURE: 99 F | OXYGEN SATURATION: 99 % | DIASTOLIC BLOOD PRESSURE: 82 MMHG | WEIGHT: 158 LBS | BODY MASS INDEX: 25.39 KG/M2 | RESPIRATION RATE: 18 BRPM

## 2018-01-19 DIAGNOSIS — N89.8 VAGINAL DISCHARGE: ICD-10-CM

## 2018-01-19 DIAGNOSIS — N73.0 PID (ACUTE PELVIC INFLAMMATORY DISEASE): Primary | ICD-10-CM

## 2018-01-19 DIAGNOSIS — R10.9 ABDOMINAL PAIN, UNSPECIFIED ABDOMINAL LOCATION: Primary | ICD-10-CM

## 2018-01-19 LAB
B-HCG UR QL: NEGATIVE
B-HCG UR QL: NEGATIVE
BACTERIA GENITAL QL WET PREP: ABNORMAL
BILIRUB UR QL STRIP: NEGATIVE
CLUE CELLS VAG QL WET PREP: ABNORMAL
CTP QC/QA: YES
CTP QC/QA: YES
FILAMENT FUNGI VAG WET PREP-#/AREA: ABNORMAL
GLUCOSE UR QL STRIP: NEGATIVE
KETONES UR QL STRIP: NEGATIVE
LEUKOCYTE ESTERASE UR QL STRIP: POSITIVE
PH, POC UA: 6.5 (ref 5–8)
POC BLOOD, URINE: NEGATIVE
POC NITRATES, URINE: NEGATIVE
PROT UR QL STRIP: NEGATIVE
SP GR UR STRIP: 1.01 (ref 1–1.03)
SPECIMEN SOURCE: ABNORMAL
T VAGINALIS GENITAL QL WET PREP: ABNORMAL
UROBILINOGEN UR STRIP-ACNC: NORMAL (ref 0.1–1.1)
WBC #/AREA VAG WET PREP: ABNORMAL
YEAST GENITAL QL WET PREP: ABNORMAL

## 2018-01-19 PROCEDURE — 99285 EMERGENCY DEPT VISIT HI MDM: CPT | Mod: 25

## 2018-01-19 PROCEDURE — 99215 OFFICE O/P EST HI 40 MIN: CPT | Mod: 25,S$GLB,, | Performed by: EMERGENCY MEDICINE

## 2018-01-19 PROCEDURE — 81025 URINE PREGNANCY TEST: CPT | Mod: S$GLB,,, | Performed by: EMERGENCY MEDICINE

## 2018-01-19 PROCEDURE — 81003 URINALYSIS AUTO W/O SCOPE: CPT | Mod: QW,S$GLB,, | Performed by: EMERGENCY MEDICINE

## 2018-01-19 PROCEDURE — 81025 URINE PREGNANCY TEST: CPT | Performed by: EMERGENCY MEDICINE

## 2018-01-19 PROCEDURE — 87491 CHLMYD TRACH DNA AMP PROBE: CPT

## 2018-01-19 PROCEDURE — 25000003 PHARM REV CODE 250: Performed by: EMERGENCY MEDICINE

## 2018-01-19 PROCEDURE — 87210 SMEAR WET MOUNT SALINE/INK: CPT

## 2018-01-19 RX ORDER — NAPROXEN 500 MG/1
500 TABLET ORAL
Status: COMPLETED | OUTPATIENT
Start: 2018-01-19 | End: 2018-01-19

## 2018-01-19 RX ADMIN — NAPROXEN 500 MG: 500 TABLET ORAL at 09:01

## 2018-01-19 NOTE — PROGRESS NOTES
"Subjective:       Patient ID: Tasha Rush is a 42 y.o. female.    Vitals:  height is 5' 6" (1.676 m) and weight is 71.7 kg (158 lb). Her oral temperature is 98.9 °F (37.2 °C). Her blood pressure is 117/82 and her pulse is 80. Her respiration is 18 and oxygen saturation is 99%.     Chief Complaint: Abdominal Pain    Patient states she been having abdominal pain for the last 2 weeks. Actually this dates back to November when her GYN saw her.  Patient states she have vaginal discharge.She was seen here by me 1/9/18 and wanted flagyl because she said the other med the other GYN gave her didn't work. She didn't want a pelvic as it had already been done. I gave her the flagyl. She then saw Dr. Bueno and he documented normal pelvic and order US but she says she never took her clothes off. She has had uterine fibroid remove and salpino ostomy due to hydrosalpinx in the past. She still has her ovaries and tubes however. She did make the appt for the Us. She says slime comes out her vagina and now it is different from her BV before    Spoke with NICHO Portillo and discussed the case      Abdominal Pain   This is a new problem. The current episode started 1 to 4 weeks ago. The problem occurs constantly. The problem has been gradually worsening. The pain is located in the RLQ. The pain is at a severity of 8/10. The pain is moderate. The quality of the pain is cramping. The abdominal pain does not radiate. Associated symptoms include constipation. Pertinent negatives include no diarrhea, dysuria, fever, hematochezia, melena, nausea or vomiting. The pain is aggravated by urination. The pain is relieved by nothing. Treatments tried: ibuprofen. The treatment provided mild relief. There is no history of abdominal surgery, gallstones or GERD. Patient's medical history does not include kidney stones and UTI.     Review of Systems   Constitution: Negative for chills and fever.   Cardiovascular: Negative for chest pain.   Respiratory: " Negative for shortness of breath.    Musculoskeletal: Negative for back pain.   Gastrointestinal: Positive for bloating, abdominal pain and constipation. Negative for diarrhea, hematochezia, melena, nausea and vomiting.   Genitourinary: Positive for flank pain and pelvic pain. Negative for dysuria.       Objective:      Physical Exam   Constitutional: She is oriented to person, place, and time. She appears well-developed and well-nourished.   Moderate pain   HENT:   Head: Normocephalic and atraumatic.   Right Ear: External ear normal.   Left Ear: External ear normal.   Nose: Nose normal. No nasal deformity. No epistaxis.   Mouth/Throat: Oropharynx is clear and moist and mucous membranes are normal.   Eyes: Conjunctivae, EOM and lids are normal. Pupils are equal, round, and reactive to light.   Neck: Trachea normal, normal range of motion and phonation normal. Neck supple.   Cardiovascular: Normal rate, regular rhythm, normal heart sounds and normal pulses.    Pulmonary/Chest: Effort normal and breath sounds normal.   Abdominal: Soft. Normal appearance and bowel sounds are normal. She exhibits no distension and no mass. There is no tenderness. There is no CVA tenderness.   Genitourinary:   Genitourinary Comments: Copious yellow green vaginal discharge with mild CMT Firm mass felt on right side but that is not what she says hurts. She has more uterine tenderness. ? Fibroid. No LLQ tenderness   Neurological: She is alert and oriented to person, place, and time.   Skin: Skin is warm, dry and intact.   Psychiatric: She has a normal mood and affect. Her speech is normal and behavior is normal. Cognition and memory are normal.   Nursing note and vitals reviewed.      Assessment:       1. Abdominal pain, unspecified abdominal location    2. Vaginal discharge        Plan:         Abdominal pain, unspecified abdominal location  -     POCT Urinalysis, Dipstick, Automated, W/O Scope  -     POCT urine pregnancy  -     Refer to  Emergency Dept.    Vaginal discharge  -     Refer to Emergency Dept.  -     C. trachomatis/N. gonorrhoeae by AMP DNA Cervix  -     Vaginosis Screen by DNA Probe

## 2018-01-20 PROBLEM — N73.0 PID (ACUTE PELVIC INFLAMMATORY DISEASE): Status: ACTIVE | Noted: 2018-01-20

## 2018-01-20 LAB
BASOPHILS # BLD AUTO: 0.01 K/UL
BASOPHILS NFR BLD: 0.1 %
DIFFERENTIAL METHOD: ABNORMAL
EOSINOPHIL # BLD AUTO: 0 K/UL
EOSINOPHIL NFR BLD: 0.2 %
ERYTHROCYTE [DISTWIDTH] IN BLOOD BY AUTOMATED COUNT: 13.7 %
HCT VFR BLD AUTO: 33.5 %
HGB BLD-MCNC: 11.1 G/DL
LYMPHOCYTES # BLD AUTO: 3 K/UL
LYMPHOCYTES NFR BLD: 26.4 %
MCH RBC QN AUTO: 30.5 PG
MCHC RBC AUTO-ENTMCNC: 33.1 G/DL
MCV RBC AUTO: 92 FL
MONOCYTES # BLD AUTO: 0.8 K/UL
MONOCYTES NFR BLD: 6.9 %
NEUTROPHILS # BLD AUTO: 7.6 K/UL
NEUTROPHILS NFR BLD: 66.1 %
PLATELET # BLD AUTO: 333 K/UL
PMV BLD AUTO: 8.6 FL
RBC # BLD AUTO: 3.64 M/UL
WBC # BLD AUTO: 11.48 K/UL

## 2018-01-20 PROCEDURE — 96372 THER/PROPH/DIAG INJ SC/IM: CPT

## 2018-01-20 PROCEDURE — 25000003 PHARM REV CODE 250: Performed by: EMERGENCY MEDICINE

## 2018-01-20 PROCEDURE — 63600175 PHARM REV CODE 636 W HCPCS: Performed by: EMERGENCY MEDICINE

## 2018-01-20 PROCEDURE — 85025 COMPLETE CBC W/AUTO DIFF WBC: CPT

## 2018-01-20 RX ORDER — DOXYCYCLINE 100 MG/1
100 CAPSULE ORAL 2 TIMES DAILY
Qty: 28 CAPSULE | Refills: 0 | Status: SHIPPED | OUTPATIENT
Start: 2018-01-20 | End: 2018-02-03

## 2018-01-20 RX ORDER — METRONIDAZOLE 500 MG/1
500 TABLET ORAL
Status: COMPLETED | OUTPATIENT
Start: 2018-01-20 | End: 2018-01-20

## 2018-01-20 RX ORDER — METRONIDAZOLE 500 MG/1
500 TABLET ORAL 2 TIMES DAILY
Qty: 14 TABLET | Refills: 0 | Status: SHIPPED | OUTPATIENT
Start: 2018-01-20 | End: 2018-02-03

## 2018-01-20 RX ORDER — CEFTRIAXONE 250 MG/1
250 INJECTION, POWDER, FOR SOLUTION INTRAMUSCULAR; INTRAVENOUS
Status: COMPLETED | OUTPATIENT
Start: 2018-01-20 | End: 2018-01-20

## 2018-01-20 RX ORDER — DOXYCYCLINE HYCLATE 100 MG
100 TABLET ORAL
Status: COMPLETED | OUTPATIENT
Start: 2018-01-20 | End: 2018-01-20

## 2018-01-20 RX ADMIN — METRONIDAZOLE 500 MG: 500 TABLET ORAL at 01:01

## 2018-01-20 RX ADMIN — DOXYCYCLINE HYCLATE 100 MG: 100 TABLET, COATED ORAL at 01:01

## 2018-01-20 RX ADMIN — CEFTRIAXONE SODIUM 250 MG: 250 INJECTION, POWDER, FOR SOLUTION INTRAMUSCULAR; INTRAVENOUS at 01:01

## 2018-01-20 NOTE — HPI
This is a 42 y.o. female who presented to the [facility] ED from an urgent care facility where she was evaluated for pelvic pain. The urgent care facility sent her to the ED for concern of PID.  The patient complain of both abdominal cramping for the past couple of months as well as acute pelvic pain which has been present for the past couple weeks. The pelvic pain in localized to the right side but states that the pain is generalized at times per the patient.  She also complains of some vaginal discharge that has been chronic in nature.  She has been treated by her OBGYN on a number of occasion for presumed BV.  Of note she completed her last course yesterday.  She states that she is sexually active and denies dysuria, fever, chills, nausea, vomiting, blood in stool, vaginal bleeding, hematuria, frequency, or urgency.     The patient s/p abdominal myomectomy and bilateral salpingostomy and drainage of hydrosalpinx secondary to symptomatic uterine fibroids on 8/2/16. Patient also has a history of a LEEP.

## 2018-01-20 NOTE — HOSPITAL COURSE
- Arrived in the OB ED from Urgent Care facility for evaluation of possible PID.  - OBGYN Service Consulted

## 2018-01-20 NOTE — CONSULTS
[facility]  Obstetrics & Gynecology  Consult Note    Patient Name: Tasha Rush  MRN: [id number]  Admission Date: 2018  Hospital Length of Stay: 0 days  Code Status: Prior  Primary Care Provider: Brock Adams MD  Principal Problem: <principal problem not specified>    Inpatient consult to Obstetrics / Gynecology  Consult performed by: JANIS KENDRICK  Consult ordered by: FRANSISCO HUYNH  Reason for consult: Pelvic Pain  Assessment/Recommendations: See Consult Note        Subjective:     Chief Complaint: Pelvic Pain    History of Present Illness:  This is a 42 y.o. female who presented to the [facility] ED from an urgent care facility where she was evaluated for pelvic pain. The urgent care facility sent her to the ED for concern of PID.  The patient complain of both abdominal cramping for the past couple of months as well as acute pelvic pain which has been present for the past couple weeks. The pelvic pain in localized to the right side but states that the pain is generalized at times per the patient.  She also complains of some vaginal discharge that has been chronic in nature.  She has been treated by her OBGYN on a number of occasion for presumed BV.  Of note she completed her last course yesterday.  She states that she is sexually active and denies dysuria, fever, chills, nausea, vomiting, blood in stool, vaginal bleeding, hematuria, frequency, or urgency.     The patient s/p abdominal myomectomy and bilateral salpingostomy and drainage of hydrosalpinx secondary to symptomatic uterine fibroids on 16. Patient also has a history of a LEEP.         Obstetric History       T0      L0     SAB0   TAB0   Ectopic0   Multiple0   Live Births0       Obstetric Comments   Denies STD     Past Medical History:   Diagnosis Date    Abnormal Pap smear of cervix     Bacterial vaginosis     RECURRENT    Cervical dysplasia     Hypertension      Past Surgical History:   Procedure  "Laterality Date    CERVICAL BIOPSY  W/ LOOP ELECTRODE EXCISION      fibroid removal      HERNIA REPAIR  2009    umbilical    MYOMECTOMY           (Not in a hospital admission)    Review of patient's allergies indicates:   Allergen Reactions    Ace inhibitors Other (See Comments)     cough        Family History     Problem Relation (Age of Onset)    Colon cancer Paternal Uncle    Diabetes Father, Mother    Hyperlipidemia Father    No Known Problems Brother        Social History Main Topics    Smoking status: Current Every Day Smoker    Smokeless tobacco: Never Used      Comment: 1 pack / week    Alcohol use Yes      Comment: occasional    Drug use: No    Sexual activity: Not on file     Review of Systems   Constitutional: Negative for chills and fever.   Eyes: Negative for visual disturbance.   Respiratory: Negative for shortness of breath.    Cardiovascular: Negative for chest pain.   Gastrointestinal: Positive for abdominal pain (R>L, lower > upper). Negative for diarrhea, nausea and vomiting.   Genitourinary: Positive for pelvic pain ("Crampy in nature") and vaginal discharge. Negative for dysuria, hematuria and vaginal bleeding.   Skin:  Negative for rash.   Neurological: Negative for headaches.   Psychiatric/Behavioral: Negative.       Objective:     Vital Signs (Most Recent):  Temp: 99.1 °F (37.3 °C) (01/19/18 2149)  Pulse: 84 (01/19/18 2149)  Resp: 18 (01/19/18 2149)  BP: 119/80 (01/19/18 2149)  SpO2: 97 % (01/19/18 2149) Vital Signs (24h Range):  Temp:  [98.9 °F (37.2 °C)-99.3 °F (37.4 °C)] 99.1 °F (37.3 °C)  Pulse:  [80-91] 84  Resp:  [18] 18  SpO2:  [97 %-99 %] 97 %  BP: (117-138)/(80-96) 119/80     Weight: 71.7 kg (158 lb)  Body mass index is 25.5 kg/m².    Patient's last menstrual period was 01/02/2018 (exact date).    Physical Exam:   Constitutional: She is oriented to person, place, and time. She appears well-developed and well-nourished. No distress.    HENT:   Head: Normocephalic and " atraumatic.    Eyes: Conjunctivae are normal.    Neck: Neck supple.    Cardiovascular: Normal rate, regular rhythm and intact distal pulses.     Pulmonary/Chest: Effort normal. No respiratory distress.        Abdominal: She exhibits no distension and no abdominal incision. There is no tenderness. There is no rebound and no guarding.   Scars consistent with surgical history noted. No evidence of acute abdomen.  No rebound tenderness or guarding.      Genitourinary: Vagina normal.   Genitourinary Comments: SSE: Normal external female genitalia, normal urethral meatus, normal vaginal rugae, normal vaginal mucosa, no vaginal blood in canal, minimal yellow- tinged discahrge, no adnexal masses palpated on bimanual exam.  MInimal cervical motion tenderness. No significant clinical findings on bimanual exam apart from mild CMT.                  Neurological: She is alert and oriented to person, place, and time.    Skin: Skin is warm and dry. She is not diaphoretic.    Psychiatric: She has a normal mood and affect. Her behavior is normal. Judgment and thought content normal.       Laboratory:  CBC:   Recent Labs  Lab 01/20/18  0001   WBC 11.48   RBC 3.64*   HGB 11.1*   HCT 33.5*      MCV 92   MCH 30.5   MCHC 33.1       Diagnostic Results:  TVUS: Prominent dilated bilateral fallopian tubes with complex fluid and/or debris suggestive for bilateral hydrosalpinx or pyosalpinx.  Similar findings were seen on previous pelvic ultrasound from 10/2015.    GC/CT Cultures Collected    Assessment/Plan:     PID (acute pelvic inflammatory disease)    - Agree with Dr. Calderon's clinical plan to treat PID as an outpatient with Rocephin, Flagyl and Doxycycline with follow-up with her OBGYN  - No admission criteria identified  - Patient tolerating PO and is a good candidate for outpatient PO management  - Unimpressive bimanual exam. Minimal to mild cervical motion tenderness  - TVUS findings consistent with prior ultrasound examinations.  Low suspicion for TOA.  - UPT Neg  - No signs of systemic infection. VSS.   - Patient's history or complaints could be indicative of a chronic PID picture.             Thank you for your consult. I will sign off. Please contact us if you have any additional questions.    Kari Hurtado MD  Obstetrics & Gynecology  [facility]

## 2018-01-20 NOTE — ED TRIAGE NOTES
"Pt presents with pains to "side and stomach" pain, onset November 2017. Pt points to suprapubic abdomen to locate pain, described as cramping. Denies burning with urination. Pt seen by several providers and OBGGYN and eventually diagnosed with bacterial vaginosis. + vaginal discharge since November 2017. Pt reports white discharge changed to yellow slimy discharge 2 weeks ago pt reports urine is brown. pt completed several flagyl regiments and topical treatment for bacterial vaginosis, last flagyl yesterday without improvement. Pt reports hx of PID, uterine fibroids. Pt tearful during assessment.   "

## 2018-01-20 NOTE — ED PROVIDER NOTES
"Encounter Date: 1/19/2018    SCRIBE #1 NOTE: I, Rianna Back , am scribing for, and in the presence of, Dr. Calderon.       History     Chief Complaint   Patient presents with    Abdominal Pain     lower abdominal pain, and vaginal discharge.      Time seen by provider: 8:47 PM    This is a 42 y.o. female who presents with complaint of lower abdominal cramping that has intermittently occurred for two months. The non-radiating pain initially affected the right lower abdomen, but is currently present bilaterally. Vaginal discharge has also been present in some form for two months, but became "yellow and slimy" two weeks ago. Her gynecologist performed a pelvic exam shortly after onset of symptoms. After following up with several physicians, the patient was diagnosed with BV and given a cream that did not alleviate symptoms. She took multiple courses of Flagyl, and completed the last course yesterday. The patient is sexually active. She underwent a pelvic exam and was instructed to go to the ED when seen in Urgent Care today. She was previously diagnosed with PID approximately twenty years ago. The patient reports dysuria, but denies fever, chills, nausea, vomiting, blood in stool, vaginal bleeding, hematuria, frequency, or urgency.       The history is provided by the patient.     Review of patient's allergies indicates:   Allergen Reactions    Ace inhibitors Other (See Comments)     cough     Past Medical History:   Diagnosis Date    Abnormal Pap smear of cervix     Bacterial vaginosis     RECURRENT    Cervical dysplasia     Hypertension      Past Surgical History:   Procedure Laterality Date    CERVICAL BIOPSY  W/ LOOP ELECTRODE EXCISION      fibroid removal      HERNIA REPAIR  2009    umbilical    MYOMECTOMY       Family History   Problem Relation Age of Onset    Diabetes Father     Hyperlipidemia Father     Diabetes Mother     No Known Problems Brother     Colon cancer Paternal Uncle     Breast cancer " Neg Hx     Ovarian cancer Neg Hx      Social History   Substance Use Topics    Smoking status: Current Every Day Smoker    Smokeless tobacco: Never Used      Comment: 1 pack / week    Alcohol use Yes      Comment: occasional     Review of Systems   Constitutional: Negative for chills and fever.   HENT: Negative for congestion and sore throat.    Eyes: Negative for photophobia and redness.   Respiratory: Negative for cough and shortness of breath.    Cardiovascular: Negative for chest pain.   Gastrointestinal: Positive for abdominal pain. Negative for blood in stool, nausea and vomiting.   Genitourinary: Positive for dysuria and vaginal discharge. Negative for frequency, hematuria, urgency and vaginal bleeding.   Musculoskeletal: Negative for back pain.   Skin: Negative for rash.   Neurological: Negative for weakness, light-headedness and headaches.   Psychiatric/Behavioral: Negative for confusion.       Physical Exam     Initial Vitals [01/19/18 1906]   BP Pulse Resp Temp SpO2   (!) 138/96 91 18 99.3 °F (37.4 °C) 99 %      MAP       110         Physical Exam    Nursing note and vitals reviewed.  Constitutional: She appears well-developed and well-nourished. She is not diaphoretic. No distress.   HENT:   Head: Normocephalic and atraumatic.   Right Ear: External ear normal.   Left Ear: External ear normal.   Eyes: EOM are normal. Pupils are equal, round, and reactive to light. Right eye exhibits no discharge. Left eye exhibits no discharge.   Neck: Normal range of motion.   Cardiovascular: Normal rate, regular rhythm and normal heart sounds. Exam reveals no gallop and no friction rub.    No murmur heard.  Pulmonary/Chest: Breath sounds normal. No respiratory distress. She has no wheezes. She has no rhonchi. She has no rales.   Abdominal: Soft. There is tenderness. There is no rebound and no guarding.   Tenderness with palpation of the suprapubic region.   Genitourinary:   Genitourinary Comments: Significant right  adnexal tenderness. Mild cervical motion tenderness. Moderate amount of thick yellowish discharge.    Musculoskeletal: Normal range of motion. She exhibits no edema or tenderness.   Neurological: She is alert and oriented to person, place, and time.   Skin: Skin is warm and dry. No rash and no abscess noted. No erythema. No pallor.   Psychiatric: She has a normal mood and affect. Her behavior is normal. Judgment and thought content normal.         ED Course   Procedures  Labs Reviewed   VAGINAL SCREEN - Abnormal; Notable for the following:        Result Value    Clue Cells, Wet Prep Rare (*)     WBC - Vaginal Screen Few (*)     Bacteria - Vaginal Screen Few (*)     All other components within normal limits   CBC W/ AUTO DIFFERENTIAL - Abnormal; Notable for the following:     RBC 3.64 (*)     Hemoglobin 11.1 (*)     Hematocrit 33.5 (*)     MPV 8.6 (*)     All other components within normal limits   C. TRACHOMATIS/N. GONORRHOEAE BY AMP DNA   POCT URINE PREGNANCY     Imaging Results          US Pelvis Comp with Transvag NON-OB (xpd (Final result)  Result time 01/19/18 23:34:31    Final result by Ana Bello MD (01/19/18 23:34:31)                 Impression:      Prominent dilated bilateral fallopian tubes with complex fluid and/or debris suggestive for bilateral hydrosalpinx or pyosalpinx.  Similar findings were seen on previous pelvic ultrasound from 10/2015.      Electronically signed by: ANA BELLO MD  Date:     01/19/18  Time:    23:34              Narrative:    Time of Procedure: 01/19/18 21:44:13  Accession # [id number]    Reason for study: 42-year-old female with right-sided pelvic pain.     Comparison: 10/2015.    Technique: Pelvic ultrasound was performed using transvaginal and transabdominal approaches.    Findings: The uterus measures 10.1 x 5.2 x 5.3 cm. Uterine parenchyma is heterogenous in echotexture without evidence for masses. The endometrial echo complex is normal in thickness and measures 6 mm  with small amount of fluid seen within the endometrial canal.    The right ovary is measures 3.0 x 2.8 x 2.6 cm. The left ovary is enlarged and measures 7.2 x 4.0 x 3.4 cm. Arterial and venous flow are preserved bilaterally with resistive indices of 0.46 on the right and 0.37 on the left.  Prominent dilated bilateral fallopian tubes are visualized with complex fluid and/or debris.  No free fluid is seen.                                      Medical Decision Making:   Clinical Tests:   Lab Tests: Ordered and Reviewed  Radiological Study: Ordered and Reviewed  ED Management:  1:30 AM  Paged OB-GYN.    2:00 AM  Paged OB-GYN.    2:18 AM  Discussed and consulted case with OB-GYN.    2:39 AM  Spoke with OB-GYN, who requests a pelvic set up.    Additional MDM:   Comments: 42-year-old female sent from Urgent Care for evaluation of pelvic pain and vaginal discharge which has been ongoing for approximately 2 months.  On exam she had right adnexal tenderness to palpation with minimal CMT and moderate amount of yellow/whitish colored discharge.  Wet prep was positive for clue cells only.  However, given her pelvic exam she will be treated for PID with Rocephin IM, doxycycline ×14 days, and Flagyl ×14 days.  A pelvic ultrasound also obtained for further evaluation and significant for bilateral hydrosalpinx versus pyosalpinx which appeared to be able compared to her ultrasound from 2015.  I did consult GYN for further recommendations given the ultrasound findings.  They did review the ultrasound as well as evaluated the patient in the emergency department and agreed with the initial plan to treat her for PID as an outpatient and have her follow-up with her GYN for reevaluation.  She was discharged home in stable condition with instructions to follow-up with her Gyn in 5-7 days.  .          Scribe Attestation:   Scribe #1: I performed the above scribed service and the documentation accurately describes the services I performed. I  attest to the accuracy of the note.    Attending Attestation:           Physician Attestation for Scribe:  Physician Attestation Statement for Scribe #1: I, Dr. Calderon, reviewed documentation, as scribed by Rianna Back  in my presence, and it is both accurate and complete.                 ED Course      Clinical Impression:     1. PID (acute pelvic inflammatory disease)                                 Evelyn Calderon MD  01/20/18 0558

## 2018-01-20 NOTE — ASSESSMENT & PLAN NOTE
- Agree with Dr. Calderon's clinical plan to treat PID as an outpatient with Rocephin, Flagyl and Doxycycline with follow-up with her OBGYN  - No admission criteria identified  - Patient tolerating PO and is a good candidate for outpatient PO management  - Unimpressive bimanual exam. Minimal to mild cervical motion tenderness  - TVUS findings consistent with prior ultrasound examinations. Low suspicion for TOA.  - UPT Neg  - No signs of systemic infection. VSS.   - Patient's history or complaints could be indicative of a chronic PID picture.

## 2018-01-20 NOTE — ED NOTES
Pt rounding complete.  Pt awake lying in bed.  Patient denies pain at this time.  Restroom and comfort needs addressed.  Pt updated on plan of care.  Call light within reach.  Will continue to monitor.

## 2018-01-20 NOTE — PATIENT INSTRUCTIONS
Go directly to [facility] emergency room to be evaluated for the vaginal discharge and pelvic pain

## 2018-01-20 NOTE — SUBJECTIVE & OBJECTIVE
"    Obstetric History       T0      L0     SAB0   TAB0   Ectopic0   Multiple0   Live Births0       Obstetric Comments   Denies STD     Past Medical History:   Diagnosis Date    Abnormal Pap smear of cervix     Bacterial vaginosis     RECURRENT    Cervical dysplasia     Hypertension      Past Surgical History:   Procedure Laterality Date    CERVICAL BIOPSY  W/ LOOP ELECTRODE EXCISION      fibroid removal      HERNIA REPAIR  2009    umbilical    MYOMECTOMY           (Not in a hospital admission)    Review of patient's allergies indicates:   Allergen Reactions    Ace inhibitors Other (See Comments)     cough        Family History     Problem Relation (Age of Onset)    Colon cancer Paternal Uncle    Diabetes Father, Mother    Hyperlipidemia Father    No Known Problems Brother        Social History Main Topics    Smoking status: Current Every Day Smoker    Smokeless tobacco: Never Used      Comment: 1 pack / week    Alcohol use Yes      Comment: occasional    Drug use: No    Sexual activity: Not on file     Review of Systems   Constitutional: Negative for chills and fever.   Eyes: Negative for visual disturbance.   Respiratory: Negative for shortness of breath.    Cardiovascular: Negative for chest pain.   Gastrointestinal: Positive for abdominal pain (R>L, lower > upper). Negative for diarrhea, nausea and vomiting.   Genitourinary: Positive for pelvic pain ("Crampy in nature") and vaginal discharge. Negative for dysuria, hematuria and vaginal bleeding.   Skin:  Negative for rash.   Neurological: Negative for headaches.   Psychiatric/Behavioral: Negative.       Objective:     Vital Signs (Most Recent):  Temp: 99.1 °F (37.3 °C) (18)  Pulse: 84 (18)  Resp: 18 (18)  BP: 119/80 (18)  SpO2: 97 % (18) Vital Signs (24h Range):  Temp:  [98.9 °F (37.2 °C)-99.3 °F (37.4 °C)] 99.1 °F (37.3 °C)  Pulse:  [80-91] 84  Resp:  [18] 18  SpO2:  [97 %-99 %] " 97 %  BP: (117-138)/(80-96) 119/80     Weight: 71.7 kg (158 lb)  Body mass index is 25.5 kg/m².    Patient's last menstrual period was 01/02/2018 (exact date).    Physical Exam:   Constitutional: She is oriented to person, place, and time. She appears well-developed and well-nourished. No distress.    HENT:   Head: Normocephalic and atraumatic.    Eyes: Conjunctivae are normal.    Neck: Neck supple.    Cardiovascular: Normal rate, regular rhythm and intact distal pulses.     Pulmonary/Chest: Effort normal. No respiratory distress.        Abdominal: She exhibits no distension and no abdominal incision. There is no tenderness. There is no rebound and no guarding.   Scars consistent with surgical history noted. No evidence of acute abdomen.  No rebound tenderness or guarding.      Genitourinary: Vagina normal.   Genitourinary Comments: SSE: Normal external female genitalia, normal urethral meatus, normal vaginal rugae, normal vaginal mucosa, no vaginal blood in canal, minimal yellow- tinged discahrge, no adnexal masses palpated on bimanual exam.  MInimal cervical motion tenderness. Fairly unimpressive vaginal exam.                  Neurological: She is alert and oriented to person, place, and time.    Skin: Skin is warm and dry. She is not diaphoretic.    Psychiatric: She has a normal mood and affect. Her behavior is normal. Judgment and thought content normal.       Laboratory:  CBC:   Recent Labs  Lab 01/20/18  0001   WBC 11.48   RBC 3.64*   HGB 11.1*   HCT 33.5*      MCV 92   MCH 30.5   MCHC 33.1       Diagnostic Results:  TVUS: Prominent dilated bilateral fallopian tubes with complex fluid and/or debris suggestive for bilateral hydrosalpinx or pyosalpinx.  Similar findings were seen on previous pelvic ultrasound from 10/2015.    GC/CT Cultures Collected

## 2018-01-20 NOTE — ED NOTES
NEURO: Pt AAO x 4. Behavior and speech appropriate to situation.   CARDIAC: Regular rhythm auscultated  RESPIRATORY: Respirations even and unlabored.   MUSCULOSKELETAL: Active ROM noted to extremities  GASTRO: abdomen soft. Tender to palpation to suprapubic abdomen.

## 2018-01-21 ENCOUNTER — HOSPITAL ENCOUNTER (EMERGENCY)
Facility: OTHER | Age: 43
Discharge: HOME | End: 2018-01-21
Attending: EMERGENCY MEDICINE
Payer: COMMERCIAL

## 2018-01-21 VITALS
RESPIRATION RATE: 20 BRPM | OXYGEN SATURATION: 97 % | HEIGHT: 66 IN | TEMPERATURE: 98 F | WEIGHT: 158 LBS | SYSTOLIC BLOOD PRESSURE: 137 MMHG | HEART RATE: 96 BPM | DIASTOLIC BLOOD PRESSURE: 77 MMHG | BODY MASS INDEX: 25.39 KG/M2

## 2018-01-21 DIAGNOSIS — N73.0 PID (ACUTE PELVIC INFLAMMATORY DISEASE): ICD-10-CM

## 2018-01-21 DIAGNOSIS — R10.84 GENERALIZED ABDOMINAL PAIN: Primary | ICD-10-CM

## 2018-01-21 LAB
ALBUMIN SERPL BCP-MCNC: 3.3 G/DL
ALP SERPL-CCNC: 69 U/L
ALT SERPL W/O P-5'-P-CCNC: 12 U/L
ANION GAP SERPL CALC-SCNC: 12 MMOL/L
AST SERPL-CCNC: 16 U/L
B-HCG UR QL: NEGATIVE
BACTERIA #/AREA URNS HPF: ABNORMAL /HPF
BASOPHILS # BLD AUTO: 0 K/UL
BASOPHILS NFR BLD: 0 %
BILIRUB SERPL-MCNC: 0.6 MG/DL
BILIRUB UR QL STRIP: ABNORMAL
BUN SERPL-MCNC: 11 MG/DL
C TRACH DNA SPEC QL NAA+PROBE: NOT DETECTED
CALCIUM SERPL-MCNC: 9.4 MG/DL
CHLORIDE SERPL-SCNC: 109 MMOL/L
CLARITY UR: ABNORMAL
CO2 SERPL-SCNC: 20 MMOL/L
COLOR UR: ABNORMAL
CREAT SERPL-MCNC: 0.7 MG/DL
CTP QC/QA: YES
DIFFERENTIAL METHOD: ABNORMAL
EOSINOPHIL # BLD AUTO: 0 K/UL
EOSINOPHIL NFR BLD: 0.3 %
ERYTHROCYTE [DISTWIDTH] IN BLOOD BY AUTOMATED COUNT: 13.7 %
EST. GFR  (AFRICAN AMERICAN): >60 ML/MIN/1.73 M^2
EST. GFR  (NON AFRICAN AMERICAN): >60 ML/MIN/1.73 M^2
GLUCOSE SERPL-MCNC: 106 MG/DL
GLUCOSE UR QL STRIP: NEGATIVE
HCT VFR BLD AUTO: 39.3 %
HGB BLD-MCNC: 13.1 G/DL
HGB UR QL STRIP: ABNORMAL
HYALINE CASTS #/AREA URNS LPF: 0 /LPF
KETONES UR QL STRIP: NEGATIVE
LEUKOCYTE ESTERASE UR QL STRIP: ABNORMAL
LIPASE SERPL-CCNC: 13 U/L
LYMPHOCYTES # BLD AUTO: 2.5 K/UL
LYMPHOCYTES NFR BLD: 21.3 %
MCH RBC QN AUTO: 30.8 PG
MCHC RBC AUTO-ENTMCNC: 33.3 G/DL
MCV RBC AUTO: 93 FL
MICROSCOPIC COMMENT: ABNORMAL
MONOCYTES # BLD AUTO: 0.5 K/UL
MONOCYTES NFR BLD: 4.1 %
N GONORRHOEA DNA SPEC QL NAA+PROBE: NOT DETECTED
NEUTROPHILS # BLD AUTO: 8.5 K/UL
NEUTROPHILS NFR BLD: 74 %
NITRITE UR QL STRIP: NEGATIVE
PH UR STRIP: 8 [PH] (ref 5–8)
PLATELET # BLD AUTO: 396 K/UL
PMV BLD AUTO: 9.1 FL
POTASSIUM SERPL-SCNC: 3.3 MMOL/L
PROT SERPL-MCNC: 7.1 G/DL
PROT UR QL STRIP: ABNORMAL
RBC # BLD AUTO: 4.25 M/UL
RBC #/AREA URNS HPF: >100 /HPF (ref 0–4)
SODIUM SERPL-SCNC: 141 MMOL/L
SP GR UR STRIP: 1.02 (ref 1–1.03)
SQUAMOUS #/AREA URNS HPF: 5 /HPF
URN SPEC COLLECT METH UR: ABNORMAL
UROBILINOGEN UR STRIP-ACNC: ABNORMAL EU/DL
WBC # BLD AUTO: 11.5 K/UL
WBC #/AREA URNS HPF: >100 /HPF (ref 0–5)

## 2018-01-21 PROCEDURE — 83690 ASSAY OF LIPASE: CPT

## 2018-01-21 PROCEDURE — S0028 INJECTION, FAMOTIDINE, 20 MG: HCPCS | Performed by: PHYSICIAN ASSISTANT

## 2018-01-21 PROCEDURE — 99284 EMERGENCY DEPT VISIT MOD MDM: CPT | Mod: 25

## 2018-01-21 PROCEDURE — 80053 COMPREHEN METABOLIC PANEL: CPT

## 2018-01-21 PROCEDURE — 96374 THER/PROPH/DIAG INJ IV PUSH: CPT

## 2018-01-21 PROCEDURE — 85025 COMPLETE CBC W/AUTO DIFF WBC: CPT

## 2018-01-21 PROCEDURE — 87086 URINE CULTURE/COLONY COUNT: CPT

## 2018-01-21 PROCEDURE — 25000003 PHARM REV CODE 250: Performed by: PHYSICIAN ASSISTANT

## 2018-01-21 PROCEDURE — 63600175 PHARM REV CODE 636 W HCPCS: Performed by: PHYSICIAN ASSISTANT

## 2018-01-21 PROCEDURE — 96375 TX/PRO/DX INJ NEW DRUG ADDON: CPT

## 2018-01-21 PROCEDURE — 81000 URINALYSIS NONAUTO W/SCOPE: CPT

## 2018-01-21 PROCEDURE — 81025 URINE PREGNANCY TEST: CPT | Performed by: EMERGENCY MEDICINE

## 2018-01-21 PROCEDURE — 96361 HYDRATE IV INFUSION ADD-ON: CPT

## 2018-01-21 RX ORDER — ONDANSETRON 2 MG/ML
4 INJECTION INTRAMUSCULAR; INTRAVENOUS
Status: COMPLETED | OUTPATIENT
Start: 2018-01-21 | End: 2018-01-21

## 2018-01-21 RX ORDER — KETOROLAC TROMETHAMINE 30 MG/ML
10 INJECTION, SOLUTION INTRAMUSCULAR; INTRAVENOUS
Status: COMPLETED | OUTPATIENT
Start: 2018-01-21 | End: 2018-01-21

## 2018-01-21 RX ORDER — DICYCLOMINE HYDROCHLORIDE 20 MG/1
20 TABLET ORAL 2 TIMES DAILY
Qty: 20 TABLET | Refills: 0 | Status: SHIPPED | OUTPATIENT
Start: 2018-01-21 | End: 2018-02-20

## 2018-01-21 RX ORDER — FAMOTIDINE 20 MG/1
20 TABLET, FILM COATED ORAL 2 TIMES DAILY
Qty: 20 TABLET | Refills: 0 | Status: SHIPPED | OUTPATIENT
Start: 2018-01-21 | End: 2018-07-03 | Stop reason: ALTCHOICE

## 2018-01-21 RX ORDER — FAMOTIDINE 10 MG/ML
20 INJECTION INTRAVENOUS
Status: COMPLETED | OUTPATIENT
Start: 2018-01-21 | End: 2018-01-21

## 2018-01-21 RX ORDER — NAPROXEN 500 MG/1
500 TABLET ORAL 2 TIMES DAILY WITH MEALS
Qty: 20 TABLET | Refills: 0 | Status: SHIPPED | OUTPATIENT
Start: 2018-01-21 | End: 2018-07-03

## 2018-01-21 RX ADMIN — SODIUM CHLORIDE 1000 ML: 0.9 INJECTION, SOLUTION INTRAVENOUS at 06:01

## 2018-01-21 RX ADMIN — ONDANSETRON 4 MG: 2 INJECTION INTRAMUSCULAR; INTRAVENOUS at 06:01

## 2018-01-21 RX ADMIN — KETOROLAC TROMETHAMINE 10 MG: 30 INJECTION, SOLUTION INTRAMUSCULAR at 06:01

## 2018-01-21 RX ADMIN — FAMOTIDINE 20 MG: 10 INJECTION, SOLUTION INTRAVENOUS at 06:01

## 2018-01-21 NOTE — ED PROVIDER NOTES
"Encounter Date: 1/21/2018       History     Chief Complaint   Patient presents with    Abdominal Pain     pt c/o upper and lower abdominal pain that started about 1 hour ago     Patient is a 42-year-old female presenting with complaints of severe abdominal pain.  The patient reports her symptoms started abruptly while she was at work earlier today.  She states the pain is "everywhere".  She states that she was recently seen and diagnosed with PID after she had lower abdominal pain and cramping.  She states that today after eating seafood she developed epigastric abdominal pain.  She states that all of her pain is now worsened.  She rates at a 10/10.  She reports associated nausea but denies vomiting.  She denies fever or chills.  She states she's been taking the prescribed antibiotics.  She denies further vaginal discharge.      The history is provided by the patient.     Review of patient's allergies indicates:   Allergen Reactions    Ace inhibitors Other (See Comments)     cough     Past Medical History:   Diagnosis Date    Abnormal Pap smear of cervix     Bacterial vaginosis     RECURRENT    Cervical dysplasia     Hypertension      Past Surgical History:   Procedure Laterality Date    CERVICAL BIOPSY  W/ LOOP ELECTRODE EXCISION      fibroid removal      HERNIA REPAIR  2009    umbilical    MYOMECTOMY       Family History   Problem Relation Age of Onset    Diabetes Father     Hyperlipidemia Father     Diabetes Mother     No Known Problems Brother     Colon cancer Paternal Uncle     Breast cancer Neg Hx     Ovarian cancer Neg Hx      Social History   Substance Use Topics    Smoking status: Current Every Day Smoker    Smokeless tobacco: Never Used      Comment: 1 pack / week    Alcohol use Yes      Comment: occasional     Review of Systems   Constitutional: Negative for activity change, appetite change, chills, fatigue and fever.   HENT: Negative for congestion, rhinorrhea and sore throat.  "   Eyes: Negative for photophobia and visual disturbance.   Respiratory: Negative for cough, shortness of breath and wheezing.    Cardiovascular: Negative for chest pain.   Gastrointestinal: Positive for abdominal pain and nausea. Negative for constipation, diarrhea and vomiting.   Genitourinary: Negative for dysuria, hematuria and urgency.   Musculoskeletal: Negative for back pain, myalgias and neck pain.   Skin: Negative for color change and wound.   Neurological: Negative for weakness and headaches.   Psychiatric/Behavioral: Negative for agitation and confusion.       Physical Exam     Initial Vitals [01/21/18 1658]   BP Pulse Resp Temp SpO2   (!) 141/93 104 20 98.3 °F (36.8 °C) 98 %      MAP       109         Physical Exam    Nursing note and vitals reviewed.  Constitutional: She appears well-developed and well-nourished. She is not diaphoretic. She is cooperative.  Non-toxic appearance. She does not have a sickly appearance. No distress.   Uncomfortable appearing -American female accompanied by her mother.  She speaking in clear and full sentences.  She is in no acute distress.   HENT:   Head: Normocephalic and atraumatic.   Right Ear: External ear normal.   Left Ear: External ear normal.   Nose: Nose normal.   Mouth/Throat: Oropharynx is clear and moist.   Eyes: Conjunctivae and EOM are normal.   Neck: Normal range of motion. Neck supple.   Cardiovascular: Regular rhythm and normal heart sounds. Tachycardia present.    Pulmonary/Chest: Breath sounds normal. No respiratory distress. She has no wheezes.   Abdominal: Soft. Bowel sounds are normal. She exhibits no distension. There is tenderness. There is no rebound and no guarding.   Diffuse tenderness to palpation of the abdomen with voluntary guarding noted.  No significant tenderness in the epigastric region.  Well-healed surgical scars noted.    Musculoskeletal: Normal range of motion.   Neurological: She is alert and oriented to person, place, and  time. GCS eye subscore is 4. GCS verbal subscore is 5. GCS motor subscore is 6.   Skin: Skin is warm.   Psychiatric: She has a normal mood and affect. Her behavior is normal. Judgment and thought content normal.         ED Course   Procedures  Labs Reviewed   URINALYSIS - Abnormal; Notable for the following:        Result Value    Color, UA Brown (*)     Appearance, UA Cloudy (*)     Protein, UA 1+ (*)     Bilirubin (UA) 1+ (*)     Occult Blood UA 3+ (*)     Urobilinogen, UA 2.0-3.0 (*)     Leukocytes, UA 3+ (*)     All other components within normal limits   CBC W/ AUTO DIFFERENTIAL - Abnormal; Notable for the following:     Platelets 396 (*)     MPV 9.1 (*)     Gran # 8.5 (*)     Gran% 74.0 (*)     All other components within normal limits   COMPREHENSIVE METABOLIC PANEL - Abnormal; Notable for the following:     Potassium 3.3 (*)     CO2 20 (*)     Albumin 3.3 (*)     All other components within normal limits    Narrative:     Recoll. [id number] by SD at 01/21/2018 18:31, reason: Specimen   hemolyzed, called Guicho Sanches RN   URINALYSIS MICROSCOPIC - Abnormal; Notable for the following:     RBC, UA >100 (*)     WBC, UA >100 (*)     Bacteria, UA Many (*)     All other components within normal limits   CULTURE, URINE   LIPASE    Narrative:     Recoll. [id number] by SD at 01/21/2018 18:31, reason: Specimen   hemolyzed, called Guicho Sanches RN   POCT URINE PREGNANCY        Imaging Results          CT Renal Stone Study ABD Pelvis WO (Final result)  Result time 01/21/18 20:29:29    Final result by Ana Bello MD (01/21/18 20:29:29)                 Impression:      1.  Dilated tubular structures within the bilateral adnexal regions suggestive for bilateral hydrosalpinx or pyosalpinx as seen on recent pelvic ultrasound from 1/19/18.    2.  Small nonobstructing left renal stone.        Electronically signed by: ANA BELLO MD  Date:     01/21/18  Time:    20:29              Narrative:    Clinical indication:  42-year-old female with abdominal pain.    Comparison: Pelvic ultrasound 1/19/18.    Technique: Transaxial images were obtained through the abdomen and pelvis in 5 mm increments without the use of p.o. or IV contrast.    Findings:  The visualized portion of the heart is unremarkable.  The lung bases are clear.    No focal hepatic abnormalities identified on this noncontrast exam.  There is no intra-or extrahepatic biliary ductal dilatation.  The gallbladder is unremarkable.  The stomach, pancreas, spleen, and adrenal glands are unremarkable.    Small 2 mm nonobstructing stone is visualized within the left kidney.  No additional stones are seen.  No evidence of hydronephrosis.  Ureters are unremarkable along their courses.  Urinary bladder is unremarkable.      Uterus is prominent with small nonspecific focal hypodensity seen.  Dilated bilateral adnexal tubular structures are visualized which correspond with abnormalities seen on recent ultrasound.  Trace free fluid is seen within the pelvis.    Appendix has been removed.  The visualized loops of small and large bowel show no evidence of obstruction or inflammation.  No free air identified.    Aorta tapers normally.     Degenerative changes and sclerosis are visualized involving the bilateral sacroiliac joints.  No acute osseous abnormality identified.  Subcutaneous soft tissue structures are unremarkable.                                Medical Decision Making:   History:   Old Medical Records: I decided to obtain old medical records.  Old Records Summarized: other records.       <> Summary of Records: Reviewed medical record in Epic including recent ED visit from 1/19/18.   Initial Assessment:   Urgent evaluation of a 42 y.o. female presenting to the emergency department complaining of abdominal pain, nausea. Patient is afebrile, nontoxic appearing and hemodynamically stable. Diffuse abdominal pain with voluntary guarding noted. Will plan for labs, fluids.      Clinical Tests:   Lab Tests: Ordered and Reviewed  The following lab test(s) were unremarkable: CBC, CMP, Lipase, UPT and Urinalysis  ED Management:  CBC shows no leukocytosis, normal H&H.  CMP is unremarkable.  UA shows significant RBC and WBC, will plan to continue his CT scan.  CT scan shows no significant new findings.  The patient does have WBC in her urine, this may be secondary to reactive cystitis.  Patient's currently been treated with Rocephin, doxycycline, and Flagyl.  Will hold off on starting another antibiotic until urine culture results.  On reassessment, patient reports full relief of her pain.  Patient counseled extensively on symptomatic care and treatment.  She'll be discharged home with a prescription for naproxen, pepcid, and Bentyl.  She is counseled on symptomatic care and treatment.  Stable for discharge home. The patient was instructed to follow up with a primary care provider in 2 days or to return to the emergency department for worsening symptoms. The treatment plan was discussed with the patient who demonstrated understanding and comfort with plan. The patient's history, physical exam, and plan of care was discussed with and agreed upon with my supervising physician.    Other:   I have discussed this case with another health care provider.       <> Summary of the Discussion: Dr. Ayala  This note was created using Dragon Medical Dictation. There may be typographical errors secondary to dictation.                    ED Course      Clinical Impression:     1. Generalized abdominal pain    2. PID (acute pelvic inflammatory disease)       Disposition:   Disposition: Discharged  Condition: Stable                        Sasha Miller PA-C  01/21/18 6353

## 2018-01-22 ENCOUNTER — TELEPHONE (OUTPATIENT)
Dept: OBSTETRICS AND GYNECOLOGY | Facility: CLINIC | Age: 43
End: 2018-01-22

## 2018-01-22 NOTE — ED NOTES
Pt presents  to ED with c/o generalized abdominal pain x today since approx. 1600. Pt reporting past med hx of uterine fibroids. Pt is yelling out, moaning in pain. Pt in multiple positions, yelling out in pain. Answers some questions but is repeatedly moaning in pain. Pt with non distended abdomen. Denies vomiting or nausea, recent fever, chills. Is currently taking flagyl for vaginal bacterial infection. Pt AAOx4 and appropriate at this time. Respirations even and unlabored. No acute distress noted.     Appearance: Pt awake, alert & oriented to person, place & time. Pt in no acute distress at present time. Pt writhing around in room.  SEE HPI.   Skin: Skin warm, dry & intact. Color consistent with ethnicity. Mucous membranes moist. No breakdown or brusing noted.   Musculoskeletal: Patient moving all extremities well, no obvious swelling or deformities noted.   Respiratory: Respirations spontaneous, even, and non-labored. Visible chest rise noted. Airway is open and patent. No accessory muscle use noted.   Neurologic: Sensation is intact. Speech is clear and appropriate. Eyes open spontaneously, behavior appropriate to situation, follows commands.   Cardiac: No Bilateral lower extremity edema. Cap refill is <3 seconds. Pt denies active chest pains, SOB, dizziness, blurred vision, weakness or fatigue at this time.   Abdomen: Abdomen nondistended. No N/V/D at this time.   : Pt currently taking flagyl PO for bacterial vaginal infection.

## 2018-01-22 NOTE — TELEPHONE ENCOUNTER
Spoke with patient concerning abdominal pain. Informed pt that she has an appt with Dr. Ximena Foster. Pt. Stated that if pain gets too bad, she's just going to go to the Er. CW

## 2018-01-22 NOTE — ED NOTES
Rounding has been complete. Pt can't sit still due to pain 10/10. PA made aware. Pt on continuous BP cuff and continuous pulse ox. Call bell within reach. Will continue to monitor.

## 2018-01-23 LAB — BACTERIA UR CULT: NORMAL

## 2018-01-24 ENCOUNTER — TELEPHONE (OUTPATIENT)
Dept: URGENT CARE | Facility: CLINIC | Age: 43
End: 2018-01-24

## 2018-01-24 ENCOUNTER — OFFICE VISIT (OUTPATIENT)
Dept: OBSTETRICS AND GYNECOLOGY | Facility: CLINIC | Age: 43
End: 2018-01-24
Payer: COMMERCIAL

## 2018-01-24 VITALS
SYSTOLIC BLOOD PRESSURE: 130 MMHG | BODY MASS INDEX: 25.51 KG/M2 | WEIGHT: 158.75 LBS | DIASTOLIC BLOOD PRESSURE: 82 MMHG | HEIGHT: 66 IN

## 2018-01-24 DIAGNOSIS — R10.2 PELVIC PAIN IN FEMALE: Primary | ICD-10-CM

## 2018-01-24 LAB
C TRACH RRNA SPEC QL NAA+PROBE: NEGATIVE
CANDIDA RRNA VAG QL PROBE: NEGATIVE
G VAGINALIS RRNA GENITAL QL PROBE: POSITIVE
N GONORRHOEA RRNA SPEC QL NAA+PROBE: NEGATIVE
T VAGINALIS RRNA GENITAL QL PROBE: NEGATIVE

## 2018-01-24 PROCEDURE — 99213 OFFICE O/P EST LOW 20 MIN: CPT | Mod: S$GLB,,, | Performed by: OBSTETRICS & GYNECOLOGY

## 2018-01-24 PROCEDURE — 3008F BODY MASS INDEX DOCD: CPT | Mod: S$GLB,,, | Performed by: OBSTETRICS & GYNECOLOGY

## 2018-01-24 PROCEDURE — 99999 PR PBB SHADOW E&M-EST. PATIENT-LVL III: CPT | Mod: PBBFAC,,, | Performed by: OBSTETRICS & GYNECOLOGY

## 2018-01-24 RX ORDER — IBUPROFEN 600 MG/1
600 TABLET ORAL EVERY 6 HOURS PRN
Qty: 60 TABLET | Refills: 2 | Status: SHIPPED | OUTPATIENT
Start: 2018-01-24 | End: 2018-08-20

## 2018-01-24 RX ORDER — TRAMADOL HYDROCHLORIDE 50 MG/1
50 TABLET ORAL EVERY 6 HOURS PRN
Qty: 30 TABLET | Refills: 0 | Status: SHIPPED | OUTPATIENT
Start: 2018-01-24 | End: 2018-01-31

## 2018-01-24 NOTE — TELEPHONE ENCOUNTER
Spoke with pt. She is doing better since her visit to the ER when I transferred her last visit. Her STD test was neg but she still has BV. She is felling better with less vaginal discharge. She does have hydrosalpinx and when I called she was in Dr. Meza office for her followup GYN appt.

## 2018-01-26 ENCOUNTER — TELEPHONE (OUTPATIENT)
Dept: OBSTETRICS AND GYNECOLOGY | Facility: CLINIC | Age: 43
End: 2018-01-26

## 2018-01-26 NOTE — TELEPHONE ENCOUNTER
Spoke to pt and she stated she is ready to move forward with her surgery. She said she is in a lot of pain and the tramadol is not working. Informed her I will sent the message to Dr. Bueno to schedule. kt

## 2018-01-26 NOTE — TELEPHONE ENCOUNTER
----- Message from Monica Miranda sent at 1/26/2018  8:25 AM CST -----  Contact: self  Pt would like to schedule surgery.  [phone].

## 2018-01-30 DIAGNOSIS — N70.11 HYDROSALPINX: Primary | ICD-10-CM

## 2018-01-30 DIAGNOSIS — R10.2 CHRONIC PELVIC PAIN IN FEMALE: ICD-10-CM

## 2018-01-30 DIAGNOSIS — G89.29 CHRONIC PELVIC PAIN IN FEMALE: ICD-10-CM

## 2018-01-30 PROBLEM — N73.0 PID (ACUTE PELVIC INFLAMMATORY DISEASE): Status: RESOLVED | Noted: 2018-01-20 | Resolved: 2018-01-30

## 2018-01-31 ENCOUNTER — TELEPHONE (OUTPATIENT)
Dept: OBSTETRICS AND GYNECOLOGY | Facility: CLINIC | Age: 43
End: 2018-01-31

## 2018-01-31 NOTE — PROGRESS NOTES
"[facility]  Ambulatory Clinic  Obstetrics & Gynecology    Visit Date:  1/24/2018    Chief Complaint:  F/u pelvic pain    History of Present Illness:      Tasha Rush is a 42 y.o. G0 here for f/u pelvic pain.  Pt is specifically requesting hysterectomy due to recurrent hydrosalpinx.  Pt states I can not live this pain or deal with this problem in the future.   Pt states "I'm sure, I dont' have any wish for future pregnancy".  Pain is constant, dull and crampy,no particular aggravating factors, some relief pain medications.  Pt has h/o abdominal myomectomy and bilateral salpingostomy and drainage of hydrosalpinx secondary to symptomatic uterine fibroids on 8/2/16.  Pt denies abnormal vaginal bleeding, vaginal discharge, dysmenorrhea, bloating, early satiety, unintentional weight loss, breast mass/skin changes, incontinence, GI or urinary complaints.      Review of Systems:      GENERAL:  No fever, fatigue, excessive weight gain or loss  HEENT:  No headaches, hearing changes, visual disturbance  RESPIRATORY:  No cough, shortness of breath  CARDIOVASCULAR:  No chest pain, heart palpitations, leg swelling  BREAST:  No lump, pain, nipple discharge, skin changes  GASTROINTESTINAL:  No nausea, vomiting, constipation, diarrhea, abd pain, rectal bleeding   GENITOURINARY:  See HPI    Physical Exam:     /82 (BP Location: Left arm, Patient Position: Sitting, BP Method: Large (Manual))   Ht 5' 6" (1.676 m)   Wt 72 kg (158 lb 11.7 oz)   LMP 01/22/2018 (Approximate)   BMI 25.62 kg/m²   Pulse 72, Resp rate 18  Patient's last menstrual period was 01/22/2018 (approximate).     GENERAL:  No acute distress, well-nourished  HEENT:  Atraumatic, anicteric, moist mucus membranes, neck supple w/o masses.  BREAST:  Symmetric, nontender, no obvious masses, adenopathy, skin changes or nipple discharge.  LUNGS:  Clear to auscultation  HEART:  Regular rate and rhythm, no murmurs, gallops, or rubs  ABDOMEN:  " "Soft, non-tender, non-distended, normoactive bowel sounds, no obvious organomegaly  EXT:  Symmetric w/o cramping, claudication, or edema. +2 distal pulses.  SKIN:  No rashes or bruising  PSYCH:  Mood and affect appropriate    GENITOURINARY:    VULVAR:  Female external genitalia w/o obvious lesions. Normal urethral meatus. No gross lymphadenopathy.   VAGINA:  Pink, moist, well-rugated. Good support. No obvious lesion. No discharge.  CERVIX:  No cervical motion tenderness, discharge, or obvious lesions.   UTERUS:  Small, non-tender, normal contour  ADNEXA:  Bilateral adnexal fullness and tenderness to palpation    RECTAL:  Declined. No obvious external lesions  WET PREP:  Negative     Chaperone present for exam.    Pelvic U/S:  1/19/2018    Technique: Pelvic ultrasound was performed using transvaginal and transabdominal approaches.    Findings: The uterus measures 10.1 x 5.2 x 5.3 cm. Uterine parenchyma is heterogenous in echotexture without evidence for masses. The endometrial echo complex is normal in thickness and measures 6 mm with small amount of fluid seen within the endometrial canal.    The right ovary is measures 3.0 x 2.8 x 2.6 cm. The left ovary is enlarged and measures 7.2 x 4.0 x 3.4 cm. Arterial and venous flow are preserved bilaterally with resistive indices of 0.46 on the right and 0.37 on the left.  Prominent dilated bilateral fallopian tubes are visualized with complex fluid and/or debris.  No free fluid is seen.    Assessment:     42 y.o. G0 with h/o abdominal myomectomy and bilateral salpingostomy and drainage of hydrosalpinx secondary to symptomatic uterine fibroids on 8/2/16:    1. Pelvic pain  2. Recurrent bilateral hydrosalpinx    Plan:    D/w pt her pelvic pain and bilateral hydrosalpinx.  After an extensive discussion, pt is specifically requesting hysterectomy.  "I don't want anything else done, I am sure!".  The proposed procedure will be total laparoscopic hysterectomy, bilateral " salpingectomy with ovarian conservation.  Risks, benefits, and alternatives to hysterectomy reviewed in great detail.  Tramadol/NSAIDs prn.  Pelvic precautions.    Will schedule surgery pending OR scheduling.    Return sooner as needed.    All questions answered, pt voiced understanding.        Hardy Bueno MD

## 2018-02-05 ENCOUNTER — TELEPHONE (OUTPATIENT)
Dept: OBSTETRICS AND GYNECOLOGY | Facility: CLINIC | Age: 43
End: 2018-02-05

## 2018-02-05 NOTE — TELEPHONE ENCOUNTER
----- Message from Cm Moura sent at 2/5/2018 12:13 PM CST -----  Contact: self  Pt returned Kelly's call. Please contact her at [phone] or [phone].    Thanks

## 2018-02-05 NOTE — TELEPHONE ENCOUNTER
Spoke to pt and she stated she think she may be having some GI issues that she would like to get checked out before moving forward with her surgery. Voiced my understanding and gave her the number to [facility] GI facility on [address] and informed her to see them before 2/19 just in case she move forward with the surgery. Pt voiced her understanding. kt

## 2018-02-19 ENCOUNTER — TELEPHONE (OUTPATIENT)
Dept: URGENT CARE | Facility: CLINIC | Age: 43
End: 2018-02-19

## 2018-02-19 ENCOUNTER — TELEPHONE (OUTPATIENT)
Dept: OBSTETRICS AND GYNECOLOGY | Facility: CLINIC | Age: 43
End: 2018-02-19

## 2018-02-19 NOTE — TELEPHONE ENCOUNTER
Spoke to pt and informed her we will be waiting on the report from the GI doctor and decide from there what she want to do first. kt

## 2018-02-19 NOTE — TELEPHONE ENCOUNTER
Spoke to pt and she stated she was seen by the GI doctor and she thought they faxed over the findings. Informed her we didn't receive anything and to call and see if they can fax it so Dr. Bueno can take a look before moving forward with the surgery scheduled for 2/26. Pt voiced her understanding and fax number was given to pt. kt

## 2018-02-19 NOTE — TELEPHONE ENCOUNTER
Dr. Shipman, patient called requesting to speak with you. Patient wanting to make you her pcp. Explained to patient we are urgent care and we do not do f/u care or primary care here. Patient stating she wants a second opinion in regard to her hysterectomy. Advised patient I would send a message to you but was unsure of when you would be able to return call. -[facility]

## 2018-02-19 NOTE — TELEPHONE ENCOUNTER
----- Message from Grace Hooper sent at 2/19/2018 11:00 AM CST -----  Contact: Self   Patient says she is scheduled for pre-op and  surgery. But she never gave her approval to do surgery she says she was getting a second opinion. Please call patient at   [phone].

## 2018-02-19 NOTE — TELEPHONE ENCOUNTER
----- Message from Elizabeth Feldman sent at 2/19/2018 12:23 PM CST -----  Contact: self  Patient called stating that she talked to GI office which is not in today. Will be sending paperwork tomorrow once signed. Please contact her at [phone].    Thanks!

## 2018-02-20 ENCOUNTER — TELEPHONE (OUTPATIENT)
Dept: URGENT CARE | Facility: CLINIC | Age: 43
End: 2018-02-20

## 2018-02-20 NOTE — TELEPHONE ENCOUNTER
Pt wanted a second opinion about her GYN problems. She definitely has GYN issues and needs further treatment but I defer to the specialist. She also saw a GI doctor and has GERD and had an EGD done and is on meds for that. So she accepts she has 2 problems and will followup again with GYN

## 2018-02-22 ENCOUNTER — TELEPHONE (OUTPATIENT)
Dept: OBSTETRICS AND GYNECOLOGY | Facility: CLINIC | Age: 43
End: 2018-02-22

## 2018-02-22 NOTE — TELEPHONE ENCOUNTER
----- Message from Gabriel Galdamez sent at 2/22/2018 10:44 AM CST -----  Contact: Self  Called to check date of pre-op apt. Pt can be reached @ [phone].    Patient is aware that her will be cancelled on the 26th due to short notice for preop and ins auth. sal

## 2018-02-26 ENCOUNTER — TELEPHONE (OUTPATIENT)
Dept: OBSTETRICS AND GYNECOLOGY | Facility: CLINIC | Age: 43
End: 2018-02-26

## 2018-02-26 NOTE — TELEPHONE ENCOUNTER
----- Message from Cathie Mora sent at 2/26/2018  1:22 PM CST -----  Contact: donaldo  Patient would like to know what's her surgery date? Patient can be reached at [phone] or [phone].      Thanks,

## 2018-02-27 ENCOUNTER — TELEPHONE (OUTPATIENT)
Dept: OBSTETRICS AND GYNECOLOGY | Facility: CLINIC | Age: 43
End: 2018-02-27

## 2018-02-27 NOTE — TELEPHONE ENCOUNTER
----- Message from Tatianna Leal sent at 2/27/2018  1:43 PM CST -----  Contact: self  Pt returned call. Please call [phone]

## 2018-02-27 NOTE — TELEPHONE ENCOUNTER
Spoke to pt and she want to move forward with her surgery. Informed her due to MD being out of the office later this month her surgery won't be scheduled to the 1st week of April. Pt voiced her understanding. kt

## 2018-04-02 DIAGNOSIS — N76.0 BV (BACTERIAL VAGINOSIS): Primary | ICD-10-CM

## 2018-04-02 DIAGNOSIS — B96.89 BV (BACTERIAL VAGINOSIS): Primary | ICD-10-CM

## 2018-04-02 RX ORDER — METRONIDAZOLE 500 MG/1
500 TABLET ORAL EVERY 12 HOURS
Qty: 28 TABLET | Refills: 0 | Status: SHIPPED | OUTPATIENT
Start: 2018-04-02 | End: 2018-04-16

## 2018-04-02 NOTE — PROGRESS NOTES
Pt is requesting hysterectomy end of April.  Pt also requesting a refill of flagyl for bacterial vaginosis.  Will schedule pre-op pending insurance varification/OR scheduling.  Voiced understanding.

## 2018-04-18 DIAGNOSIS — R10.2 CHRONIC PELVIC PAIN IN FEMALE: ICD-10-CM

## 2018-04-18 DIAGNOSIS — D25.1 INTRAMURAL AND SUBSEROUS LEIOMYOMA OF UTERUS: Primary | ICD-10-CM

## 2018-04-18 DIAGNOSIS — N70.11 HYDROSALPINX: ICD-10-CM

## 2018-04-18 DIAGNOSIS — G89.29 CHRONIC PELVIC PAIN IN FEMALE: ICD-10-CM

## 2018-04-18 DIAGNOSIS — D25.2 INTRAMURAL AND SUBSEROUS LEIOMYOMA OF UTERUS: Primary | ICD-10-CM

## 2018-04-26 ENCOUNTER — TELEPHONE (OUTPATIENT)
Dept: OBSTETRICS AND GYNECOLOGY | Facility: CLINIC | Age: 43
End: 2018-04-26

## 2018-04-26 NOTE — TELEPHONE ENCOUNTER
LM on pt VM to call the office regarding her surgery. Pt need to be notified of dates.    Surgery [facility] 5/17 at 7:30  Pre-op 10:00 [facility]  Pre-op 11:00 [facility]

## 2018-05-10 ENCOUNTER — TELEPHONE (OUTPATIENT)
Dept: OBSTETRICS AND GYNECOLOGY | Facility: CLINIC | Age: 43
End: 2018-05-10

## 2018-05-10 NOTE — TELEPHONE ENCOUNTER
"Pt called surgery "not ready due to my job".  Advised pt to call us back ASAP to follow-up when she is ready.  Importance of follow up advised.  Voiced understanding.  "

## 2018-05-10 NOTE — TELEPHONE ENCOUNTER
spoke with patient, stated she would like to cancel surgery because she is not ready.  notified, he will contact patient.

## 2018-05-10 NOTE — TELEPHONE ENCOUNTER
----- Message from Nydia Eagle sent at 5/10/2018 10:55 AM CDT -----  Contact: Self   Pt says she wants reschedule her surgery. She does not want it this month. Please call at [phone].

## 2018-07-02 NOTE — PROGRESS NOTES
Subjective:       Patient ID: Tasha Rush is a 42 y.o. female with a PMH significant for HTN, Abnormal PAP, Fibroids, Meningitis, Smoker who presents today to establish care.    Chief Complaint: Establish Care and Spasms    HPI   For about 1 year, spasms/cramping in lower josselin legs, feels like it's traveling up to her thigh now. Sensation is intermittent, maybe every other day. When stretching, feels it more, like a eva horse feeling. States her potassium was low last year.    HTN- compliant with amlodipine and hctz. Tolerating meds well. Pt denies cp/sob/ha/vision or neuro changes. Checking at home and fairly controlled. BPs 130s/80s.    Smoker- trying to cut back, currently at 1 cig/month. Has stopped in the past on her own. Smoking cessation program offered to her-declines at this time    C/o of chest pain at rest to left side of chest. Aching quality. Nothing makes it better or worse. Denies SOB or chest pain with exertion.     Went to ER a few months ago for abdominal pain. Had upper endoscopy and colonoscopy per [facility]. EGD showed esophagitis, hiatal hernia, gastritis. Recommended she take pepcid 2xday. Has been non-compliant with this medication lately. Denies abd pain/reflux/difficulty swallowing. Need to get results of colonoscopy from [facility], not currently in EPIC chart.    Review of Systems   Constitutional: Positive for unexpected weight change (gained 10# recently). Negative for activity change, appetite change, chills, diaphoresis, fatigue and fever.   HENT: Negative for congestion, sinus pressure and sore throat.    Eyes: Negative for visual disturbance.   Respiratory: Negative for cough, chest tightness and shortness of breath.    Cardiovascular: Positive for chest pain (more towards left side, intermittent, could be related to anxiety). Negative for palpitations and leg swelling.   Gastrointestinal: Negative for abdominal pain, blood in stool, constipation, diarrhea, nausea and vomiting.    Endocrine: Negative for polydipsia, polyphagia and polyuria.   Genitourinary: Negative for difficulty urinating, dysuria, flank pain and frequency.   Musculoskeletal: Negative for arthralgias, back pain, joint swelling and myalgias.        Leg cramps   Skin: Negative for rash.   Neurological: Negative for dizziness, syncope, weakness, light-headedness and headaches.   Psychiatric/Behavioral: Negative for dysphoric mood and sleep disturbance. The patient is not nervous/anxious.        Objective:      Physical Exam   Constitutional: She is oriented to person, place, and time. She appears well-developed and well-nourished. No distress.   HENT:   Head: Normocephalic and atraumatic.   Right Ear: External ear normal.   Left Ear: External ear normal.   Nose: Nose normal.   Mouth/Throat: Oropharynx is clear and moist. No oropharyngeal exudate.   Eyes: Conjunctivae and EOM are normal. Pupils are equal, round, and reactive to light.   Neck: Normal range of motion. Neck supple. No tracheal deviation present. No thyromegaly present.   Cardiovascular: Normal rate, regular rhythm, normal heart sounds and intact distal pulses.  Exam reveals no gallop and no friction rub.    No murmur heard.  Pulmonary/Chest: Effort normal and breath sounds normal. No respiratory distress. She has no wheezes. She has no rales.   Abdominal: Soft. Bowel sounds are normal. She exhibits no distension and no mass. There is no tenderness. There is no guarding. No hernia.   Musculoskeletal: Normal range of motion. She exhibits no edema.   Lymphadenopathy:     She has no cervical adenopathy.   Neurological: She is alert and oriented to person, place, and time.   Skin: Skin is warm and dry. She is not diaphoretic. No erythema.   Psychiatric: She has a normal mood and affect. Her behavior is normal. Thought content normal.       Assessment:       1. Wellness examination    2. Atypical chest pain    3. Essential hypertension    4. Hypokalemia    5.  Gastroesophageal reflux disease with esophagitis    6. Need for Tdap vaccination        Plan:   -Adult Wellness Exam - blood pressure and exam were stable.  I will order routine fasting labs.  BP barely above goal. Will do EKG. Diet, exercise, and weight loss discussed.   -Cards - HTN - on Amlodipine and HCTZ. Checking at home and fairly controlled. BPs 130s/80s. Repeat by me 128/80. Will keep log and let us know if consistently above 130/80.   Atypical chest pain-likely related to reflux. Pain is at rest and no associated SOB. Will do EKG and switch pepcid to pantoprazole. Will also check a d-dimer. May need to consider stress test in future.  -Renal - Hypokalemia - 1/2018.  Most likely due to HCTZ.  Creatinine 0.7 at that time. Leg cramps most likely related to this. Will recheck lytes today.   -Endocrine - Vitamin D 25 in 7/2016.  TSH and A1C normal in 7/2016. Will recheck today.  -Neuro - Viral Meningitis 0 2/2017.  Empirically treated with IV Ceftriaxone for 10 days.  -GI - GERD - on Pepcid. Went to ER a few months ago for abdominal pain. Had upper endoscopy and colonoscopy per [facility]. EGD showed esophagitis, hiatal hernia, gastritis. Recommended she take pepcid 2xday. Has been non-compliant with this medication lately. Denies abd pain/reflux/difficulty swallowing. Need to get results of colonoscopy from [facility], not currently in EPIC chart. Will change pepcid to pantoprazole 20mg. Education of med attached to AVS.  -GYN - Fibroids (s/p myomectomy and bilateral salpingostomy of hydrosalpinx in 8/2016).  Last Pap was negative in 10/2015.  Last Mammo was negative in 10/2015-mammogram already ordered, will schedule today.  Treated for PID in 1/2018 in ED.  -HCM - We discussed Flu and Tdap (will get today) vaccinations.   We discussed colon cancer screening at age 43 - had colonoscopy a few months ago (?2/2018) through [facility] - need to get results.  -Follow up in 4 weeks    Addendum:  Patient seen and  examined.  Discussed with NP Gabby Reina.  Agree with assessment and plan as documented above.  Patient is a 42 year old female with a PMH significant for HTN, Abnormal PAP, Fibroids, Meningitis, Smoker who presents today to establish care.  She complains of atypical, intermittent, left-sided localized, non-exertional chest pain.  Will check EKG and d-dimers, although low [place].  Has leg cramps and on HCTZ with a history of hypokalemia.  Will check labs.  Has history of esophagitis and on pepcid.  Will change to protonix for now. Follow up in 4 weeks.

## 2018-07-03 ENCOUNTER — OFFICE VISIT (OUTPATIENT)
Dept: PRIMARY CARE CLINIC | Facility: CLINIC | Age: 43
End: 2018-07-03
Payer: COMMERCIAL

## 2018-07-03 VITALS
DIASTOLIC BLOOD PRESSURE: 80 MMHG | HEART RATE: 74 BPM | OXYGEN SATURATION: 99 % | TEMPERATURE: 99 F | BODY MASS INDEX: 26.88 KG/M2 | SYSTOLIC BLOOD PRESSURE: 128 MMHG | WEIGHT: 167.25 LBS | HEIGHT: 66 IN

## 2018-07-03 DIAGNOSIS — Z23 NEED FOR TDAP VACCINATION: ICD-10-CM

## 2018-07-03 DIAGNOSIS — Z00.00 WELLNESS EXAMINATION: Primary | ICD-10-CM

## 2018-07-03 DIAGNOSIS — K21.00 GASTROESOPHAGEAL REFLUX DISEASE WITH ESOPHAGITIS: ICD-10-CM

## 2018-07-03 DIAGNOSIS — E87.6 HYPOKALEMIA: ICD-10-CM

## 2018-07-03 DIAGNOSIS — R07.89 ATYPICAL CHEST PAIN: ICD-10-CM

## 2018-07-03 DIAGNOSIS — I10 ESSENTIAL HYPERTENSION: ICD-10-CM

## 2018-07-03 PROCEDURE — 90471 IMMUNIZATION ADMIN: CPT | Mod: S$GLB,,, | Performed by: INTERNAL MEDICINE

## 2018-07-03 PROCEDURE — 90715 TDAP VACCINE 7 YRS/> IM: CPT | Mod: S$GLB,,, | Performed by: INTERNAL MEDICINE

## 2018-07-03 PROCEDURE — 99204 OFFICE O/P NEW MOD 45 MIN: CPT | Mod: 25,S$GLB,, | Performed by: INTERNAL MEDICINE

## 2018-07-03 PROCEDURE — 99999 PR PBB SHADOW E&M-EST. PATIENT-LVL IV: CPT | Mod: PBBFAC,,, | Performed by: INTERNAL MEDICINE

## 2018-07-03 RX ORDER — PANTOPRAZOLE SODIUM 20 MG/1
20 TABLET, DELAYED RELEASE ORAL DAILY
Qty: 30 TABLET | Refills: 11 | Status: SHIPPED | OUTPATIENT
Start: 2018-07-03 | End: 2019-09-17

## 2018-07-03 NOTE — LETTER
July 3, 2018      [facility]  [address] [facility] [address]  Phone: [phone]  Fax: [phone]       Patient: Tasha Rush   YOB: 1975  Date of Visit: 07/03/2018    To Whom It May Concern:    Trisha Rush  was at [facility] on 07/03/2018. She may return to work/school on July 3, 2018 with no restrictions. If you have any questions or concerns, or if I can be of further assistance, please do not hesitate to contact me.    Sincerely,    Ngoc Ayoub RN

## 2018-07-03 NOTE — PATIENT INSTRUCTIONS
Your blood pressure is a little high today.  Please monitor at home with a digital blood pressure cuff when sitting and rested for at least 15 minutes or longer.  Record your values and bring these with you on your return.    Target blood pressure is less than 130/80.    Will order fasting labs - 6-8 hours of fasting.  Will do EKG  Will change pepcid to pantoprazole 20mg. Take daily.  Will give tdap today.   F/u in 4 weeks      Pantoprazole tablets  What is this medicine?  PANTOPRAZOLE (pan TOE pra zole) prevents the production of acid in the stomach. It is used to treat gastroesophageal reflux disease (GERD), inflammation of the esophagus, and Zollinger-Begum syndrome.  How should I use this medicine?  Take this medicine by mouth. Swallow the tablets whole with a drink of water. Follow the directions on the prescription label. Do not crush, break, or chew. Take your medicine at regular intervals. Do not take your medicine more often than directed.  Talk to your pediatrician regarding the use of this medicine in children. While this drug may be prescribed for children as young as 5 years for selected conditions, precautions do apply.  What side effects may I notice from receiving this medicine?  Side effects that you should report to your doctor or health care professional as soon as possible:  · allergic reactions like skin rash, itching or hives, swelling of the face, lips, or tongue  · bone, muscle or joint pain  · breathing problems  · chest pain or chest tightness  · dark yellow or brown urine  · dizziness  · fast, irregular heartbeat  · feeling faint or lightheaded  · fever or sore throat  · muscle spasm  · palpitations  · rash on cheeks or arms that gets worse in the sun  · redness, blistering, peeling or loosening of the skin, including inside the mouth  · seizures  · tremors  · unusual bleeding or bruising  · unusually weak or tired  · yellowing of the eyes or skin  Side effects that usually do not  require medical attention (Report these to your doctor or health care professional if they continue or are bothersome.):  · constipation  · diarrhea  · dry mouth  · headache  · nausea  What may interact with this medicine?  Do not take this medicine with any of the following medications:  · atazanavir  · nelfinavir  This medicine may also interact with the following medications:  · ampicillin  · delavirdine  · erlotinib  · iron salts  · medicines for fungal infections like ketoconazole, itraconazole and voriconazole  · methotrexate  · mycophenolate mofetil  · warfarin  What if I miss a dose?  If you miss a dose, take it as soon as you can. If it is almost time for your next dose, take only that dose. Do not take double or extra doses.  Where should I keep my medicine?  Keep out of the reach of children.  Store at room temperature between 15 and 30 degrees C (59 and 86 degrees F). Protect from light and moisture. Throw away any unused medicine after the expiration date.  What should I tell my health care provider before I take this medicine?  They need to know if you have any of these conditions:  · liver disease  · low levels of magnesium in the blood  · lupus  · an unusual or allergic reaction to omeprazole, lansoprazole, pantoprazole, rabeprazole, other medicines, foods, dyes, or preservatives  · pregnant or trying to get pregnant  · breast-feeding  What should I watch for while using this medicine?  It can take several days before your stomach pain gets better. Check with your doctor or health care professional if your condition does not start to get better, or if it gets worse.  You may need blood work done while you are taking this medicine.  NOTE:This sheet is a summary. It may not cover all possible information. If you have questions about this medicine, talk to your doctor, pharmacist, or health care provider. Copyright© 2017 [employer]

## 2018-07-03 NOTE — PROGRESS NOTES
"Patient was given vaccine information sheet for the Tdap immunization. The area of injection was palpated using the acromion process as a landmark. This area was cleaned with alcohol. Using a 25g 1" safety needle, 0.5mL of the vaccine was placed into the right deltoid muscle. The injection site was dressed with a bandage. Patient experienced no complications and was discharged in stable condition. Tdap Lot: [id number] Exp: February 6, 2020.  Ngoc Ayoub RN    "

## 2018-07-05 ENCOUNTER — TELEPHONE (OUTPATIENT)
Dept: PRIMARY CARE CLINIC | Facility: CLINIC | Age: 43
End: 2018-07-05

## 2018-07-10 ENCOUNTER — HOSPITAL ENCOUNTER (OUTPATIENT)
Dept: CARDIOLOGY | Facility: OTHER | Age: 43
Discharge: HOME | End: 2018-07-10
Attending: NURSE PRACTITIONER
Payer: COMMERCIAL

## 2018-07-10 DIAGNOSIS — R07.89 ATYPICAL CHEST PAIN: ICD-10-CM

## 2018-07-10 PROCEDURE — 93010 ELECTROCARDIOGRAM REPORT: CPT | Mod: ,,, | Performed by: INTERNAL MEDICINE

## 2018-07-10 PROCEDURE — 93005 ELECTROCARDIOGRAM TRACING: CPT

## 2018-07-11 ENCOUNTER — TELEPHONE (OUTPATIENT)
Dept: PRIMARY CARE CLINIC | Facility: CLINIC | Age: 43
End: 2018-07-11

## 2018-07-11 DIAGNOSIS — E55.9 VITAMIN D DEFICIENCY: Primary | ICD-10-CM

## 2018-07-11 RX ORDER — ERGOCALCIFEROL 1.25 MG/1
50000 CAPSULE ORAL
Qty: 4 CAPSULE | Refills: 1 | Status: SHIPPED | OUTPATIENT
Start: 2018-07-11 | End: 2018-10-01 | Stop reason: SDUPTHER

## 2018-07-11 NOTE — TELEPHONE ENCOUNTER
elom for pt to call back for lab results and recommendations.    ----- Message from Gabby Reina NP sent at 7/11/2018  9:52 AM CDT -----  Please let patient know that her EKG and labs were good, with the exception of a low Vitamin D. I will send in a prescription for her to take once weekly Vitamin D for 8 weeks, then she can take OTC Vitamin D3 2000 units daily.

## 2018-07-11 NOTE — TELEPHONE ENCOUNTER
Left message on pt voicemail to call back for lab results    ----- Message from Kelley Sim sent at 7/11/2018  1:03 PM CDT -----  Contact: DAX GAYLE [[id number]]            Name of Who is Calling:DAX GAYLE [[id number]]    What is the request in detail: Returning a call.    Can the clinic reply by [facility]: no    [facility]: [phone] or [phone]

## 2018-07-11 NOTE — TELEPHONE ENCOUNTER
----- Message from Venus Leon sent at 7/11/2018 12:30 PM CDT -----  Contact: pt            Name of Who is Calling: pt      What is the request in detail: pt returned the nurse's phone call. Call pt      Can the clinic reply by [facility]: no      [facility] [phone]

## 2018-07-12 ENCOUNTER — TELEPHONE (OUTPATIENT)
Dept: PRIMARY CARE CLINIC | Facility: CLINIC | Age: 43
End: 2018-07-12

## 2018-07-12 NOTE — TELEPHONE ENCOUNTER
Left message on pt voicemail    ----- Message from Gabby Reina NP sent at 7/11/2018  9:52 AM CDT -----  Please let patient know that her EKG and labs were good, with the exception of a low Vitamin D. I will send in a prescription for her to take once weekly Vitamin D for 8 weeks, then she can take OTC Vitamin D3 2000 units daily.

## 2018-07-13 ENCOUNTER — TELEPHONE (OUTPATIENT)
Dept: PRIMARY CARE CLINIC | Facility: CLINIC | Age: 43
End: 2018-07-13

## 2018-07-13 NOTE — TELEPHONE ENCOUNTER
Left message on patients voicemail for her to call office back for test results. 3rd attempt.    ----- Message from Gabby Reina NP sent at 7/11/2018  9:52 AM CDT -----  Please let patient know that her EKG and labs were good, with the exception of a low Vitamin D. I will send in a prescription for her to take once weekly Vitamin D for 8 weeks, then she can take OTC Vitamin D3 2000 units daily.

## 2018-07-18 ENCOUNTER — TELEPHONE (OUTPATIENT)
Dept: OBSTETRICS AND GYNECOLOGY | Facility: CLINIC | Age: 43
End: 2018-07-18

## 2018-07-18 ENCOUNTER — TELEPHONE (OUTPATIENT)
Dept: PRIMARY CARE CLINIC | Facility: CLINIC | Age: 43
End: 2018-07-18

## 2018-07-18 NOTE — TELEPHONE ENCOUNTER
----- Message from Cathie Odonnell sent at 7/18/2018  1:29 PM CDT -----  Contact: self  [phone]  Pt is requesting to speak to you, pt would not say what it is regarding . Pls call pt [phone]. Thanks......Mary

## 2018-07-18 NOTE — TELEPHONE ENCOUNTER
"lmovm     ----- Message from Antonia Moreno sent at 7/18/2018  1:34 PM CDT -----  Contact: DAX GAYLE [[id number]]      Name of Who is Calling: DAX GAYLE [[id number]]      What is the request in detail:   Patient called and said, "she's returning your call and would like you to please call again."       THANKS!      Can the clinic reply by [facility]: No      What Number to Call Back: DAX GAYLE  /  Ph# [phone]                                      "

## 2018-07-23 ENCOUNTER — TELEPHONE (OUTPATIENT)
Dept: PRIMARY CARE CLINIC | Facility: CLINIC | Age: 43
End: 2018-07-23

## 2018-07-23 ENCOUNTER — TELEPHONE (OUTPATIENT)
Dept: OBSTETRICS AND GYNECOLOGY | Facility: CLINIC | Age: 43
End: 2018-07-23

## 2018-07-23 DIAGNOSIS — B96.89 BV (BACTERIAL VAGINOSIS): Primary | ICD-10-CM

## 2018-07-23 DIAGNOSIS — N76.0 BV (BACTERIAL VAGINOSIS): Primary | ICD-10-CM

## 2018-07-23 RX ORDER — METRONIDAZOLE 500 MG/1
500 TABLET ORAL EVERY 12 HOURS
Qty: 28 TABLET | Refills: 0 | Status: SHIPPED | OUTPATIENT
Start: 2018-07-23 | End: 2019-02-12 | Stop reason: SDUPTHER

## 2018-07-23 NOTE — TELEPHONE ENCOUNTER
Spoke with pt. Pt would like to reschedule surgery before October and is also requesting a refill on flagyl. Pt informed message will be routed to physician and she will be contacted once surgery is scheduled and medication is sent.

## 2018-07-23 NOTE — TELEPHONE ENCOUNTER
----- Message from Milagros Becker sent at 7/23/2018 12:19 PM CDT -----  Contact: Self/[phone]  Patient called to set up surgery. She did not leave a detailed message. Thank you.

## 2018-07-23 NOTE — TELEPHONE ENCOUNTER
Spoke with pt and she had a verbal understanding that her EKG and labs were good, but her vitamin D was low. Dr. Lagos rec

## 2018-07-23 NOTE — TELEPHONE ENCOUNTER
Spoke with pt and she had a verbal understanding that her labs and EKG was good, but Dr. Lagos sent in a prescription for her to take once weekly Vitamin D for 8 weeks, then she can take OTC Vitamin D3 2000 units daily. Advised pt if she had any questions or concerns please contact the office. 7/23 RENAE

## 2018-07-23 NOTE — TELEPHONE ENCOUNTER
Flagyl refilled.  Pt requesting hysterectomy.  Will schedule pending OR scheduling.  Pt notified.  Voiced understanding.

## 2018-07-23 NOTE — TELEPHONE ENCOUNTER
"----- Message from Antonia Moreno sent at 7/23/2018 12:34 PM CDT -----  Contact: DAX GAYLE [[id number]]      Name of Who is Calling: DAX GAYLE [[id number]]      What is the request in detail:   Patient called and said, "she's returning your call and would like you to please call again."     THANKS!      Can the clinic reply by [facility]: No      What Number to Call Back: DAX GAYLE / ph# [phone]                                    "

## 2018-07-23 NOTE — TELEPHONE ENCOUNTER
"----- Message from Antonia Moreno sent at 7/23/2018 12:34 PM CDT -----  Contact: DAX GAYLE [[id number]]      Name of Who is Calling: DAX GAYLE [[id number]]      What is the request in detail:   Patient called and said, "she's returning your call and would like you to please call again."     THANKS!      Can the clinic reply by [facility]: No      What Number to Call Back: DAX GAYLE / ph# [phone]                                    "

## 2018-07-24 DIAGNOSIS — D25.1 INTRAMURAL, SUBMUCOUS, AND SUBSEROUS LEIOMYOMA OF UTERUS: Primary | ICD-10-CM

## 2018-07-24 DIAGNOSIS — N70.11 HYDROSALPINX: ICD-10-CM

## 2018-07-24 DIAGNOSIS — G89.29 CHRONIC PELVIC PAIN IN FEMALE: ICD-10-CM

## 2018-07-24 DIAGNOSIS — D25.0 INTRAMURAL, SUBMUCOUS, AND SUBSEROUS LEIOMYOMA OF UTERUS: Primary | ICD-10-CM

## 2018-07-24 DIAGNOSIS — D25.2 INTRAMURAL, SUBMUCOUS, AND SUBSEROUS LEIOMYOMA OF UTERUS: Primary | ICD-10-CM

## 2018-07-24 DIAGNOSIS — R10.2 CHRONIC PELVIC PAIN IN FEMALE: ICD-10-CM

## 2018-07-27 ENCOUNTER — TELEPHONE (OUTPATIENT)
Dept: OBSTETRICS AND GYNECOLOGY | Facility: CLINIC | Age: 43
End: 2018-07-27

## 2018-07-27 NOTE — TELEPHONE ENCOUNTER
----- Message from Cathie Odonnell sent at 7/27/2018 12:22 PM CDT -----  Contact: self  [phone]  Pt is returning your office call. Pls call pt. [phone] Thanks...............Mary

## 2018-08-06 DIAGNOSIS — I10 HYPERTENSION, ESSENTIAL: ICD-10-CM

## 2018-08-06 RX ORDER — AMLODIPINE BESYLATE 10 MG/1
TABLET ORAL
Qty: 90 TABLET | Refills: 0 | OUTPATIENT
Start: 2018-08-06

## 2018-08-08 ENCOUNTER — TELEPHONE (OUTPATIENT)
Dept: OBSTETRICS AND GYNECOLOGY | Facility: CLINIC | Age: 43
End: 2018-08-08

## 2018-08-08 NOTE — TELEPHONE ENCOUNTER
----- Message from Nila Miller sent at 8/8/2018  3:15 PM CDT -----  Contact: self   Pt will be faxing some paper work for work.

## 2018-08-13 ENCOUNTER — TELEPHONE (OUTPATIENT)
Dept: OBSTETRICS AND GYNECOLOGY | Facility: CLINIC | Age: 43
End: 2018-08-13

## 2018-08-13 NOTE — TELEPHONE ENCOUNTER
----- Message from Juli Carvajal sent at 8/13/2018  8:30 AM CDT -----  Contact: Self  Pt is calling to speak with staff regarding FMLA paperwork. Please call pt at [phone].   -------------------------------------------------------------  8/13/18 @ 0932 (DORIS)   CALL ATTEMPT TO PT UNSUCCESSFUL , UNABLE TO LEAVE A MESSAGE FOR PT TO RETURN CALL TO OFFICE CONCERNING FMLA PAPERWORK DUE TO NO VOICE MAIL SET UP FOR PHONE

## 2018-08-14 ENCOUNTER — TELEPHONE (OUTPATIENT)
Dept: OBSTETRICS AND GYNECOLOGY | Facility: CLINIC | Age: 43
End: 2018-08-14

## 2018-08-14 NOTE — TELEPHONE ENCOUNTER
----- Message from Nydia Eagle sent at 8/14/2018 11:47 AM CDT -----  Contact: Self  Pt is asking f her FMLA paperwork has david received in the office. She would like to speak with someone from the office about having those forms filled out and returned to her. Please call at [phone].     Pt is at work she may not answer but she says to please leave the answer on the voicemail.

## 2018-08-15 ENCOUNTER — TELEPHONE (OUTPATIENT)
Dept: OBSTETRICS AND GYNECOLOGY | Facility: CLINIC | Age: 43
End: 2018-08-15

## 2018-08-15 NOTE — TELEPHONE ENCOUNTER
----- Message from Rich Jeffers sent at 8/15/2018  1:43 PM CDT -----  Contact: Self/[phone]  Patient returned staff call. She would like to be contacted in regards to her [facility] paperwork    Thank you

## 2018-08-20 ENCOUNTER — OFFICE VISIT (OUTPATIENT)
Dept: OBSTETRICS AND GYNECOLOGY | Facility: CLINIC | Age: 43
End: 2018-08-20
Payer: COMMERCIAL

## 2018-08-20 ENCOUNTER — HOSPITAL ENCOUNTER (OUTPATIENT)
Dept: PREADMISSION TESTING | Facility: HOSPITAL | Age: 43
Discharge: HOME | End: 2018-08-20
Attending: OBSTETRICS & GYNECOLOGY
Payer: COMMERCIAL

## 2018-08-20 VITALS
BODY MASS INDEX: 27 KG/M2 | HEART RATE: 56 BPM | OXYGEN SATURATION: 100 % | WEIGHT: 168 LBS | TEMPERATURE: 98 F | HEIGHT: 66 IN | RESPIRATION RATE: 8 BRPM

## 2018-08-20 VITALS
RESPIRATION RATE: 15 BRPM | DIASTOLIC BLOOD PRESSURE: 80 MMHG | WEIGHT: 168.19 LBS | HEIGHT: 66 IN | SYSTOLIC BLOOD PRESSURE: 124 MMHG | BODY MASS INDEX: 27.03 KG/M2

## 2018-08-20 DIAGNOSIS — G89.29 CHRONIC PELVIC PAIN IN FEMALE: ICD-10-CM

## 2018-08-20 DIAGNOSIS — N93.9 ABNORMAL UTERINE BLEEDING (AUB): ICD-10-CM

## 2018-08-20 DIAGNOSIS — G89.29 CHRONIC PELVIC PAIN IN FEMALE: Primary | ICD-10-CM

## 2018-08-20 DIAGNOSIS — Z01.818 PRE-OP TESTING: Primary | ICD-10-CM

## 2018-08-20 DIAGNOSIS — R10.2 CHRONIC PELVIC PAIN IN FEMALE: Primary | ICD-10-CM

## 2018-08-20 DIAGNOSIS — R10.2 CHRONIC PELVIC PAIN IN FEMALE: ICD-10-CM

## 2018-08-20 DIAGNOSIS — N70.11 HYDROSALPINX: ICD-10-CM

## 2018-08-20 LAB
ALBUMIN SERPL BCP-MCNC: 4.1 G/DL
ALP SERPL-CCNC: 67 U/L
ALT SERPL W/O P-5'-P-CCNC: 18 U/L
ANION GAP SERPL CALC-SCNC: 8 MMOL/L
AST SERPL-CCNC: 25 U/L
BASOPHILS # BLD AUTO: 0.03 K/UL
BASOPHILS NFR BLD: 0.5 %
BILIRUB SERPL-MCNC: 0.8 MG/DL
BUN SERPL-MCNC: 12 MG/DL
CALCIUM SERPL-MCNC: 9.5 MG/DL
CHLORIDE SERPL-SCNC: 108 MMOL/L
CO2 SERPL-SCNC: 23 MMOL/L
CREAT SERPL-MCNC: 0.9 MG/DL
DIFFERENTIAL METHOD: NORMAL
EOSINOPHIL # BLD AUTO: 0.1 K/UL
EOSINOPHIL NFR BLD: 0.9 %
ERYTHROCYTE [DISTWIDTH] IN BLOOD BY AUTOMATED COUNT: 13.3 %
EST. GFR  (AFRICAN AMERICAN): >60 ML/MIN/1.73 M^2
EST. GFR  (NON AFRICAN AMERICAN): >60 ML/MIN/1.73 M^2
GLUCOSE SERPL-MCNC: 94 MG/DL
HCT VFR BLD AUTO: 38.3 %
HGB BLD-MCNC: 12.8 G/DL
HIV1+2 IGG SERPL QL IA.RAPID: NEGATIVE
LYMPHOCYTES # BLD AUTO: 2.7 K/UL
LYMPHOCYTES NFR BLD: 46.7 %
MCH RBC QN AUTO: 31 PG
MCHC RBC AUTO-ENTMCNC: 33.4 G/DL
MCV RBC AUTO: 93 FL
MONOCYTES # BLD AUTO: 0.4 K/UL
MONOCYTES NFR BLD: 7.5 %
NEUTROPHILS # BLD AUTO: 2.6 K/UL
NEUTROPHILS NFR BLD: 44.4 %
PLATELET # BLD AUTO: 241 K/UL
PMV BLD AUTO: 9.3 FL
POTASSIUM SERPL-SCNC: 3.8 MMOL/L
PROT SERPL-MCNC: 7.1 G/DL
RBC # BLD AUTO: 4.13 M/UL
SODIUM SERPL-SCNC: 139 MMOL/L
WBC # BLD AUTO: 5.83 K/UL

## 2018-08-20 PROCEDURE — 99999 PR PBB SHADOW E&M-EST. PATIENT-LVL III: CPT | Mod: PBBFAC,,, | Performed by: OBSTETRICS & GYNECOLOGY

## 2018-08-20 PROCEDURE — 80053 COMPREHEN METABOLIC PANEL: CPT

## 2018-08-20 PROCEDURE — 99499 UNLISTED E&M SERVICE: CPT | Mod: S$GLB,,, | Performed by: OBSTETRICS & GYNECOLOGY

## 2018-08-20 PROCEDURE — 86703 HIV-1/HIV-2 1 RESULT ANTBDY: CPT

## 2018-08-20 PROCEDURE — 85025 COMPLETE CBC W/AUTO DIFF WBC: CPT

## 2018-08-20 RX ORDER — SODIUM CHLORIDE, SODIUM LACTATE, POTASSIUM CHLORIDE, CALCIUM CHLORIDE 600; 310; 30; 20 MG/100ML; MG/100ML; MG/100ML; MG/100ML
INJECTION, SOLUTION INTRAVENOUS CONTINUOUS
Status: CANCELLED | OUTPATIENT
Start: 2018-08-20

## 2018-08-20 NOTE — DISCHARGE INSTRUCTIONS
Your surgery is scheduled for___8/24/2018______________.    Call [phone] between 2 pm and 5 pm ___8/23/2018_________ to find out your arrival time for the day of surgery.    Report to SAME DAY SURGERY UNIT at _______am on the 2nd floor of the hospital.  Use the front entrance of the hospital.  The front doors of the hospital open promptly at 5:30 am.    If you need wheelchair assistance, call [phone] from your cell phone,  or call 0 from the courtesy phone in the hospital lobby.    Important instructions:   Do not eat or drink after 12 midnight, including water.  It is okay to brush your teeth.  Do not have gum, candy or mints.     Take only these medications with a small swallow of water on the morning of your surgery.__amlodipine, pantoprazole___________      Stop taking Aspirin, Ibuprofen, Motrin and Aleve , Fish oil, and Vitamin E for at least 7 days before your surgery. You may use Tylenol unless otherwise instructed by your doctor.          Return to the hospital lab on __8/22/2018 or 8/23/2018________for additional blood test.         Please shower the night before and the morning of your surgery.        Use Hibiclens soap to your surgery site if instructed by your pre op nurse.   If your surgery is on your abdomen, be sure to wash your naval.  Be sure to rinse off Hibiclens after it is on your skin for several minutes.  Do not use Hibiclens on your face or genitals. Please place clean linens on your bed the night before surgery. Please wear fresh clean clothing after each shower.     No shaving of procedural area at least 4-5 days before surgery due to increased risk of skin irritation and/or possible infection.      You may be asked to take a third shower on arrival to Same Day Surgery depending on the type of surgery you are having.     Female patients may be asked for a urine specimen on the morning of the surgery.  Please check with your nurse before using the restroom.     Do not wear make-  up, including mascara.     You may wear deodorant only.      Do not wear powder, body lotion or cologne.     Do not wear any jewelry or have any metal on your body.     Please bring any documents given to you by your doctor.     If you are going home on the same day of surgery, you must arrange for a family member or a friend to drive you home.  Public transportation is prohibited.    You will not be able to drive home if you were given anesthesia or sedation.     Wear loose fitting clothes allowing for bandages.     Please leave money and valuables home.       You may bring your cell phone.     Call the doctor if fever or illness should occur before your surgery.    Call [phone] to contact us here at [facility] if needed.

## 2018-08-23 ENCOUNTER — TELEPHONE (OUTPATIENT)
Dept: OBSTETRICS AND GYNECOLOGY | Facility: CLINIC | Age: 43
End: 2018-08-23

## 2018-08-23 NOTE — TELEPHONE ENCOUNTER
----- Message from Grace Hooper sent at 8/23/2018  1:48 PM CDT -----  Contact: Self   Patient would like to know what time she should be there for surgery in the morning. Please call at [phone]    Patient has been advised to call the surgery dept for the correct time she will need to be at the hospital for surgery

## 2018-08-24 ENCOUNTER — ANESTHESIA (OUTPATIENT)
Dept: SURGERY | Facility: HOSPITAL | Age: 43
DRG: 982 | End: 2018-08-24
Payer: COMMERCIAL

## 2018-08-24 ENCOUNTER — HOSPITAL ENCOUNTER (INPATIENT)
Facility: HOSPITAL | Age: 43
LOS: 2 days | Discharge: HOME | DRG: 982 | End: 2018-08-26
Attending: OBSTETRICS & GYNECOLOGY | Admitting: OBSTETRICS & GYNECOLOGY
Payer: COMMERCIAL

## 2018-08-24 ENCOUNTER — ANESTHESIA EVENT (OUTPATIENT)
Dept: SURGERY | Facility: HOSPITAL | Age: 43
DRG: 982 | End: 2018-08-24
Payer: COMMERCIAL

## 2018-08-24 ENCOUNTER — TELEPHONE (OUTPATIENT)
Dept: UROLOGY | Facility: CLINIC | Age: 43
End: 2018-08-24

## 2018-08-24 DIAGNOSIS — N70.11 HYDROSALPINX: ICD-10-CM

## 2018-08-24 DIAGNOSIS — R10.2 CHRONIC PELVIC PAIN IN FEMALE: ICD-10-CM

## 2018-08-24 DIAGNOSIS — R10.2 PELVIC PAIN: ICD-10-CM

## 2018-08-24 DIAGNOSIS — G89.29 CHRONIC PELVIC PAIN IN FEMALE: ICD-10-CM

## 2018-08-24 PROBLEM — Z90.710 S/P TAH (TOTAL ABDOMINAL HYSTERECTOMY): Status: ACTIVE | Noted: 2018-08-24

## 2018-08-24 LAB
B-HCG UR QL: NEGATIVE
POCT GLUCOSE: 119 MG/DL (ref 70–110)

## 2018-08-24 PROCEDURE — 63600175 PHARM REV CODE 636 W HCPCS: Performed by: OBSTETRICS & GYNECOLOGY

## 2018-08-24 PROCEDURE — 58150 TOTAL HYSTERECTOMY: CPT | Mod: 80,,, | Performed by: OBSTETRICS & GYNECOLOGY

## 2018-08-24 PROCEDURE — D9220A PRA ANESTHESIA: Mod: CRNA,,, | Performed by: NURSE ANESTHETIST, CERTIFIED REGISTERED

## 2018-08-24 PROCEDURE — 82962 GLUCOSE BLOOD TEST: CPT | Performed by: OBSTETRICS & GYNECOLOGY

## 2018-08-24 PROCEDURE — 63600175 PHARM REV CODE 636 W HCPCS: Performed by: NURSE ANESTHETIST, CERTIFIED REGISTERED

## 2018-08-24 PROCEDURE — C2628 CATHETER, OCCLUSION: HCPCS | Performed by: OBSTETRICS & GYNECOLOGY

## 2018-08-24 PROCEDURE — 25000003 PHARM REV CODE 250: Performed by: OBSTETRICS & GYNECOLOGY

## 2018-08-24 PROCEDURE — 36000711: Performed by: OBSTETRICS & GYNECOLOGY

## 2018-08-24 PROCEDURE — 58150 TOTAL HYSTERECTOMY: CPT | Mod: ,,, | Performed by: OBSTETRICS & GYNECOLOGY

## 2018-08-24 PROCEDURE — 0UT90ZZ RESECTION OF UTERUS, OPEN APPROACH: ICD-10-PCS | Performed by: OBSTETRICS & GYNECOLOGY

## 2018-08-24 PROCEDURE — 88307 TISSUE EXAM BY PATHOLOGIST: CPT | Performed by: PATHOLOGY

## 2018-08-24 PROCEDURE — 37000008 HC ANESTHESIA 1ST 15 MINUTES: Performed by: OBSTETRICS & GYNECOLOGY

## 2018-08-24 PROCEDURE — 27201423 OPTIME MED/SURG SUP & DEVICES STERILE SUPPLY: Performed by: OBSTETRICS & GYNECOLOGY

## 2018-08-24 PROCEDURE — 63600175 PHARM REV CODE 636 W HCPCS: Performed by: ANESTHESIOLOGY

## 2018-08-24 PROCEDURE — S0028 INJECTION, FAMOTIDINE, 20 MG: HCPCS | Performed by: NURSE ANESTHETIST, CERTIFIED REGISTERED

## 2018-08-24 PROCEDURE — 71000039 HC RECOVERY, EACH ADD'L HOUR: Performed by: OBSTETRICS & GYNECOLOGY

## 2018-08-24 PROCEDURE — 0TQB0ZZ REPAIR BLADDER, OPEN APPROACH: ICD-10-PCS | Performed by: UROLOGY

## 2018-08-24 PROCEDURE — 71000033 HC RECOVERY, INTIAL HOUR: Performed by: OBSTETRICS & GYNECOLOGY

## 2018-08-24 PROCEDURE — 37000009 HC ANESTHESIA EA ADD 15 MINS: Performed by: OBSTETRICS & GYNECOLOGY

## 2018-08-24 PROCEDURE — 81025 URINE PREGNANCY TEST: CPT

## 2018-08-24 PROCEDURE — 88307 TISSUE EXAM BY PATHOLOGIST: CPT | Mod: 26,,, | Performed by: PATHOLOGY

## 2018-08-24 PROCEDURE — D9220A PRA ANESTHESIA: Mod: ANES,,, | Performed by: ANESTHESIOLOGY

## 2018-08-24 PROCEDURE — 36000710: Performed by: OBSTETRICS & GYNECOLOGY

## 2018-08-24 PROCEDURE — 0UT70ZZ RESECTION OF BILATERAL FALLOPIAN TUBES, OPEN APPROACH: ICD-10-PCS | Performed by: OBSTETRICS & GYNECOLOGY

## 2018-08-24 PROCEDURE — 0DNW0ZZ RELEASE PERITONEUM, OPEN APPROACH: ICD-10-PCS | Performed by: OBSTETRICS & GYNECOLOGY

## 2018-08-24 PROCEDURE — 11000001 HC ACUTE MED/SURG PRIVATE ROOM

## 2018-08-24 PROCEDURE — 25000003 PHARM REV CODE 250: Performed by: NURSE ANESTHETIST, CERTIFIED REGISTERED

## 2018-08-24 PROCEDURE — 51865 REPAIR OF BLADDER WOUND: CPT | Mod: ,,, | Performed by: UROLOGY

## 2018-08-24 PROCEDURE — C1729 CATH, DRAINAGE: HCPCS | Performed by: OBSTETRICS & GYNECOLOGY

## 2018-08-24 RX ORDER — IBUPROFEN 600 MG/1
600 TABLET ORAL EVERY 6 HOURS
Status: DISCONTINUED | OUTPATIENT
Start: 2018-08-25 | End: 2018-08-26 | Stop reason: HOSPADM

## 2018-08-24 RX ORDER — SODIUM CHLORIDE, SODIUM LACTATE, POTASSIUM CHLORIDE, CALCIUM CHLORIDE 600; 310; 30; 20 MG/100ML; MG/100ML; MG/100ML; MG/100ML
INJECTION, SOLUTION INTRAVENOUS CONTINUOUS
Status: DISCONTINUED | OUTPATIENT
Start: 2018-08-24 | End: 2018-08-25

## 2018-08-24 RX ORDER — SODIUM CHLORIDE 0.9 % (FLUSH) 0.9 %
3 SYRINGE (ML) INJECTION
Status: DISCONTINUED | OUTPATIENT
Start: 2018-08-24 | End: 2018-08-24

## 2018-08-24 RX ORDER — PHENYLEPHRINE HYDROCHLORIDE 10 MG/ML
INJECTION INTRAVENOUS
Status: DISCONTINUED | OUTPATIENT
Start: 2018-08-24 | End: 2018-08-24

## 2018-08-24 RX ORDER — ROCURONIUM BROMIDE 10 MG/ML
INJECTION, SOLUTION INTRAVENOUS
Status: DISCONTINUED | OUTPATIENT
Start: 2018-08-24 | End: 2018-08-24

## 2018-08-24 RX ORDER — OXYCODONE AND ACETAMINOPHEN 10; 325 MG/1; MG/1
1 TABLET ORAL EVERY 4 HOURS PRN
Status: DISCONTINUED | OUTPATIENT
Start: 2018-08-24 | End: 2018-08-26 | Stop reason: HOSPADM

## 2018-08-24 RX ORDER — DIPHENHYDRAMINE HCL 25 MG
25 CAPSULE ORAL EVERY 4 HOURS PRN
Status: DISCONTINUED | OUTPATIENT
Start: 2018-08-24 | End: 2018-08-26 | Stop reason: HOSPADM

## 2018-08-24 RX ORDER — ONDANSETRON 8 MG/1
8 TABLET, ORALLY DISINTEGRATING ORAL EVERY 8 HOURS PRN
Status: DISCONTINUED | OUTPATIENT
Start: 2018-08-24 | End: 2018-08-26 | Stop reason: HOSPADM

## 2018-08-24 RX ORDER — OXYCODONE AND ACETAMINOPHEN 5; 325 MG/1; MG/1
1 TABLET ORAL EVERY 4 HOURS PRN
Status: DISCONTINUED | OUTPATIENT
Start: 2018-08-24 | End: 2018-08-26 | Stop reason: HOSPADM

## 2018-08-24 RX ORDER — AMLODIPINE BESYLATE 5 MG/1
5 TABLET ORAL DAILY
Status: DISCONTINUED | OUTPATIENT
Start: 2018-08-25 | End: 2018-08-26 | Stop reason: HOSPADM

## 2018-08-24 RX ORDER — BISACODYL 10 MG
10 SUPPOSITORY, RECTAL RECTAL DAILY PRN
Status: DISCONTINUED | OUTPATIENT
Start: 2018-08-24 | End: 2018-08-26 | Stop reason: HOSPADM

## 2018-08-24 RX ORDER — GLYCOPYRROLATE 0.2 MG/ML
INJECTION INTRAMUSCULAR; INTRAVENOUS
Status: DISCONTINUED | OUTPATIENT
Start: 2018-08-24 | End: 2018-08-24

## 2018-08-24 RX ORDER — ACETAMINOPHEN 10 MG/ML
INJECTION, SOLUTION INTRAVENOUS
Status: DISCONTINUED | OUTPATIENT
Start: 2018-08-24 | End: 2018-08-24

## 2018-08-24 RX ORDER — LIDOCAINE HCL/PF 100 MG/5ML
SYRINGE (ML) INTRAVENOUS
Status: DISCONTINUED | OUTPATIENT
Start: 2018-08-24 | End: 2018-08-24

## 2018-08-24 RX ORDER — LORAZEPAM 2 MG/ML
0.25 INJECTION INTRAMUSCULAR ONCE
Status: DISCONTINUED | OUTPATIENT
Start: 2018-08-24 | End: 2018-08-24

## 2018-08-24 RX ORDER — SODIUM CHLORIDE, SODIUM LACTATE, POTASSIUM CHLORIDE, CALCIUM CHLORIDE 600; 310; 30; 20 MG/100ML; MG/100ML; MG/100ML; MG/100ML
INJECTION, SOLUTION INTRAVENOUS CONTINUOUS
Status: DISCONTINUED | OUTPATIENT
Start: 2018-08-24 | End: 2018-08-24

## 2018-08-24 RX ORDER — PROPOFOL 10 MG/ML
VIAL (ML) INTRAVENOUS
Status: DISCONTINUED | OUTPATIENT
Start: 2018-08-24 | End: 2018-08-24

## 2018-08-24 RX ORDER — METOCLOPRAMIDE HYDROCHLORIDE 5 MG/ML
INJECTION INTRAMUSCULAR; INTRAVENOUS
Status: DISCONTINUED | OUTPATIENT
Start: 2018-08-24 | End: 2018-08-24

## 2018-08-24 RX ORDER — KETOROLAC TROMETHAMINE 30 MG/ML
30 INJECTION, SOLUTION INTRAMUSCULAR; INTRAVENOUS EVERY 6 HOURS
Status: COMPLETED | OUTPATIENT
Start: 2018-08-24 | End: 2018-08-25

## 2018-08-24 RX ORDER — SUCCINYLCHOLINE CHLORIDE 20 MG/ML
INJECTION INTRAMUSCULAR; INTRAVENOUS
Status: DISCONTINUED | OUTPATIENT
Start: 2018-08-24 | End: 2018-08-24

## 2018-08-24 RX ORDER — FAMOTIDINE 10 MG/ML
INJECTION INTRAVENOUS
Status: DISCONTINUED | OUTPATIENT
Start: 2018-08-24 | End: 2018-08-24

## 2018-08-24 RX ORDER — MIDAZOLAM HYDROCHLORIDE 1 MG/ML
INJECTION, SOLUTION INTRAMUSCULAR; INTRAVENOUS
Status: DISCONTINUED | OUTPATIENT
Start: 2018-08-24 | End: 2018-08-24

## 2018-08-24 RX ORDER — MUPIROCIN 20 MG/G
1 OINTMENT TOPICAL 2 TIMES DAILY
Status: DISCONTINUED | OUTPATIENT
Start: 2018-08-24 | End: 2018-08-26 | Stop reason: HOSPADM

## 2018-08-24 RX ORDER — ONDANSETRON 2 MG/ML
INJECTION INTRAMUSCULAR; INTRAVENOUS
Status: DISCONTINUED | OUTPATIENT
Start: 2018-08-24 | End: 2018-08-24

## 2018-08-24 RX ORDER — CEFAZOLIN SODIUM 2 G/50ML
2 SOLUTION INTRAVENOUS
Status: COMPLETED | OUTPATIENT
Start: 2018-08-24 | End: 2018-08-24

## 2018-08-24 RX ORDER — LORAZEPAM 0.5 MG/1
0.5 TABLET ORAL EVERY 6 HOURS PRN
Status: DISCONTINUED | OUTPATIENT
Start: 2018-08-24 | End: 2018-08-26 | Stop reason: HOSPADM

## 2018-08-24 RX ORDER — FENTANYL CITRATE 50 UG/ML
INJECTION, SOLUTION INTRAMUSCULAR; INTRAVENOUS
Status: DISCONTINUED | OUTPATIENT
Start: 2018-08-24 | End: 2018-08-24

## 2018-08-24 RX ORDER — PANTOPRAZOLE SODIUM 40 MG/1
40 TABLET, DELAYED RELEASE ORAL DAILY
Status: DISCONTINUED | OUTPATIENT
Start: 2018-08-25 | End: 2018-08-26 | Stop reason: HOSPADM

## 2018-08-24 RX ORDER — NALOXONE HCL 0.4 MG/ML
0.02 VIAL (ML) INJECTION
Status: DISCONTINUED | OUTPATIENT
Start: 2018-08-24 | End: 2018-08-26 | Stop reason: HOSPADM

## 2018-08-24 RX ORDER — HYDROMORPHONE HCL IN 0.9% NACL 6 MG/30 ML
PATIENT CONTROLLED ANALGESIA SYRINGE INTRAVENOUS CONTINUOUS
Status: DISCONTINUED | OUTPATIENT
Start: 2018-08-24 | End: 2018-08-26 | Stop reason: HOSPADM

## 2018-08-24 RX ORDER — NEOSTIGMINE METHYLSULFATE 1 MG/ML
INJECTION, SOLUTION INTRAVENOUS
Status: DISCONTINUED | OUTPATIENT
Start: 2018-08-24 | End: 2018-08-24

## 2018-08-24 RX ORDER — HYDROMORPHONE HYDROCHLORIDE 2 MG/ML
0.2 INJECTION, SOLUTION INTRAMUSCULAR; INTRAVENOUS; SUBCUTANEOUS EVERY 5 MIN PRN
Status: COMPLETED | OUTPATIENT
Start: 2018-08-24 | End: 2018-08-24

## 2018-08-24 RX ADMIN — MIDAZOLAM HYDROCHLORIDE 2 MG: 1 INJECTION, SOLUTION INTRAMUSCULAR; INTRAVENOUS at 07:08

## 2018-08-24 RX ADMIN — FENTANYL CITRATE 50 MCG: 50 INJECTION INTRAMUSCULAR; INTRAVENOUS at 10:08

## 2018-08-24 RX ADMIN — SODIUM CHLORIDE, SODIUM LACTATE, POTASSIUM CHLORIDE, AND CALCIUM CHLORIDE: .6; .31; .03; .02 INJECTION, SOLUTION INTRAVENOUS at 07:08

## 2018-08-24 RX ADMIN — ROCURONIUM BROMIDE 20 MG: 10 INJECTION, SOLUTION INTRAVENOUS at 07:08

## 2018-08-24 RX ADMIN — HYDROMORPHONE HYDROCHLORIDE 0.2 MG: 2 INJECTION, SOLUTION INTRAMUSCULAR; INTRAVENOUS; SUBCUTANEOUS at 10:08

## 2018-08-24 RX ADMIN — ROCURONIUM BROMIDE 10 MG: 10 INJECTION, SOLUTION INTRAVENOUS at 08:08

## 2018-08-24 RX ADMIN — ACETAMINOPHEN 1000 MG: 10 INJECTION, SOLUTION INTRAVENOUS at 10:08

## 2018-08-24 RX ADMIN — FENTANYL CITRATE 50 MCG: 50 INJECTION INTRAMUSCULAR; INTRAVENOUS at 08:08

## 2018-08-24 RX ADMIN — HYDROMORPHONE HYDROCHLORIDE 0.2 MG: 2 INJECTION, SOLUTION INTRAMUSCULAR; INTRAVENOUS; SUBCUTANEOUS at 11:08

## 2018-08-24 RX ADMIN — LIDOCAINE HYDROCHLORIDE 100 MG: 20 INJECTION, SOLUTION INTRAVENOUS at 07:08

## 2018-08-24 RX ADMIN — METOCLOPRAMIDE 10 MG: 5 INJECTION, SOLUTION INTRAMUSCULAR; INTRAVENOUS at 07:08

## 2018-08-24 RX ADMIN — KETOROLAC TROMETHAMINE 30 MG: 30 INJECTION INTRAMUSCULAR; INTRAVENOUS at 11:08

## 2018-08-24 RX ADMIN — KETOROLAC TROMETHAMINE 30 MG: 30 INJECTION INTRAMUSCULAR; INTRAVENOUS at 05:08

## 2018-08-24 RX ADMIN — CEFAZOLIN SODIUM 2 G: 2 SOLUTION INTRAVENOUS at 07:08

## 2018-08-24 RX ADMIN — ONDANSETRON 4 MG: 2 INJECTION, SOLUTION INTRAMUSCULAR; INTRAVENOUS at 09:08

## 2018-08-24 RX ADMIN — SODIUM CHLORIDE, SODIUM LACTATE, POTASSIUM CHLORIDE, AND CALCIUM CHLORIDE: .6; .31; .03; .02 INJECTION, SOLUTION INTRAVENOUS at 08:08

## 2018-08-24 RX ADMIN — PHENYLEPHRINE HYDROCHLORIDE 100 MCG: 10 INJECTION INTRAVENOUS at 08:08

## 2018-08-24 RX ADMIN — FAMOTIDINE 20 MG: 10 INJECTION, SOLUTION INTRAVENOUS at 07:08

## 2018-08-24 RX ADMIN — SUCCINYLCHOLINE CHLORIDE 120 MG: 20 INJECTION, SOLUTION INTRAMUSCULAR; INTRAVENOUS at 07:08

## 2018-08-24 RX ADMIN — Medication: at 11:08

## 2018-08-24 RX ADMIN — GLYCOPYRROLATE 0.5 MG: 0.2 INJECTION, SOLUTION INTRAMUSCULAR; INTRAVENOUS at 10:08

## 2018-08-24 RX ADMIN — SODIUM CHLORIDE, SODIUM LACTATE, POTASSIUM CHLORIDE, AND CALCIUM CHLORIDE: .6; .31; .03; .02 INJECTION, SOLUTION INTRAVENOUS at 10:08

## 2018-08-24 RX ADMIN — FENTANYL CITRATE 25 MCG: 50 INJECTION INTRAMUSCULAR; INTRAVENOUS at 10:08

## 2018-08-24 RX ADMIN — ROCURONIUM BROMIDE 10 MG: 10 INJECTION, SOLUTION INTRAVENOUS at 07:08

## 2018-08-24 RX ADMIN — MUPIROCIN 1 G: 20 OINTMENT TOPICAL at 11:08

## 2018-08-24 RX ADMIN — NEOSTIGMINE METHYLSULFATE 5 MG: 1 INJECTION INTRAVENOUS at 10:08

## 2018-08-24 RX ADMIN — OXYCODONE HYDROCHLORIDE AND ACETAMINOPHEN 1 TABLET: 10; 325 TABLET ORAL at 07:08

## 2018-08-24 RX ADMIN — FENTANYL CITRATE 100 MCG: 50 INJECTION INTRAMUSCULAR; INTRAVENOUS at 07:08

## 2018-08-24 RX ADMIN — PROPOFOL 160 MG: 10 INJECTION, EMULSION INTRAVENOUS at 07:08

## 2018-08-24 RX ADMIN — KETOROLAC TROMETHAMINE 30 MG: 30 INJECTION INTRAMUSCULAR; INTRAVENOUS at 10:08

## 2018-08-24 RX ADMIN — LORAZEPAM 0.5 MG: 0.5 TABLET ORAL at 07:08

## 2018-08-24 RX ADMIN — ONDANSETRON 8 MG: 8 TABLET, ORALLY DISINTEGRATING ORAL at 06:08

## 2018-08-24 RX ADMIN — SODIUM CHLORIDE, SODIUM LACTATE, POTASSIUM CHLORIDE, AND CALCIUM CHLORIDE: .6; .31; .03; .02 INJECTION, SOLUTION INTRAVENOUS at 03:08

## 2018-08-24 RX ADMIN — GLYCOPYRROLATE 0.2 MG: 0.2 INJECTION, SOLUTION INTRAMUSCULAR; INTRAVENOUS at 07:08

## 2018-08-24 RX ADMIN — SODIUM CHLORIDE, SODIUM LACTATE, POTASSIUM CHLORIDE, AND CALCIUM CHLORIDE: .6; .31; .03; .02 INJECTION, SOLUTION INTRAVENOUS at 11:08

## 2018-08-24 NOTE — OP NOTE
DATE OF PROCEDURE:  08/24/2018.    PREOPERATIVE DIAGNOSES:  Pelvic pain and hydrosalpinx.    POSTOPERATIVE DIAGNOSES:  Pelvic pain and hydrosalpinx plus intraoperative   cystotomy.    PROCEDURE PERFORMED:  Closure of intraoperative cystotomy and Baltazar catheter   placement.    CONSULTING SURGEON:  Raghav Barroso M.D.    ANESTHESIA:  General.    ESTIMATED BLOOD LOSS:  Minimal.    DRAINS:  A 19-round Allen drain and 16-Malawian Baltazar catheter.    SPECIMENS REMOVED:  None.    COMPLICATIONS:  None.    INDICATIONS:  Tasha Rush is a 42-year-old woman with pelvic pain, history of   pelvic inflammatory disease and hydrosalpinx.  She is currently undergoing   total laparoscopic hysterectomy by Dr. Hardy Bueno.  During that procedure, it   was discovered that a cystotomy had been performed during dissection of the   uterus off the peritoneum.  Intraoperative Urology consult was requested.    Tasha Rush was in the Operating Room under anesthesia.  Dr. Bueno and Dr. Salmon had converted to open with a Pfannenstiel incision.  They performed   their hysterectomy and were now ready for me for cystotomy closure.    Her abdomen was inspected.  The anterior peritoneum was identified.  Then, the   cystotomy was identified.  The cystotomy was approximately 10 cm in length at   the dome of the bladder.  The bladder was inspected.  A new Baltazar catheter was   placed by me under sterile conditions.    The Baltazar was once again identified and the trigone was identified.  It was seen   to be well away from the cystotomy.  There was no evidence of any other injury   to the bladder.    I then closed the cystotomy in a transverse fashion starting with the urothelium   and with a 2-0 Vicryl suture, I performed the closure in a running locking   fashion.    I then reapproximated the detrusor muscle over the urothelial closure again with   a 2-0 Vicryl suture in a running locking fashion.    The bladder was then instilled with 240  mL of sterile water through the Batlazar   catheter.  There was no evidence of any leakage at this point.    Due to the large nature of the cystotomy, I decided that a drain would be   needed.    I made separate stab wounds using electrocautery.  I then placed a tonsil clamp   through this incision into the abdomen.  The drain was then brought out through   this incision and the anterior portion of the drain was then placed near the   bladder.    The drain was secured to the skin with 2-0 nylon suture.    Ms. Rush was then transferred back to when Dr. Bueno for closure of the   abdomen.    Sponge counts, needle counts and instrument counts were reported as correct at   my portion of the case.      BENJAMIN/IN  dd: 08/24/2018 10:00:24 (CDT)  td: 08/24/2018 11:44:24 (CDT)  Doc ID   #[id number]  Job ID #[id number]    CC:

## 2018-08-24 NOTE — PROGRESS NOTES
"Patient c/o feeling "anxious."  Patient started using diladid pump x3 attempts  after not using it for 2 hours. Anxiety symptoms  Ie; tightness in center of chest,  Flushed then cold, startled after dosing for 3 min.and nausea. Dr. Bueno here to evaluate.  Diladid discontinued Iv flushed. Toradol given ivp.  Patient exhibited relief from symptoms after one hour.   "

## 2018-08-24 NOTE — H&P
[facility]  History & Physical  Obstetrics & Gynecology    Visit Date:  8/20/2018    Chief Complaint:  Pre-op for total laparoscopic hysterectomy     History of Present Illness:      Tasha Rush is a 42 y.o. G0 here for pre-op for a total laparoscopic hysterectomy.    Pt is requesting definitive treatment with hysterectomy due to chronic pelvic pain, recurrent bilateral hydrosalpinx, and abnormal uterine bleeding.    Pt is resolute in her decision to proceed with surgery and has declined further medical management or other conservative therapies.      Otherwise, pt is in her usual state of health.    Past Medical History:      Diagnosis Date    Abnormal Pap smear of cervix     Bacterial vaginosis     RECURRENT    Cervical dysplasia     Gastritis     Hypertension      Past Surgical History:      Procedure Laterality Date    CERVICAL BIOPSY  W/ LOOP ELECTRODE EXCISION      fibroid removal      HERNIA REPAIR  2009    umbilical    MYOMECTOMY       Medications:     Current Outpatient Medications on File Prior to Visit   Medication Sig Dispense Refill    amlodipine (NORVASC) 10 MG tablet Take 1 tablet (10 mg total) by mouth once daily. 90 tablet 1    ergocalciferol (ERGOCALCIFEROL) 50,000 unit Cap Take 1 capsule (50,000 Units total) by mouth every 7 days. 4 capsule 1    hydrochlorothiazide (HYDRODIURIL) 25 MG tablet Take 1 tablet (25 mg total) by mouth once daily. 90 tablet 1    pantoprazole (PROTONIX) 20 MG tablet Take 1 tablet (20 mg total) by mouth once daily. 30 tablet 11     Allergies:     Review of patient's allergies indicates:   Allergen Reactions    Ace inhibitors Other (See Comments)     cough     Social History:      Denies tobacco, alcohol abuse or illicit drug use  Denies domestic abuse     Family History:       Problem Relation Age of Onset    Diabetes Father     Hyperlipidemia Father     Diabetes Mother     Multiple sclerosis Mother     No Known Problems  "Brother     Colon cancer Paternal Uncle     Breast cancer Neg Hx     Ovarian cancer Neg Hx      Review of Systems:      GENERAL:  No fever, fatigue, excessive weight gain or loss  HEENT:  No head injury, headaches, hearing changes, visual disturbance  RESPIRATORY:  No cough, shortness of breath  CARDIOVASCULAR:  No chest pain, heart palpitations, leg swelling  GASTROINTESTINAL:  No nausea, vomiting, constipation, diarrhea, abd pain, rectal bleeding   GENITOURINARY:  See HPI.  HEMATOLOGIC:  No easy bruisability or bleeding   LYMPHATICS:  No enlarged nodes  MUSCULOSKELETAL:  No joint pain or swelling  SKIN:  No rash, lesions, jaundice  NEUROLOGIC:  No dizziness, weakness, syncope  PSYCHIATRIC:  No depression, anxiety or mood swings     Physical Exam:     /80   Resp 15   Ht 5' 6" (1.676 m)   Wt 76.3 kg (168 lb 3.4 oz)   LMP 2018   BMI 27.15 kg/m²      GENERAL: NAD, well-nourished  HEENT: NCAT, moist mucus membranes, anicteric, neck supple  LUNGS: CTA-B  HEART: RRR  ABDOMEN: Soft, non-tender. Normoactive BS. No obvious organomegaly.  EXT: Symmetric w/o cramping, claudication, or edema. +2 distal pulses.  SKIN: No rashes or bruising  NEURO: Grossly intact bilaterally  PSYCH: Mood and affect appropriate    GENITOURINARY:    VULVAR:  Female external genitalia w/o obvious lesions. Normal urethral meatus. No gross lymphadenopathy.   VAGINA:  Pink, moist, well-rugated. Good support. No obvious lesion. No discharge.  CERVIX:  No cervical motion tenderness, discharge, or obvious lesions.   UTERUS:  Small, non-tender, normal contour  ADNEXA:  Bilateral adnexal fullness and tenderness to palpation    RECTAL:  Declined. No obvious external lesions  WET PREP:  Negative     Labs/studies:    Lab Results   Component Value Date    WBC 5.83 2018    HGB 12.8 2018    HCT 38.3 2018    MCV 93 2018     2018     Assessment:     42 y.o.  with:    1. Chronic pelvic " pain  2. Recurrent bilateral hydrosalpinx  3. Abnormal uterine bleeding    Plan:    We discussed her condition in great detail.  The proposed procedure will be a total laparoscopic hysterectomy, bilateral salpingectomy, and ovarian conservation (if the ovaries are not damaged or diseased) if feasible all of which as determined at the time of surgery.  Pt has declined medical management or other conservative therapies, and is resolute in her decision to proceed with surgery.        Pt was informed of the risks, benefits, and alternatives of total laparoscopic hysterectomy.  Risks included but were not limited to bleeding, infection, injury to surrounding organs or tissues, injury to bladder and/or urinary tract, injury to bowel and/or intestinal obstruction, damage to major blood vessels, hemorrhage requiring transfusion of blood products, ovarian failure requiring hormone administration, pulmonary embolism, fistula formation, urinary and/or fecal incontinence, sexual dysfunction/pain, chronic pain, hernia, failure of wound to heal, permanent/disfiguring scarring, and even death.  In the event of a supracervical hysterectomy, she was also counseled on the need for long term follow-up with pap smears and the possibility of future trachelectomy.  Pt was counseled extensively on the inability to become pregnant following a hysterectomy and expressed an understanding of this.  Pt was also counseled that a bilateral oophorectomy will result in surgical menopause for pre/perimenopausal women and the long term health consequences thereof.  Pt was counseled in the possibility of laparotomy if extensive adhesions or bleeding were encountered.  Pt was counseled on the cancer risk associated with the use of a uterine morcellator.  Pt was counseled that hysterectomy and/or oophorectomy may not result in the resolution of her pain symptoms.  Pt was also counseled on the risk of uterine and/or ovarian cancer diagnosed on post-op  pathology which may require additional future surgery and/or treatment.  The risks, benefits, and alternatives to blood transfusion was also discussed.  Pt expressed an understanding of risks involved, all questions answered, and informed consent was obtained for the procedure and blood transfusion.    Surgery tentatively scheduled for 8/24/2018.    Pre-op instructions reviewed.      All questions answered, pt voiced understanding.        Hardy Bueno MD

## 2018-08-24 NOTE — TELEPHONE ENCOUNTER
Lt message for pt to contact office regarding a appt with mel rick for 10am on next Thursday 08/30/18.-yunior

## 2018-08-24 NOTE — INTERVAL H&P NOTE
The patient has been examined and the H&P has been reviewed:    I concur with the findings and no changes have occurred since H&P was written.    Anesthesia/Surgery risks, benefits and alternative options discussed and understood by patient/family.          Active Hospital Problems    Diagnosis  POA    Pelvic pain [R10.2]  Yes      Resolved Hospital Problems   No resolved problems to display.

## 2018-08-24 NOTE — TELEPHONE ENCOUNTER
----- Message from REID Barroso MD sent at 8/24/2018 10:02 AM CDT -----  S/p cystotomy closure today 8/24/2018  She needs to see me or Hue in a week to set up cystogram

## 2018-08-24 NOTE — OP NOTE
[id number]      Keep Baltazar x 2 weeks  Macrobid with Baltazar  Drain can come out prior to discharge if drainage is low    Follow up in 1 week to set up cystogram

## 2018-08-24 NOTE — TRANSFER OF CARE
"Anesthesia Transfer of Care Note    Patient: Tasha Rush    Procedure(s) Performed: Procedure(s) (LRB):  HYSTERECTOMY, TOTAL, LAPAROSCOPIC, Attempted (N/A)  HYSTERECTOMY, TOTAL, ABDOMINAL (N/A)  CYSTOTOMY FOR BLADDER PERFORATION (N/A)    Patient location: PACU    Anesthesia Type: general    Transport from OR: Transported from OR on room air with adequate spontaneous ventilation    Post pain: adequate analgesia    Post assessment: tolerated procedure well and no apparent anesthetic complications    Post vital signs: stable    Level of consciousness: awake, alert and oriented    Nausea/Vomiting: no nausea/vomiting    Complications: none    Transfer of care protocol was followed      Last vitals:   Visit Vitals  /74 (BP Location: Left arm, Patient Position: Lying)   Pulse 81   Temp 36.8 °C (98.3 °F) (Oral)   Resp 14   Ht 5' 6" (1.676 m)   Wt 76.2 kg (168 lb)   LMP 08/14/2018   SpO2 97%   BMI 27.12 kg/m²     "

## 2018-08-24 NOTE — ANESTHESIA POSTPROCEDURE EVALUATION
"Anesthesia Post Evaluation    Patient: Tasha Rush    Procedure(s) Performed: Procedure(s) (LRB):  HYSTERECTOMY, TOTAL, LAPAROSCOPIC, Attempted (N/A)  HYSTERECTOMY, TOTAL, ABDOMINAL (N/A)  CYSTOTOMY FOR BLADDER PERFORATION (N/A)    Final Anesthesia Type: general  Patient location during evaluation: PACU  Patient participation: Yes- Able to Participate  Level of consciousness: awake and alert, oriented and awake  Post-procedure vital signs: reviewed and stable  Pain management: adequate  Airway patency: patent  PONV status at discharge: No PONV  Anesthetic complications: no      Cardiovascular status: blood pressure returned to baseline, hemodynamically stable and stable  Respiratory status: unassisted and spontaneous ventilation  Hydration status: euvolemic  Follow-up not needed.        Visit Vitals  BP (!) 117/58   Pulse 64   Temp 36.8 °C (98.3 °F)   Resp 16   Ht 5' 6" (1.676 m)   Wt 76.2 kg (168 lb)   LMP 08/14/2018   SpO2 95%   BMI 27.12 kg/m²       Pain/Igor Score: Pain Assessment Performed: Yes (8/24/2018 10:21 AM)  Presence of Pain: non-verbal indicators absent (8/24/2018 10:21 AM)  Pain Rating Prior to Med Admin: 8 (8/24/2018 11:22 AM)  Pain Rating Post Med Admin: 3 (8/24/2018 12:37 PM)  Igor Score: 10 (8/24/2018 12:37 PM)        "

## 2018-08-24 NOTE — BRIEF OP NOTE
Surgery Date: 8/24/2018    Preoperative Dx:hydrosalpinx    Postoperative Dx:same, intraoperative cystotomy     Surgeon: RAS Barroso MD    Anesthesia: General    Procedure:Intraoperative Cystotomy closure    Findings/Key Components:Cystotomy occurred during scheduled TLH.  Approximately 10 cm cystotomy observed at dome of bladder. Closed with 2 layers    Estimated Blood Loss: minimal         Specimens Removed:   Specimen (12h ago, onward)    None          Drains:19 Round Allen  16 Fr Baltazar

## 2018-08-24 NOTE — ANESTHESIA PREPROCEDURE EVALUATION
08/24/2018  Tasha Rush is a 42 y.o., female.    Anesthesia Evaluation    I have reviewed the Patient Summary Reports.     I have reviewed the Medications.     Review of Systems  Anesthesia Hx:  No previous Anesthesia    Social:  Non-Smoker    Hematology/Oncology:  Hematology Normal   Oncology Normal     EENT/Dental:EENT/Dental Normal   Cardiovascular:   Hypertension    Pulmonary:  Pulmonary Normal    Renal/:  Renal/ Normal     Hepatic/GI:  Hepatic/GI Normal    Musculoskeletal:  Musculoskeletal Normal    Neurological:   Headaches    Endocrine:  Endocrine Normal    Dermatological:  Skin Normal    Psych:  Psychiatric Normal           Physical Exam  General:  Well nourished    Airway/Jaw/Neck:  Airway Findings: Mallampati: II TM Distance: 4 - 6 cm      Dental:  DENTAL FINDINGS: Normal   Chest/Lungs:  Chest/Lungs Clear    Heart/Vascular:  Heart Findings: Normal       Mental Status:  Mental Status Findings:  Cooperative, Alert and Oriented         Anesthesia Plan  Type of Anesthesia, risks & benefits discussed:  Anesthesia Type:  general  Patient's Preference:   Intra-op Monitoring Plan: standard ASA monitors  Intra-op Monitoring Plan Comments:   Post Op Pain Control Plan: multimodal analgesia, IV/PO Opioids PRN and per primary service following discharge from PACU  Post Op Pain Control Plan Comments:   Induction:    Beta Blocker:  Patient is not currently on a Beta-Blocker (No further documentation required).       Informed Consent: Patient understands risks and agrees with Anesthesia plan.  Questions answered. Anesthesia consent signed with patient.  ASA Score: 2     Day of Surgery Review of History & Physical:    H&P update referred to the provider.  H&P completed by Anesthesiologist.   Anesthesia Plan Notes: npo        Ready For Surgery From Anesthesia Perspective.

## 2018-08-25 LAB
BASOPHILS # BLD AUTO: 0 K/UL
BASOPHILS NFR BLD: 0 %
DIFFERENTIAL METHOD: ABNORMAL
EOSINOPHIL # BLD AUTO: 0.1 K/UL
EOSINOPHIL NFR BLD: 0.9 %
ERYTHROCYTE [DISTWIDTH] IN BLOOD BY AUTOMATED COUNT: 13.3 %
HCT VFR BLD AUTO: 30.9 %
HGB BLD-MCNC: 10.3 G/DL
LYMPHOCYTES # BLD AUTO: 0.7 K/UL
LYMPHOCYTES NFR BLD: 10.7 %
MCH RBC QN AUTO: 30.7 PG
MCHC RBC AUTO-ENTMCNC: 33.3 G/DL
MCV RBC AUTO: 92 FL
MONOCYTES # BLD AUTO: 0.5 K/UL
MONOCYTES NFR BLD: 7.8 %
NEUTROPHILS # BLD AUTO: 5.4 K/UL
NEUTROPHILS NFR BLD: 80.6 %
PLATELET # BLD AUTO: 188 K/UL
PMV BLD AUTO: 9.6 FL
RBC # BLD AUTO: 3.36 M/UL
WBC # BLD AUTO: 6.66 K/UL

## 2018-08-25 PROCEDURE — 36415 COLL VENOUS BLD VENIPUNCTURE: CPT

## 2018-08-25 PROCEDURE — 85025 COMPLETE CBC W/AUTO DIFF WBC: CPT

## 2018-08-25 PROCEDURE — 25000003 PHARM REV CODE 250: Performed by: OBSTETRICS & GYNECOLOGY

## 2018-08-25 PROCEDURE — 63600175 PHARM REV CODE 636 W HCPCS: Performed by: OBSTETRICS & GYNECOLOGY

## 2018-08-25 PROCEDURE — 11000001 HC ACUTE MED/SURG PRIVATE ROOM

## 2018-08-25 PROCEDURE — 94799 UNLISTED PULMONARY SVC/PX: CPT

## 2018-08-25 RX ORDER — NITROFURANTOIN 25; 75 MG/1; MG/1
100 CAPSULE ORAL EVERY 12 HOURS
Status: DISCONTINUED | OUTPATIENT
Start: 2018-08-25 | End: 2018-08-26 | Stop reason: HOSPADM

## 2018-08-25 RX ORDER — SIMETHICONE 80 MG
1 TABLET,CHEWABLE ORAL 3 TIMES DAILY PRN
Status: DISCONTINUED | OUTPATIENT
Start: 2018-08-25 | End: 2018-08-26 | Stop reason: HOSPADM

## 2018-08-25 RX ORDER — DOCUSATE SODIUM 100 MG/1
100 CAPSULE, LIQUID FILLED ORAL 2 TIMES DAILY
Status: DISCONTINUED | OUTPATIENT
Start: 2018-08-25 | End: 2018-08-26 | Stop reason: HOSPADM

## 2018-08-25 RX ADMIN — SODIUM CHLORIDE, SODIUM LACTATE, POTASSIUM CHLORIDE, AND CALCIUM CHLORIDE: .6; .31; .03; .02 INJECTION, SOLUTION INTRAVENOUS at 11:08

## 2018-08-25 RX ADMIN — DOCUSATE SODIUM 100 MG: 100 CAPSULE, LIQUID FILLED ORAL at 09:08

## 2018-08-25 RX ADMIN — OXYCODONE HYDROCHLORIDE AND ACETAMINOPHEN 1 TABLET: 10; 325 TABLET ORAL at 02:08

## 2018-08-25 RX ADMIN — PANTOPRAZOLE SODIUM 40 MG: 40 TABLET, DELAYED RELEASE ORAL at 08:08

## 2018-08-25 RX ADMIN — MUPIROCIN 1 G: 20 OINTMENT TOPICAL at 08:08

## 2018-08-25 RX ADMIN — NITROFURANTOIN (MONOHYDRATE/MACROCRYSTALS) 100 MG: 75; 25 CAPSULE ORAL at 08:08

## 2018-08-25 RX ADMIN — NITROFURANTOIN (MONOHYDRATE/MACROCRYSTALS) 100 MG: 75; 25 CAPSULE ORAL at 09:08

## 2018-08-25 RX ADMIN — SIMETHICONE CHEW TAB 80 MG 80 MG: 80 TABLET ORAL at 02:08

## 2018-08-25 RX ADMIN — AMLODIPINE BESYLATE 5 MG: 5 TABLET ORAL at 08:08

## 2018-08-25 RX ADMIN — OXYCODONE HYDROCHLORIDE AND ACETAMINOPHEN 1 TABLET: 5; 325 TABLET ORAL at 01:08

## 2018-08-25 RX ADMIN — IBUPROFEN 600 MG: 600 TABLET ORAL at 01:08

## 2018-08-25 RX ADMIN — KETOROLAC TROMETHAMINE 30 MG: 30 INJECTION INTRAMUSCULAR; INTRAVENOUS at 05:08

## 2018-08-25 RX ADMIN — MUPIROCIN 1 G: 20 OINTMENT TOPICAL at 09:08

## 2018-08-25 RX ADMIN — OXYCODONE HYDROCHLORIDE AND ACETAMINOPHEN 1 TABLET: 10; 325 TABLET ORAL at 09:08

## 2018-08-25 RX ADMIN — OXYCODONE HYDROCHLORIDE AND ACETAMINOPHEN 1 TABLET: 10; 325 TABLET ORAL at 05:08

## 2018-08-25 RX ADMIN — BISACODYL 10 MG: 10 SUPPOSITORY RECTAL at 09:08

## 2018-08-25 RX ADMIN — IBUPROFEN 600 MG: 600 TABLET ORAL at 06:08

## 2018-08-25 NOTE — PLAN OF CARE
Problem: Patient Care Overview  Goal: Plan of Care Review  Pt has a patent urinary catheter, urine is clear yellow. Vaginal bleed scant. VSS. No acute distress noted. Incision is intact. Serosanguinous drainage noted to right side of incision. Reapplied steri strips to that area. JORGE drain site is currently draining, applied gauze. Reinforced area with abd pad. JORGE drain bulb has sanginous fluid. Island dressing x2 intact. Abdomen is distended, has not passed flatus. Ambulated in mccoy x1 tolerated well. Tolerating PO. Pt no longer anxious. Resting in between care.

## 2018-08-25 NOTE — PROGRESS NOTES
Educated patient on how to empty her JORGE drain and close it back creating suction. Patient was able to demonstrate that she could do it via my verbal instruction.

## 2018-08-25 NOTE — PROGRESS NOTES
Patient ambulating in the hallway with a visitor. Patient reported that her pain is lower than before. Patient walking with a steady gait. Bowel movement reported.

## 2018-08-25 NOTE — PROGRESS NOTES
Applied pre-cut dressing 4X4 dressing around JORGE drain site and applied medipore tape to secure it to the patient's skin. Patient reported no bowel movements in 2 days- administered bisacodyl to the patient's rectum to stimulate bowel. Patient's JORGE drain continues to put out sero-sanguinous fluid. Patient complaining of gas pain and back pain. Administered oral percocet 10 to address pain.

## 2018-08-25 NOTE — OP NOTE
[facility]   Operative Report   Obstetrics & Gynecology     Date of Surgery:  8/24/2018     Surgeon:  Hardy Bueno MD     Assistant:  Beka Salmon MD        Preoperative diagnosis:     Chronic pelvic pain  Recurrent bilateral hydrosalpinx  Abnormal uterine bleeding     Postoperative diagnosis:     Chronic pelvic pain  Recurrent bilateral hydrosalpinx  Abnormal uterine bleeding  Dense pelvic adhesions  Incidental cystotomy and repair    Procedure performed:      Attempted total laparoscopic hysterectomy converted to total abdominal hysterectomy due to dense pelvic adhesions and incidental cystotomy   Bilateral salpingectomy  Extensive lysis of adhesions  Cystotomy repair by urology intra-op     Anesthesia:  General      IV Fluids:  2000 mL of LR     Urine Output:  100 mL, clear at end of procedure       Estimated Blood Loss:  150 mL with no replacements     Specimens: Uterus and cervix  Right and left fallopian tube    Findings:       Small normal appearing uterus  Normal appearing ovaries bilaterally  Dilated hydrosalpinx bilaterally  Dense adhesions of anterior uterus and bladder to anterior abdominal wall  Dense adhesions of bladder dome to anterior/mid uterus  Dense adhesions of right and left ovary and tube to uterine body  Anterior and posterior cul de sac clear  Pelvic side walls clear without gross lymphadenopathy  Omentum, small and large bowel in operative field normal appearing      Complications:  None    Indications for the Procedure:      See H&P for complete details.  In brief, Tasha Rush is a 42 y.o. G0 with chronic pelvic pain, recurrent bilateral hydrosalpinx, and abnormal uterine bleeding requesting definitive surgical therapy with hysterectomy. The proposed procedure was a total laparoscopic hysterectomy, bilateral salpingectomy, and ovarian conservation (if the ovaries are not damaged or diseased) if feasible all of which as determined at the time of surgery.  Pt has  declined medical management or other conservative therapies, and is resolute in her decision to proceed with surgery.      Pt was informed of the risks, benefits, and alternatives to hysterectomy.  Risks included but were not limited to bleeding, infection, injury to surrounding organs or tissues, injury to bladder and/or urinary tract, injury to bowel and/or intestinal obstruction, damage to major blood vessels, hemorrhage requiring transfusion of blood products, ovarian failure requiring hormone administration, pulmonary embolism, fistula formation, urinary and/or fecal incontinence, sexual dysfunction/pain, chronic pain, hernia, failure of wound to heal, permanent/disfiguring scarring, and even death.  In the event of a supracervical hysterectomy, she was also counseled on the need for long term follow-up with pap smears and the possibility of future trachelectomy.  Pt was counseled extensively on the inability to become pregnant following a hysterectomy and expressed an understanding of this.  Pt was also counseled that in event of bilateral oophorectomy will result in surgical menopause for pre/perimenopausal women.  Pt was also counseled on the potential risks of future ovarian neoplasm if she decides to keep her ovaries which may require additional surgeries.  Pt was counseled that hysterectomy might not result in the resolution of her pain symptoms.  Pt was also informed that she will need routine cervical cancer screening in the event of a supracervical hysterectomy.  The risks, benefits, and alternatives to blood transfusion was also discussed.  Pt expressed an understanding of risks involved, all questions answered, and informed consent was obtained for the procedure and blood transfusion.    Description of the Procedure:      Pt was taken to the operating room where a time out was performed to confirm the correct patient and correct procedure.  Preoperative prophylactic IV antibiotics was administered  prior to the procedure.  Pt was then placed in the dorsal lithotomy position with legs supported in Dominic stirrups and care was taken to pad all pressure points.  General anesthesia was obtained without difficulty.  A Leticia hugger was placed to maintain control of core body temperature.  Pt was then prepped and draped in a normal sterile fashion.  A sanchez catheter was inserted.  CAMRON uterine manipulator was placed without difficulty.  Attention was turned to the abdomen for laparoscopy.  A small vertical incision was made infraumbilical.  The Veress needle was introduced into the peritoneum.  Placement of the needle was confirmed with normal saline drop test.  Pneumoperitoneum was then established with ~3 liters of carbon dioxide and the Veress needle removed.  A 5 mm trocar was inserted through the paraumbilical incision into the peritoneum without difficulty and pneumoperitoneum was then maintained using carbon dioxide.   Next, two additional small incisions where made for the secondary trocars, one in the right and left lower quadrant approximately 3 cm from iliac crest.  Two 5 mm ports where introduced under direct visualization.  Survey of the pelvis revealed the above findings.  Pt had dense pelvic adhesions of anterior uterus and bladder to anterior abdominal wall; however, there appeared to be a plan of separation between the tissues making laparoscopic dissection a worthy attempt given pt pre-op preference for laparoscopic surgery.  Unfortunately due to dense adhesions of bladder to anterior uterus and abdominal wall resulted in an incidental cystotomy at dome of bladder.  At this point, the decision was made to convert to a total abdominal hysterectomy due to dense pelvic adhesions and subsequent repair of bladder dome by urology.  A Pfannenstiel skin incision was then made with the scalpel and carried through to the underlying layer of fascia with the Bovie.  The fascia was then incised in the midline and  the incision extended laterally with Sánchez scissors.  The superior aspect of the fascia was then grasped with Kocher clamps, elevated and the underlying rectus muscles were dissected off bluntly.  Attention was then turned to the inferior aspect of the fascial incision which, in a similar fashion, was grasped, tented up with Kocher clamps, and the rectus muscles were dissected off bluntly.  The rectus muscles were then  in the midline, and the peritoneum identified, tented up, and entered sharply with Metzenbaum scissors.  The peritoneal incision was then extended superiorly and inferiorly with good visualization of the bladder.  The O'John-O'Will retractor was inserted into the abdominal cavity for exposure.  Pt was placed in Trendelenburg position and the intestines where placed into the upper abdomen with moist laps. Careful lysis of adhesions was performed to separate the anterior uterus and bladder from anterior abdominal wall with sharp dissection.  Once uterus and bladder was mobilized, the Ligasure was then used to coagulate and cut the round ligaments followed by the utero-ovarian ligaments and fallopian tubes. The pelvic courses of the ureters were identified bilaterally and care was taken to avoid disruption of the ureters. The Ligasure was then used to coagulate and cut down the broad ligament to the uterine artery. A bladder flap was then created and dissected across the uterus at the level of the lower uterine segment. The bladder was pushed down. The uterine arteries where then skeletonized and then coagulate/cut using the Ligasure.  The cardinal and uterosacral ligaments were then coagulate/cut using the Ligasure in a similar fashion.  The cervix and uterus were amputated with Brooke Scissors.  The vaginal cuff angles where closed with a figure of eight sutures of 0 Vicryl and were transfixed to the ipsilateral cardinal and uterosacral ligaments.  The remainder of the vaginal cuff  was closed with a series of interrupted 0 vicryl figure of eight sutures.  Hemostasis was assured.  Attention was then turn to performing the bilateral salpingectomy. The bilateral fallopian tubes where then grasped with Shaina's and the mesosalpinx coagulate/cut using the Ligasure. Good hemostasis was confirmed.  At this point, urology proceeded with closure of dome of bladder in two layers followed by sterile leak test.  See urology op report for further detail.  A JORGE drain was placed.  The abdominal cavity was irrigated with normal saline solution and good hemostasis was confirmed. The retractor and laparotomy pads where removed and hemostasis was again confirmed.  The peritoneum and rectus muscles were reapproximated with 2-0 chromic in a running fashion.  The fascia was reapproximated with 0-Vicryl in a running fashion.  The skin was closed with absorbable Insorb staples.  The laparoscopic incisions were closed with 4-0 vicryl in a  subcutaneous fashion.  Pt tolerated the procedure well.  All instruments were removed from vagina.  Sponge, lap and needle counts were correct time two.  Pt was transferred to the PACU in stable condition.  Baltazar draining with clear urine and JORGE with minimal output.  Findings and expectations were later discussed with the patient.        Hardy Bueno MD

## 2018-08-25 NOTE — PROGRESS NOTES
"[facility]    Obstetrics & Gynecology  Progress Note      Patient Name:  Tasha Rush  MRN:  [id number]  Admission Date:  8/24/2018  Hospital Length of Stay:  1  Attending Physician:  Hardy Bueno MD  Primary Care Provider:  Kristofer Lagos MD    Subjective:     Date:  08/25/2018     Hospital Course:  Post-op Day # 1 - s/p attempted TLH converted to CALISTA secondary to dense pelvic adhesions and incidental cystotomy.     Pt c/o crampy incisional pains  No acute events overnight  Pain well controlled  Tolerating regular diet  Positive flatus  Ambulating and voiding without difficulty  No vaginal bleeding    Objective:     Temp:  [96.4 °F (35.8 °C)-98.5 °F (36.9 °C)] 97 °F (36.1 °C)  Pulse:  [56-76] 69  Resp:  [16-18] 17  SpO2:  [99 %-100 %] 99 %  BP: (129-149)/(67-86) 136/86    /86 (BP Location: Left arm, Patient Position: Lying)   Pulse 69   Temp 97 °F (36.1 °C) (Oral)   Resp 17   Ht 5' 6" (1.676 m)   Wt 76.2 kg (168 lb)   LMP 08/14/2018   SpO2 99%   Breastfeeding? No   BMI 27.12 kg/m²     Intake/Output Summary (Last 24 hours) at 8/25/2018 1848  Last data filed at 8/25/2018 1836  Gross per 24 hour   Intake 2390 ml   Output 3950 ml   Net -1560 ml   Clear julito urine  JORGE drain serosanguinous fluid    Physical Exam:   Constitutional: No distress.                             NAD, AOx3  HEENT:  No scleral icterus, neck supple, moist mucus membranes   LUNGS:  CTA-B  HEART:  RRR no m/g/r  ABDOMEN:  Soft, appropriately tender, non-distended, no guarding, rigidity, or rebound with normoactive bowel sounds.  INCISION: Clean, dry, & intact  EXTREMITIES:  Symmetric, no cramping, claudication, or edema. +2 distal pulses.  SCD's in place.  NEUROLOGIC:  Grossly intact bilaterally  PSYCH:  Mood and affect appropriate  PERINEUM:  Intact with no vaginal bleeding    Labs:      Recent Results (from the past 336 hour(s))   CBC auto differential    Collection Time: 08/25/18  6:26 AM   Result Value " Ref Range    WBC 6.66 3.90 - 12.70 K/uL    Hemoglobin 10.3 (L) 12.0 - 16.0 g/dL    Hematocrit 30.9 (L) 37.0 - 48.5 %    Platelets 188 150 - 350 K/uL   CBC auto differential    Collection Time: 08/20/18  2:05 PM   Result Value Ref Range    WBC 5.83 3.90 - 12.70 K/uL    Hemoglobin 12.8 12.0 - 16.0 g/dL    Hematocrit 38.3 37.0 - 48.5 %    Platelets 241 150 - 350 K/uL     Assessment:     Post-op day # 1 - 42 y.o. G0 s/p attempted TLH converted to CALISTA secondary to dense pelvic adhesions and incidental cystotomy - progressing well    Plan:     Continue post-op care  Review post-op care instructions and precautions  Keep sanchez 14 days post-op  Continue macrobid 100 mg bid x 14 d  Pt will see urology in 1 week after discharge  Encourage ambulation, SCD's  Surgical findings and expectations were discussed with the patient.  All of her questions were answered to her satisfaction, pt voiced understanding.     Disposition:  As patient meets milestones, will plan to discharge tomorrow.      Hardy Bueno MD

## 2018-08-25 NOTE — PROGRESS NOTES
Dr. Bueno en route to the hospital. He will round on patient and review chart for any additional orders for prn meds.

## 2018-08-26 VITALS
HEIGHT: 66 IN | SYSTOLIC BLOOD PRESSURE: 120 MMHG | WEIGHT: 168 LBS | DIASTOLIC BLOOD PRESSURE: 76 MMHG | TEMPERATURE: 98 F | OXYGEN SATURATION: 99 % | HEART RATE: 65 BPM | BODY MASS INDEX: 27 KG/M2 | RESPIRATION RATE: 19 BRPM

## 2018-08-26 PROBLEM — N70.11 HYDROSALPINX: Status: ACTIVE | Noted: 2018-08-26

## 2018-08-26 PROBLEM — Z98.890 S/P BLADDER REPAIR: Status: ACTIVE | Noted: 2018-08-26

## 2018-08-26 PROBLEM — R10.2 PELVIC PAIN: Status: RESOLVED | Noted: 2018-08-20 | Resolved: 2018-08-26

## 2018-08-26 PROBLEM — G89.29 CHRONIC PELVIC PAIN IN FEMALE: Status: ACTIVE | Noted: 2018-08-26

## 2018-08-26 PROBLEM — R10.2 CHRONIC PELVIC PAIN IN FEMALE: Status: ACTIVE | Noted: 2018-08-26

## 2018-08-26 PROCEDURE — 25000003 PHARM REV CODE 250: Performed by: OBSTETRICS & GYNECOLOGY

## 2018-08-26 RX ORDER — IBUPROFEN 600 MG/1
600 TABLET ORAL EVERY 6 HOURS PRN
Qty: 60 TABLET | Refills: 0 | Status: SHIPPED | OUTPATIENT
Start: 2018-08-26 | End: 2019-08-21 | Stop reason: SDUPTHER

## 2018-08-26 RX ORDER — NITROFURANTOIN 25; 75 MG/1; MG/1
100 CAPSULE ORAL 2 TIMES DAILY
Qty: 14 CAPSULE | Refills: 0 | Status: SHIPPED | OUTPATIENT
Start: 2018-08-26 | End: 2018-09-02

## 2018-08-26 RX ORDER — OXYCODONE AND ACETAMINOPHEN 5; 325 MG/1; MG/1
1 TABLET ORAL EVERY 4 HOURS PRN
Qty: 30 TABLET | Refills: 0 | Status: SHIPPED | OUTPATIENT
Start: 2018-08-26 | End: 2018-11-02

## 2018-08-26 RX ADMIN — ONDANSETRON 8 MG: 8 TABLET, ORALLY DISINTEGRATING ORAL at 12:08

## 2018-08-26 RX ADMIN — IBUPROFEN 600 MG: 600 TABLET ORAL at 05:08

## 2018-08-26 RX ADMIN — DOCUSATE SODIUM 100 MG: 100 CAPSULE, LIQUID FILLED ORAL at 09:08

## 2018-08-26 RX ADMIN — MUPIROCIN 1 G: 20 OINTMENT TOPICAL at 09:08

## 2018-08-26 RX ADMIN — PANTOPRAZOLE SODIUM 40 MG: 40 TABLET, DELAYED RELEASE ORAL at 09:08

## 2018-08-26 RX ADMIN — LORAZEPAM 0.5 MG: 0.5 TABLET ORAL at 01:08

## 2018-08-26 RX ADMIN — OXYCODONE HYDROCHLORIDE AND ACETAMINOPHEN 1 TABLET: 10; 325 TABLET ORAL at 05:08

## 2018-08-26 RX ADMIN — AMLODIPINE BESYLATE 5 MG: 5 TABLET ORAL at 09:08

## 2018-08-26 RX ADMIN — NITROFURANTOIN (MONOHYDRATE/MACROCRYSTALS) 100 MG: 75; 25 CAPSULE ORAL at 09:08

## 2018-08-26 NOTE — PROGRESS NOTES
[facility]  Urology  Progress Note    Patient Name: Tasha Rush  MRN: [id number]  Admission Date: 8/24/2018  Hospital Length of Stay: 2 days  Code Status: Prior   Attending Provider: Hardy Bueno MD   Primary Care Physician: Kristofer Lagos MD    Subjective:     HPI:  No notes on file    Interval History:     Feels well  Eager to go home  No bladder spasms    Sanchez draining well    Review of Systems  Objective:     Temp:  [97 °F (36.1 °C)-99 °F (37.2 °C)] 98.2 °F (36.8 °C)  Pulse:  [60-77] 65  Resp:  [17-20] 19  SpO2:  [99 %] 99 %  BP: (120-140)/(76-86) 120/76     Body mass index is 27.12 kg/m².    Date 08/26/18 0700 - 08/27/18 0659   Shift 9483-6385 4630-7242 8158-7181 24 Hour Total   INTAKE   Shift Total(mL/kg)       OUTPUT   Urine(mL/kg/hr) 1000   1000   Drains 30   30   Shift Total(mL/kg) 1030(13.5)   1030(13.5)   Weight (kg) 76.2 76.2 76.2 76.2          Drains     Drain                 Closed/Suction Drain 08/24/18 0947 Left Abdomen Bulb 19 Fr. 2 days         Urethral Catheter 08/24/18 0925 Non-latex 16 Fr. 2 days                Physical Exam    Ao*4  resp normal rate' breathing not labored  cv normal rate  Ab nondistended  Ext no edema      Significant Labs:    BMP:  Recent Labs   Lab  08/20/18   1405   NA  139   K  3.8   CL  108   CO2  23   BUN  12   CREATININE  0.9   CALCIUM  9.5       CBC:   Recent Labs   Lab  08/20/18   1405  08/25/18   0626   WBC  5.83  6.66   HGB  12.8  10.3*   HCT  38.3  30.9*   PLT  241  188       Urine Culture: No results for input(s): LABURIN in the last 168 hours.  Urine Studies: No results for input(s): COLORU, APPEARANCEUA, PHUR, SPECGRAV, PROTEINUA, GLUCUA, KETONESU, BILIRUBINUA, OCCULTUA, NITRITE, UROBILINOGEN, LEUKOCYTESUR, RBCUA, WBCUA, BACTERIA, SQUAMEPITHEL, HYALINECASTS in the last 168 hours.    Invalid input(s): FRANK    Significant Imaging:  -                  Assessment/Plan:     S/P bladder repair    Cont sanchez  macrobid  See NP this Thursday  to check urine cs and schedule cystogram            VTE Risk Mitigation (From admission, onward)        Ordered     IP VTE HIGH RISK PATIENT  Once      08/24/18 1822     Place sequential compression device  Until discontinued      08/24/18 1822          Emir Kirk MD  Urology  [facility]

## 2018-08-26 NOTE — PLAN OF CARE
Problem: Patient Care Overview  Goal: Individualization & Mutuality  Outcome: Ongoing (interventions implemented as appropriate)  VSS. Afebrile. Pt has a patent urinary catheter, urine is clear yellow. No vaginal bleeding noted. Incision is intact.. JORGE dressing cd&i JORGE drain bulb has serosanginous fluid. Patient able drain bulb per self. Island dressing x2 intact. Abdomen is soft and nondistended. Ambulated in room. Tolerating PO. Ativan given this shift for c/o anxiety. POC discussed and understanding voiced.

## 2018-08-26 NOTE — PROGRESS NOTES
Dr. Bueno at bedside.  Left abdominal JORGE drain removed and stitch applied per Dr. Bueno without complication.  Pt tolerated well.  No complaints.  No signs of distress.

## 2018-08-26 NOTE — DISCHARGE INSTRUCTIONS
Take showers for next 6 weeks  No tampons, douching or sexual intercourse for next 6 weeks or until ok with doctor  No driving for 2 weeks   Do not lift anything heavier than 10 pounds for next 6 weeks  Walking is only form of exercise allowed for next 6 weeks unless instructed otherwise by your doctor    Notify your doctor if you have:  -pain not relieved by your pain medication  -unexplained swelling of arms or legs  -uncontrolled nausea/vomiting  -redness, swelling, or drainage from incision  -fever of 101* or higher  -sudden severe pain in arms, back, or legs  -heavy vaginal bleeding  Baltazar Catheter Care    A Baltazar catheter is a rubber tube that is placed through the urethra (opening where urine comes out) and into the bladder. This helps drain urine from the bladder. There is a small balloon on the end of the tube that is inflated after insertion. This keeps the catheter from sliding out of the bladder.  A Baltazar catheter is used to treat urinary retention (unable to pass urine). It is also used when there is incontinence (loss of bladder control).  Home care  · Finish taking any prescribed antibiotic even if you are feeling better before then.  · It is important to keep bacteria from getting into the collection bag. Do not disconnect the catheter from the collection bag.  · Use a leg band to secure the drainage tube, so it does not pull on the catheter. Drain the collection bag when it becomes full using the drain spout at the bottom of the bag.  · Do not try to pull or remove your catheter. This will injure your urethra. It must be removed by your healthcare provider or nurse.  Follow-up care  Follow up with your healthcare provider as advised for repeat urine testing and catheter removal or replacement.  When to seek medical advice  Call your healthcare provider right away if any of these occur:  · Fever of 100.4ºF (38ºC) or higher, or as directed by your healthcare provider  · Bladder pain or  fullness  · Abdominal swelling, nausea or vomiting, or back pain  · Blood or urine leakage around the catheter  · Bloody urine coming from the catheter (if a new symptom)  · Catheter falls out  · Catheter stops draining for 6 hours  · Weakness, dizziness, or fainting  Date Last Reviewed: 10/1/2016  © 8682-2520 [employer]. [address] [place] [address], [place] [address]. All rights reserved. This information is not intended as a substitute for professional medical care. Always follow your healthcare professional's instructions.        Caring for Yourself After Hysterectomy     Staying active can help you feel better in mind and body.     After you recover from your hysterectomy, you may feel better than you have in a long time. An active, healthy lifestyle and regular medical care can help you continue to feel good.  Being intimate  After a hysterectomy, sex can be as pleasurable as it was before. Follow your surgeons instructions on when you can resume having intercourse. This is usually within 4 to 8 weeks after the procedure. Other types of sexual activity may be possible sooner. If you experience vaginal dryness during sex, use a lubricant. Be aware that a hysterectomy prevents pregnancy, but does not protect you against sexually transmitted diseases. If you have any concerns, discuss them with your partner and your healthcare provider.  Being aware of your emotions  After a hysterectomy, you may feel relieved to be free of symptoms. You may also feel sad about the changes in your body. If your ovaries were removed, you may go through some natural mood swings as your hormones adjust. Note how you are feeling from day to day. Talk to your healthcare provider if youre concerned about emotions you are feeling.  Ongoing healthcare  Regular physical exams help to ensure your general health and well-being:  · You will continue to need routine breast exams and pelvic exams. This includes Pap tests if you  still have a cervix or if you have a history of certain types of dysplasia or cervical cancer.   · If youre taking hormone therapy, you will need follow-up visits with your healthcare provider to fine-tune your dosage.  What to know about hormone therapy  Hormone therapy (HT) is medicine to replace the hormones made by your ovaries. It may be advised if your ovaries are removed and you have not yet gone through natural menopause. HT helps decrease hot flashes, vaginal dryness, and other symptoms of menopause. It may help reduce bone loss and lessen your risk of developing osteoporosis. But HT may also increase the risk of certain types of health problems in some women. Discuss the pros and cons of HT with your healthcare provider.   Date Last Reviewed: 3/1/2017  © 3812-7204 [employer]. [address] [place] [address], [place] [address]. All rights reserved. This information is not intended as a substitute for professional medical care. Always follow your healthcare professional's instructions.

## 2018-08-26 NOTE — SUBJECTIVE & OBJECTIVE
Interval History:     Feels well  Eager to go home  No bladder spasms    Baltazar draining well    Review of Systems  Objective:     Temp:  [97 °F (36.1 °C)-99 °F (37.2 °C)] 98.2 °F (36.8 °C)  Pulse:  [60-77] 65  Resp:  [17-20] 19  SpO2:  [99 %] 99 %  BP: (120-140)/(76-86) 120/76     Body mass index is 27.12 kg/m².    Date 08/26/18 0700 - 08/27/18 0659   Shift 7654-8305 6605-4353 8607-6870 24 Hour Total   INTAKE   Shift Total(mL/kg)       OUTPUT   Urine(mL/kg/hr) 1000   1000   Drains 30   30   Shift Total(mL/kg) 1030(13.5)   1030(13.5)   Weight (kg) 76.2 76.2 76.2 76.2          Drains     Drain                 Closed/Suction Drain 08/24/18 0947 Left Abdomen Bulb 19 Fr. 2 days         Urethral Catheter 08/24/18 0925 Non-latex 16 Fr. 2 days                Physical Exam    Ao*4  resp normal rate' breathing not labored  cv normal rate  Ab nondistended  Ext no edema      Significant Labs:    BMP:  Recent Labs   Lab  08/20/18   1405   NA  139   K  3.8   CL  108   CO2  23   BUN  12   CREATININE  0.9   CALCIUM  9.5       CBC:   Recent Labs   Lab  08/20/18   1405  08/25/18   0626   WBC  5.83  6.66   HGB  12.8  10.3*   HCT  38.3  30.9*   PLT  241  188       Urine Culture: No results for input(s): LABURIN in the last 168 hours.  Urine Studies: No results for input(s): COLORU, APPEARANCEUA, PHUR, SPECGRAV, PROTEINUA, GLUCUA, KETONESU, BILIRUBINUA, OCCULTUA, NITRITE, UROBILINOGEN, LEUKOCYTESUR, RBCUA, WBCUA, BACTERIA, SQUAMEPITHEL, HYALINECASTS in the last 168 hours.    Invalid input(s): WRIGHTSUR    Significant Imaging:  -

## 2018-08-26 NOTE — PLAN OF CARE
Problem: Fall Risk (Adult)  Goal: Identify Related Risk Factors and Signs and Symptoms  Related risk factors and signs and symptoms are identified upon initiation of Human Response Clinical Practice Guideline (CPG)  Outcome: Ongoing (interventions implemented as appropriate)  Frequent checks to ensure safety.

## 2018-08-26 NOTE — PROGRESS NOTES
Pt discharged to home per dr's orders.  Pt verbalized understanding of all discharged instructions including follow up appointments, medications and catheter care.  No complaints.  No signs of distress.

## 2018-08-26 NOTE — PROGRESS NOTES
Patient encouraged to use incentive spirometer 10x/hour while awake. Also encouraged to ambulate in the [place]oy before going to bed. Understanding voiced.

## 2018-08-26 NOTE — PROGRESS NOTES
Baltazar bag with leak and urine found on floor. Bag replaced and housekeeping called to [facility]. Patient dressed in new gown. Patient drained JORGE per self. 50 ml serosanguenous fluid noted.

## 2018-08-26 NOTE — DISCHARGE SUMMARY
[facility]    Obstetrics & Gynecology  Discharge Summary      Patient Name:  Tasha Rush  MRN:  [id number]  Admission Date:  8/24/2018  Discharge Date:  8/26/2018  Hospital Length of Stay:  2  Attending Physician:  Hardy Bueno MD  Discharging Physician:  Hardy Bueno MD  Primary Care Provider:  Kristofer Lagos MD  Date of Surgery:  8/24/2018    Admitting Diagnosis:       Chronic pelvic pain  Recurrent bilateral hydrosalpinx  Abnormal uterine bleeding     Discharge Diagnosis:    Chronic pelvic pain  Recurrent bilateral hydrosalpinx  Abnormal uterine bleeding  Attempted total laparoscopic hysterectomy converted to total abdominal hysterectomy due to dense pelvic adhesions and incidental cystotomy     Procedure performed:      Attempted total laparoscopic hysterectomy converted to total abdominal hysterectomy due to dense pelvic adhesions and incidental cystotomy   Bilateral salpingectomy  Extensive lysis of adhesions  Cystotomy repair by urology intra-op     Brief Hospital Course:      See H&P for complete details.  In brief, Tasha Rush is a 42 y.o. G0 with chronic pelvic pain, recurrent bilateral hydrosalpinx, and abnormal uterine bleeding requesting definitive surgical therapy with hysterectomy. The proposed procedure was a total laparoscopic hysterectomy, bilateral salpingectomy, and ovarian conservation (if the ovaries are not damaged or diseased) if feasible all of which as determined at the time of surgery.  Pt has declined medical management or other conservative therapies, and is resolute in her decision to proceed with surgery.  Surgery was complicated by incidental cystotomy at dome of bladder due to dense bladder/pelvis adhesions.  Cystotomy was repaired by urology.  Pt tolerated the surgery well.  Please see operative report for further details.  Following the surgery, pt was transferred to the PACU in stable condition.  Pt had an uncomplicated post-operative course.   "Prior to discharge, pt was without major complaints.  Her pain was well-controlled.  Her vital signs where physiologic and stable.  Physical exam showed no acute findings.  Pt had no active vaginal bleeding.  Surgical incisions was hemostatic.  Pt was ambulating, tolerating oral intake. Pt was discharge home with sanchez for 2 wks.  Pt had satisfied routine post-op discharge criteria and expressed the desire to go home.        Pertinent Labs/Studies:      Recent Results (from the past 336 hour(s))   CBC auto differential    Collection Time: 08/25/18  6:26 AM   Result Value Ref Range    WBC 6.66 3.90 - 12.70 K/uL    Hemoglobin 10.3 (L) 12.0 - 16.0 g/dL    Hematocrit 30.9 (L) 37.0 - 48.5 %    Platelets 188 150 - 350 K/uL   CBC auto differential    Collection Time: 08/20/18  2:05 PM   Result Value Ref Range    WBC 5.83 3.90 - 12.70 K/uL    Hemoglobin 12.8 12.0 - 16.0 g/dL    Hematocrit 38.3 37.0 - 48.5 %    Platelets 241 150 - 350 K/uL     Discharge Exam:      Temp:  [98.1 °F (36.7 °C)-99 °F (37.2 °C)] 98.2 °F (36.8 °C)  Pulse:  [60-65] 65  Resp:  [17-20] 19  BP: (120-134)/(76-83) 120/76    /76 (BP Location: Left arm, Patient Position: Lying)   Pulse 65   Temp 98.2 °F (36.8 °C) (Oral)   Resp 19   Ht 5' 6" (1.676 m)   Wt 76.2 kg (168 lb)   LMP 08/14/2018   SpO2 99%   Breastfeeding? No   BMI 27.12 kg/m²     GENERAL:  No acute distress. Alert and oriented x 3.   NECK:  Supple, FROM.  LUNGS:  Clear to auscultation bilaterally.   HEART:  Regular rate and rhythm with physiologic heart sounds.   ABDOMEN:  Soft, non-tender, no guarding, rigidity, or rebound.   INCISION:  Clean, dry, & intact.   EXT:  No cramping, claudication or edema.  Good skin turgor.  +2 distal pulses, symmetric.  Full range of motion.   NEURO:  Grossly intact bilaterally.   PSYCH:  Mood and affect appropriate.   PERINEUM:  Intact with no bleeding. Sanchez draining.    Discharge Condition:  Stable      Discharge Disposition:  Home   "     Discharge Medications:     Motrin 600 mg 1 tab q6h prn pain, disp #60, refill 0  Percocet 5/325 mg 1 tab q4-6h prn breakthrough pain, disp #30, refill 0   Macrboid 100 mg bid, disp #28, refill 0    Discharge Activites:      Activities as tolerated with adequate rest  Pelvic rest for 4 weeks   No heavy lifting greater 10 lbs or vigorous activities   Showers only  Wound and sanchez care instructions and precautions reviewed  Avoid driving and operating machinery while taking narcotics or other sedative medications.  Patient voiced understanding.    Discharge Diet:  Regular diet    Discharge Instructions:      Post-op care instructions and precautions, clinical/sugical findings, and hospital course reviewed with patient prior to discharge.  All of her questions were answered to her satisfaction.  Pt voiced understanding.  Pt was instructed to contact the clinic or emergency department for any concerns including but not limited to an increase in her vaginal bleeding, abdominal pain, foul vaginal discharge, weakness, decrease activity level, decrease appetite, difficulty with voiding or having bowel movements, wound breakdown, perineal pain or breakdown, fever >100.4, shortness of breath, chest pain, dizziness or feeling faint, pain or swelling in her extremities, headaches not relieved with rest and Tylenol, abnormal bleeding/bruising, or any other health concerns.      Follow-up Visit:      1 week with urology  2 week with GYN, or sooner if any problems      Hardy Bueno MD

## 2018-08-26 NOTE — PROGRESS NOTES
"[facility]    Obstetrics & Gynecology  Progress Note      Patient Name:  Tasha Rush  MRN:  [id number]  Admission Date:  8/24/2018  Hospital Length of Stay:  2  Attending Physician:  Hardy Bueno MD  Primary Care Provider:  Kristofer Lagos MD    Subjective:     Date:  08/26/2018     Hospital Course:  Post-op Day # 2 - s/p attempted TLH converted to CALISTA secondary to dense pelvic adhesions and incidental cystotomy with repair    Pt has no major complaints today  No acute events overnight  Requesting to go home  Pain well controlled  Tolerating regular diet  Positive flatus  Ambulating  Baltazar draining well, no urinary complaints  No vaginal bleeding    Objective:     Temp:  [97 °F (36.1 °C)-99 °F (37.2 °C)] 98.2 °F (36.8 °C)  Pulse:  [60-77] 65  Resp:  [17-20] 19  SpO2:  [99 %] 99 %  BP: (120-140)/(76-86) 120/76    /76 (BP Location: Left arm, Patient Position: Lying)   Pulse 65   Temp 98.2 °F (36.8 °C) (Oral)   Resp 19   Ht 5' 6" (1.676 m)   Wt 76.2 kg (168 lb)   LMP 08/14/2018   SpO2 99%   Breastfeeding? No   BMI 27.12 kg/m²     Intake/Output Summary (Last 24 hours) at 8/26/2018 1115  Last data filed at 8/26/2018 0704  Gross per 24 hour   Intake 1080 ml   Output 3910 ml   Net -2830 ml   Clear julito urine  JORGE drain serosanguinous fluid removed and closed with suture    Physical Exam:   Constitutional: No distress.                             NAD, AOx3  HEENT:  No scleral icterus, neck supple, moist mucus membranes   LUNGS:  CTA-B  HEART:  RRR no m/g/r  ABDOMEN:  Soft, appropriately tender, non-distended, no guarding, rigidity, or rebound with normoactive bowel sounds.  INCISION: Clean, dry, & intact  EXTREMITIES:  Symmetric, no cramping, claudication, or edema. +2 distal pulses.  SCD's in place.  NEUROLOGIC:  Grossly intact bilaterally  PSYCH:  Mood and affect appropriate  PERINEUM:  Intact with no vaginal bleeding    Assessment:     Post-op day # 2 - 42 y.o. G0 s/p attempted " TLH converted to CALISTA secondary to dense pelvic adhesions and incidental cystotomy repair - progressing well    Plan:     Plan for discharge today    Keep sanchez 14 days post-op    Continue macrobid 100 mg bid x 14 d    Pt will see urology in 1 week after discharge    Post-op care instructions and precautions, surgical findings/pathology, labs/studies, and hospital course reviewed with the patient prior to discharge.  Review discharge medications.  The patient was instructed to avoid driving or operate heavy machinery while taking narcotics or other medications with sedative properties.  All of her questions were answered to her satisfaction.  The patient voiced understanding and agrees with hospital discharge.    Return to the clinic in 2 weeks for post-op visit or sooner if any concerns.        Hardy Bueno MD

## 2018-08-27 ENCOUNTER — HOSPITAL ENCOUNTER (EMERGENCY)
Facility: OTHER | Age: 43
Discharge: HOME | End: 2018-08-27
Attending: EMERGENCY MEDICINE
Payer: COMMERCIAL

## 2018-08-27 ENCOUNTER — TELEPHONE (OUTPATIENT)
Dept: OBSTETRICS AND GYNECOLOGY | Facility: CLINIC | Age: 43
End: 2018-08-27

## 2018-08-27 ENCOUNTER — TELEPHONE (OUTPATIENT)
Dept: UROLOGY | Facility: CLINIC | Age: 43
End: 2018-08-27

## 2018-08-27 VITALS
HEIGHT: 65 IN | WEIGHT: 160 LBS | OXYGEN SATURATION: 100 % | SYSTOLIC BLOOD PRESSURE: 133 MMHG | BODY MASS INDEX: 26.66 KG/M2 | HEART RATE: 82 BPM | RESPIRATION RATE: 16 BRPM | TEMPERATURE: 99 F | DIASTOLIC BLOOD PRESSURE: 85 MMHG

## 2018-08-27 DIAGNOSIS — R11.10 VOMITING, INTRACTABILITY OF VOMITING NOT SPECIFIED, PRESENCE OF NAUSEA NOT SPECIFIED, UNSPECIFIED VOMITING TYPE: Primary | ICD-10-CM

## 2018-08-27 DIAGNOSIS — R07.9 CHEST PAIN: ICD-10-CM

## 2018-08-27 LAB
ALBUMIN SERPL BCP-MCNC: 3.9 G/DL
ALP SERPL-CCNC: 69 U/L
ALT SERPL W/O P-5'-P-CCNC: 19 U/L
ANION GAP SERPL CALC-SCNC: 14 MMOL/L
AST SERPL-CCNC: 24 U/L
BACTERIA #/AREA URNS HPF: ABNORMAL /HPF
BASOPHILS # BLD AUTO: 0 K/UL
BASOPHILS NFR BLD: 0 %
BILIRUB SERPL-MCNC: 1.8 MG/DL
BILIRUB UR QL STRIP: NEGATIVE
BUN SERPL-MCNC: 16 MG/DL
CALCIUM SERPL-MCNC: 10 MG/DL
CHLORIDE SERPL-SCNC: 105 MMOL/L
CLARITY UR: CLEAR
CO2 SERPL-SCNC: 20 MMOL/L
COLOR UR: YELLOW
CREAT SERPL-MCNC: 0.8 MG/DL
DIFFERENTIAL METHOD: ABNORMAL
EOSINOPHIL # BLD AUTO: 0.2 K/UL
EOSINOPHIL NFR BLD: 3.5 %
ERYTHROCYTE [DISTWIDTH] IN BLOOD BY AUTOMATED COUNT: 13.1 %
EST. GFR  (AFRICAN AMERICAN): >60 ML/MIN/1.73 M^2
EST. GFR  (NON AFRICAN AMERICAN): >60 ML/MIN/1.73 M^2
GLUCOSE SERPL-MCNC: 107 MG/DL
GLUCOSE UR QL STRIP: NEGATIVE
HCT VFR BLD AUTO: 40.9 %
HGB BLD-MCNC: 13.6 G/DL
HGB UR QL STRIP: ABNORMAL
HYALINE CASTS #/AREA URNS LPF: 0 /LPF
KETONES UR QL STRIP: ABNORMAL
LEUKOCYTE ESTERASE UR QL STRIP: ABNORMAL
LIPASE SERPL-CCNC: 15 U/L
LYMPHOCYTES # BLD AUTO: 0.8 K/UL
LYMPHOCYTES NFR BLD: 12.3 %
MCH RBC QN AUTO: 30.6 PG
MCHC RBC AUTO-ENTMCNC: 33.3 G/DL
MCV RBC AUTO: 92 FL
MICROSCOPIC COMMENT: ABNORMAL
MONOCYTES # BLD AUTO: 0.3 K/UL
MONOCYTES NFR BLD: 5.1 %
NEUTROPHILS # BLD AUTO: 5.1 K/UL
NEUTROPHILS NFR BLD: 78.8 %
NITRITE UR QL STRIP: NEGATIVE
PH UR STRIP: 6 [PH] (ref 5–8)
PLATELET # BLD AUTO: 244 K/UL
PMV BLD AUTO: 9.7 FL
POTASSIUM SERPL-SCNC: 3.3 MMOL/L
PROT SERPL-MCNC: 7.7 G/DL
PROT UR QL STRIP: ABNORMAL
RBC # BLD AUTO: 4.45 M/UL
RBC #/AREA URNS HPF: 20 /HPF (ref 0–4)
SODIUM SERPL-SCNC: 139 MMOL/L
SP GR UR STRIP: 1.02 (ref 1–1.03)
SQUAMOUS #/AREA URNS HPF: 1 /HPF
TROPONIN I SERPL DL<=0.01 NG/ML-MCNC: <0.006 NG/ML
URN SPEC COLLECT METH UR: ABNORMAL
UROBILINOGEN UR STRIP-ACNC: 1 EU/DL
WBC # BLD AUTO: 6.49 K/UL
WBC #/AREA URNS HPF: 2 /HPF (ref 0–5)

## 2018-08-27 PROCEDURE — 96374 THER/PROPH/DIAG INJ IV PUSH: CPT

## 2018-08-27 PROCEDURE — 93005 ELECTROCARDIOGRAM TRACING: CPT

## 2018-08-27 PROCEDURE — 99284 EMERGENCY DEPT VISIT MOD MDM: CPT | Mod: 25

## 2018-08-27 PROCEDURE — 96361 HYDRATE IV INFUSION ADD-ON: CPT

## 2018-08-27 PROCEDURE — 93010 ELECTROCARDIOGRAM REPORT: CPT | Mod: ,,, | Performed by: INTERNAL MEDICINE

## 2018-08-27 PROCEDURE — 83690 ASSAY OF LIPASE: CPT

## 2018-08-27 PROCEDURE — 81000 URINALYSIS NONAUTO W/SCOPE: CPT

## 2018-08-27 PROCEDURE — 96375 TX/PRO/DX INJ NEW DRUG ADDON: CPT

## 2018-08-27 PROCEDURE — 85025 COMPLETE CBC W/AUTO DIFF WBC: CPT

## 2018-08-27 PROCEDURE — 25000003 PHARM REV CODE 250: Performed by: EMERGENCY MEDICINE

## 2018-08-27 PROCEDURE — 63600175 PHARM REV CODE 636 W HCPCS: Performed by: EMERGENCY MEDICINE

## 2018-08-27 PROCEDURE — 84484 ASSAY OF TROPONIN QUANT: CPT

## 2018-08-27 PROCEDURE — 80053 COMPREHEN METABOLIC PANEL: CPT

## 2018-08-27 RX ORDER — ONDANSETRON 2 MG/ML
4 INJECTION INTRAMUSCULAR; INTRAVENOUS
Status: COMPLETED | OUTPATIENT
Start: 2018-08-27 | End: 2018-08-27

## 2018-08-27 RX ORDER — ONDANSETRON 4 MG/1
4 TABLET, ORALLY DISINTEGRATING ORAL EVERY 8 HOURS PRN
Qty: 12 TABLET | Refills: 0 | Status: SHIPPED | OUTPATIENT
Start: 2018-08-27 | End: 2018-11-02

## 2018-08-27 RX ORDER — MORPHINE SULFATE 4 MG/ML
4 INJECTION, SOLUTION INTRAMUSCULAR; INTRAVENOUS
Status: COMPLETED | OUTPATIENT
Start: 2018-08-27 | End: 2018-08-27

## 2018-08-27 RX ORDER — DOCUSATE SODIUM 100 MG/1
100 CAPSULE, LIQUID FILLED ORAL 2 TIMES DAILY PRN
Qty: 40 CAPSULE | Refills: 0 | Status: SHIPPED | OUTPATIENT
Start: 2018-08-27 | End: 2018-11-02

## 2018-08-27 RX ADMIN — ONDANSETRON 4 MG: 2 INJECTION, SOLUTION INTRAMUSCULAR; INTRAVENOUS at 02:08

## 2018-08-27 RX ADMIN — SODIUM CHLORIDE 1000 ML: 0.9 INJECTION, SOLUTION INTRAVENOUS at 02:08

## 2018-08-27 RX ADMIN — MORPHINE SULFATE 4 MG: 4 INJECTION, SOLUTION INTRAMUSCULAR; INTRAVENOUS at 02:08

## 2018-08-27 NOTE — ED PROVIDER NOTES
"Encounter Date: 8/27/2018    SCRIBE #1 NOTE: I, Anand Arriaza, am scribing for, and in the presence of, Dr. Granda.       History     Chief Complaint   Patient presents with    Vomiting     Pt reports vomiting approx 40 mins ago. She reports three episodes of midsternal Chest Pain that she describes as sharp. Pt c/o " an overheating in my stomach that makes me SOB." Pt has hysterectomy four days ago.     Chest Pain     Seen by provider: 1:43 PM    Patient is a 42 y.o. female who presents to the ED with multiple complaints. Patient had a hysterectomy three days ago and was discharged from the hospital yesterday. She states she felt well upon discharge, but then early this morning she began to experience chest heaviness, which became worse after laying down. She began to "feel overheated" after getting out of bed and also began coughing at that time. A short time after onset of coughing she became nauseous and started to vomit. She then began to experience mid-sternal chest pain and shortness of breath. She also reports pain at her abdominal incision sites with coughing. She is currently experiencing shortness of breath, chest heaviness, and mild dizziness. She denies any current abdominal pain, diarrhea, or constipation. She has been taking percocet and ibuprofen s/p surgery for pain, which she took today. She had a normal bowel movement this morning.  Her hysterectomy was complicated by bladder injury requiring sanchez catheter placement.        The history is provided by the patient.     Review of patient's allergies indicates:   Allergen Reactions    Ace inhibitors Other (See Comments)     cough     Past Medical History:   Diagnosis Date    Abnormal Pap smear of cervix     Bacterial vaginosis     RECURRENT    Cervical dysplasia     Gastritis     Hypertension      Past Surgical History:   Procedure Laterality Date    CERVICAL BIOPSY  W/ LOOP ELECTRODE EXCISION      fibroid removal      HERNIA REPAIR  2009 "    umbilical    MYOMECTOMY       Family History   Problem Relation Age of Onset    Diabetes Father     Hyperlipidemia Father     Diabetes Mother     Multiple sclerosis Mother     No Known Problems Brother     Colon cancer Paternal Uncle     Breast cancer Neg Hx     Ovarian cancer Neg Hx      Social History     Tobacco Use    Smoking status: Light Tobacco Smoker     Packs/day: 0.25    Smokeless tobacco: Never Used    Tobacco comment: 1 pack / week   Substance Use Topics    Alcohol use: Yes     Comment: occasional    Drug use: No     Review of Systems   Constitutional: Negative for chills and fever.   HENT: Negative for congestion.    Respiratory: Positive for cough and shortness of breath.    Cardiovascular: Positive for chest pain.   Gastrointestinal: Positive for abdominal pain, nausea and vomiting. Negative for diarrhea.   Genitourinary: Negative for hematuria.   Musculoskeletal: Negative for back pain.   Skin: Positive for wound (surgical incisions). Negative for rash.   Neurological: Positive for dizziness. Negative for weakness.   Psychiatric/Behavioral: Negative for confusion.       Physical Exam     Initial Vitals [08/27/18 1330]   BP Pulse Resp Temp SpO2   (!) 140/89 99 18 98.5 °F (36.9 °C) 100 %      MAP       --         Physical Exam    Nursing note and vitals reviewed.  Constitutional: She appears well-developed and well-nourished. She is not diaphoretic. No distress.   HENT:   Head: Normocephalic and atraumatic.   Mouth/Throat: Oropharynx is clear and moist.   Eyes: Conjunctivae and EOM are normal. Right eye exhibits no discharge. Left eye exhibits no discharge. No scleral icterus.   Neck: Normal range of motion. Neck supple.   Cardiovascular: Normal rate, regular rhythm and normal heart sounds.   Pulmonary/Chest: Breath sounds normal. No respiratory distress. She has no wheezes. She has no rhonchi. She has no rales.   Abdominal: Soft. She exhibits no distension. There is tenderness (mild  diffuse abdominal tendereness). There is no rebound and no guarding.   Genitourinary:   Genitourinary Comments: Baltazar catheter in place.   Neurological: She is alert and oriented to person, place, and time.   Skin: Skin is warm and dry.   Lower transverse abdominal incision that is clean, dry, and intact with steri-strips in place.   Psychiatric: She has a normal mood and affect. Her behavior is normal. Judgment and thought content normal.         ED Course   Procedures  Labs Reviewed   COMPREHENSIVE METABOLIC PANEL - Abnormal; Notable for the following components:       Result Value    Potassium 3.3 (*)     CO2 20 (*)     Total Bilirubin 1.8 (*)     All other components within normal limits   CBC W/ AUTO DIFFERENTIAL - Abnormal; Notable for the following components:    Lymph # 0.8 (*)     Gran% 78.8 (*)     Lymph% 12.3 (*)     All other components within normal limits   URINALYSIS - Abnormal; Notable for the following components:    Protein, UA 2+ (*)     Ketones, UA 1+ (*)     Occult Blood UA 3+ (*)     Leukocytes, UA Trace (*)     All other components within normal limits   URINALYSIS MICROSCOPIC - Abnormal; Notable for the following components:    RBC, UA 20 (*)     Bacteria, UA Few (*)     All other components within normal limits   LIPASE   TROPONIN I     EKG Readings: (Independently Interpreted)   13:23: Rate of 102 bpm. Sinus tachycardia. Right axis deviation. Normal intervals. No other ST or ischemic changes.       Imaging Results    None              Medical Decision Making:   History:   Old Medical Records: I decided to obtain old medical records.  Old Records Summarized: other records, records from clinic visits and records from previous admission(s).  Initial Assessment:   1:43PM:  Pt is a 41 y/o F who presents to ED with chest tightness, vomiting, abdominal pain, dizziness.  Pt is s/p hysterectomy with bladder injury, POD 3.  Pt appears well, nontoxic. Abdomen is soft but has some diffuse tenderness,  which would be appropriate, and her incision site is C/D/I.  Will plan to check labs, analgesia, CXR, anti-emetics, will continue to follow and reassess.    Independently Interpreted Test(s):   I have ordered and independently interpreted EKG Reading(s) - see prior notes  Clinical Tests:   Lab Tests: Ordered and Reviewed  Radiological Study: Ordered and Reviewed    3:05 PM: Pt doing well, feeling better, will continue to follow.    3:59PM:  Pt doing well, she is feeling much better and able to tolerate PO.  Her labs are unremarkable.  I suspect that her symptoms are due to her post-op recovery as she felt much better once her pain and nausea was improved.  She was not provided with any nausea medication or stool softeners so will provide her with a prescription for that.  I updated pt regarding results and I counseled pt regarding supportive care measures.  I have discussed with the pt ED return warnings and need for close PCP f/u.  Pt agreeable to plan and all questions answered.  I feel that pt is stable for discharge and management as an outpatient and no further intervention is needed at this time.  Pt is comfortable returning to the ED if needed.  Will DC home in stable condition.                Scribe Attestation:   Scribe #1: I performed the above scribed service and the documentation accurately describes the services I performed. I attest to the accuracy of the note.    Attending Attestation:           Physician Attestation for Scribe:  Physician Attestation Statement for Scribe #1: I, Dr. Granda, reviewed documentation, as scribed by Anand Arriaza in my presence, and it is both accurate and complete.                    Clinical Impression:     1. Vomiting, intractability of vomiting not specified, presence of nausea not specified, unspecified vomiting type    2. Chest pain                                  Jennifer Granda MD  08/27/18 9071

## 2018-08-27 NOTE — TELEPHONE ENCOUNTER
Patient notified that her apt has been changed and is now with Simeon Valentine at [facility].

## 2018-08-27 NOTE — ED NOTES
"Pt AAOx4 and appropriate at this time. Respirations now even and unlabored at this time. No acute distress noted. Pt reporting "my pain is better." MD notified. Awaiting further orders. Pt updated on POC. Bed is locked and in lowest position with side rails up x2. Call bell within reach and pt oriented to use of call bell. Pt on continuous pulse ox, and continuous BP cuff. Will continue to monitor. Pt's mother remains at BS.     "

## 2018-08-27 NOTE — TELEPHONE ENCOUNTER
----- Message from Grace Hooper sent at 8/27/2018  2:11 PM CDT -----  Contact: Mother  Patient's mother called with patient in the background says she started to feel worse and is at [facility] ED and would like to speak with you. Please call her at [phone]  ----------------------------------------------------  8/27/18 @ 1516 ([facility])   SPOKE WITH MS GAYLE'S MOTHER , SHE CALLED AND STATED THAT THEY HAD TO TO TO [facility] E.R. BECAUSE MS GAYLE IS HAVING SEVERE NAUSEA AND VOMITING & CHEST PAIN, SHE IS STATUS POST HYSTERECTOMY FROM 8/24/18    MESSAGE SENT TO DR MILLER PER PT REQUEST

## 2018-08-27 NOTE — ED NOTES
Scanned pt's bladder per MD verbal order. Pt with 50 cc urine noted in bladder via bladder scanner. MD notified.

## 2018-08-27 NOTE — TELEPHONE ENCOUNTER
----- Message from Cm Moura sent at 8/27/2018  9:32 AM CDT -----  Contact: self  Pt states she just had surgery and is requesting a stool softener. Please contact her at [phone].    .  [facility] - [place], [place] - [address] AT [facility] [address]  [address]  [facility] [address]  Phone: [phone] Fax: [phone]    Thanks     -----------------------------------------------  8/27/18 @ 1201 ([facility])  SPOKE WITH MS GAYLE, INFORMED HER THAT STOOL SOFTENERS ARE NOT ORDERED , SHE CAN PICK IT UP OVER THE COUNTER, AT [employer], OPTIONS GIVEN: COLACE, SENOKOT STOOL SOFTENER, BENEFIBER, MIRALAX, MILK OF MAGNESIA, ALSO WARM WATER WITH LEMON JUICE, DRINK LOTS OF WATER,

## 2018-08-27 NOTE — TELEPHONE ENCOUNTER
----- Message from Emir Kirk MD sent at 8/26/2018 11:18 AM CDT -----  Pt is sp repair of bladder injury during hyst at [facility]    However, she [place] to [facility] so she would rather be seen at [facility]    Please have her see Meme CURTIS at [facility] on Thursday to recheck urine cs and schedule cystogram    Thanks    sc

## 2018-08-27 NOTE — DISCHARGE INSTRUCTIONS
We have provided you with medication for your stool and nausea. Please fill and take as directed.    Please return to the ER if you have chest pain, difficulty breathing, fevers, altered mental status, dizziness, weakness, or any other concerns.      Follow up with your primary care physician.

## 2018-08-27 NOTE — ED NOTES
"Pt to ED, had hysterectomy, x several days ago, stating "they put stiches in my bladder." Pt reporting SOB, CP, nausea, vomiting x today upon awakening. Pt with increased RR noted, in mild distress r/t pain, appears anxious, is crying out in pain repeatedly. Pt AAOx4 and appropriate at this time.  No acute distress noted. Awaiting further orders. Pt updated on POC. Bed is locked and in lowest position with side rails up x2. Call bell within reach and pt oriented to use of call bell. Pt on continuous pulse ox, and continuous BP cuff. Will continue to monitor.     "

## 2018-08-27 NOTE — TELEPHONE ENCOUNTER
----- Message from Nydia Eagle sent at 8/27/2018 12:28 PM CDT -----  Contact: Self   Patient is in a lot of pain and she's doing a lot of vomiting. She is not sure if she should come to an Emergency room or if she should just schedule an appointment. She would like to speak with the office instead. Pt says she will wait for the call unless it progresses. Please call at [phone].  --------------------------------------------------------  8/27/18 @ 7990 ([facility])   SPOKE WITH MS GAYLE, , INFORMED HER THAT SHE SHOULD GO TO E.R. , SHE STATED THAT SHE IS STARTING TO FEEL A LITTLE BETTER, APPT MADE FOR POST OP WITH DR MILLER, ON 9/10/18 @ 6035  INFORMED HER THAT IF SHE CONTINUES WITH N&V TO GO DIRECTLY TO E.R, PT STATED HER UNDERSTANDING

## 2018-08-29 ENCOUNTER — OFFICE VISIT (OUTPATIENT)
Dept: UROLOGY | Facility: CLINIC | Age: 43
End: 2018-08-29
Attending: UROLOGY
Payer: COMMERCIAL

## 2018-08-29 ENCOUNTER — TELEPHONE (OUTPATIENT)
Dept: OBSTETRICS AND GYNECOLOGY | Facility: CLINIC | Age: 43
End: 2018-08-29

## 2018-08-29 VITALS
HEART RATE: 68 BPM | WEIGHT: 160 LBS | HEIGHT: 65 IN | BODY MASS INDEX: 26.66 KG/M2 | SYSTOLIC BLOOD PRESSURE: 125 MMHG | DIASTOLIC BLOOD PRESSURE: 79 MMHG

## 2018-08-29 DIAGNOSIS — I10 HYPERTENSION, ESSENTIAL: ICD-10-CM

## 2018-08-29 DIAGNOSIS — Z98.890 S/P BLADDER REPAIR: Primary | ICD-10-CM

## 2018-08-29 PROCEDURE — 99024 POSTOP FOLLOW-UP VISIT: CPT | Mod: S$GLB,,, | Performed by: UROLOGY

## 2018-08-29 RX ORDER — OXYBUTYNIN CHLORIDE 10 MG/1
10 TABLET, EXTENDED RELEASE ORAL DAILY
Qty: 30 TABLET | Refills: 1 | Status: SHIPPED | OUTPATIENT
Start: 2018-08-29 | End: 2018-09-07

## 2018-08-29 RX ORDER — HYOSCYAMINE SULFATE 0.12 MG/1
0.12 TABLET SUBLINGUAL EVERY 4 HOURS PRN
Qty: 30 TABLET | Refills: 1 | Status: SHIPPED | OUTPATIENT
Start: 2018-08-29 | End: 2018-09-07

## 2018-08-29 RX ORDER — HYDROCHLOROTHIAZIDE 25 MG/1
TABLET ORAL
Qty: 30 TABLET | Refills: 0 | Status: SHIPPED | OUTPATIENT
Start: 2018-08-29 | End: 2018-10-01 | Stop reason: SDUPTHER

## 2018-08-29 NOTE — PROGRESS NOTES
"  Subjective:       Tasha Rush is a 42 y.o. female who is an established patient who was seen for evaluation of catheter issues.      She had inadvertent bladder injury during hysterectomy on 8/24/18. She was discharged over the weekend. She now reports catheter issues - pain and leakage around catheter. Also with constipation and nausea.     The following portions of the patient's history were reviewed and updated as appropriate: allergies, current medications, past family history, past medical history, past social history, past surgical history and problem list.    Review of Systems  Constitutional: no fever or chills  ENT: no nasal congestion or sore throat  Respiratory: no cough or shortness of breath  Cardiovascular: no chest pain or palpitations  Gastrointestinal: no nausea or vomiting, tolerating diet  Genitourinary: as per HPI  Hematologic/Lymphatic: no easy bruising or lymphadenopathy  Musculoskeletal: no arthralgias or myalgias  Skin: no rashes or lesions  Neurological: no seizures or tremors  Behavioral/Psych: no auditory or visual hallucinations        Objective:    Vitals: /79 (BP Location: Left arm, Patient Position: Sitting, BP Method: Medium (Automatic))   Pulse 68   Ht 5' 5" (1.651 m)   Wt 72.6 kg (160 lb)   LMP 08/14/2018   BMI 26.63 kg/m²     Physical Exam   General: well developed, well nourished in no acute distress  Head: normocephalic, atraumatic  Neck: supple, trachea midline, no obvious enlargement of thyroid  HEENT: EOMI, mucus membranes moist, sclera anicteric, no hearing impairment  Lungs: symmetric expansion, non-labored breathing  Cardiovascular: regular rate and rhythm, normal pulses  Abdomen: soft, non tender, non distended, no palpable masses, no hepatosplenomegaly, no hernias, no CVA tenderness  Musculoskeletal: no peripheral edema, normal ROM in bilateral upper and lower extremities  Lymphatics: no cervical or inguinal lymphadenopathy  Skin: no rashes or " lesions  Neuro: alert and oriented x 3, no gross deficits  Psych: normal judgment and insight, normal mood/affect and non-anxious  Genitourinary:   sanchez catheter in place with yellow urine     Catheter flushed with NS with return of yellow urine.       Lab Review   Urine analysis today in clinic shows - sanchez     Lab Results   Component Value Date    WBC 6.49 08/27/2018    HGB 13.6 08/27/2018    HCT 40.9 08/27/2018    MCV 92 08/27/2018     08/27/2018     Lab Results   Component Value Date    CREATININE 0.8 08/27/2018    BUN 16 08/27/2018     Imaging  NA         Assessment/Plan:      1. S/P bladder repair    - Seems to be more bladder spasms rather than problems with catheter   - Catheter irrigated well   - Ditropan 10mg and Levsin sent to pharmacy for spasms         Follow up in 5-7 day with Meme to arrange for cystogram

## 2018-08-29 NOTE — TELEPHONE ENCOUNTER
Spoke with patient and schedule appt with pcp.     ----- Message from Kristofer Lagos MD sent at 8/29/2018  2:50 PM CDT -----  Regarding: Reschedule  Patient was to see me around the end of July.  I will refill her medication, but she needs to reschedule follow up

## 2018-08-29 NOTE — TELEPHONE ENCOUNTER
Spoke with pt. Her cath came out and she was not sure what she should do. Appointment scheduled with Urology for her today.

## 2018-08-30 ENCOUNTER — TELEPHONE (OUTPATIENT)
Dept: UROLOGY | Facility: CLINIC | Age: 43
End: 2018-08-30

## 2018-08-30 ENCOUNTER — TELEPHONE (OUTPATIENT)
Dept: OBSTETRICS AND GYNECOLOGY | Facility: CLINIC | Age: 43
End: 2018-08-30

## 2018-08-30 NOTE — TELEPHONE ENCOUNTER
----- Message from Venus Mayers sent at 8/30/2018  1:26 PM CDT -----  Contact: Charisse with [facility] [employer]/ [phone]  Requesting surgery notes with date of surgery, diagnosis codes, cpt codes, and doctor's signature to process a claim for pt. Please fax to [phone]. Thank you.

## 2018-08-30 NOTE — TELEPHONE ENCOUNTER
Spoke to pt advised it is okay to take levsin as needed along with oxybutynin pt wasn't sure if okay an wasn't taking levsin at all.  Pt states its mainly the burning she is having an issue with.  I advised she could take azo also which pt states she will try the levsin an oxybutynin together before adding azo an will call us tomorrow to let us know how she is doing. I did advise pt I would forward message to . - yunior

## 2018-08-30 NOTE — TELEPHONE ENCOUNTER
----- Message from Lisa Ford sent at 8/30/2018 10:54 AM CDT -----  Contact: Self            Name of Who is Calling: Self      What is the request in detail: Pt states she is still having problems with the catheter and would like to know what else she can do.      Can the clinic reply by [facility]: N      What Number to Call Back [facility] not [facility]: [phone]

## 2018-09-04 ENCOUNTER — OFFICE VISIT (OUTPATIENT)
Dept: UROLOGY | Facility: CLINIC | Age: 43
End: 2018-09-04
Payer: COMMERCIAL

## 2018-09-04 ENCOUNTER — HOSPITAL ENCOUNTER (OUTPATIENT)
Dept: RADIOLOGY | Facility: OTHER | Age: 43
Discharge: HOME | End: 2018-09-04
Attending: NURSE PRACTITIONER
Payer: COMMERCIAL

## 2018-09-04 VITALS
BODY MASS INDEX: 26.3 KG/M2 | SYSTOLIC BLOOD PRESSURE: 136 MMHG | HEIGHT: 65 IN | DIASTOLIC BLOOD PRESSURE: 86 MMHG | HEART RATE: 62 BPM | WEIGHT: 157.88 LBS

## 2018-09-04 DIAGNOSIS — S37.20XD INJURY OF BLADDER, SUBSEQUENT ENCOUNTER: Primary | ICD-10-CM

## 2018-09-04 DIAGNOSIS — M79.89 LEG SWELLING: ICD-10-CM

## 2018-09-04 PROCEDURE — 93971 EXTREMITY STUDY: CPT | Mod: 26,,, | Performed by: RADIOLOGY

## 2018-09-04 PROCEDURE — 87077 CULTURE AEROBIC IDENTIFY: CPT

## 2018-09-04 PROCEDURE — 87086 URINE CULTURE/COLONY COUNT: CPT

## 2018-09-04 PROCEDURE — 87186 SC STD MICRODIL/AGAR DIL: CPT

## 2018-09-04 PROCEDURE — 93971 EXTREMITY STUDY: CPT | Mod: TC

## 2018-09-04 PROCEDURE — 87088 URINE BACTERIA CULTURE: CPT

## 2018-09-04 PROCEDURE — 99213 OFFICE O/P EST LOW 20 MIN: CPT | Mod: S$GLB,,, | Performed by: NURSE PRACTITIONER

## 2018-09-04 NOTE — PROGRESS NOTES
"Subjective:      Tasha Rush is a 42 y.o. female who returns today regarding her sanchez catheter.    She had inadvertent bladder injury during hysterectomy on 8/24/18. Sanchez was placed at the time of the repair. She presents today to coordinate cystogram. Ditropan and levsin are working well for bladder spasms. Reports left leg swelling that developed over the last 3 days. Restarted her HCTZ 2 days ago; however no improvement. Denies SOB and leg pain.     The following portions of the patient's history were reviewed and updated as appropriate: allergies, current medications, past family history, past medical history, past social history, past surgical history and problem list.    Review of Systems  Constitutional: no fever or chills  ENT: no nasal congestion or sore throat  Respiratory: no cough or shortness of breath  Cardiovascular: no chest pain or palpitations  Gastrointestinal: no nausea or vomiting, tolerating diet  Genitourinary: as per HPI  Hematologic/Lymphatic: no easy bruising or lymphadenopathy  Musculoskeletal: no arthralgias or myalgias  Neurological: no seizures or tremors  Behavioral/Psych: no auditory or visual hallucinations     Objective:   Vital Signs:/85 (BP Location: Left arm, Patient Position: Sitting, BP Method: Medium (Automatic))   Pulse 62   Ht 5' 5" (1.651 m)   Wt 71.6 kg (157 lb 13.6 oz)   LMP 08/14/2018   BMI 26.27 kg/m²     Physical Exam   General: alert and oriented, no acute distress  Head: normocephalic, atraumatic  Neck: supple, no lymphadenopathy, normal ROM, no masses  Respiratory: Symmetric expansion, non-labored breathing  Cardiovascular: regular rate and rhythm, nomal pulses,   Abdomen: soft, non tender, non distended, no palpable masses, no hernias, no hepatomegaly or splenomegaly  Pelvic: sanchez to gravity with julito urine noted   Lymphatic: no inguinal nodes  Skin: normal coloration and turgor, no rashes, no suspicious skin lesions noted  Neuro: alert and " oriented x3, no gross deficits  Psych: normal judgment and insight, normal mood/affect and non-anxious  2+ edema to LLE; no calf tenderness     Lab Review   Urinalysis demonstrates: sent for culture   Lab Results   Component Value Date    WBC 6.49 08/27/2018    HGB 13.6 08/27/2018    HCT 40.9 08/27/2018    MCV 92 08/27/2018     08/27/2018     Lab Results   Component Value Date    CREATININE 0.8 08/27/2018    BUN 16 08/27/2018       Imaging  None    Assessment:   Injury to bladder  Leg swelling     Plan:   Tasha was seen today for follow-up.    Diagnoses and all orders for this visit:    Injury of bladder, subsequent encounter  -     Urine culture  -     FL Cystogram Minimum 3 Views (xpd) - Rad Performed; Future    Leg swelling  -     US Lower Extremity Veins Left; Future    Plan:  --Urine culture today  --STAT US  --Follow up Friday with cystogram prior to appt

## 2018-09-05 PROBLEM — Z98.890 S/P BLADDER REPAIR: Status: RESOLVED | Noted: 2018-08-26 | Resolved: 2018-09-05

## 2018-09-05 NOTE — PROGRESS NOTES
Subjective:      Tasha Rush is a 42 y.o. female who returns today regarding her sanchez catheter.     She had inadvertent bladder injury during hysterectomy on 8/24/18. Sanchez was placed at the time of the repair. Urine culture was positive earlier this week. She is currently on bactrim and received gent IM yesterday. Cystogram was done this am (report below). Doing well. Urine remains clear yellow. Denies fever/chills.     The following portions of the patient's history were reviewed and updated as appropriate: allergies, current medications, past family history, past medical history, past social history, past surgical history and problem list.    Review of Systems  Constitutional: no fever or chills  ENT: no nasal congestion or sore throat  Respiratory: no cough or shortness of breath  Cardiovascular: no chest pain or palpitations  Gastrointestinal: no nausea or vomiting, tolerating diet  Genitourinary: as per HPI  Hematologic/Lymphatic: no easy bruising or lymphadenopathy  Musculoskeletal: no arthralgias or myalgias  Neurological: no seizures or tremors  Behavioral/Psych: no auditory or visual hallucinations     Objective:   Vital Signs:  Vitals:    09/07/18 1048   BP: 121/79   Pulse: 75     Physical Exam   General: alert and oriented, no acute distress  Head: normocephalic, atraumatic  Neck: supple, no lymphadenopathy, normal ROM, no masses  Respiratory: Symmetric expansion, non-labored breathing  Cardiovascular: regular rate and rhythm, nomal pulses,   Abdomen: soft, non tender, non distended, no palpable masses, no hernias, no hepatomegaly or splenomegaly  Pelvic: sanchez to gravity with julito urine noted   Lymphatic: no inguinal nodes  Skin: normal coloration and turgor, no rashes, no suspicious skin lesions noted  Neuro: alert and oriented x3, no gross deficits  Psych: normal judgment and insight, normal mood/affect and non-anxious    Lab Review   Urinalysis demonstrates: no sample   Lab Results   Component  "Value Date    WBC 6.49 08/27/2018    HGB 13.6 08/27/2018    HCT 40.9 08/27/2018    MCV 92 08/27/2018     08/27/2018     Lab Results   Component Value Date    CREATININE 0.8 08/27/2018    BUN 16 08/27/2018       Imaging  (all images personally reviewed; agree with report below)  Cystogram (9/7/18)- "Focal contour irregularity at the left paramidline bladder dome with superiorly pointed configuration.  This may relate to irregularity from cystotomy with surgical repair; however, this also raises the question of a small contained bladder leak." --Images reviewed by Dr. Kirk-NO LEAK    Voiding Trial (Fill/Pull/Void): 200cc of sterile saline was instilled into the bladder through the previously placed sanchez catheter.  The sanchez balloon was then deflated and the sanchez removed.  The patient subsequently voided 200cc, consistent with successful voiding trial.  The sanchez was not replaced.    Assessment:   Injury to bladder    Plan:   Diagnoses and all orders for this visit:    S/P bladder repair    Plan:  --Successful VT  --Continue bactrim  --D/C levsin, but okay to continue ditropan 5 mg daily for bladders spasms   --If unable to void, instructed to go to the ED for sanchez placement   --Follow up in 1 weeks for PVR  "

## 2018-09-06 ENCOUNTER — OFFICE VISIT (OUTPATIENT)
Dept: UROLOGY | Facility: CLINIC | Age: 43
End: 2018-09-06
Payer: COMMERCIAL

## 2018-09-06 VITALS
WEIGHT: 157.88 LBS | SYSTOLIC BLOOD PRESSURE: 126 MMHG | BODY MASS INDEX: 26.3 KG/M2 | DIASTOLIC BLOOD PRESSURE: 80 MMHG | HEIGHT: 65 IN

## 2018-09-06 DIAGNOSIS — N30.00 ACUTE CYSTITIS WITHOUT HEMATURIA: Primary | ICD-10-CM

## 2018-09-06 DIAGNOSIS — N30.01 ACUTE CYSTITIS WITH HEMATURIA: Primary | ICD-10-CM

## 2018-09-06 DIAGNOSIS — S37.20XD INJURY OF BLADDER, SUBSEQUENT ENCOUNTER: ICD-10-CM

## 2018-09-06 LAB — BACTERIA UR CULT: NORMAL

## 2018-09-06 PROCEDURE — 99212 OFFICE O/P EST SF 10 MIN: CPT | Mod: 25,S$GLB,, | Performed by: NURSE PRACTITIONER

## 2018-09-06 PROCEDURE — 96372 THER/PROPH/DIAG INJ SC/IM: CPT | Mod: S$GLB,,, | Performed by: NURSE PRACTITIONER

## 2018-09-06 RX ORDER — AMOXICILLIN AND CLAVULANATE POTASSIUM 875; 125 MG/1; MG/1
1 TABLET, FILM COATED ORAL EVERY 12 HOURS
Qty: 14 TABLET | Refills: 0 | Status: SHIPPED | OUTPATIENT
Start: 2018-09-06 | End: 2018-09-06

## 2018-09-06 RX ORDER — GENTAMICIN SULFATE 40 MG/ML
80 INJECTION, SOLUTION INTRAMUSCULAR; INTRAVENOUS
Status: COMPLETED | OUTPATIENT
Start: 2018-09-06 | End: 2018-09-06

## 2018-09-06 RX ADMIN — GENTAMICIN SULFATE 80 MG: 40 INJECTION, SOLUTION INTRAMUSCULAR; INTRAVENOUS at 02:09

## 2018-09-06 NOTE — PROGRESS NOTES
"Subjective:      Tasha Rush is a 42 y.o. female who returns today regarding her sanchez catheter.    She had inadvertent bladder injury during hysterectomy on 8/24/18. Sanchez was placed at the time of the repair. Scheduled for cystogram tomorrow. Urine culture was + for E Coli. She presents today for gent injection. Starting bactrim this afternoon.     The following portions of the patient's history were reviewed and updated as appropriate: allergies, current medications, past family history, past medical history, past social history, past surgical history and problem list.    Review of Systems  Constitutional: no fever or chills  ENT: no nasal congestion or sore throat  Respiratory: no cough or shortness of breath  Cardiovascular: no chest pain or palpitations  Gastrointestinal: no nausea or vomiting, tolerating diet  Genitourinary: as per HPI  Hematologic/Lymphatic: no easy bruising or lymphadenopathy  Musculoskeletal: no arthralgias or myalgias  Neurological: no seizures or tremors  Behavioral/Psych: no auditory or visual hallucinations     Objective:   Vital Signs:/80 (BP Location: Left arm, Patient Position: Standing, BP Method: Large (Manual))   Ht 5' 5" (1.651 m)   Wt 71.6 kg (157 lb 13.6 oz)   LMP 08/14/2018   BMI 26.27 kg/m²     Physical Exam   General: alert and oriented, no acute distress  Head: normocephalic, atraumatic  Neck: supple, no lymphadenopathy, normal ROM, no masses  Respiratory: Symmetric expansion, non-labored breathing  Cardiovascular: regular rate and rhythm, nomal pulses,   Abdomen: soft, non tender, non distended, no palpable masses, no hernias, no hepatomegaly or splenomegaly  Pelvic: sanchez to gravity with julito urine noted   Lymphatic: no inguinal nodes  Skin: normal coloration and turgor, no rashes, no suspicious skin lesions noted  Neuro: alert and oriented x3, no gross deficits  Psych: normal judgment and insight, normal mood/affect and non-anxious    Lab Review "   Urinalysis demonstrates:no sample   Lab Results   Component Value Date    WBC 6.49 08/27/2018    HGB 13.6 08/27/2018    HCT 40.9 08/27/2018    MCV 92 08/27/2018     08/27/2018     Lab Results   Component Value Date    CREATININE 0.8 08/27/2018    BUN 16 08/27/2018       Imaging  None    Assessment:   Injury to bladder    Plan:   Tasha was seen today for establish care.    Diagnoses and all orders for this visit:    Acute cystitis with hematuria  -     gentamicin injection 80 mg; Inject 80 mg into the muscle one time.    Injury of bladder, subsequent encounter    Plan:  --Gent IM now  --Start bactrim tonight  --Cystogram tomorrow am  --Follow up in the clinic after cystogram for possible VT

## 2018-09-07 ENCOUNTER — OFFICE VISIT (OUTPATIENT)
Dept: UROLOGY | Facility: CLINIC | Age: 43
End: 2018-09-07
Payer: COMMERCIAL

## 2018-09-07 ENCOUNTER — HOSPITAL ENCOUNTER (OUTPATIENT)
Dept: RADIOLOGY | Facility: OTHER | Age: 43
Discharge: HOME | End: 2018-09-07
Attending: NURSE PRACTITIONER
Payer: COMMERCIAL

## 2018-09-07 VITALS
BODY MASS INDEX: 26.3 KG/M2 | WEIGHT: 157.88 LBS | SYSTOLIC BLOOD PRESSURE: 121 MMHG | DIASTOLIC BLOOD PRESSURE: 79 MMHG | HEART RATE: 75 BPM | HEIGHT: 65 IN

## 2018-09-07 DIAGNOSIS — S37.20XD INJURY OF BLADDER, SUBSEQUENT ENCOUNTER: ICD-10-CM

## 2018-09-07 DIAGNOSIS — Z98.890 S/P BLADDER REPAIR: Primary | ICD-10-CM

## 2018-09-07 PROCEDURE — 25500020 PHARM REV CODE 255: Performed by: NURSE PRACTITIONER

## 2018-09-07 PROCEDURE — 99213 OFFICE O/P EST LOW 20 MIN: CPT | Mod: 25,S$GLB,, | Performed by: NURSE PRACTITIONER

## 2018-09-07 PROCEDURE — 51600 INJECTION FOR BLADDER X-RAY: CPT | Mod: 26,,, | Performed by: RADIOLOGY

## 2018-09-07 PROCEDURE — 51700 IRRIGATION OF BLADDER: CPT | Mod: S$GLB,,, | Performed by: NURSE PRACTITIONER

## 2018-09-07 PROCEDURE — 51600 INJECTION FOR BLADDER X-RAY: CPT | Mod: TC

## 2018-09-07 PROCEDURE — 74430 CONTRAST X-RAY BLADDER: CPT | Mod: 26,,, | Performed by: RADIOLOGY

## 2018-09-07 PROCEDURE — 74430 CONTRAST X-RAY BLADDER: CPT | Mod: TC

## 2018-09-07 RX ORDER — OXYBUTYNIN CHLORIDE 5 MG/1
5 TABLET, EXTENDED RELEASE ORAL DAILY
Qty: 30 TABLET | Refills: 11 | Status: SHIPPED | OUTPATIENT
Start: 2018-09-07 | End: 2018-10-08

## 2018-09-07 RX ADMIN — IOTHALAMATE MEGLUMINE 150 ML: 172 INJECTION URETERAL at 10:09

## 2018-09-10 ENCOUNTER — TELEPHONE (OUTPATIENT)
Dept: OBSTETRICS AND GYNECOLOGY | Facility: CLINIC | Age: 43
End: 2018-09-10

## 2018-09-10 ENCOUNTER — OFFICE VISIT (OUTPATIENT)
Dept: OBSTETRICS AND GYNECOLOGY | Facility: CLINIC | Age: 43
End: 2018-09-10
Payer: COMMERCIAL

## 2018-09-10 VITALS
SYSTOLIC BLOOD PRESSURE: 124 MMHG | DIASTOLIC BLOOD PRESSURE: 72 MMHG | HEIGHT: 65 IN | BODY MASS INDEX: 27.56 KG/M2 | WEIGHT: 165.44 LBS

## 2018-09-10 DIAGNOSIS — Z09 POSTOP CHECK: Primary | ICD-10-CM

## 2018-09-10 DIAGNOSIS — Z90.710 S/P TAH (TOTAL ABDOMINAL HYSTERECTOMY): ICD-10-CM

## 2018-09-10 PROCEDURE — 99999 PR PBB SHADOW E&M-EST. PATIENT-LVL II: CPT | Mod: PBBFAC,,, | Performed by: OBSTETRICS & GYNECOLOGY

## 2018-09-10 PROCEDURE — 99499 UNLISTED E&M SERVICE: CPT | Mod: S$GLB,,, | Performed by: OBSTETRICS & GYNECOLOGY

## 2018-09-10 NOTE — TELEPHONE ENCOUNTER
----- Message from Nydia Eagle sent at 9/10/2018 12:48 PM CDT -----  Contact: Self   Patient is calling to see if her fax to her insurance company went through. She says she was at the office this morning and just wanted to make sure it was sent. Please call at [phone].  -----------------------------------------------------------  9/10/18 @ 2111 ([facility])  INFORMED MS GAYLE, THAT ALL THE PAPERS SHE GAVE US WAS FAXED FOR HER AND WITH A POSITIVE CONFIRMATION OF FAX, SHE STATED SHE NEEDS THE PAPER WITH HER SIGNATURE, INFORMED HER IT SHE CAN BRING IT WE CAN FAX THAT ONE TOO

## 2018-09-11 NOTE — PROGRESS NOTES
Subjective:      Tasha Rush is a 42 y.o. female who returns today regarding her bladder repair.       She had inadvertent bladder injury during hysterectomy on 8/24/18. Successful VT on 9/7. Urine culture on 9/4 was positive for E Coli, and she is currently on bactrim.     She presents today for PVR. Doing well. Reports bladder spasms at the end of urination only; however not currently on ditropan or levsin. Denies dysuria, frequency, urgency, and incomplete bladder emptying. Denies incontinence and urinary leakage. + constipation, taking mag citrate.      The following portions of the patient's history were reviewed and updated as appropriate: allergies, current medications, past family history, past medical history, past social history, past surgical history and problem list.    Review of Systems  Constitutional: no fever or chills  ENT: no nasal congestion or sore throat  Respiratory: no cough or shortness of breath  Cardiovascular: no chest pain or palpitations  Gastrointestinal: no nausea or vomiting, tolerating diet  Genitourinary: as per HPI  Hematologic/Lymphatic: no easy bruising or lymphadenopathy  Musculoskeletal: no arthralgias or myalgias  Neurological: no seizures or tremors  Behavioral/Psych: no auditory or visual hallucinations     Objective:   Vital Signs:  Vitals:    09/12/18 1034   BP: 99/61   Pulse: 63     Physical Exam   General: alert and oriented, no acute distress  Head: normocephalic, atraumatic  Neck: supple, no lymphadenopathy, normal ROM, no masses  Respiratory: Symmetric expansion, non-labored breathing  Cardiovascular: regular rate and rhythm, nomal pulses,   Abdomen: soft, non tender, non distended, no palpable masses, no hernias, no hepatomegaly or splenomegaly  Pelvic: not examined   Lymphatic: no inguinal nodes  Skin: normal coloration and turgor, no rashes, no suspicious skin lesions noted  Neuro: alert and oriented x3, no gross deficits  Psych: normal judgment and insight,  normal mood/affect and non-anxious    Lab Review   Urinalysis demonstrates: leukocytes (++), protein (trace), and RBCs (250 shamar/mL)  PVR: 102 mL  Lab Results   Component Value Date    WBC 6.49 08/27/2018    HGB 13.6 08/27/2018    HCT 40.9 08/27/2018    MCV 92 08/27/2018     08/27/2018     Lab Results   Component Value Date    CREATININE 0.8 08/27/2018    BUN 16 08/27/2018       Imaging  None     Assessment:   Injury to bladder  Bladder spasms    Plan:   Tasha was seen today for follow-up.    Diagnoses and all orders for this visit:    S/P bladder repair  -     POCT URINE DIPSTICK WITHOUT MICROSCOPE  -     POCT Bladder Scan  -     Urine culture    Bladder spasms    Plan: Discussed with Dr. Smith  --Doing well   --Urine culture today, continue bactrim   --Again discussed vesicovaginal fistula   --Okay to take ditropan 5 mg daily and/or pyridium PRN   --Follow up in 3 weeks with PVR

## 2018-09-12 ENCOUNTER — OFFICE VISIT (OUTPATIENT)
Dept: UROLOGY | Facility: CLINIC | Age: 43
End: 2018-09-12
Payer: COMMERCIAL

## 2018-09-12 VITALS
SYSTOLIC BLOOD PRESSURE: 99 MMHG | DIASTOLIC BLOOD PRESSURE: 61 MMHG | WEIGHT: 165 LBS | BODY MASS INDEX: 27.49 KG/M2 | HEART RATE: 63 BPM | HEIGHT: 65 IN

## 2018-09-12 DIAGNOSIS — N32.89 BLADDER SPASMS: ICD-10-CM

## 2018-09-12 DIAGNOSIS — Z98.890 S/P BLADDER REPAIR: Primary | ICD-10-CM

## 2018-09-12 LAB
BILIRUB SERPL-MCNC: ABNORMAL MG/DL
BLOOD URINE, POC: 250
COLOR, POC UA: YELLOW
GLUCOSE UR QL STRIP: ABNORMAL
KETONES UR QL STRIP: ABNORMAL
LEUKOCYTE ESTERASE URINE, POC: ABNORMAL
NITRITE, POC UA: ABNORMAL
PH, POC UA: 6
POC RESIDUAL URINE VOLUME: 102 ML (ref 0–100)
PROTEIN, POC: ABNORMAL
SPECIFIC GRAVITY, POC UA: 1.01
UROBILINOGEN, POC UA: ABNORMAL

## 2018-09-12 PROCEDURE — 81002 URINALYSIS NONAUTO W/O SCOPE: CPT | Mod: S$GLB,,, | Performed by: NURSE PRACTITIONER

## 2018-09-12 PROCEDURE — 87086 URINE CULTURE/COLONY COUNT: CPT

## 2018-09-12 PROCEDURE — 99213 OFFICE O/P EST LOW 20 MIN: CPT | Mod: 25,S$GLB,, | Performed by: NURSE PRACTITIONER

## 2018-09-12 PROCEDURE — 51798 US URINE CAPACITY MEASURE: CPT | Mod: S$GLB,,, | Performed by: NURSE PRACTITIONER

## 2018-09-12 NOTE — PROGRESS NOTES
"[facility]  Ambulatory Clinic  Obstetrics & Gynecology    Visit Date:  9/10/2018    Chief Complaint:  Post-op visit    Subjective:      Tasha Rush is a 42 y.o. G0 here for post-op visit ~2 wks on 8/24/2018.      Pt underwent:  Attempted total laparoscopic hysterectomy converted to total abdominal hysterectomy due to dense pelvic adhesions and incidental cystotomy   Bilateral salpingectomy  Extensive lysis of adhesions  Cystotomy repair by urology intra-op     Secondary to:  Chronic pelvic pain  Recurrent bilateral hydrosalpinx  Abnormal uterine bleeding    Pt was discharge with sanchez catheter for 2 wks on suppressive antibx with macrobid 100 mg qhs.  Sanchez was removed by urology and pt is voiding without difficulty.  Denies UTI sxs or incontinence.  Otherwise, pt has been overall doing well.  Pt c/o mild soreness at incision.    Pt has resumed household activities and states she pleased with surgical results.    Pt denies any vaginal bleeding, discharge, pain, incisional problems, GI or  complaints.      Review of Systems:      Constitutional:  No fever, fatigue  HENT:  No congestion, hearing changes  Eyes:  No visual disturbance  Respiratory:  No cough, shortness of breath  Cardiovascular:  No chest pain, leg swelling  Gastrointestinal:  No abdominal pain, constipation, blood in stool   Genitourinary:  No dysuria, frequency  Skin:  No rash, jaundice  Neurological:  No dizziness, weakness, headaches    Objective:     /72   Ht 5' 5" (1.651 m)   Wt 75 kg (165 lb 7.3 oz)   LMP 08/14/2018   BMI 27.53 kg/m²      GENERAL:  NAD, well-nourished  HEENT:  NCAT, moist mucus membranes. Neck supple w/o masses.  LUNGS:  CTA-B  HEART:  RRR, no murmurs, gallops, or rubs  ABDOMEN:  Soft, non-tender, non-distended. Normoactive BS. No obvious organomegaly.  2 cm superficial skin defect at site of JORGE drain; otherwise, remainder of incision is clean, dry, and intact and is healing appropriately " without signs of infection.    EXT:  Symmetric w/o cramping, claudication, or edema. +2 distal pulses.  SKIN:  No rashes or bruising  NEURO:  Grossly intact bilaterally  PSYCH:  Mood and affect appropriate  PELVIC: Pt declined.    Chaperone present for exam.    Laboratory:    Lab Results   Component Value Date    WBC 6.49 08/27/2018    HGB 13.6 08/27/2018    HCT 40.9 08/27/2018    MCV 92 08/27/2018     08/27/2018     Sugical pathology:    Uterus and cervix (130 g):  -Mid proliferative endometrium. Glands appear synchronous and free of hyperplasia and atypia.  -Extensive adenomyosis  -Mild squamous metaplasia the cervix  Fallopian tubes (2):  -Hydrosalpinx of both tubes  Surgical pathology reviewed with pt.    Assessment:    42 y.o. G0 s/p:    1. Attempted total laparoscopic hysterectomy converted to total abdominal hysterectomy due to dense pelvic adhesions and incidental cystotomy   2. Bilateral salpingectomy  3. Extensive lysis of adhesions  4. Incidental cystotomy repair by urology intra-op      Plan:    Post-op care instructions and precautions reviewed.  Surgical findings and pathology discussed with pt.  Pelvic rest x 6 wks post-op.  Motrin and percocet prn pain.  Wound care instructions and precautions  Return in 4 weeks, or sooner for any concerns.  All of her questions were answered to her satisfactions, pt voiced understanding.       Hardy Bueno MD

## 2018-09-14 ENCOUNTER — TELEPHONE (OUTPATIENT)
Dept: OBSTETRICS AND GYNECOLOGY | Facility: CLINIC | Age: 43
End: 2018-09-14

## 2018-09-14 ENCOUNTER — TELEPHONE (OUTPATIENT)
Dept: UROLOGY | Facility: HOSPITAL | Age: 43
End: 2018-09-14

## 2018-09-14 DIAGNOSIS — B37.31 VAGINAL YEAST INFECTION: Primary | ICD-10-CM

## 2018-09-14 LAB — BACTERIA UR CULT: NO GROWTH

## 2018-09-14 RX ORDER — FLUCONAZOLE 150 MG/1
150 TABLET ORAL DAILY
Qty: 1 TABLET | Refills: 0 | Status: SHIPPED | OUTPATIENT
Start: 2018-09-14 | End: 2019-02-11 | Stop reason: SDUPTHER

## 2018-09-14 NOTE — TELEPHONE ENCOUNTER
Pt notified of negative culture. She complained of vaginal itching. rx for diflucan x 1 sent to her pharmacy.

## 2018-09-14 NOTE — TELEPHONE ENCOUNTER
----- Message from Rich Jeffers sent at 9/14/2018  9:03 AM CDT -----  Contact: Self/[phone]  The patient stating she's having vaginal itching and would like to speak to someone in regards to this matter.    Thank you

## 2018-09-17 ENCOUNTER — TELEPHONE (OUTPATIENT)
Dept: OBSTETRICS AND GYNECOLOGY | Facility: CLINIC | Age: 43
End: 2018-09-17

## 2018-09-17 NOTE — TELEPHONE ENCOUNTER
----- Message from Milagros Becker sent at 9/17/2018  2:08 PM CDT -----  Contact: Self/[phone]  Patient called to inform the staff that her employer is waiting on her [place] paperwork. Thank you.

## 2018-09-26 NOTE — PROGRESS NOTES
Subjective:       Patient ID: Tasha Rush is a 42 y.o. female with a PMH significant for HTN, Abnormal PAP, Fibroids, Meningitis, Smoker who was seen initially by me (Gabby) on 7/3/2018.    Chief Complaint: Follow-up    HPI  Patient complains of 5 days of bilateral flank pain - improved with lying down and with ibuprofen.  Admits to constipation - one BM in past week and usually regular.  Reflux symptoms improved with Protonix, but still with the discomfort she had prior to hysterectomy.  Having some mild ongoing and chronic dysuria.  Patient denies f/c, n/v/d.  No chest pain or SOB.  No headaches or change in vision.  No dizziness.  No significant  weight gain or weight loss.  Remaining ROS negative.    Review of Systems   Constitutional: Negative for activity change, appetite change, chills, diaphoresis, fatigue and fever.   HENT: Negative for congestion, sinus pressure and sore throat.    Eyes: Negative for visual disturbance.   Respiratory: Negative for cough, chest tightness and shortness of breath.    Cardiovascular: Negative for chest pain, palpitations and leg swelling.   Gastrointestinal: Positive for abdominal pain. Negative for blood in stool, constipation, diarrhea, nausea and vomiting.   Endocrine: Negative for polydipsia, polyphagia and polyuria.   Genitourinary: Negative for difficulty urinating, dysuria, flank pain and frequency.   Musculoskeletal: Negative for arthralgias, back pain, joint swelling and myalgias.        Leg cramps   Skin: Negative for rash.   Neurological: Negative for dizziness, syncope, weakness, light-headedness and headaches.   Psychiatric/Behavioral: Negative for dysphoric mood and sleep disturbance. The patient is not nervous/anxious.        Objective:      Physical Exam   Constitutional: She is oriented to person, place, and time. She appears well-developed and well-nourished. No distress.   HENT:   Head: Normocephalic and atraumatic.   Right Ear: External ear normal.    Left Ear: External ear normal.   Nose: Nose normal.   Mouth/Throat: Oropharynx is clear and moist. No oropharyngeal exudate.   Eyes: Conjunctivae and EOM are normal. Pupils are equal, round, and reactive to light.   Neck: Normal range of motion. Neck supple. No tracheal deviation present. No thyromegaly present.   Cardiovascular: Normal rate, regular rhythm, normal heart sounds and intact distal pulses. Exam reveals no gallop and no friction rub.   No murmur heard.  Pulmonary/Chest: Effort normal and breath sounds normal. No respiratory distress. She has no wheezes. She has no rales.   Abdominal: Soft. Bowel sounds are normal. She exhibits no distension and no mass. There is no tenderness. There is no guarding. No hernia.   Musculoskeletal: Normal range of motion. She exhibits no edema.   Lymphadenopathy:     She has no cervical adenopathy.   Neurological: She is alert and oriented to person, place, and time.   Skin: Skin is warm and dry. She is not diaphoretic. No erythema.   Psychiatric: She has a normal mood and affect. Her behavior is normal. Thought content normal.       Assessment:       1. Essential hypertension    2. Hypertension, essential    3. Vitamin D deficiency    4. Hypokalemia        Plan:   -Today's Visit - patient is awake and alert today.  Having constipation and vague bilateral flank pain with chronic dysuria.  Will repeat UA and urine culture.  Will advise to resume stool softeners and MOM as previous post-surgery.    -Cards - HTN - on Amlodipine and HCTZ.  BP last visit was  128/80. BP today is 120/80.  Refilled medications today.    Atypical chest pain in July - likely related to reflux. Pain is at rest and not associated SOB.  D-dimer negative in 7/2018.  EKG in 7/2018 with sinus paco, but otherwise normal.  Switched Pepcid to Pantoprazole.  No recurrent chest pain.    Lipids in 7/2018 with , TG 92, HDL 52, LDL 51.6.    -Renal - Hypokalemia - 1/2018.  Most likely due to HCTZ.   "Creatinine 0.7 at that time.  Repeat K+ in 7/2018 3.6.  Was 3.3 in 8/2018 (in ED).  Repeat BMP.  She may need daily potassium.  Check mag level as well.    -Endocrine - Vitamin D 25 in 7/2016.  TSH and A1C normal in 7/2016.  Was 10 in 7/2018 - recommended high-dose D2 weekly.  Will refill for 2 months, then change to D3 at 2000 units daily.  TSH normal in 7/2018.    -GI - GERD/Gastritis - was on Pepcid.  Had upper endoscopy and colonoscopy per [facility].  EGD showed esophagitis, hiatal hernia, gastritis.  Recommended she take pepcid 2xday. Has been non-compliant with this medication lately. Denies abd pain/reflux/difficulty swallowing.  Need to get results of colonoscopy from [facility] (2/2018), not currently in EPIC chart. Changed Pepcid to Pantoprazole 20mg.  Reflux symptoms better.      -GYN - Fibroids (s/p myomectomy and bilateral salpingostomy of hydrosalpinx in 8/2016).  S/p Hysterectomy in 8/2018.  Last Pap was negative in 10/2015.  S/p CALISTA in 8/2018.  Last Mammo was negative in 10/2015 - Mammogram already ordered, but not yet done.  Repeat Mammogram.  Treated for PID in 1/2018 in ED.  Has GYN follow up on 10/8/2018.    - - Bladder Injury during Hysterectomy on 8/24/2018 - seen by Urology on 8/29/2018 - Cystogram (9/7/18)- "Focal contour irregularity at the left paramidline bladder dome with superiorly pointed configuration.  This may relate to irregularity from cystotomy with surgical repair; however, this also raises the question of a small contained bladder leak."  S/p antibiotics for acute cystitis 9/6/2018.  S/p successful VT 9/7/2018.  Continued Ditropan 5mg daily.  Had follow up 9/12/2018 and treated with Bactrim for acute cystitis. Has Urology follow up 10/8/2018.    -Neuro - Viral Meningitis - 2/2017.  Empirically treated with IV Ceftriaxone for 10 days.    -HCM - We discussed Flu (declines today) and Tdap (7/3/2018) vaccinations.       -Follow up in 4 months  "

## 2018-09-29 DIAGNOSIS — E55.9 VITAMIN D DEFICIENCY: ICD-10-CM

## 2018-10-01 ENCOUNTER — LAB VISIT (OUTPATIENT)
Dept: LAB | Facility: HOSPITAL | Age: 43
End: 2018-10-01
Attending: INTERNAL MEDICINE
Payer: COMMERCIAL

## 2018-10-01 ENCOUNTER — OFFICE VISIT (OUTPATIENT)
Dept: PRIMARY CARE CLINIC | Facility: CLINIC | Age: 43
End: 2018-10-01
Payer: COMMERCIAL

## 2018-10-01 VITALS
SYSTOLIC BLOOD PRESSURE: 120 MMHG | OXYGEN SATURATION: 98 % | HEIGHT: 66 IN | HEART RATE: 66 BPM | DIASTOLIC BLOOD PRESSURE: 80 MMHG | BODY MASS INDEX: 26.31 KG/M2 | WEIGHT: 163.69 LBS

## 2018-10-01 DIAGNOSIS — R30.0 DYSURIA: ICD-10-CM

## 2018-10-01 DIAGNOSIS — I10 HYPERTENSION, ESSENTIAL: ICD-10-CM

## 2018-10-01 DIAGNOSIS — I10 ESSENTIAL HYPERTENSION: Primary | ICD-10-CM

## 2018-10-01 DIAGNOSIS — E55.9 VITAMIN D DEFICIENCY: ICD-10-CM

## 2018-10-01 DIAGNOSIS — E87.6 HYPOKALEMIA: ICD-10-CM

## 2018-10-01 DIAGNOSIS — I10 ESSENTIAL HYPERTENSION: ICD-10-CM

## 2018-10-01 LAB
ANION GAP SERPL CALC-SCNC: 7 MMOL/L
BACTERIA #/AREA URNS AUTO: ABNORMAL /HPF
BILIRUB UR QL STRIP: NEGATIVE
BUN SERPL-MCNC: 8 MG/DL
CALCIUM SERPL-MCNC: 9.5 MG/DL
CHLORIDE SERPL-SCNC: 106 MMOL/L
CLARITY UR REFRACT.AUTO: ABNORMAL
CO2 SERPL-SCNC: 27 MMOL/L
COLOR UR AUTO: YELLOW
CREAT SERPL-MCNC: 0.7 MG/DL
EST. GFR  (AFRICAN AMERICAN): >60 ML/MIN/1.73 M^2
EST. GFR  (NON AFRICAN AMERICAN): >60 ML/MIN/1.73 M^2
GLUCOSE SERPL-MCNC: 93 MG/DL
GLUCOSE UR QL STRIP: NEGATIVE
HGB UR QL STRIP: ABNORMAL
HYALINE CASTS UR QL AUTO: 6 /LPF
KETONES UR QL STRIP: NEGATIVE
LEUKOCYTE ESTERASE UR QL STRIP: ABNORMAL
MAGNESIUM SERPL-MCNC: 1.8 MG/DL
MICROSCOPIC COMMENT: ABNORMAL
NITRITE UR QL STRIP: POSITIVE
PH UR STRIP: 6 [PH] (ref 5–8)
POTASSIUM SERPL-SCNC: 3.7 MMOL/L
PROT UR QL STRIP: ABNORMAL
RBC #/AREA URNS AUTO: 17 /HPF (ref 0–4)
SODIUM SERPL-SCNC: 140 MMOL/L
SP GR UR STRIP: 1.02 (ref 1–1.03)
SQUAMOUS #/AREA URNS AUTO: 2 /HPF
URN SPEC COLLECT METH UR: ABNORMAL
UROBILINOGEN UR STRIP-ACNC: 4 EU/DL
WBC #/AREA URNS AUTO: >100 /HPF (ref 0–5)
WBC CLUMPS UR QL AUTO: ABNORMAL

## 2018-10-01 PROCEDURE — 87086 URINE CULTURE/COLONY COUNT: CPT

## 2018-10-01 PROCEDURE — 87088 URINE BACTERIA CULTURE: CPT

## 2018-10-01 PROCEDURE — 87077 CULTURE AEROBIC IDENTIFY: CPT

## 2018-10-01 PROCEDURE — 99999 PR PBB SHADOW E&M-EST. PATIENT-LVL III: CPT | Mod: PBBFAC,,, | Performed by: INTERNAL MEDICINE

## 2018-10-01 PROCEDURE — 83735 ASSAY OF MAGNESIUM: CPT

## 2018-10-01 PROCEDURE — 81001 URINALYSIS AUTO W/SCOPE: CPT

## 2018-10-01 PROCEDURE — 99214 OFFICE O/P EST MOD 30 MIN: CPT | Mod: S$GLB,,, | Performed by: INTERNAL MEDICINE

## 2018-10-01 PROCEDURE — 80048 BASIC METABOLIC PNL TOTAL CA: CPT

## 2018-10-01 PROCEDURE — 36415 COLL VENOUS BLD VENIPUNCTURE: CPT | Mod: PN

## 2018-10-01 PROCEDURE — 87186 SC STD MICRODIL/AGAR DIL: CPT

## 2018-10-01 RX ORDER — ERGOCALCIFEROL 1.25 MG/1
50000 CAPSULE ORAL
Qty: 8 CAPSULE | Refills: 0 | Status: SHIPPED | OUTPATIENT
Start: 2018-10-01 | End: 2018-11-30

## 2018-10-01 RX ORDER — HYDROCHLOROTHIAZIDE 25 MG/1
TABLET ORAL
Qty: 90 TABLET | Refills: 1 | Status: SHIPPED | OUTPATIENT
Start: 2018-10-01 | End: 2019-05-03

## 2018-10-01 RX ORDER — AMLODIPINE BESYLATE 10 MG/1
10 TABLET ORAL DAILY
Qty: 90 TABLET | Refills: 1 | Status: SHIPPED | OUTPATIENT
Start: 2018-10-01 | End: 2019-06-28 | Stop reason: SDUPTHER

## 2018-10-01 RX ORDER — ERGOCALCIFEROL 1.25 MG/1
CAPSULE ORAL
Qty: 4 CAPSULE | Refills: 0 | OUTPATIENT
Start: 2018-10-01

## 2018-10-01 NOTE — PATIENT INSTRUCTIONS
Your blood pressure was good.  Continue with Vitamin D2 at 50,000 units once a week for 2 more months, then change to over the counter vitamin D3 at 2000 units once a day.  Restart a stool softener and Milk of Magnesia for your constipation.  Reconsider the Flu vaccine.  Will repeat labs today to check you potassium level, as well as to repeat your urine studies.  Return in 4 months - sooner if needed.  Please come at least 15-20 minutes before your scheduled appointment time.

## 2018-10-02 ENCOUNTER — TELEPHONE (OUTPATIENT)
Dept: PRIMARY CARE CLINIC | Facility: CLINIC | Age: 43
End: 2018-10-02

## 2018-10-02 DIAGNOSIS — N39.0 URINARY TRACT INFECTION WITHOUT HEMATURIA, SITE UNSPECIFIED: ICD-10-CM

## 2018-10-02 RX ORDER — NITROFURANTOIN 25; 75 MG/1; MG/1
100 CAPSULE ORAL 2 TIMES DAILY
Qty: 10 CAPSULE | Refills: 0 | Status: SHIPPED | OUTPATIENT
Start: 2018-10-02 | End: 2018-10-07

## 2018-10-02 NOTE — TELEPHONE ENCOUNTER
Spoke with patient and she had a verbal understanding that her urinalysis suggests a UTI and that we sent a Rx to her pharmacy for her. Pt had verbal understanding that her metabolic panel shows a normal potassium and magnesium level.    ----- Message from Kristofer Lagos MD sent at 10/2/2018  6:51 AM CDT -----  Please let patient know the following:  Your Urinalysis suggests a UTI.  [facility] Nitrofurantoin to take twice a day for 5 days.  Your urine culture is still pending.  Your Metabolic panel shows a normal potassium and magnesium level.

## 2018-10-04 LAB — BACTERIA UR CULT: NORMAL

## 2018-10-08 ENCOUNTER — OFFICE VISIT (OUTPATIENT)
Dept: OBSTETRICS AND GYNECOLOGY | Facility: CLINIC | Age: 43
End: 2018-10-08
Payer: COMMERCIAL

## 2018-10-08 ENCOUNTER — OFFICE VISIT (OUTPATIENT)
Dept: UROLOGY | Facility: CLINIC | Age: 43
End: 2018-10-08
Payer: COMMERCIAL

## 2018-10-08 VITALS
HEIGHT: 66 IN | WEIGHT: 161 LBS | HEART RATE: 60 BPM | SYSTOLIC BLOOD PRESSURE: 113 MMHG | BODY MASS INDEX: 25.88 KG/M2 | DIASTOLIC BLOOD PRESSURE: 79 MMHG

## 2018-10-08 VITALS
DIASTOLIC BLOOD PRESSURE: 64 MMHG | BODY MASS INDEX: 26.02 KG/M2 | SYSTOLIC BLOOD PRESSURE: 118 MMHG | HEIGHT: 66 IN | WEIGHT: 161.94 LBS

## 2018-10-08 DIAGNOSIS — Z09 POSTOP CHECK: Primary | ICD-10-CM

## 2018-10-08 DIAGNOSIS — R30.0 DYSURIA: Primary | ICD-10-CM

## 2018-10-08 DIAGNOSIS — Z90.710 S/P TAH (TOTAL ABDOMINAL HYSTERECTOMY): ICD-10-CM

## 2018-10-08 DIAGNOSIS — S37.20XD INJURY OF BLADDER, SUBSEQUENT ENCOUNTER: ICD-10-CM

## 2018-10-08 PROBLEM — G03.9 MENINGITIS: Status: RESOLVED | Noted: 2017-02-16 | Resolved: 2018-10-08

## 2018-10-08 PROBLEM — G89.29 CHRONIC PELVIC PAIN IN FEMALE: Status: RESOLVED | Noted: 2018-08-26 | Resolved: 2018-10-08

## 2018-10-08 PROBLEM — R10.2 CHRONIC PELVIC PAIN IN FEMALE: Status: RESOLVED | Noted: 2018-08-26 | Resolved: 2018-10-08

## 2018-10-08 PROBLEM — N70.11 HYDROSALPINX: Status: RESOLVED | Noted: 2018-08-26 | Resolved: 2018-10-08

## 2018-10-08 LAB
BILIRUB SERPL-MCNC: ABNORMAL MG/DL
BLOOD URINE, POC: ABNORMAL
COLOR, POC UA: ABNORMAL
GLUCOSE UR QL STRIP: NORMAL
KETONES UR QL STRIP: ABNORMAL
LEUKOCYTE ESTERASE URINE, POC: ABNORMAL
NITRITE, POC UA: ABNORMAL
PH, POC UA: 7
POC RESIDUAL URINE VOLUME: 109 ML (ref 0–100)
PROTEIN, POC: ABNORMAL
SPECIFIC GRAVITY, POC UA: 1
UROBILINOGEN, POC UA: NORMAL

## 2018-10-08 PROCEDURE — 99999 PR PBB SHADOW E&M-EST. PATIENT-LVL II: CPT | Mod: PBBFAC,,, | Performed by: OBSTETRICS & GYNECOLOGY

## 2018-10-08 PROCEDURE — 87086 URINE CULTURE/COLONY COUNT: CPT

## 2018-10-08 PROCEDURE — 51798 US URINE CAPACITY MEASURE: CPT | Mod: S$GLB,,, | Performed by: NURSE PRACTITIONER

## 2018-10-08 PROCEDURE — 99499 UNLISTED E&M SERVICE: CPT | Mod: S$GLB,,, | Performed by: OBSTETRICS & GYNECOLOGY

## 2018-10-08 PROCEDURE — 81002 URINALYSIS NONAUTO W/O SCOPE: CPT | Mod: S$GLB,,, | Performed by: NURSE PRACTITIONER

## 2018-10-08 PROCEDURE — 99024 POSTOP FOLLOW-UP VISIT: CPT | Mod: S$GLB,,, | Performed by: NURSE PRACTITIONER

## 2018-10-08 RX ORDER — PHENAZOPYRIDINE HYDROCHLORIDE 200 MG/1
200 TABLET, FILM COATED ORAL 3 TIMES DAILY PRN
Qty: 30 TABLET | Refills: 2 | Status: SHIPPED | OUTPATIENT
Start: 2018-10-08 | End: 2018-10-18

## 2018-10-08 NOTE — PROGRESS NOTES
Subjective:      Tasha Rush is a 42 y.o. female who returns today regarding her bladder repair.       She had inadvertent bladder injury during hysterectomy on 8/24/18. Successful VT on 9/7. Urine culture on 10/1 was positive for E Coli, and she completed rx for macrobid.     She presents today for PVR. Doing well. Currently on ditropan 5 mg XL daily. Reports slight discomfort at the end of urination. Also with urgency and small volume urge incontinence. Denies involuntary urinary leakage. Denies constipation. Denies flank pain and fever/chill.s     The following portions of the patient's history were reviewed and updated as appropriate: allergies, current medications, past family history, past medical history, past social history, past surgical history and problem list.    Review of Systems  Constitutional: no fever or chills  ENT: no nasal congestion or sore throat  Respiratory: no cough or shortness of breath  Cardiovascular: no chest pain or palpitations  Gastrointestinal: no nausea or vomiting, tolerating diet  Genitourinary: as per HPI  Hematologic/Lymphatic: no easy bruising or lymphadenopathy  Musculoskeletal: no arthralgias or myalgias  Neurological: no seizures or tremors  Behavioral/Psych: no auditory or visual hallucinations     Objective:   Vital Signs:  Vitals:    10/08/18 1329   BP: 113/79   Pulse: 60     Physical Exam   General: alert and oriented, no acute distress  Head: normocephalic, atraumatic  Neck: supple, no lymphadenopathy, normal ROM, no masses  Respiratory: Symmetric expansion, non-labored breathing  Cardiovascular: regular rate and rhythm, nomal pulses,   Abdomen: soft, non tender, non distended, no palpable masses, no hernias, no hepatomegaly or splenomegaly  Pelvic: not examined   Lymphatic: no inguinal nodes  Skin: normal coloration and turgor, no rashes, no suspicious skin lesions noted  Neuro: alert and oriented x3, no gross deficits  Psych: normal judgment and insight, normal  mood/affect and non-anxious    Lab Review   Urinalysis demonstrates: RBCs (5-10 shamar/mL)  PVR: 109 mL (102 mL at last visit)   Lab Results   Component Value Date    WBC 6.49 08/27/2018    HGB 13.6 08/27/2018    HCT 40.9 08/27/2018    MCV 92 08/27/2018     08/27/2018     Lab Results   Component Value Date    CREATININE 0.7 10/01/2018    BUN 8 10/01/2018       Imaging  None     Assessment:   Injury to bladder  Bladder spasms    Plan:   Tasha was seen today for follow-up, urinary tract infection and dysuria.    Diagnoses and all orders for this visit:    Dysuria  -     POCT URINE DIPSTICK WITHOUT MICROSCOPE  -     POCT Bladder Scan  -     Urine culture    Injury of bladder, subsequent encounter    Other orders  -     phenazopyridine (PYRIDIUM) 200 MG tablet; Take 1 tablet (200 mg total) by mouth 3 (three) times daily as needed.    Plan:   --Doing well   --Urine culture today  --D/C ditropan  --Pyridium PRN   --Again discussed vesicovaginal fistula   --Follow up in 3 weeks with PVR

## 2018-10-08 NOTE — PROGRESS NOTES
"[facility]  Ambulatory Clinic  Obstetrics & Gynecology    Visit Date:  10/8/2018    Chief Complaint:  Post-op visit    Subjective:      Tasha Rush is a 42 y.o. G0 here for post-op visit.      Pt underwent surgery on 8/24/2018:  Attempted total laparoscopic hysterectomy converted to total abdominal hysterectomy due to dense pelvic adhesions and incidental cystotomy   Bilateral salpingectomy  Extensive lysis of adhesions  Cystotomy repair by urology intra-op     Pt has no major complaints today.  Denies UTI sxs or incontinence.  Pt has resumed household activities and states she pleased with surgical results.    Pt is requesting release to go back to work.  Pt denies any vaginal bleeding, discharge, pain, incisional problems, GI or  complaints.    Pt has f/u apt with urology today.    Review of Systems:      Constitutional:  No fever, fatigue  HENT:  No congestion, hearing changes  Eyes:  No visual disturbance  Respiratory:  No cough, shortness of breath  Cardiovascular:  No chest pain, leg swelling  Gastrointestinal:  No abdominal pain, constipation, blood in stool   Genitourinary:  No dysuria, frequency  Skin:  No rash, jaundice  Neurological:  No dizziness, weakness, headaches    Objective:     /64   Ht 5' 6" (1.676 m)   Wt 73.5 kg (161 lb 14.9 oz)   BMI 26.14 kg/m²      GENERAL:  NAD, well-nourished  HEENT:  NCAT, moist mucus membranes. Neck supple w/o masses.  LUNGS:  CTA-B  HEART:  RRR, no murmurs, gallops, or rubs  ABDOMEN:  Soft, non-tender, non-distended. Normoactive BS. No obvious organomegaly. Incisions all well healed.    EXT:  Symmetric w/o cramping, claudication, or edema. +2 distal pulses.  SKIN:  No rashes or bruising  NEURO:  Grossly intact bilaterally  PSYCH:  Mood and affect appropriate  GENERAL:  NAD, well-nourished    GENITOURINARY:  VULVAR:  Female external genitalia w/o any obvious lesions. Female hair distribution. Adequate perineal body. Normal urethral " meatus. No gross lymphadenopathy.   VAGINA:  Pink, moist, well-rugated. Adequate support. No significant cystocele or rectocele. No obvious lesion. No discharge.  CERVIX:   Surgically absent. No cuff lesions or tenderness.     UTERUS:  Surgically absent.   ADNEXA:  No masses, non-tender   RECTAL:  Declined. No obvious external lesions    Chaperone present for exam.    Urine dip:  10/8/2018 negative for LE, nitrite, or RBC    Assessment:    42 y.o. G0 s/p:    1. Attempted total laparoscopic hysterectomy converted to total abdominal hysterectomy due to dense pelvic adhesions and incidental cystotomy   2. Bilateral salpingectomy  3. Extensive lysis of adhesions  4. Incidental cystotomy repair by urology intra-op      Plan:    Post-op care instructions and precautions reviewed.    Surgical findings/pathology discussed.  Pelvic rest x 6 wks post-op.    Resume activities as tolerated.  Encourage healthy lifestyle modifications.  F/u with PCP for health maintenance.   F/u in 1 year for annual GYN exam, or sooner for any concerns.    All questions answered, voiced understanding.      Hardy Bueno MD

## 2018-10-09 LAB — BACTERIA UR CULT: NO GROWTH

## 2018-11-02 ENCOUNTER — OFFICE VISIT (OUTPATIENT)
Dept: UROLOGY | Facility: CLINIC | Age: 43
End: 2018-11-02
Payer: COMMERCIAL

## 2018-11-02 VITALS
HEART RATE: 77 BPM | SYSTOLIC BLOOD PRESSURE: 132 MMHG | BODY MASS INDEX: 25.88 KG/M2 | WEIGHT: 161 LBS | HEIGHT: 66 IN | DIASTOLIC BLOOD PRESSURE: 90 MMHG

## 2018-11-02 DIAGNOSIS — R35.0 URINARY FREQUENCY: Primary | ICD-10-CM

## 2018-11-02 DIAGNOSIS — S37.20XD INJURY OF BLADDER, SUBSEQUENT ENCOUNTER: ICD-10-CM

## 2018-11-02 LAB
BILIRUB SERPL-MCNC: NORMAL MG/DL
BLOOD URINE, POC: NORMAL
COLOR, POC UA: YELLOW
GLUCOSE UR QL STRIP: NORMAL
KETONES UR QL STRIP: NORMAL
LEUKOCYTE ESTERASE URINE, POC: NORMAL
NITRITE, POC UA: NORMAL
PH, POC UA: 6
PROTEIN, POC: NORMAL
SPECIFIC GRAVITY, POC UA: 1
UROBILINOGEN, POC UA: NORMAL

## 2018-11-02 PROCEDURE — 87088 URINE BACTERIA CULTURE: CPT

## 2018-11-02 PROCEDURE — 87086 URINE CULTURE/COLONY COUNT: CPT

## 2018-11-02 PROCEDURE — 87186 SC STD MICRODIL/AGAR DIL: CPT

## 2018-11-02 PROCEDURE — 81002 URINALYSIS NONAUTO W/O SCOPE: CPT | Mod: S$GLB,,, | Performed by: NURSE PRACTITIONER

## 2018-11-02 PROCEDURE — 51798 US URINE CAPACITY MEASURE: CPT | Mod: S$GLB,,, | Performed by: NURSE PRACTITIONER

## 2018-11-02 PROCEDURE — 87077 CULTURE AEROBIC IDENTIFY: CPT

## 2018-11-02 PROCEDURE — 99024 POSTOP FOLLOW-UP VISIT: CPT | Mod: S$GLB,,, | Performed by: NURSE PRACTITIONER

## 2018-11-02 NOTE — PROGRESS NOTES
Subjective:      Tasha Rush is a 42 y.o. female who returns today regarding her bladder repair.     She had inadvertent bladder injury during hysterectomy on 8/24/18. Successful VT on 9/7. Urine culture on 10/1 was positive for E Coli, and she completed rx for macrobid. Repeat culture was negative.      She presents today for PVR. Doing well. She has stopped taking the ditropan 5 mg XL. Reports frequency and urgency. Denies incomplete bladder emptying, dysuria, hematuria, flank pain and fever/chills.     The following portions of the patient's history were reviewed and updated as appropriate: allergies, current medications, past family history, past medical history, past social history, past surgical history and problem list.    Review of Systems  Constitutional: no fever or chills  ENT: no nasal congestion or sore throat  Respiratory: no cough or shortness of breath  Cardiovascular: no chest pain or palpitations  Gastrointestinal: no nausea or vomiting, tolerating diet  Genitourinary: as per HPI  Hematologic/Lymphatic: no easy bruising or lymphadenopathy  Musculoskeletal: no arthralgias or myalgias  Neurological: no seizures or tremors  Behavioral/Psych: no auditory or visual hallucinations     Objective:   Vital Signs:There were no vitals taken for this visit.    Physical Exam   General: alert and oriented, no acute distress  Head: normocephalic, atraumatic  Neck: supple, no lymphadenopathy, normal ROM, no masses  Respiratory: Symmetric expansion, non-labored breathing  Cardiovascular: regular rate and rhythm, nomal pulses, no peripheral edema  Abdomen: soft, non tender, non distended, no palpable masses, no hernias, no hepatomegaly or splenomegaly  Pelvic: not examined   Lymphatic: no inguinal nodes  Skin: normal coloration and turgor, no rashes, no suspicious skin lesions noted  Neuro: alert and oriented x3, no gross deficits  Psych: normal judgment and insight, normal mood/affect and non-anxious    Lab  Review   Urinalysis demonstrates negative for all components  PVR: 23 mL  Lab Results   Component Value Date    WBC 6.49 08/27/2018    HGB 13.6 08/27/2018    HCT 40.9 08/27/2018    MCV 92 08/27/2018     08/27/2018     Lab Results   Component Value Date    CREATININE 0.7 10/01/2018    BUN 8 10/01/2018     Imaging   None    Assessment:   Urinary frequency  Injury of bladder, subsequent encounter     Plan:   Tasha was seen today for follow-up.    Diagnoses and all orders for this visit:    Urinary frequency  -     POCT urine dipstick without microscope  -     Bladder scan; Future  -     Urine culture    Injury of bladder, subsequent encounter    Plan:  --Emptying well   --Urine culture today, will hold on antibiotics  --D/C ditropan  --Again reviewed the signs of vesicovaginal fistula  --Follow up PRN

## 2018-11-04 LAB — BACTERIA UR CULT: NORMAL

## 2018-11-05 ENCOUNTER — TELEPHONE (OUTPATIENT)
Dept: UROLOGY | Facility: CLINIC | Age: 43
End: 2018-11-05

## 2018-11-05 DIAGNOSIS — N30.00 ACUTE CYSTITIS WITHOUT HEMATURIA: Primary | ICD-10-CM

## 2018-11-05 RX ORDER — AMOXICILLIN AND CLAVULANATE POTASSIUM 875; 125 MG/1; MG/1
1 TABLET, FILM COATED ORAL EVERY 12 HOURS
Qty: 10 TABLET | Refills: 0 | Status: SHIPPED | OUTPATIENT
Start: 2018-11-05 | End: 2018-11-10

## 2018-11-26 ENCOUNTER — TELEPHONE (OUTPATIENT)
Dept: UROLOGY | Facility: CLINIC | Age: 43
End: 2018-11-26

## 2018-11-26 ENCOUNTER — OFFICE VISIT (OUTPATIENT)
Dept: OBSTETRICS AND GYNECOLOGY | Facility: CLINIC | Age: 43
End: 2018-11-26
Payer: COMMERCIAL

## 2018-11-26 VITALS — HEIGHT: 66 IN | BODY MASS INDEX: 26.75 KG/M2 | WEIGHT: 166.44 LBS

## 2018-11-26 DIAGNOSIS — R30.0 DYSURIA: Primary | ICD-10-CM

## 2018-11-26 DIAGNOSIS — N30.00 ACUTE CYSTITIS WITHOUT HEMATURIA: ICD-10-CM

## 2018-11-26 DIAGNOSIS — N89.8 VAGINAL LESION: ICD-10-CM

## 2018-11-26 DIAGNOSIS — N76.0 ACUTE VAGINITIS: ICD-10-CM

## 2018-11-26 LAB
CANDIDA RRNA VAG QL PROBE: NEGATIVE
G VAGINALIS RRNA GENITAL QL PROBE: NEGATIVE
T VAGINALIS RRNA GENITAL QL PROBE: NEGATIVE

## 2018-11-26 PROCEDURE — 99999 PR PBB SHADOW E&M-EST. PATIENT-LVL III: CPT | Mod: PBBFAC,,, | Performed by: NURSE PRACTITIONER

## 2018-11-26 PROCEDURE — 87086 URINE CULTURE/COLONY COUNT: CPT

## 2018-11-26 PROCEDURE — 87660 TRICHOMONAS VAGIN DIR PROBE: CPT

## 2018-11-26 PROCEDURE — 99213 OFFICE O/P EST LOW 20 MIN: CPT | Mod: S$GLB,,, | Performed by: NURSE PRACTITIONER

## 2018-11-26 PROCEDURE — 87491 CHLMYD TRACH DNA AMP PROBE: CPT

## 2018-11-26 PROCEDURE — 87529 HSV DNA AMP PROBE: CPT

## 2018-11-26 RX ORDER — AMOXICILLIN AND CLAVULANATE POTASSIUM 875; 125 MG/1; MG/1
TABLET, FILM COATED ORAL
Qty: 10 TABLET | Refills: 0 | OUTPATIENT
Start: 2018-11-26

## 2018-11-26 RX ORDER — VALACYCLOVIR HYDROCHLORIDE 1 G/1
1000 TABLET, FILM COATED ORAL EVERY 12 HOURS
Qty: 20 TABLET | Refills: 3 | Status: SHIPPED | OUTPATIENT
Start: 2018-11-26 | End: 2019-09-17

## 2018-11-26 NOTE — TELEPHONE ENCOUNTER
----- Message from Delfin Lincoln sent at 11/26/2018 12:08 PM CST -----  Contact: DAX GAYLE [[id number]]  Can the clinic reply in [facility]: no    Please refill the medication(s) listed below.     Please call the patient when the prescription(s) is ready for  at the phone number   [phone]      Medication #1: amoxicillin-clavulanate 875-125mg (AUGMENTIN) 875-125 mg per tablet       [facility] [address] [place] [address] [address] [facility] [address] E[address] [phone]    Preferred Pharmacy:

## 2018-11-26 NOTE — TELEPHONE ENCOUNTER
LMOR to call back if needed.  LM that antibiotics cannot be called in without urine culture.  LM that Meme has appts open today and tomorrow.

## 2018-11-26 NOTE — PROGRESS NOTES
CC: dysuria, vaginal DC and itching     HPI: Pt is a 43 y.o.  female who presents c/o dysuria, frequency, and back pain for the past 1 week.  Denies any associated N/V or fever.  Reports she has had issues with her urine since her hysterectomy.  Reports she had sex for the first time last week since the surgery.  Pt is also c/o vaginal DC and itching.  Denies associated odor. U dip is negative     ROS:  GENERAL: Feeling well overall. Denies fever or chills.   SKIN: Denies rash or lesions.   HEAD: Denies head injury or headache.   NODES: Denies enlarged lymph nodes.   CHEST: Denies chest pain or shortness of breath.   CARDIOVASCULAR: Denies palpitations or left sided chest pain.   ABDOMEN: No abdominal pain, constipation, diarrhea, nausea, vomiting or rectal bleeding.   URINARY: No dysuria, hematuria, or burning on urination.  REPRODUCTIVE: See HPI.   BREASTS: Denies pain, lumps, or nipple discharge.   HEMATOLOGIC: No easy bruisability or excessive bleeding.   MUSCULOSKELETAL: Denies joint pain or swelling.   NEUROLOGIC: Denies syncope or weakness.   PSYCHIATRIC: Denies depression, anxiety or mood swings.    PE:   APPEARANCE: Well nourished, well developed, Black or  female in no acute distress.  VULVA: + lesions to labia majora- appear to be healing. Normal external female genitalia.  URETHRAL MEATUS: Normal size and location, no lesions, no prolapse.  URETHRA: No masses, tenderness, or prolapse.  VAGINA: Moist. + lesions noted to bilateral side wall of vaginal. + watery tan discharge present. No odor present.  Cuff intact- healing well  CERVIX: surgically absent   UTERUS surgically absent   ADNEXA: No tenderness. No fullness or masses palpated in the adnexal regions.   ANUS PERINEUM: Normal.      Diagnosis:  1. Dysuria    2. Acute vaginitis    3. Vaginal lesion        Plan:   U dip is negative  Urine culture  HSV culture of vaginal lesions  Discussed lesions are highly suspicious for genital  herpes  Discussed that clinical recurrences of genital HSV are common, but are typically less severe than primary infections.   Discussed that recurrences are also more common in immunosuppressed patients    Discussed triggers for recurrence -- Illness, stress, sunlight, and fatigue can trigger recurrent herpes outbreaks.  Valtrex   GCCT  Affirm     Orders Placed This Encounter    Urine culture    C. trachomatis/N. gonorrhoeae by AMP DNA    Vaginosis Screen by DNA Probe    HSV by Rapid PCR, Non-Blood [facility]; Vagina    valACYclovir (VALTREX) 1000 MG tablet         Follow-up PRN no resolution of symptoms.    Estefania Morfin, FNP-C

## 2018-11-28 LAB
BACTERIA UR CULT: NORMAL
C TRACH DNA SPEC QL NAA+PROBE: NOT DETECTED
HSV1 DNA SPEC QL NAA+PROBE: NEGATIVE
HSV2 DNA SPEC QL NAA+PROBE: NEGATIVE
N GONORRHOEA DNA SPEC QL NAA+PROBE: NOT DETECTED
SPECIMEN SOURCE: NORMAL

## 2018-11-29 ENCOUNTER — TELEPHONE (OUTPATIENT)
Dept: OBSTETRICS AND GYNECOLOGY | Facility: CLINIC | Age: 43
End: 2018-11-29

## 2019-02-11 DIAGNOSIS — B37.31 VAGINAL YEAST INFECTION: ICD-10-CM

## 2019-02-12 DIAGNOSIS — N76.0 BV (BACTERIAL VAGINOSIS): ICD-10-CM

## 2019-02-12 DIAGNOSIS — B96.89 BV (BACTERIAL VAGINOSIS): ICD-10-CM

## 2019-02-12 RX ORDER — METRONIDAZOLE 500 MG/1
TABLET ORAL
Qty: 28 TABLET | Refills: 0 | Status: SHIPPED | OUTPATIENT
Start: 2019-02-12 | End: 2019-05-03

## 2019-02-12 RX ORDER — FLUCONAZOLE 150 MG/1
TABLET ORAL
Qty: 1 TABLET | Refills: 0 | Status: SHIPPED | OUTPATIENT
Start: 2019-02-12 | End: 2019-09-17

## 2019-03-15 DIAGNOSIS — Z12.39 BREAST CANCER SCREENING: ICD-10-CM

## 2019-04-01 DIAGNOSIS — N39.0 URINARY TRACT INFECTION WITHOUT HEMATURIA, SITE UNSPECIFIED: ICD-10-CM

## 2019-04-01 RX ORDER — NITROFURANTOIN 25; 75 MG/1; MG/1
CAPSULE ORAL
Qty: 10 CAPSULE | Refills: 0 | OUTPATIENT
Start: 2019-04-01

## 2019-05-03 ENCOUNTER — OFFICE VISIT (OUTPATIENT)
Dept: URGENT CARE | Facility: CLINIC | Age: 44
End: 2019-05-03
Payer: COMMERCIAL

## 2019-05-03 VITALS
RESPIRATION RATE: 18 BRPM | OXYGEN SATURATION: 98 % | SYSTOLIC BLOOD PRESSURE: 112 MMHG | DIASTOLIC BLOOD PRESSURE: 76 MMHG | HEIGHT: 66 IN | TEMPERATURE: 98 F | BODY MASS INDEX: 27 KG/M2 | WEIGHT: 168 LBS | HEART RATE: 72 BPM

## 2019-05-03 DIAGNOSIS — N76.0 BV (BACTERIAL VAGINOSIS): ICD-10-CM

## 2019-05-03 DIAGNOSIS — B96.89 BV (BACTERIAL VAGINOSIS): ICD-10-CM

## 2019-05-03 DIAGNOSIS — R30.0 DYSURIA: ICD-10-CM

## 2019-05-03 DIAGNOSIS — N30.90 CYSTITIS: Primary | ICD-10-CM

## 2019-05-03 LAB
BILIRUB UR QL STRIP: NEGATIVE
GLUCOSE UR QL STRIP: NEGATIVE
KETONES UR QL STRIP: NEGATIVE
LEUKOCYTE ESTERASE UR QL STRIP: NEGATIVE
PH, POC UA: 6
POC BLOOD, URINE: NEGATIVE
POC NITRATES, URINE: NEGATIVE
PROT UR QL STRIP: NEGATIVE
SP GR UR STRIP: 1.01 (ref 1–1.03)
UROBILINOGEN UR STRIP-ACNC: NORMAL (ref 0.1–1.1)

## 2019-05-03 PROCEDURE — 81003 URINALYSIS AUTO W/O SCOPE: CPT | Mod: QW,S$GLB,, | Performed by: FAMILY MEDICINE

## 2019-05-03 PROCEDURE — 99214 OFFICE O/P EST MOD 30 MIN: CPT | Mod: 25,S$GLB,, | Performed by: FAMILY MEDICINE

## 2019-05-03 PROCEDURE — 99000 SPECIMEN HANDLING OFFICE-LAB: CPT | Mod: S$GLB,,, | Performed by: FAMILY MEDICINE

## 2019-05-03 PROCEDURE — 81003 POCT URINALYSIS, DIPSTICK, AUTOMATED, W/O SCOPE: ICD-10-PCS | Mod: QW,S$GLB,, | Performed by: FAMILY MEDICINE

## 2019-05-03 PROCEDURE — 99214 PR OFFICE/OUTPT VISIT, EST, LEVL IV, 30-39 MIN: ICD-10-PCS | Mod: 25,S$GLB,, | Performed by: FAMILY MEDICINE

## 2019-05-03 PROCEDURE — 99000 PR SPECIMEN HANDLING,DR OFF->LAB: ICD-10-PCS | Mod: S$GLB,,, | Performed by: FAMILY MEDICINE

## 2019-05-03 PROCEDURE — 87086 URINE CULTURE/COLONY COUNT: CPT

## 2019-05-03 RX ORDER — METRONIDAZOLE 500 MG/1
500 TABLET ORAL 2 TIMES DAILY
Qty: 14 TABLET | Refills: 0 | Status: SHIPPED | OUTPATIENT
Start: 2019-05-03 | End: 2019-09-17 | Stop reason: SDUPTHER

## 2019-05-03 RX ORDER — NITROFURANTOIN 25; 75 MG/1; MG/1
100 CAPSULE ORAL 2 TIMES DAILY
Qty: 14 CAPSULE | Refills: 0 | Status: SHIPPED | OUTPATIENT
Start: 2019-05-03 | End: 2019-05-10

## 2019-05-03 RX ORDER — HYDROCHLOROTHIAZIDE 25 MG/1
25 TABLET ORAL
COMMUNITY
End: 2019-08-21 | Stop reason: SDUPTHER

## 2019-05-03 NOTE — PROGRESS NOTES
"Subjective:       Patient ID: Tasha Rush is a 43 y.o. female.    Vitals:  height is 5' 6" (1.676 m) and weight is 76.2 kg (168 lb). Her tympanic temperature is 98.3 °F (36.8 °C). Her blood pressure is 112/76 and her pulse is 72. Her respiration is 18 and oxygen saturation is 98%.     Chief Complaint: Urinary Tract Infection    This is a 43 y.o. female who presents today with a chief complaint of possible UTI.  Pt states it started 2 weeks ago.  She reports frequency, although no dysuria.  She has a slight odor and cramping in the LLQ/RLQ.  Pain level 4/10.  She has not taken anything for this.  She reports history of subclinical cystitis in past as well as frequent BV.  No fever back pain. Denies vaginal discharge. Sexually active with condoms 100% of the time.  Status post hysterectomy.     Urinary Tract Infection    This is a new problem. The current episode started 1 to 4 weeks ago (2 weeks ago). The problem occurs every urination. The problem has been unchanged. The pain is at a severity of 4/10. The pain is moderate. There has been no fever. There is no history of pyelonephritis. Associated symptoms include frequency and urgency. Pertinent negatives include no chills, hematuria, nausea, vomiting or rash. Associated symptoms comments: Cramping in RLQ/LLQ. She has tried nothing for the symptoms.       Constitution: Negative for chills and fever.   Neck: Negative for painful lymph nodes.   Gastrointestinal: Negative for abdominal pain, nausea and vomiting.   Genitourinary: Positive for frequency and urgency. Negative for dysuria, urine decreased, hematuria, history of kidney stones, painful menstruation, irregular menstruation, missed menses, heavy menstrual bleeding, ovarian cysts, genital trauma, vaginal pain, vaginal discharge, vaginal bleeding, vaginal odor, painful intercourse, genital sore, painful ejaculation and pelvic pain.   Musculoskeletal: Negative for back pain.   Skin: Negative for rash and " lesion.   Hematologic/Lymphatic: Negative for swollen lymph nodes.       Objective:      Physical Exam   Constitutional: She is oriented to person, place, and time. She appears well-developed and well-nourished.   HENT:   Head: Normocephalic and atraumatic.   Right Ear: External ear normal.   Left Ear: External ear normal.   Nose: Nose normal.   Mouth/Throat: Mucous membranes are normal.   Eyes: Conjunctivae and lids are normal.   Neck: Trachea normal, normal range of motion and full passive range of motion without pain. Neck supple.   Cardiovascular: Normal rate, regular rhythm and normal heart sounds.   Pulmonary/Chest: Effort normal and breath sounds normal. No stridor. No respiratory distress. She has no wheezes. She has no rales.   Abdominal: Soft. Normal appearance and bowel sounds are normal. She exhibits no distension, no abdominal bruit, no pulsatile midline mass and no mass. There is no tenderness. There is no rebound and no guarding.   No suprapubic tenderness.  No CVA pain.   Genitourinary:   Genitourinary Comments: Declines   Musculoskeletal: Normal range of motion. She exhibits no edema.   Lymphadenopathy:     She has no cervical adenopathy.   Neurological: She is alert and oriented to person, place, and time. She has normal strength.   Skin: Skin is warm, dry and intact. She is not diaphoretic. No pallor.   Psychiatric: She has a normal mood and affect. Her speech is normal and behavior is normal. Judgment and thought content normal. Cognition and memory are normal.   Nursing note and vitals reviewed.        Assessment:       1. Cystitis    2. Dysuria    3. BV (bacterial vaginosis)        Plan:         Cystitis  -     nitrofurantoin, macrocrystal-monohydrate, (MACROBID) 100 MG capsule; Take 1 capsule (100 mg total) by mouth 2 (two) times daily. for 7 days  Dispense: 14 capsule; Refill: 0  -     Culture, Urine    Dysuria  -     POCT Urinalysis, Dipstick, Automated, W/O Scope    BV (bacterial  vaginosis)  -     metroNIDAZOLE (FLAGYL) 500 MG tablet; Take 1 tablet (500 mg total) by mouth 2 (two) times daily. for 7 days  Dispense: 14 tablet; Refill: 0    WE WILL CALL YOU IN SEVERAL DAYS WITH URINE CULTURE RESULT.  IF NEGATIVE, YOU MAY BE ADVISED TO DISCONTINUE THE NITROFURANTOIN OR MACROBID.    NO ALCOHOL WHILE ON THE METRONIDAZOLE, OR WITHIN 3 DAYS OF FINISHING THE METRONIDAZOLE    Make sure that you follow up with your primary care doctor in the next 2-5 days if needed .  Return to the Urgent Care if signs or symptoms change and certainly if you have worsening symptoms go to the nearest emergency department for further evaluation.

## 2019-05-03 NOTE — PATIENT INSTRUCTIONS
Bacterial Vaginosis    You have a vaginal infection called bacterial vaginosis (BV). Both good and bad bacteria are present in a healthy vagina. BV occurs when these bacteria get out of balance. The number of bad bacteria increase. And the number of good bacteria decrease.  BV may or may not cause symptoms. If symptoms do occur, they can include:  · Thin, gray, milky-white, or sometimes green discharge  · Unpleasant odor or fishy smell  · Itching, burning, or pain in or around the vagina  It is not known what causes BV, but certain factors can make the problem more likely. This can include:  · Douching  · Having sex with a new partner  · Having sex with more than one partner  BV will sometimes go away on its own. But treatment is usually recommended. This is because untreated BV can increase the risk of more serious health problems such as:  · Pelvic inflammatory disease (PID)  ·  delivery (giving birth to a baby early if youre pregnant)  · HIV and certain other sexually transmitted diseases (STDs)  · Infection after surgery on the reproductive organs  Home care  General care  · BV is most often treated with medicines called antibiotics. These may be given as pills or as a vaginal cream. If antibiotics are prescribed, be sure to use them exactly as directed. Also, be sure to complete all of the medicine, even if your symptoms go away.  · Avoid douching or having sex during treatment.  · If you have sex with a female partner, ask your healthcare provider if she should also be treated.  Prevention  · Limit or avoid douching.  · Avoid having sex. If you do have sex, then take steps to lower your risk:  ¨ Use condoms when having sex.  ¨ Limit the number of partners you have sex with.  Follow-up care  Follow up with your healthcare provider, or as advised.  When to seek medical advice  Call your healthcare provider right away if:  · You have a fever of 100.4ºF (38ºC) or higher, or as directed by your  provider.  · Your symptoms worsen, or they dont go away within a few days of starting treatment.  · You have new pain in the lower belly or pelvic region.  · You have side effects that bother you or a reaction to the pills or cream youre prescribed.  · You or any partners you have sex with have new symptoms, such as a rash, joint pain, or sores.  Date Last Reviewed: 7/30/2015  © 5391-8939 [employer]. [address] [place] [address], [place] [address]. All rights reserved. This information is not intended as a substitute for professional medical care. Always follow your healthcare professional's instructions.    WE WILL CALL YOU IN SEVERAL DAYS WITH URINE CULTURE RESULT.  IF NEGATIVE, YOU MAY BE ADVISED TO DISCONTINUE THE NITROFURANTOIN OR MACROBID.    NO ALCOHOL WHILE ON THE METRONIDAZOLE, OR WITHIN 3 DAYS OF FINISHING THE METRONIDAZOLE    Make sure that you follow up with your primary care doctor in the next 2-5 days if needed .  Return to the Urgent Care if signs or symptoms change and certainly if you have worsening symptoms go to the nearest emergency department for further evaluation.

## 2019-05-05 ENCOUNTER — TELEPHONE (OUTPATIENT)
Dept: URGENT CARE | Facility: CLINIC | Age: 44
End: 2019-05-05

## 2019-05-05 LAB — BACTERIA UR CULT: NO GROWTH

## 2019-05-05 NOTE — TELEPHONE ENCOUNTER
Patient returned call.  Urine culture showed no growth.  She feels like things might be improving. advised to continue current medications.  Recheck with PCP if continued improvement not noted.

## 2019-05-06 ENCOUNTER — TELEPHONE (OUTPATIENT)
Dept: URGENT CARE | Facility: CLINIC | Age: 44
End: 2019-05-06

## 2019-05-06 NOTE — TELEPHONE ENCOUNTER
"----- Message from Criss Montiel MD sent at 5/5/2019  1:37 PM CDT -----  Attempted to call.  "Mailbox is full".  Will try again later.    "

## 2019-06-28 DIAGNOSIS — I10 HYPERTENSION, ESSENTIAL: ICD-10-CM

## 2019-06-28 RX ORDER — AMLODIPINE BESYLATE 10 MG/1
TABLET ORAL
Qty: 30 TABLET | Refills: 0 | Status: SHIPPED | OUTPATIENT
Start: 2019-06-28 | End: 2019-08-21 | Stop reason: SDUPTHER

## 2019-06-28 NOTE — TELEPHONE ENCOUNTER
Call placed to patient regarding Amlodipine prescription refill to be picked up at her pharmacy of choice. The patient was also notified that she will need to schedule a follow up appointment within 30 days with Dr. Lagos for any further refills. The patient verbalized understanding and had no further questions or concerns.      Harrison Martinez MA

## 2019-07-30 PROBLEM — E55.9 VITAMIN D DEFICIENCY: Status: ACTIVE | Noted: 2019-07-30

## 2019-07-31 ENCOUNTER — PATIENT OUTREACH (OUTPATIENT)
Dept: ADMINISTRATIVE | Facility: OTHER | Age: 44
End: 2019-07-31

## 2019-08-21 ENCOUNTER — OFFICE VISIT (OUTPATIENT)
Dept: PRIMARY CARE CLINIC | Facility: CLINIC | Age: 44
End: 2019-08-21
Attending: FAMILY MEDICINE
Payer: COMMERCIAL

## 2019-08-21 VITALS
HEIGHT: 66 IN | DIASTOLIC BLOOD PRESSURE: 80 MMHG | WEIGHT: 173.5 LBS | HEART RATE: 64 BPM | OXYGEN SATURATION: 98 % | BODY MASS INDEX: 27.88 KG/M2 | SYSTOLIC BLOOD PRESSURE: 138 MMHG

## 2019-08-21 DIAGNOSIS — M79.672 PAIN IN LEFT FOOT: ICD-10-CM

## 2019-08-21 DIAGNOSIS — M25.542 JOINT PAIN IN FINGERS OF LEFT HAND: ICD-10-CM

## 2019-08-21 DIAGNOSIS — I10 HYPERTENSION, ESSENTIAL: Primary | ICD-10-CM

## 2019-08-21 PROCEDURE — 99999 PR PBB SHADOW E&M-EST. PATIENT-LVL III: CPT | Mod: PBBFAC,,, | Performed by: FAMILY MEDICINE

## 2019-08-21 PROCEDURE — 99214 OFFICE O/P EST MOD 30 MIN: CPT | Mod: S$GLB,,, | Performed by: FAMILY MEDICINE

## 2019-08-21 PROCEDURE — 99214 PR OFFICE/OUTPT VISIT, EST, LEVL IV, 30-39 MIN: ICD-10-PCS | Mod: S$GLB,,, | Performed by: FAMILY MEDICINE

## 2019-08-21 PROCEDURE — 99999 PR PBB SHADOW E&M-EST. PATIENT-LVL III: ICD-10-PCS | Mod: PBBFAC,,, | Performed by: FAMILY MEDICINE

## 2019-08-21 RX ORDER — AMLODIPINE BESYLATE 10 MG/1
TABLET ORAL
Qty: 90 TABLET | Refills: 0 | Status: SHIPPED | OUTPATIENT
Start: 2019-08-21 | End: 2020-01-09 | Stop reason: SDUPTHER

## 2019-08-21 RX ORDER — IBUPROFEN 600 MG/1
600 TABLET ORAL EVERY 6 HOURS PRN
Qty: 60 TABLET | Refills: 0 | Status: SHIPPED | OUTPATIENT
Start: 2019-08-21 | End: 2019-09-17

## 2019-08-21 RX ORDER — HYDROCHLOROTHIAZIDE 25 MG/1
25 TABLET ORAL DAILY
Qty: 90 TABLET | Refills: 0 | Status: SHIPPED | OUTPATIENT
Start: 2019-08-21 | End: 2020-01-09 | Stop reason: SDUPTHER

## 2019-08-21 NOTE — PROGRESS NOTES
Subjective:       Patient ID: Tasha Rush is a 43 y.o. female.    Chief Complaint: Hand Pain (left and fingers) and Medication Refill    Pt presents today for HTN f/u since she needs med RF. Explained to pt that her BP's are elevated and when asked if she took her meds, pt responds, 'yes she did for today, but has not taken it for the few days prior'   Pt also states that her fingers are hurting, marizol in her left arm. Notices it first thing in the AM and at the end of the day. 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 Pt thinks that this has worsened her pain on the left side shoulder and fingers/hand  Denies any weakness in , radiculopathy.     Pt also states that she has pain at the bone of her great toe left foot  Wearing flats today    Review of Systems   Constitutional: Positive for activity change.   Eyes: Negative.    Respiratory: Negative for cough, chest tightness and shortness of breath.    Cardiovascular: Negative for chest pain, palpitations and leg swelling.   Gastrointestinal: Negative.    Musculoskeletal: Positive for arthralgias and gait problem. Negative for joint swelling, myalgias, neck pain and neck stiffness.   Skin: Negative.    Neurological: Negative for dizziness, weakness, light-headedness, numbness and headaches.   All other systems reviewed and are negative.      Objective:      Physical Exam   Constitutional: She is oriented to person, place, and time. She appears well-developed and well-nourished.   HENT:   Head: Normocephalic and atraumatic.   Eyes: Pupils are equal, round, and reactive to light. Conjunctivae and EOM are normal.   Neck: Normal range of motion. Neck supple. No thyromegaly present.   Cardiovascular: Normal rate, regular rhythm, normal heart sounds and intact distal pulses.   No murmur heard.  Pulmonary/Chest: Effort normal and breath sounds normal. No respiratory distress. She has no wheezes. She has no rales. She exhibits no  tenderness.   Musculoskeletal: Normal range of motion. She exhibits tenderness (pos TTP at left foot big toe. FROM of toes and pos DP pulses. left hand, neg for tinnels and phalen. pos radial pulses, FROM of hand, but pain in DIPS and PIPS when extending and flexing her fingers). She exhibits no edema.   Lymphadenopathy:     She has no cervical adenopathy.   Neurological: She is alert and oriented to person, place, and time. She displays normal reflexes. No cranial nerve deficit. She exhibits normal muscle tone. Coordination normal.   Skin: Skin is warm. No erythema.   Psychiatric: She has a normal mood and affect. Her behavior is normal. Judgment and thought content normal.       Assessment:       1. Hypertension, essential        Plan:       Hypertension:  Elevated. meds refilled so pt can take meds as instructed and f/u with her PCP  Pain in fingers: OA. Recommends NSAIDS, ice , heat and rest  Pain in great toe left foot: pt needs footwear with wider toe box. She is developing a bunion. Can also do treatment for pain as above  HTN: uncontrolled. Pt needs to get back on meds. Vague historian. F/u with her PCP  Hypertension, essential  -     amLODIPine (NORVASC) 10 MG tablet; TAKE 1 TABLET(10 MG) BY MOUTH EVERY DAY  Dispense: 90 tablet; Refill: 0  -     hydroCHLOROthiazide (HYDRODIURIL) 25 MG tablet; Take 1 tablet (25 mg total) by mouth once daily.  Dispense: 90 tablet; Refill: 0    Other orders  -     ibuprofen (ADVIL,MOTRIN) 600 MG tablet; Take 1 tablet (600 mg total) by mouth every 6 (six) hours as needed.  Dispense: 60 tablet; Refill: 0      Greater than 45 mins spent with pt; 50% face to face      At end of visit pt asks for meds for vaginal d/c. Explained to pt that she needs to contact her GYN or proceed to the walk in GYN clinic.

## 2019-09-16 ENCOUNTER — PATIENT OUTREACH (OUTPATIENT)
Dept: ADMINISTRATIVE | Facility: OTHER | Age: 44
End: 2019-09-16

## 2019-09-17 ENCOUNTER — OFFICE VISIT (OUTPATIENT)
Dept: FAMILY MEDICINE | Facility: CLINIC | Age: 44
End: 2019-09-17
Payer: COMMERCIAL

## 2019-09-17 VITALS
HEART RATE: 72 BPM | DIASTOLIC BLOOD PRESSURE: 72 MMHG | HEIGHT: 66 IN | BODY MASS INDEX: 27.5 KG/M2 | SYSTOLIC BLOOD PRESSURE: 110 MMHG | OXYGEN SATURATION: 98 % | WEIGHT: 171.13 LBS

## 2019-09-17 DIAGNOSIS — N76.0 BV (BACTERIAL VAGINOSIS): ICD-10-CM

## 2019-09-17 DIAGNOSIS — B96.89 BV (BACTERIAL VAGINOSIS): ICD-10-CM

## 2019-09-17 DIAGNOSIS — B02.9 HERPES ZOSTER WITHOUT COMPLICATION: Primary | ICD-10-CM

## 2019-09-17 PROCEDURE — 99213 OFFICE O/P EST LOW 20 MIN: CPT | Mod: S$GLB,,, | Performed by: NURSE PRACTITIONER

## 2019-09-17 PROCEDURE — 99999 PR PBB SHADOW E&M-EST. PATIENT-LVL III: ICD-10-PCS | Mod: PBBFAC,,, | Performed by: NURSE PRACTITIONER

## 2019-09-17 PROCEDURE — 99999 PR PBB SHADOW E&M-EST. PATIENT-LVL III: CPT | Mod: PBBFAC,,, | Performed by: NURSE PRACTITIONER

## 2019-09-17 PROCEDURE — 99213 PR OFFICE/OUTPT VISIT, EST, LEVL III, 20-29 MIN: ICD-10-PCS | Mod: S$GLB,,, | Performed by: NURSE PRACTITIONER

## 2019-09-17 RX ORDER — METRONIDAZOLE 500 MG/1
TABLET ORAL
Qty: 14 TABLET | Refills: 0 | Status: SHIPPED | OUTPATIENT
Start: 2019-09-17 | End: 2019-11-20

## 2019-09-17 RX ORDER — HYDROCODONE BITARTRATE AND ACETAMINOPHEN 10; 325 MG/1; MG/1
TABLET ORAL
Refills: 0 | COMMUNITY
Start: 2019-08-26 | End: 2019-09-17

## 2019-09-17 RX ORDER — AMOXICILLIN 500 MG/1
CAPSULE ORAL
Refills: 0 | COMMUNITY
Start: 2019-08-09 | End: 2019-09-17

## 2019-09-17 RX ORDER — VALACYCLOVIR HYDROCHLORIDE 1 G/1
1000 TABLET, FILM COATED ORAL 3 TIMES DAILY
Qty: 21 TABLET | Refills: 0 | Status: SHIPPED | OUTPATIENT
Start: 2019-09-17 | End: 2019-12-30 | Stop reason: SDUPTHER

## 2019-09-17 NOTE — PATIENT INSTRUCTIONS
Maru,     We are always striving for excellence. Should you receive a patient experience survey electronically or by mail, we would appreciate if you would take a few moments to give us your feedback. These surveys let us know our strengths as well as areas of opportunity for improvement to better serve you.    Thank you for your time,  Paige Salazar MA

## 2019-09-17 NOTE — PROGRESS NOTES
"Subjective:       Patient ID: Tasha Rush is a 43 y.o. female.    Chief Complaint: No chief complaint on file.    HPI   Patient presents today with concerns of a blister that she is worried may be shingles. She works a [employer] and was "told by a doctor" that it was shingles. It first appeared as a small red dot and then grew in size over the last 4 days. It blistered and now the blister has ruptured. There is only 1 single blister, no cluster, no other blisters located on the body. No burning or tingling sensation noted. Mildly pruritic.  No fever/chills. No injuries noted to area. No known contacts with active shingles infection.    Patient Active Problem List    Diagnosis Date Noted    Vitamin D deficiency 07/30/2019    S/P CALISTA (total abdominal hysterectomy) 08/24/2018    Atypical chest pain     Hypokalemia 02/16/2017    Aphasia 02/16/2017    Hyponatremia 02/15/2017    Headache 02/15/2017    Insomnia 01/21/2016    Tobacco abuse 01/21/2016    Essential hypertension 01/21/2016     Current Outpatient Medications:     amLODIPine (NORVASC) 10 MG tablet, TAKE 1 TABLET(10 MG) BY MOUTH EVERY DAY, Disp: 90 tablet, Rfl: 0    hydroCHLOROthiazide (HYDRODIURIL) 25 MG tablet, Take 1 tablet (25 mg total) by mouth once daily., Disp: 90 tablet, Rfl: 0    metroNIDAZOLE (FLAGYL) 500 MG tablet, TAKE 1 TABLET(500 MG) BY MOUTH TWICE DAILY FOR 7 DAYS, Disp: 14 tablet, Rfl: 0    Review of Systems    Pertinent info noted in HPI    Objective:      Vitals:    09/17/19 1402   BP: 110/72   Pulse: 72       Physical Exam   Constitutional: She is oriented to person, place, and time. She appears well-developed and well-nourished.   HENT:   Head: Normocephalic and atraumatic.   Eyes: No scleral icterus.   Neurological: She is alert and oriented to person, place, and time.   Skin: Skin is warm and dry.   Single vesicular lesion noted to patient left forearm. Minimal surrounding erythema. No swelling noted. No drainage noted. Not pain " with palpation.    Psychiatric: She has a normal mood and affect. Her behavior is normal. Thought content normal.         Assessment:       1. Herpes zoster without complication        Plan:     Shingles vs skin abrasion/injury?  Trial valacyclovir 1,000 mg 3 times daily x 7 days  Keep site clean and dry    Call or RTC if symptoms worsen or do not begin to improve

## 2019-11-20 ENCOUNTER — OFFICE VISIT (OUTPATIENT)
Dept: FAMILY MEDICINE | Facility: CLINIC | Age: 44
End: 2019-11-20
Payer: COMMERCIAL

## 2019-11-20 VITALS
BODY MASS INDEX: 28.28 KG/M2 | DIASTOLIC BLOOD PRESSURE: 76 MMHG | SYSTOLIC BLOOD PRESSURE: 116 MMHG | HEART RATE: 63 BPM | WEIGHT: 176 LBS | HEIGHT: 66 IN | OXYGEN SATURATION: 97 %

## 2019-11-20 DIAGNOSIS — I10 ESSENTIAL HYPERTENSION: ICD-10-CM

## 2019-11-20 DIAGNOSIS — R82.90 ABNORMAL URINE: Primary | ICD-10-CM

## 2019-11-20 DIAGNOSIS — N89.8 VAGINAL DISCHARGE: ICD-10-CM

## 2019-11-20 PROCEDURE — 99214 PR OFFICE/OUTPT VISIT, EST, LEVL IV, 30-39 MIN: ICD-10-PCS | Mod: S$GLB,,, | Performed by: NURSE PRACTITIONER

## 2019-11-20 PROCEDURE — 87086 URINE CULTURE/COLONY COUNT: CPT

## 2019-11-20 PROCEDURE — 87088 URINE BACTERIA CULTURE: CPT

## 2019-11-20 PROCEDURE — 99999 PR PBB SHADOW E&M-EST. PATIENT-LVL III: CPT | Mod: PBBFAC,,, | Performed by: NURSE PRACTITIONER

## 2019-11-20 PROCEDURE — 87077 CULTURE AEROBIC IDENTIFY: CPT

## 2019-11-20 PROCEDURE — 87481 CANDIDA DNA AMP PROBE: CPT | Mod: 59

## 2019-11-20 PROCEDURE — 99214 OFFICE O/P EST MOD 30 MIN: CPT | Mod: S$GLB,,, | Performed by: NURSE PRACTITIONER

## 2019-11-20 PROCEDURE — 87186 SC STD MICRODIL/AGAR DIL: CPT

## 2019-11-20 PROCEDURE — 99999 PR PBB SHADOW E&M-EST. PATIENT-LVL III: ICD-10-PCS | Mod: PBBFAC,,, | Performed by: NURSE PRACTITIONER

## 2019-11-20 PROCEDURE — 87491 CHLMYD TRACH DNA AMP PROBE: CPT

## 2019-11-20 PROCEDURE — 87801 DETECT AGNT MULT DNA AMPLI: CPT

## 2019-11-20 RX ORDER — NITROFURANTOIN (MACROCRYSTALS) 100 MG/1
100 CAPSULE ORAL EVERY 12 HOURS
Qty: 14 CAPSULE | Refills: 0 | Status: SHIPPED | OUTPATIENT
Start: 2019-11-20 | End: 2019-11-27

## 2019-11-20 RX ORDER — METRONIDAZOLE 500 MG/1
500 TABLET ORAL EVERY 12 HOURS
Qty: 14 TABLET | Refills: 0 | Status: SHIPPED | OUTPATIENT
Start: 2019-11-20 | End: 2019-11-27

## 2019-11-20 NOTE — PROGRESS NOTES
Subjective:       Patient ID: Tasha Rush is a 44 y.o. female.    Chief Complaint: Gynecologic Exam    HPI     Patient presents today with complaints of malodorous urine, some burning with urination, as well as vaginal discharge.  Pt has history of total abdominal hysterectomy and prolonged catheter use following surgery. Denies f/c/n/v/d. Some complaints of occasional constipation but not currently. No flank pain or pelvic pain.   Patient also has concerns of her potassium level 2/t muscle cramps and pains in her legs. She is currently taking 25mg HCTZ daily. She has not had labs in a year.  Her bp has been well within normal limits at her last few office visits. She denies cp/sob. No HA, dizziness or lightheadedness.     Patient Active Problem List    Diagnosis Date Noted    Vitamin D deficiency 07/30/2019    S/P CALISTA (total abdominal hysterectomy) 08/24/2018    Atypical chest pain     Hypokalemia 02/16/2017    Aphasia 02/16/2017    Hyponatremia 02/15/2017    Headache 02/15/2017    Insomnia 01/21/2016    Tobacco abuse 01/21/2016    Essential hypertension 01/21/2016       Current Outpatient Medications:     amLODIPine (NORVASC) 10 MG tablet, TAKE 1 TABLET(10 MG) BY MOUTH EVERY DAY, Disp: 90 tablet, Rfl: 0    hydroCHLOROthiazide (HYDRODIURIL) 25 MG tablet, Take 1 tablet (25 mg total) by mouth once daily., Disp: 90 tablet, Rfl: 0    metroNIDAZOLE (FLAGYL) 500 MG tablet, Take 1 tablet (500 mg total) by mouth every 12 (twelve) hours. for 7 days, Disp: 14 tablet, Rfl: 0    nitrofurantoin (MACRODANTIN) 100 MG capsule, Take 1 capsule (100 mg total) by mouth every 12 (twelve) hours. for 7 days, Disp: 14 capsule, Rfl: 0    valACYclovir (VALTREX) 1000 MG tablet, Take 1 tablet (1,000 mg total) by mouth 3 (three) times daily. for 7 days (Patient not taking: Reported on 11/20/2019), Disp: 21 tablet, Rfl: 0    Review of Systems    Pertinent info noted in HPI    Objective:      Vitals:    11/20/19 1207   BP:  116/76   Pulse: 63     Physical Exam   Constitutional: She is oriented to person, place, and time. She appears well-developed and well-nourished.   HENT:   Head: Normocephalic and atraumatic.   Eyes: No scleral icterus.   Genitourinary:   Genitourinary Comments: Normal external female genitalia; v/v urethra/urethral meatus is without lesions or prolapse.  Normal hair distribution; cervix is absent. Moderate amount of thin/thick white discharge noted.   Musculoskeletal: She exhibits no edema.   Neurological: She is alert and oriented to person, place, and time.   Psychiatric: She has a normal mood and affect. Her behavior is normal. Thought content normal.       Assessment:       1. Abnormal urine    2. Vaginal discharge    3. Essential hypertension        Plan:       Affirm  GC/Chlamydia  Start flagyl 2x daily for 7 days    POCT urine  Urine culture  Start macrobid 2 times daily x 7 days    CBC, CMP ordered  Tiral 1/2 dose of HCTZ (12.5mg) daily    Further recommendations to follow after above.  RTC 3 weeks for BP check

## 2019-11-21 ENCOUNTER — TELEPHONE (OUTPATIENT)
Dept: FAMILY MEDICINE | Facility: CLINIC | Age: 44
End: 2019-11-21

## 2019-11-21 LAB
BACTERIAL VAGINOSIS DNA: POSITIVE
C TRACH DNA SPEC QL NAA+PROBE: NOT DETECTED
CANDIDA GLABRATA DNA: NEGATIVE
CANDIDA KRUSEI DNA: NEGATIVE
CANDIDA RRNA VAG QL PROBE: NEGATIVE
N GONORRHOEA DNA SPEC QL NAA+PROBE: NOT DETECTED
T VAGINALIS RRNA GENITAL QL PROBE: NEGATIVE

## 2019-11-21 NOTE — TELEPHONE ENCOUNTER
----- Message from Ysabel Santiago NP sent at 11/21/2019  8:05 AM CST -----  Hey!  Please call Maru and let her know that she was only positive for bacterial vaginosis. All others were negative. She was treated for UTI and bv so she shouldn't need any other medications (unless urine culture comes back needing a different antibiotic) Let her know that she will only hear from us if she needs to change antibiotics!  Thanks!

## 2019-11-21 NOTE — TELEPHONE ENCOUNTER
----- Message from Josiane Park sent at 11/21/2019 11:38 AM CST -----  Contact: Pt   Type:  Patient Returning Call    Who Called: DAX GAYLE [[id number]]    Who Left Message for Patient: Fransisca     Does the patient know what this is regarding?:Yes     Best Call Back Number:[phone] (home)     Additional Information:

## 2019-11-21 NOTE — TELEPHONE ENCOUNTER
Attempted to contact the patient to discuss test results and NP Ysabel Santiago's recommendations. No answer. Left voicemail requesting a call back.

## 2019-11-22 LAB — BACTERIA UR CULT: ABNORMAL

## 2019-12-10 DIAGNOSIS — R82.90 ABNORMAL URINE: ICD-10-CM

## 2019-12-10 RX ORDER — NITROFURANTOIN (MACROCRYSTALS) 100 MG/1
CAPSULE ORAL
Qty: 14 CAPSULE | Refills: 0 | OUTPATIENT
Start: 2019-12-10

## 2019-12-23 ENCOUNTER — TELEPHONE (OUTPATIENT)
Dept: FAMILY MEDICINE | Facility: CLINIC | Age: 44
End: 2019-12-23

## 2019-12-23 NOTE — TELEPHONE ENCOUNTER
Call pt she wanted to know if her lab orders was still good and she wanted more meds for a UTI forward message to Simeon Grady.

## 2019-12-23 NOTE — TELEPHONE ENCOUNTER
----- Message from Ysabel Santiago NP sent at 12/23/2019 12:25 PM CST -----  Contact: Pt   Have her come in a leave some urine. We can dip it here. If there is infection, I will send in a prescription    ----- Message -----  From: Trish Regan MA  Sent: 12/23/2019  10:21 AM CST  To: Ysabel Santiago NP    Hi Ysabel, please advise pt stated she still feels like she has a UTI and wants to know if you can call her in more Meds for the UTI.  ----- Message -----  From: Josiane Park  Sent: 12/23/2019  10:09 AM CST  To: Jack Grady Staff    Name of Who is Calling: DAX GAYLE [[id number]]    What is the request in detail:DAX GAYLE [[id number]] is requesting a call back regards to orders for blood work.....,. Please contact to further discuss and advise      Can the clinic reply by [facility]: No     What [facility]:  [phone] (home)

## 2019-12-23 NOTE — TELEPHONE ENCOUNTER
----- Message from Josiane Park sent at 12/23/2019 10:09 AM CST -----  Contact: Pt   Name of Who is Calling: DAX GAYLE [[id number]]    What is the request in detail:DAX GAYLE [[id number]] is requesting a call back regards to orders for blood work.....,. Please contact to further discuss and advise      Can the clinic reply by [facility]: No     What [facility]:  [phone] (home)

## 2019-12-26 ENCOUNTER — LAB VISIT (OUTPATIENT)
Dept: LAB | Facility: OTHER | Age: 44
End: 2019-12-26
Attending: NURSE PRACTITIONER
Payer: COMMERCIAL

## 2019-12-26 DIAGNOSIS — I10 ESSENTIAL HYPERTENSION: ICD-10-CM

## 2019-12-26 LAB
ALBUMIN SERPL BCP-MCNC: 3.9 G/DL (ref 3.5–5.2)
ALP SERPL-CCNC: 69 U/L (ref 55–135)
ALT SERPL W/O P-5'-P-CCNC: 23 U/L (ref 10–44)
ANION GAP SERPL CALC-SCNC: 9 MMOL/L (ref 8–16)
AST SERPL-CCNC: 23 U/L (ref 10–40)
BASOPHILS # BLD AUTO: 0.04 K/UL (ref 0–0.2)
BASOPHILS NFR BLD: 0.6 % (ref 0–1.9)
BILIRUB SERPL-MCNC: 0.8 MG/DL (ref 0.1–1)
BUN SERPL-MCNC: 8 MG/DL (ref 6–20)
CALCIUM SERPL-MCNC: 9.1 MG/DL (ref 8.7–10.5)
CHLORIDE SERPL-SCNC: 108 MMOL/L (ref 95–110)
CO2 SERPL-SCNC: 23 MMOL/L (ref 23–29)
CREAT SERPL-MCNC: 0.7 MG/DL (ref 0.5–1.4)
DIFFERENTIAL METHOD: NORMAL
EOSINOPHIL # BLD AUTO: 0.1 K/UL (ref 0–0.5)
EOSINOPHIL NFR BLD: 1.4 % (ref 0–8)
ERYTHROCYTE [DISTWIDTH] IN BLOOD BY AUTOMATED COUNT: 13.5 % (ref 11.5–14.5)
EST. GFR  (AFRICAN AMERICAN): >60 ML/MIN/1.73 M^2
EST. GFR  (NON AFRICAN AMERICAN): >60 ML/MIN/1.73 M^2
GLUCOSE SERPL-MCNC: 87 MG/DL (ref 70–110)
HCT VFR BLD AUTO: 38.7 % (ref 37–48.5)
HGB BLD-MCNC: 12.5 G/DL (ref 12–16)
IMM GRANULOCYTES # BLD AUTO: 0.01 K/UL (ref 0–0.04)
IMM GRANULOCYTES NFR BLD AUTO: 0.2 % (ref 0–0.5)
LYMPHOCYTES # BLD AUTO: 2.7 K/UL (ref 1–4.8)
LYMPHOCYTES NFR BLD: 39.8 % (ref 18–48)
MCH RBC QN AUTO: 30.6 PG (ref 27–31)
MCHC RBC AUTO-ENTMCNC: 32.3 G/DL (ref 32–36)
MCV RBC AUTO: 95 FL (ref 82–98)
MONOCYTES # BLD AUTO: 0.6 K/UL (ref 0.3–1)
MONOCYTES NFR BLD: 9.5 % (ref 4–15)
NEUTROPHILS # BLD AUTO: 3.2 K/UL (ref 1.8–7.7)
NEUTROPHILS NFR BLD: 48.5 % (ref 38–73)
NRBC BLD-RTO: 0 /100 WBC
PLATELET # BLD AUTO: 231 K/UL (ref 150–350)
PMV BLD AUTO: 9.7 FL (ref 9.2–12.9)
POTASSIUM SERPL-SCNC: 3.8 MMOL/L (ref 3.5–5.1)
PROT SERPL-MCNC: 6.9 G/DL (ref 6–8.4)
RBC # BLD AUTO: 4.08 M/UL (ref 4–5.4)
SODIUM SERPL-SCNC: 140 MMOL/L (ref 136–145)
WBC # BLD AUTO: 6.66 K/UL (ref 3.9–12.7)

## 2019-12-26 PROCEDURE — 80053 COMPREHEN METABOLIC PANEL: CPT

## 2019-12-26 PROCEDURE — 36415 COLL VENOUS BLD VENIPUNCTURE: CPT

## 2019-12-26 PROCEDURE — 85025 COMPLETE CBC W/AUTO DIFF WBC: CPT

## 2019-12-27 ENCOUNTER — TELEPHONE (OUTPATIENT)
Dept: FAMILY MEDICINE | Facility: CLINIC | Age: 44
End: 2019-12-27

## 2019-12-27 NOTE — TELEPHONE ENCOUNTER
----- Message from Ysabel Santiago NP sent at 12/27/2019  3:57 PM CST -----  Did she ever leave urine to dip?

## 2019-12-30 DIAGNOSIS — B02.9 HERPES ZOSTER WITHOUT COMPLICATION: ICD-10-CM

## 2019-12-30 RX ORDER — VALACYCLOVIR HYDROCHLORIDE 1 G/1
1000 TABLET, FILM COATED ORAL 3 TIMES DAILY
Qty: 21 TABLET | Refills: 0 | Status: SHIPPED | OUTPATIENT
Start: 2019-12-30 | End: 2020-11-13 | Stop reason: CLARIF

## 2019-12-30 NOTE — TELEPHONE ENCOUNTER
----- Message from Antonia Moreno sent at 12/30/2019 10:42 AM CST -----  Contact: DAX GAYLE [[id number]]    MEDICATION  REFILL  REQUEST      Please refill the medication(s) listed below. Please call the patient when the prescription(s) is ready for  at this phone number   [phone]      Medication #1   valACYclovir (VALTREX) 1000 MG tablet        Preferred Pharmacy: [id number] - [place] - [address] AT [facility] [address]     [phone] (Phone)  [phone] (Fax)

## 2019-12-30 NOTE — TELEPHONE ENCOUNTER
----- Message from Ysabel Santiago NP sent at 12/30/2019 11:53 AM CST -----  Please call patient and let her know that her lab work came back and everything looks fine.  I was expecting her to come drop off urine to be dipped bc she had complaint of continued uti symptoms.  Ask her if these symptoms have resolved?

## 2020-01-09 ENCOUNTER — LAB VISIT (OUTPATIENT)
Dept: LAB | Facility: HOSPITAL | Age: 45
End: 2020-01-09
Attending: INTERNAL MEDICINE
Payer: COMMERCIAL

## 2020-01-09 ENCOUNTER — OFFICE VISIT (OUTPATIENT)
Dept: INTERNAL MEDICINE | Facility: CLINIC | Age: 45
End: 2020-01-09
Attending: INTERNAL MEDICINE
Payer: COMMERCIAL

## 2020-01-09 ENCOUNTER — PATIENT OUTREACH (OUTPATIENT)
Dept: ADMINISTRATIVE | Facility: OTHER | Age: 45
End: 2020-01-09

## 2020-01-09 VITALS
BODY MASS INDEX: 27.77 KG/M2 | OXYGEN SATURATION: 99 % | WEIGHT: 172.81 LBS | HEART RATE: 66 BPM | HEIGHT: 66 IN | DIASTOLIC BLOOD PRESSURE: 86 MMHG | SYSTOLIC BLOOD PRESSURE: 140 MMHG

## 2020-01-09 DIAGNOSIS — R21 RASH: ICD-10-CM

## 2020-01-09 DIAGNOSIS — I10 HYPERTENSION, ESSENTIAL: ICD-10-CM

## 2020-01-09 DIAGNOSIS — H53.8 BLURRY VISION: ICD-10-CM

## 2020-01-09 DIAGNOSIS — R35.0 URINARY FREQUENCY: ICD-10-CM

## 2020-01-09 DIAGNOSIS — Z12.39 BREAST CANCER SCREENING: ICD-10-CM

## 2020-01-09 DIAGNOSIS — R35.0 URINARY FREQUENCY: Primary | ICD-10-CM

## 2020-01-09 DIAGNOSIS — I10 BENIGN ESSENTIAL HYPERTENSION: ICD-10-CM

## 2020-01-09 LAB
BILIRUB UR QL STRIP: NEGATIVE
CAOX CRY UR QL COMP ASSIST: ABNORMAL
CLARITY UR REFRACT.AUTO: ABNORMAL
COLOR UR AUTO: YELLOW
GLUCOSE UR QL STRIP: NEGATIVE
HGB UR QL STRIP: ABNORMAL
KETONES UR QL STRIP: NEGATIVE
LEUKOCYTE ESTERASE UR QL STRIP: NEGATIVE
MICROSCOPIC COMMENT: ABNORMAL
NITRITE UR QL STRIP: NEGATIVE
PH UR STRIP: 5 [PH] (ref 5–8)
PROT UR QL STRIP: NEGATIVE
RBC #/AREA URNS AUTO: 36 /HPF (ref 0–4)
SP GR UR STRIP: 1.02 (ref 1–1.03)
SQUAMOUS #/AREA URNS AUTO: 8 /HPF
URN SPEC COLLECT METH UR: ABNORMAL
WBC #/AREA URNS AUTO: 2 /HPF (ref 0–5)

## 2020-01-09 PROCEDURE — 81001 URINALYSIS AUTO W/SCOPE: CPT

## 2020-01-09 PROCEDURE — 99214 PR OFFICE/OUTPT VISIT, EST, LEVL IV, 30-39 MIN: ICD-10-PCS | Mod: S$GLB,,, | Performed by: INTERNAL MEDICINE

## 2020-01-09 PROCEDURE — 99214 OFFICE O/P EST MOD 30 MIN: CPT | Mod: S$GLB,,, | Performed by: INTERNAL MEDICINE

## 2020-01-09 PROCEDURE — 99999 PR PBB SHADOW E&M-EST. PATIENT-LVL IV: CPT | Mod: PBBFAC,,, | Performed by: INTERNAL MEDICINE

## 2020-01-09 PROCEDURE — 99999 PR PBB SHADOW E&M-EST. PATIENT-LVL IV: ICD-10-PCS | Mod: PBBFAC,,, | Performed by: INTERNAL MEDICINE

## 2020-01-09 RX ORDER — AMLODIPINE BESYLATE 10 MG/1
TABLET ORAL
Qty: 90 TABLET | Refills: 0 | Status: SHIPPED | OUTPATIENT
Start: 2020-01-09 | End: 2020-06-03

## 2020-01-09 RX ORDER — CICLOPIROX 80 MG/ML
SOLUTION TOPICAL NIGHTLY
Qty: 6.6 ML | Refills: 11 | Status: SHIPPED | OUTPATIENT
Start: 2020-01-09 | End: 2021-09-17 | Stop reason: ALTCHOICE

## 2020-01-09 RX ORDER — HYDROCHLOROTHIAZIDE 25 MG/1
25 TABLET ORAL DAILY
Qty: 90 TABLET | Refills: 0 | Status: SHIPPED | OUTPATIENT
Start: 2020-01-09 | End: 2020-06-03

## 2020-01-09 RX ORDER — KETOCONAZOLE 20 MG/G
CREAM TOPICAL DAILY
Qty: 15 G | Refills: 0 | Status: SHIPPED | OUTPATIENT
Start: 2020-01-09 | End: 2020-03-05

## 2020-01-09 NOTE — PROGRESS NOTES
"Subjective:       Patient ID: Tasha Rush is a 44 y.o. female.    Chief Complaint: Rash; Urinary Frequency; Medication Refill; and Nail Problem    Here for urgent visit    Needs refill on medications. Did not take today.     Rash on left face. Went to [facility] urgent care for rash on her left arm. Told could be Shingles. Rx valtrex and then another spot appeared.She has a spot on her left face and now right thigh.     Lower abdominal cramping, thin wet discharge, urinary frequency, nocturia. No dysuria, hematuria.      Review of Systems   Constitutional: Negative for chills, fatigue, fever and unexpected weight change.   HENT: Negative for ear pain, hearing loss, postnasal drip, tinnitus, trouble swallowing and voice change.    Respiratory: Negative for cough, chest tightness, shortness of breath and wheezing.    Cardiovascular: Negative for chest pain, palpitations and leg swelling.   Gastrointestinal: Negative for abdominal pain, blood in stool, diarrhea, nausea and vomiting.   Endocrine: Negative for polydipsia, polyphagia and polyuria.   Genitourinary: Negative for difficulty urinating, dysuria, hematuria and vaginal bleeding.   Skin: Negative for rash.   Allergic/Immunologic: Negative for food allergies.   Neurological: Negative for dizziness, numbness and headaches.   Hematological: Does not bruise/bleed easily.   Psychiatric/Behavioral: The patient is not nervous/anxious.        Objective:      Vitals:    01/09/20 1500   BP: (!) 140/86   Pulse: 66   SpO2: 99%   Weight: 78.4 kg (172 lb 13.5 oz)   Height: 5' 6" (1.676 m)      Physical Exam   Constitutional: She is oriented to person, place, and time. She appears well-developed and well-nourished. No distress.   HENT:   Head: Normocephalic and atraumatic.   Mouth/Throat: Oropharynx is clear and moist. No oropharyngeal exudate.   Eyes: Pupils are equal, round, and reactive to light. Conjunctivae and EOM are normal. No scleral icterus.   Neck: No thyromegaly " present.   Cardiovascular: Normal rate, regular rhythm and normal heart sounds.   No murmur heard.  Pulmonary/Chest: Effort normal and breath sounds normal. She has no wheezes. She has no rales.   Abdominal: Soft. She exhibits no distension. There is no tenderness.   Musculoskeletal: She exhibits no edema or tenderness.   Lymphadenopathy:     She has no cervical adenopathy.   Neurological: She is alert and oriented to person, place, and time.   Skin: Skin is warm and dry. Rash noted.   Psychiatric: She has a normal mood and affect. Her behavior is normal.       Assessment:       1. Urinary frequency    2. Breast cancer screening    3. Blurry vision    4. Benign essential hypertension    5. Hypertension, essential    6. Rash        Plan:       Tasha was seen today for rash, urinary frequency, medication refill and nail problem.    Diagnoses and all orders for this visit:    Urinary frequency  -     Urinalysis, Reflex to Urine Culture Urine, Clean Catch; Future  -     Urinalysis, Reflex to Urine Culture Urine, Unspecified; Future    Breast cancer screening  -     Mammo Digital Screening Bilat w/ Cullen; Future    Blurry vision  -     Ambulatory Referral to Optometry    Benign essential hypertension    Hypertension, essential  f/u in 3-4 weeks for nurse visit for BP check    -     amLODIPine (NORVASC) 10 MG tablet; TAKE 1 TABLET(10 MG) BY MOUTH EVERY DAY  -     hydroCHLOROthiazide (HYDRODIURIL) 25 MG tablet; Take 1 tablet (25 mg total) by mouth once daily.    Rash   Will cover for ring worm but feel more discoid eczema  -     ketoconazole (NIZORAL) 2 % cream; Apply topically once daily.  -     ciclopirox (PENLAC) 8 % Soln; Apply topically nightly. Remove with rubbing alcohol every 7 days           Honorio Oh MD  Internal Medicine-[facility]        Side effects of medication(s) were discussed in detail and patient voiced understanding.  Patient will call back for any issues or complications.

## 2020-01-14 ENCOUNTER — TELEPHONE (OUTPATIENT)
Dept: PRIMARY CARE CLINIC | Facility: CLINIC | Age: 45
End: 2020-01-14

## 2020-01-14 RX ORDER — CLOTRIMAZOLE AND BETAMETHASONE DIPROPIONATE 10; .64 MG/G; MG/G
CREAM TOPICAL 2 TIMES DAILY
Qty: 15 G | Refills: 0 | Status: SHIPPED | OUTPATIENT
Start: 2020-01-14 | End: 2020-01-24

## 2020-01-14 RX ORDER — CIPROFLOXACIN 500 MG/1
500 TABLET ORAL 2 TIMES DAILY
Qty: 6 TABLET | Refills: 0 | Status: SHIPPED | OUTPATIENT
Start: 2020-01-14 | End: 2020-01-21

## 2020-01-14 NOTE — TELEPHONE ENCOUNTER
----- Message from Ran Dejesus sent at 1/13/2020  3:34 PM CST -----  Contact: DAX GAYLE [[id number]]  Name of Who is Calling: DAX GAYLE [[id number]]      What is the request in detail: Pt is requesting to speak with clinical staff in regards to medication.Please contact to further discuss and advise.       Can the clinic reply by [facility]:       What Number to Call Back if not in [facility]: [phone]

## 2020-01-14 NOTE — TELEPHONE ENCOUNTER
Ms. Rush states that she was informed that an antibiotic would be sent to her pharmacy, but has not received anything yet.  Also patient states that the Nizoral cream is too expensive and would like something else called to the pharmacy

## 2020-01-14 NOTE — TELEPHONE ENCOUNTER
Spoke to Ms. Rush and informed her that her medication was sent to pharmacy.  Patient states understanding with no further questions.

## 2020-01-23 RX ORDER — CIPROFLOXACIN 500 MG/1
TABLET ORAL
Qty: 6 TABLET | Refills: 0 | OUTPATIENT
Start: 2020-01-23

## 2020-03-04 ENCOUNTER — PATIENT OUTREACH (OUTPATIENT)
Dept: ADMINISTRATIVE | Facility: OTHER | Age: 45
End: 2020-03-04

## 2020-03-04 ENCOUNTER — HOSPITAL ENCOUNTER (OUTPATIENT)
Dept: RADIOLOGY | Facility: OTHER | Age: 45
Discharge: HOME | End: 2020-03-04
Attending: NURSE PRACTITIONER
Payer: COMMERCIAL

## 2020-03-04 ENCOUNTER — OFFICE VISIT (OUTPATIENT)
Dept: UROLOGY | Facility: CLINIC | Age: 45
End: 2020-03-04
Payer: COMMERCIAL

## 2020-03-04 VITALS
DIASTOLIC BLOOD PRESSURE: 80 MMHG | BODY MASS INDEX: 27.77 KG/M2 | SYSTOLIC BLOOD PRESSURE: 129 MMHG | HEIGHT: 66 IN | WEIGHT: 172.81 LBS | HEART RATE: 73 BPM

## 2020-03-04 DIAGNOSIS — R35.0 URINARY FREQUENCY: Primary | ICD-10-CM

## 2020-03-04 DIAGNOSIS — R39.15 URINARY URGENCY: ICD-10-CM

## 2020-03-04 DIAGNOSIS — N39.0 RECURRENT UTI: ICD-10-CM

## 2020-03-04 PROCEDURE — 51798 US URINE CAPACITY MEASURE: CPT | Mod: S$GLB,,, | Performed by: NURSE PRACTITIONER

## 2020-03-04 PROCEDURE — 81002 POCT URINE DIPSTICK WITHOUT MICROSCOPE: ICD-10-PCS | Mod: S$GLB,,, | Performed by: NURSE PRACTITIONER

## 2020-03-04 PROCEDURE — 99214 OFFICE O/P EST MOD 30 MIN: CPT | Mod: 25,S$GLB,, | Performed by: NURSE PRACTITIONER

## 2020-03-04 PROCEDURE — 76770 US RETROPERITONEAL COMPLETE: ICD-10-PCS | Mod: 26,,, | Performed by: INTERNAL MEDICINE

## 2020-03-04 PROCEDURE — 87086 URINE CULTURE/COLONY COUNT: CPT

## 2020-03-04 PROCEDURE — 76770 US EXAM ABDO BACK WALL COMP: CPT | Mod: TC

## 2020-03-04 PROCEDURE — 51798 POCT BLADDER SCAN: ICD-10-PCS | Mod: S$GLB,,, | Performed by: NURSE PRACTITIONER

## 2020-03-04 PROCEDURE — 76770 US EXAM ABDO BACK WALL COMP: CPT | Mod: 26,,, | Performed by: INTERNAL MEDICINE

## 2020-03-04 PROCEDURE — 87088 URINE BACTERIA CULTURE: CPT

## 2020-03-04 PROCEDURE — 81002 URINALYSIS NONAUTO W/O SCOPE: CPT | Mod: S$GLB,,, | Performed by: NURSE PRACTITIONER

## 2020-03-04 PROCEDURE — 99214 PR OFFICE/OUTPT VISIT, EST, LEVL IV, 30-39 MIN: ICD-10-PCS | Mod: 25,S$GLB,, | Performed by: NURSE PRACTITIONER

## 2020-03-04 PROCEDURE — 87186 SC STD MICRODIL/AGAR DIL: CPT

## 2020-03-04 PROCEDURE — 87184 SC STD DISK METHOD PER PLATE: CPT

## 2020-03-04 PROCEDURE — 87077 CULTURE AEROBIC IDENTIFY: CPT

## 2020-03-04 RX ORDER — CIPROFLOXACIN 500 MG/1
500 TABLET ORAL 2 TIMES DAILY
Qty: 14 TABLET | Refills: 0 | Status: SHIPPED | OUTPATIENT
Start: 2020-03-04 | End: 2020-03-10

## 2020-03-04 NOTE — PROGRESS NOTES
Chart reviewed.   Immunizations: Triggered Imm Registry     Orders placed: n/a  Upcoming appts to satisfy FEI topics: n/a

## 2020-03-04 NOTE — PROGRESS NOTES
Subjective:      Tasha Rush is a 44 y.o. female who returns today regarding her urinary symptoms.     Seen previously following intraoperative bladder injury repair by Dr. Barroso during hysterectomy in 2018. Baltazar was removed following cystogram showed no leak.     She presents today reporting urinary frequency, urgency, suprapubic pain and left flank pain that began a few months ago. She was treated in November for a UTI with Cipro x 3 days. Symptoms recurred, and she completed bactrim in January- unfortunately urine culture was not done.     Denies dysuria, hematuria, fever/chills and N/V.     The following portions of the patient's history were reviewed and updated as appropriate: allergies, current medications, past family history, past medical history, past social history, past surgical history and problem list.    Review of Systems  Constitutional: no fever or chills  ENT: no nasal congestion or sore throat  Respiratory: no cough or shortness of breath  Cardiovascular: no chest pain or palpitations  Gastrointestinal: no nausea or vomiting, tolerating diet  Genitourinary: as per HPI  Hematologic/Lymphatic: no easy bruising or lymphadenopathy  Musculoskeletal: no arthralgias or myalgias  Neurological: no seizures or tremors  Behavioral/Psych: no auditory or visual hallucinations     Objective:   Vital Signs:   Vitals:    03/04/20 1503   BP: 129/80   Pulse: 73     Physical Exam   General: alert and oriented, no acute distress  Head: normocephalic, atraumatic  Neck: supple, no lymphadenopathy, normal ROM, no masses  Respiratory: Symmetric expansion, non-labored breathing  Cardiovascular: regular rate and rhythm, nomal pulses, no peripheral edema  Abdomen: soft, non tender, non distended, no palpable masses, no hernias, no hepatomegaly or splenomegaly  Pelvic: deferred  Lymphatic: no inguinal nodes  Skin: normal coloration and turgor, no rashes, no suspicious skin lesions noted  Neuro: alert and oriented x3,  no gross deficits  Psych: normal judgment and insight, normal mood/affect and non-anxious  Mild left CVA tenderness    Lab Review   Urinalysis demonstrates positive for nitrites, leukocytes (+), red blood cells (5-10 shamar/mL), protein (+)  PVR: 37 mL  Lab Results   Component Value Date    WBC 6.66 12/26/2019    HGB 12.5 12/26/2019    HCT 38.7 12/26/2019    MCV 95 12/26/2019     12/26/2019     Lab Results   Component Value Date    CREATININE 0.7 12/26/2019    BUN 8 12/26/2019       Imaging   None    Assessment:   Urinary frequency  Recurrent UTI   Urinary urgency    Plan:   Tasha was seen today for recurrent uti.    Diagnoses and all orders for this visit:    Urinary frequency  -     POCT URINE DIPSTICK WITHOUT MICROSCOPE  -     POCT Bladder Scan  -     Urine culture  -     Urine culture; Standing    Recurrent UTI  -     US Retroperitoneal Complete (Kidney and; Future    Urinary urgency    Other orders  -     ciprofloxacin HCl (CIPRO) 500 MG tablet; Take 1 tablet (500 mg total) by mouth 2 (two) times daily. for 7 days    Plan:  --Urine culture today  --Cipro BID x 7 days, may adjust based on culture results  --CHAI to r/o stones/hydro  --Standing order for culture after she completes antibiotics  --Follow up will be determined based on the above

## 2020-03-05 ENCOUNTER — HOSPITAL ENCOUNTER (EMERGENCY)
Facility: OTHER | Age: 45
Discharge: HOME | End: 2020-03-06
Attending: EMERGENCY MEDICINE
Payer: COMMERCIAL

## 2020-03-05 DIAGNOSIS — R07.9 CHEST PAIN: ICD-10-CM

## 2020-03-05 DIAGNOSIS — N12 PYELONEPHRITIS: Primary | ICD-10-CM

## 2020-03-05 LAB
BACTERIA #/AREA URNS HPF: ABNORMAL /HPF
BASOPHILS # BLD AUTO: 0.04 K/UL (ref 0–0.2)
BASOPHILS NFR BLD: 0.4 % (ref 0–1.9)
BILIRUB SERPL-MCNC: NEGATIVE MG/DL
BILIRUB UR QL STRIP: NEGATIVE
BLOOD URINE, POC: ABNORMAL
CLARITY UR: CLEAR
COLOR UR: YELLOW
COLOR, POC UA: YELLOW
DIFFERENTIAL METHOD: ABNORMAL
EOSINOPHIL # BLD AUTO: 0.1 K/UL (ref 0–0.5)
EOSINOPHIL NFR BLD: 0.8 % (ref 0–8)
ERYTHROCYTE [DISTWIDTH] IN BLOOD BY AUTOMATED COUNT: 13.8 % (ref 11.5–14.5)
GLUCOSE UR QL STRIP: NEGATIVE
GLUCOSE UR QL STRIP: NORMAL
HCT VFR BLD AUTO: 37.6 % (ref 37–48.5)
HGB BLD-MCNC: 12.1 G/DL (ref 12–16)
HGB UR QL STRIP: ABNORMAL
IMM GRANULOCYTES # BLD AUTO: 0.03 K/UL (ref 0–0.04)
IMM GRANULOCYTES NFR BLD AUTO: 0.3 % (ref 0–0.5)
KETONES UR QL STRIP: NEGATIVE
KETONES UR QL STRIP: NEGATIVE
LEUKOCYTE ESTERASE UR QL STRIP: ABNORMAL
LEUKOCYTE ESTERASE URINE, POC: ABNORMAL
LYMPHOCYTES # BLD AUTO: 2.2 K/UL (ref 1–4.8)
LYMPHOCYTES NFR BLD: 24.4 % (ref 18–48)
MCH RBC QN AUTO: 30.4 PG (ref 27–31)
MCHC RBC AUTO-ENTMCNC: 32.2 G/DL (ref 32–36)
MCV RBC AUTO: 95 FL (ref 82–98)
MICROSCOPIC COMMENT: ABNORMAL
MONOCYTES # BLD AUTO: 1.2 K/UL (ref 0.3–1)
MONOCYTES NFR BLD: 12.7 % (ref 4–15)
NEUTROPHILS # BLD AUTO: 5.6 K/UL (ref 1.8–7.7)
NEUTROPHILS NFR BLD: 61.4 % (ref 38–73)
NITRITE UR QL STRIP: NEGATIVE
NITRITE, POC UA: ABNORMAL
NRBC BLD-RTO: 0 /100 WBC
PH UR STRIP: 6 [PH] (ref 5–8)
PH, POC UA: 5
PLATELET # BLD AUTO: 202 K/UL (ref 150–350)
PLATELET BLD QL SMEAR: ABNORMAL
PMV BLD AUTO: 10 FL (ref 9.2–12.9)
POC RESIDUAL URINE VOLUME: 37 ML (ref 0–100)
PROT UR QL STRIP: NEGATIVE
PROTEIN, POC: ABNORMAL
RBC # BLD AUTO: 3.98 M/UL (ref 4–5.4)
RBC #/AREA URNS HPF: 4 /HPF (ref 0–4)
SP GR UR STRIP: 1.02 (ref 1–1.03)
SPECIFIC GRAVITY, POC UA: 1.01
SQUAMOUS #/AREA URNS HPF: 10 /HPF
URN SPEC COLLECT METH UR: ABNORMAL
UROBILINOGEN UR STRIP-ACNC: 1 EU/DL
UROBILINOGEN, POC UA: NORMAL
WBC # BLD AUTO: 9.19 K/UL (ref 3.9–12.7)
WBC #/AREA URNS HPF: 40 /HPF (ref 0–5)
WBC CLUMPS URNS QL MICRO: ABNORMAL

## 2020-03-05 PROCEDURE — 96375 TX/PRO/DX INJ NEW DRUG ADDON: CPT

## 2020-03-05 PROCEDURE — 93005 ELECTROCARDIOGRAM TRACING: CPT

## 2020-03-05 PROCEDURE — 87186 SC STD MICRODIL/AGAR DIL: CPT

## 2020-03-05 PROCEDURE — 87077 CULTURE AEROBIC IDENTIFY: CPT

## 2020-03-05 PROCEDURE — 99284 EMERGENCY DEPT VISIT MOD MDM: CPT | Mod: 25

## 2020-03-05 PROCEDURE — 87086 URINE CULTURE/COLONY COUNT: CPT

## 2020-03-05 PROCEDURE — 96361 HYDRATE IV INFUSION ADD-ON: CPT

## 2020-03-05 PROCEDURE — 93010 ELECTROCARDIOGRAM REPORT: CPT | Mod: ,,, | Performed by: INTERNAL MEDICINE

## 2020-03-05 PROCEDURE — 87088 URINE BACTERIA CULTURE: CPT

## 2020-03-05 PROCEDURE — 81000 URINALYSIS NONAUTO W/SCOPE: CPT

## 2020-03-05 PROCEDURE — 63600175 PHARM REV CODE 636 W HCPCS: Performed by: PHYSICIAN ASSISTANT

## 2020-03-05 PROCEDURE — 96374 THER/PROPH/DIAG INJ IV PUSH: CPT

## 2020-03-05 PROCEDURE — 85025 COMPLETE CBC W/AUTO DIFF WBC: CPT

## 2020-03-05 PROCEDURE — 93010 EKG 12-LEAD: ICD-10-PCS | Mod: ,,, | Performed by: INTERNAL MEDICINE

## 2020-03-05 RX ORDER — ONDANSETRON 2 MG/ML
4 INJECTION INTRAMUSCULAR; INTRAVENOUS
Status: COMPLETED | OUTPATIENT
Start: 2020-03-05 | End: 2020-03-05

## 2020-03-05 RX ORDER — KETOROLAC TROMETHAMINE 30 MG/ML
10 INJECTION, SOLUTION INTRAMUSCULAR; INTRAVENOUS
Status: COMPLETED | OUTPATIENT
Start: 2020-03-05 | End: 2020-03-05

## 2020-03-05 RX ADMIN — KETOROLAC TROMETHAMINE 10 MG: 30 INJECTION, SOLUTION INTRAMUSCULAR; INTRAVENOUS at 11:03

## 2020-03-05 RX ADMIN — ONDANSETRON 4 MG: 2 INJECTION INTRAMUSCULAR; INTRAVENOUS at 11:03

## 2020-03-05 RX ADMIN — SODIUM CHLORIDE 1000 ML: 0.9 INJECTION, SOLUTION INTRAVENOUS at 10:03

## 2020-03-06 VITALS
TEMPERATURE: 99 F | OXYGEN SATURATION: 100 % | DIASTOLIC BLOOD PRESSURE: 86 MMHG | HEART RATE: 69 BPM | HEIGHT: 66 IN | SYSTOLIC BLOOD PRESSURE: 146 MMHG | BODY MASS INDEX: 27.32 KG/M2 | WEIGHT: 170 LBS | RESPIRATION RATE: 18 BRPM

## 2020-03-06 LAB
ALBUMIN SERPL BCP-MCNC: 3.6 G/DL (ref 3.5–5.2)
ALP SERPL-CCNC: 73 U/L (ref 55–135)
ALT SERPL W/O P-5'-P-CCNC: 16 U/L (ref 10–44)
ANION GAP SERPL CALC-SCNC: 10 MMOL/L (ref 8–16)
AST SERPL-CCNC: 19 U/L (ref 10–40)
BILIRUB SERPL-MCNC: 0.5 MG/DL (ref 0.1–1)
BUN SERPL-MCNC: 9 MG/DL (ref 6–20)
CALCIUM SERPL-MCNC: 8.6 MG/DL (ref 8.7–10.5)
CHLORIDE SERPL-SCNC: 108 MMOL/L (ref 95–110)
CO2 SERPL-SCNC: 20 MMOL/L (ref 23–29)
CREAT SERPL-MCNC: 0.6 MG/DL (ref 0.5–1.4)
EST. GFR  (AFRICAN AMERICAN): >60 ML/MIN/1.73 M^2
EST. GFR  (NON AFRICAN AMERICAN): >60 ML/MIN/1.73 M^2
GLUCOSE SERPL-MCNC: 94 MG/DL (ref 70–110)
MAGNESIUM SERPL-MCNC: 1.6 MG/DL (ref 1.6–2.6)
POTASSIUM SERPL-SCNC: 3.4 MMOL/L (ref 3.5–5.1)
PROT SERPL-MCNC: 6.7 G/DL (ref 6–8.4)
SODIUM SERPL-SCNC: 138 MMOL/L (ref 136–145)
TROPONIN I SERPL DL<=0.01 NG/ML-MCNC: <0.006 NG/ML (ref 0–0.03)

## 2020-03-06 PROCEDURE — 36415 COLL VENOUS BLD VENIPUNCTURE: CPT

## 2020-03-06 PROCEDURE — 84484 ASSAY OF TROPONIN QUANT: CPT

## 2020-03-06 PROCEDURE — 25000003 PHARM REV CODE 250: Performed by: EMERGENCY MEDICINE

## 2020-03-06 PROCEDURE — 96365 THER/PROPH/DIAG IV INF INIT: CPT | Mod: 59

## 2020-03-06 PROCEDURE — 83735 ASSAY OF MAGNESIUM: CPT

## 2020-03-06 PROCEDURE — 63600175 PHARM REV CODE 636 W HCPCS: Performed by: PHYSICIAN ASSISTANT

## 2020-03-06 PROCEDURE — 80053 COMPREHEN METABOLIC PANEL: CPT

## 2020-03-06 RX ORDER — PHENAZOPYRIDINE HYDROCHLORIDE 200 MG/1
200 TABLET, FILM COATED ORAL 3 TIMES DAILY
Qty: 9 TABLET | Refills: 0 | Status: SHIPPED | OUTPATIENT
Start: 2020-03-06 | End: 2020-03-09

## 2020-03-06 RX ORDER — NAPROXEN 500 MG/1
500 TABLET ORAL 2 TIMES DAILY PRN
Qty: 60 TABLET | Refills: 0 | Status: SHIPPED | OUTPATIENT
Start: 2020-03-06 | End: 2020-11-13 | Stop reason: CLARIF

## 2020-03-06 RX ORDER — ACETAMINOPHEN 325 MG/1
650 TABLET ORAL
Status: COMPLETED | OUTPATIENT
Start: 2020-03-06 | End: 2020-03-06

## 2020-03-06 RX ORDER — CEPHALEXIN 500 MG/1
500 CAPSULE ORAL 3 TIMES DAILY
Qty: 30 CAPSULE | Refills: 0 | Status: SHIPPED | OUTPATIENT
Start: 2020-03-06 | End: 2020-03-09

## 2020-03-06 RX ORDER — METHOCARBAMOL 750 MG/1
750 TABLET, FILM COATED ORAL
Status: COMPLETED | OUTPATIENT
Start: 2020-03-06 | End: 2020-03-06

## 2020-03-06 RX ORDER — METHOCARBAMOL 750 MG/1
1500 TABLET, FILM COATED ORAL 3 TIMES DAILY PRN
Qty: 30 TABLET | Refills: 0 | Status: SHIPPED | OUTPATIENT
Start: 2020-03-06 | End: 2020-03-11

## 2020-03-06 RX ADMIN — ACETAMINOPHEN 650 MG: 325 TABLET ORAL at 01:03

## 2020-03-06 RX ADMIN — METHOCARBAMOL TABLETS 750 MG: 750 TABLET, COATED ORAL at 01:03

## 2020-03-06 RX ADMIN — CEFTRIAXONE 1 G: 1 INJECTION, SOLUTION INTRAVENOUS at 12:03

## 2020-03-06 NOTE — DISCHARGE INSTRUCTIONS
Call your primary care doctor to make the first available appointment.     Keep all your medical appointments.     Take your regular medication as prescribed. Contact your primary care provider before running out of medication, or for any problems obtaining them.    Do not drive or operate heavy machinery while taking opioid or muscle relaxing medications. Examples include norco, percocet, xanax, valium, flexeril.     Overuse or long term use of pain and sedating medication may lead to addiction, dependence, organ failure, family and work problems, legal problems, accidental overdose and death.    If you do not have health insurance, you probably qualify for heavily discounted rates:  Call 1-376.514.5249 (Critical access hospital hotline) or go to www.[employer].[place].gov    Your evaluation in the ED does not suggest any emergent or life threatening medical condition requiring admission or immediate intervention beyond that provided in the ED.   However, the signs of a serious problem sometimes take more time to appear.   RETURN TO THE ER if any of the following occur:    Weakness, dizziness, fainting, or loss of consciousness   Fever of 100.4ºF (38ºC) or higher  Any worse symptoms  Any new or concerning symptoms

## 2020-03-06 NOTE — ED PROVIDER NOTES
Encounter Date: 3/5/2020       History     Chief Complaint   Patient presents with    Chest Pain     LEFT sided, sharp that radiates to left neck x1 hour. Denies SOB    Back Pain     lower x1 week- dx with UTI- started cipro today     Patient is a 44-year-old female with history of recurrent UTI, gastritis, hypertension is presenting to the ER for evaluation bilateral flank pain. Patient reports that she has had recurring UTIs and was just seen by her urologist yesterday.  She was placed on ciprofloxacin that she is currently taking.  She states that today she woke up and noticed pain to bilateral flank.  She has noticed some dark urine, no hematuria.  She states she also has some abdominal cramping.  No vomiting which she has had some nausea.  She has also had some loose stools.  Denies any chest pain, palpitations for shortness of breath. She reports having chills without recorded fevers.  No history of kidney stone.  Patient does have a history of bladder surgery secondary to perforation during hysterectomy 2 years ago.  No recent urological procedures.    The history is provided by the patient.     Review of patient's allergies indicates:   Allergen Reactions    Ace inhibitors Other (See Comments)     cough    Dilaudid [hydromorphone]      SOB + anxiety      Past Medical History:   Diagnosis Date    Abnormal Pap smear of cervix     Bacterial vaginosis     RECURRENT    Cervical dysplasia     Gastritis     Hypertension     S/P bladder repair 8/26/2018     Past Surgical History:   Procedure Laterality Date    CERVICAL BIOPSY  W/ LOOP ELECTRODE EXCISION      CYSTOTOMY FOR EXCISION OF BLADDER DIVERTICULUM N/A 8/24/2018    Procedure: CYSTOTOMY FOR BLADDER PERFORATION;  Surgeon: Hardy Bueno MD;  Location: [facility];  Service: OB/GYN;  Laterality: N/A;    fibroid removal      HERNIA REPAIR  2009    umbilical    LAPAROSCOPIC TOTAL HYSTERECTOMY N/A 8/24/2018    Procedure: HYSTERECTOMY, TOTAL, LAPAROSCOPIC,  Attempted;  Surgeon: Hardy Bueno MD;  Location: [facility] OR;  Service: OB/GYN;  Laterality: N/A;  RN PREOP 8/20/2018------UPT-----NEED H/P    MYOMECTOMY      TOTAL ABDOMINAL HYSTERECTOMY N/A 8/24/2018    Procedure: HYSTERECTOMY, TOTAL, ABDOMINAL;  Surgeon: Hardy Bueno MD;  Location: [facility] OR;  Service: OB/GYN;  Laterality: N/A;     Family History   Problem Relation Age of Onset    Diabetes Father     Hyperlipidemia Father     Diabetes Mother     Multiple sclerosis Mother     No Known Problems Brother     Colon cancer Paternal Uncle     Breast cancer Neg Hx     Ovarian cancer Neg Hx      Social History     Tobacco Use    Smoking status: Current Some Day Smoker     Packs/day: 0.25    Smokeless tobacco: Never Used    Tobacco comment: 1 pack / 2 weeks   Substance Use Topics    Alcohol use: Yes     Comment: occasional    Drug use: No     Review of Systems   Constitutional: Positive for chills. Negative for fever.   HENT: Negative for congestion.    Respiratory: Negative for cough and shortness of breath.    Cardiovascular: Positive for chest pain. Negative for palpitations.   Gastrointestinal: Positive for abdominal pain and nausea. Negative for constipation, diarrhea and vomiting.   Genitourinary: Positive for flank pain and urgency. Negative for dysuria, hematuria, vaginal bleeding and vaginal discharge.        Dark urine    Musculoskeletal: Positive for myalgias.   Skin: Negative for rash.   Allergic/Immunologic: Negative for immunocompromised state.   Neurological: Negative for dizziness, syncope, weakness, numbness and headaches.   Hematological: Does not bruise/bleed easily.   Psychiatric/Behavioral: Negative for confusion.       Physical Exam     Initial Vitals [03/05/20 2033]   BP Pulse Resp Temp SpO2   139/81 84 18 99.3 °F (37.4 °C) 100 %      MAP       --         Physical Exam    Vitals reviewed.  Constitutional: She appears well-developed and well-nourished. She is not diaphoretic. No distress.    HENT:   Head: Normocephalic and atraumatic.   Mouth/Throat: Oropharynx is clear and moist.   Eyes: Conjunctivae and EOM are normal. Pupils are equal, round, and reactive to light.   Neck: Normal range of motion and full passive range of motion without pain. Neck supple. No spinous process tenderness and no muscular tenderness present. Normal range of motion present. No neck rigidity.   Cardiovascular: Normal rate, regular rhythm, normal heart sounds and intact distal pulses.   Pulmonary/Chest: Breath sounds normal. No respiratory distress. She has no wheezes.   Abdominal: Soft. Normal appearance and bowel sounds are normal. She exhibits no distension. There is tenderness. There is no rigidity, no rebound, no guarding and no CVA tenderness.   Neurological: She is alert and oriented to person, place, and time.   Skin: Skin is warm.         ED Course   Procedures  Labs Reviewed   CBC W/ AUTO DIFFERENTIAL - Abnormal; Notable for the following components:       Result Value    RBC 3.98 (*)     Mono # 1.2 (*)     Platelet Estimate Clumped (*)     All other components within normal limits   URINALYSIS, REFLEX TO URINE CULTURE - Abnormal; Notable for the following components:    Occult Blood UA 1+ (*)     Leukocytes, UA 1+ (*)     All other components within normal limits    Narrative:     Preferred Collection Type->Urine, Clean Catch   URINALYSIS MICROSCOPIC - Abnormal; Notable for the following components:    WBC, UA 40 (*)     WBC Clumps, UA Occasional (*)     Bacteria Few (*)     All other components within normal limits    Narrative:     Preferred Collection Type->Urine, Clean Catch   COMPREHENSIVE METABOLIC PANEL - Abnormal; Notable for the following components:    Potassium 3.4 (*)     CO2 20 (*)     Calcium 8.6 (*)     All other components within normal limits   CULTURE, URINE   MAGNESIUM   TROPONIN I          Imaging Results    None                APC / Resident Notes:   Patient seen in the ER promptly upon  arrival.  She is afebrile, no acute distress.  Diffuse tenderness on palpation throughout the abdomen.  No CVA tenderness on exam.  IV access established, labs ordered, fluids given.  Patient given Toradol and Zofran.    Urinalysis concerning for UTI.  She was given Rocephin in the ED.  CBC shows normal white count.    Pending final disposition.  Case was signed out to Dr. Chen              ED Course as of Mar 06 0557   Fri Mar 06, 2020   0127 Physician Attestation Statement: I have reviewed this case with my non-physician provider.  Physician Attestation Statement: I have provided a face to face evaluation of this patient at the request of my  non-physician provider.  Physician Attestation Statement: The patient's condition warranted physician involvement. The treatment regimen was reviewed by me.   I assumed full care of this patient at the end of the advanced practice provider's shift.  Patient is a 44-year-old female with recent diagnosis of UTI, started on Cipro, had normal renal ultrasound yesterday presents with myalgias, new bilateral flank pain.  Urinalysis consistent with UTI.  Labs notable for minimal metabolic acidosis, no leukocytosis, normal troponin.  Patient given ceftriaxone IV here.  Patient well-appearing.  Adding ciprofloxacin to the patient's antibiotics.  We will have her follow up with Urology, PCP.  I discussed with patient and/or guardian/caretaker that this evaluation in the ED does not suggest any emergent or life threatening medical condition requiring admission or immediate intervention beyond what was provided in the ED. Regardless, an unremarkable evaluation in the ED does not preclude the development or presence of a serious or life threatening condition. As such, patient was instructed to return immediately for any worsening or change in current symptoms.     I had a detailed discussion with patient  and/or guardian/caretaker regarding findings, plan, return precautions, importance  of medication adherence, need to follow-up with a PCP and specialist. All questions answered.     Note was created using voice recognition software. It may have occasional typographical errors not identified and edited despite initial review prior to signing.        [RC]      ED Course User Index  [RC] Gera Chen MD                Clinical Impression:       ICD-10-CM ICD-9-CM   1. Pyelonephritis N12 590.80   2. Chest pain R07.9 786.50             ED Disposition Condition    Discharge Good        ED Prescriptions     Medication Sig Dispense Start Date End Date Auth. Provider    cephALEXin (KEFLEX) 500 MG capsule Take 1 capsule (500 mg total) by mouth 3 (three) times daily. for 10 days 30 capsule 3/6/2020 3/16/2020 Gera Chen MD    phenazopyridine (PYRIDIUM) 200 MG tablet Take 1 tablet (200 mg total) by mouth 3 (three) times daily. for 3 days 9 tablet 3/6/2020 3/9/2020 Gera Chen MD    methocarbamol (ROBAXIN) 750 MG Tab Take 2 tablets (1,500 mg total) by mouth 3 (three) times daily as needed (Muscle spasm pain). 30 tablet 3/6/2020 3/11/2020 Gera Chen MD    naproxen (NAPROSYN) 500 MG tablet Take 1 tablet (500 mg total) by mouth 2 (two) times daily as needed (pain). 60 tablet 3/6/2020  Gera Chen MD        Follow-up Information     Follow up With Specialties Details Why Contact Info    Kristofer Lagos MD Internal Medicine In 1 week For recheck with your primary care doctor [address] [address]  [phone]      Meme Freedman NP Urology In 2 days For recheck with specialist [address] [facility] [address]  [phone]                                       Gera Chen MD  03/06/20 0567

## 2020-03-06 NOTE — ED TRIAGE NOTES
"Pt reports to ED with c/o bilateral flank pain x3 days. Pt reports seeing a urologist and was diagnosed with a UTI. Pt reports ongoing UTI's since hysterectomy. Pt also reports neck pain, ABD cramping, "eva horses" in her legs, and HA. Pt currently on cipro since this AM. Pt states she was having chest pains earlier, but denies chest pain at this time. Pt denies SOB.   "

## 2020-03-07 LAB — BACTERIA UR CULT: ABNORMAL

## 2020-03-09 ENCOUNTER — TELEPHONE (OUTPATIENT)
Dept: UROLOGY | Facility: CLINIC | Age: 45
End: 2020-03-09

## 2020-03-09 DIAGNOSIS — N30.00 ACUTE CYSTITIS WITHOUT HEMATURIA: Primary | ICD-10-CM

## 2020-03-09 DIAGNOSIS — B37.31 VAGINAL YEAST INFECTION: Primary | ICD-10-CM

## 2020-03-09 LAB — BACTERIA UR CULT: ABNORMAL

## 2020-03-09 RX ORDER — AMOXICILLIN AND CLAVULANATE POTASSIUM 875; 125 MG/1; MG/1
1 TABLET, FILM COATED ORAL EVERY 12 HOURS
Qty: 14 TABLET | Refills: 0 | Status: SHIPPED | OUTPATIENT
Start: 2020-03-09 | End: 2020-03-16

## 2020-03-09 NOTE — TELEPHONE ENCOUNTER
----- Message from [place], Patient Care Assistant sent at 3/9/2020  3:20 PM CDT -----  Contact: DAX GAYLE [[id number]]  Type:  Patient Returning Call    Who Called: DAX GAYLE [[id number]]    Who Left Message for Patient: Meme Freedman    Does the patient know what this is regarding?:No    Best Call Back Number: [phone]    Additional Information:   None

## 2020-03-10 RX ORDER — FLUCONAZOLE 150 MG/1
150 TABLET ORAL
Qty: 3 TABLET | Refills: 0 | Status: SHIPPED | OUTPATIENT
Start: 2020-03-10 | End: 2020-10-15 | Stop reason: SDUPTHER

## 2020-04-13 ENCOUNTER — TELEPHONE (OUTPATIENT)
Dept: UROLOGY | Facility: CLINIC | Age: 45
End: 2020-04-13

## 2020-04-13 NOTE — TELEPHONE ENCOUNTER
----- Message from Doreen Lafleur sent at 4/13/2020 10:56 AM CDT -----  Contact: DAX GAYLE  Name of Who is Calling: DAX GAYLE      What is the request in detail: Would like to speak to staff in regards to wanting an order for an ultrasound or cat scan for a recurring UTI. Please advise.       Can the clinic reply by [facility]: No      What Number to Call Back if not in [facility]: [phone]

## 2020-04-13 NOTE — TELEPHONE ENCOUNTER
----- Message from Valerie Hooper sent at 4/13/2020  2:04 PM CDT -----  Contact: DAX GAYLE [[id number]]  Type:  Patient Returning Call    Who Called: DAX GAYLE [[id number]]    Who Left Message for Patient: Keturah     Does the patient know what this is regarding?:yes     Best Call Back Number:[phone] (home)       Additional Information:

## 2020-04-14 ENCOUNTER — TELEPHONE (OUTPATIENT)
Dept: UROLOGY | Facility: CLINIC | Age: 45
End: 2020-04-14

## 2020-04-14 NOTE — TELEPHONE ENCOUNTER
----- Message from Josiane Park sent at 4/14/2020 12:08 PM CDT -----  Contact: Pt   Name of Who is Calling: DAX GAYLE [[id number]]    What is the request in detail:Patient is requesting a CT scan and ultra sound of her kidneys because of pain ..... Please contact to further discuss and advise      Can the clinic reply by [facility]: No     What Number to Call Back if not in [facility]:  [phone] (home)

## 2020-04-14 NOTE — TELEPHONE ENCOUNTER
LVM for patient to call back with symptoms so we can report them to Meme CURTIS. Left office phone number    --Sara Méndez

## 2020-04-14 NOTE — TELEPHONE ENCOUNTER
----- Message from Teresita Fish sent at 4/14/2020  4:42 PM CDT -----  Contact: DAX GAYLE [[id number]]  Type:  Patient Returning Call    Who Called:     Who Left Message for Patient:     Does the patient know what this is regarding?: missed call     Best Call Back Number:  [phone]    Additional Information:  n/a

## 2020-04-14 NOTE — TELEPHONE ENCOUNTER
----- Message from Meme Freedman NP sent at 4/14/2020  1:44 PM CDT -----  Contact: Pt   Attempted to reach the pt x 2, VM left. If yall are able to contact her please find out what symptoms she is having. I am doubtful that she has a stone but possibly a kidney infection. Thanks!    ----- Message -----  From: Sara Méndez MA  Sent: 4/14/2020   1:01 PM CDT  To: Meme Freedman NP        ----- Message -----  From: Josiane Prak  Sent: 4/14/2020  12:08 PM CDT  To: Tano Valentine Staff    Name of Who is Calling: DAX GAYLE [[id number]]    What is the request in detail:Patient is requesting a CT scan and ultra sound of her kidneys because of pain ..... Please contact to further discuss and advise      Can the clinic reply by [facility]: No     [facility]:  [phone] (home)

## 2020-04-14 NOTE — TELEPHONE ENCOUNTER
Attempted to contact patient regarding concerns. LVM for patient to return call. Will forward msg to NP of Urology for further instructions.    ---Sara Méndez

## 2020-04-14 NOTE — TELEPHONE ENCOUNTER
----- Message from Sara Méndez MA sent at 4/14/2020  3:17 PM CDT -----  Contact: DAX GAYLE [[id number]]  Can you please call this patient to see what are her symptoms and forward that information to Meme CURTIS. I have a meeting to go to but we have been trying to get in touch with this patient.    Thanks  Sara  ----- Message -----  From: Becky Bateman  Sent: 4/14/2020   2:58 PM CDT  To: Tano Valentine Staff    Type:  Patient Returning Call    Who Called: DAX GAYLE [[id number]]    Who Left Message for Patient: Sara Méndez MA     Does the patient know what this is regarding?:  Symptoms - She went the the ED and she was advised that she has a UTI. Pain on her left side around her kidney. She states that she was advised that she may need a CAT Scan or U/S. She states that she is currently taking Augmentin 7 days.     Can the clinic reply in [facility]: No    Best Call Back Number: [phone]    Additional Information: N/A

## 2020-04-29 ENCOUNTER — LAB VISIT (OUTPATIENT)
Dept: LAB | Facility: OTHER | Age: 45
End: 2020-04-29
Attending: NURSE PRACTITIONER
Payer: COMMERCIAL

## 2020-04-29 DIAGNOSIS — R35.0 URINARY FREQUENCY: ICD-10-CM

## 2020-04-29 PROCEDURE — 87077 CULTURE AEROBIC IDENTIFY: CPT

## 2020-04-29 PROCEDURE — 87086 URINE CULTURE/COLONY COUNT: CPT

## 2020-04-29 PROCEDURE — 87186 SC STD MICRODIL/AGAR DIL: CPT

## 2020-04-29 PROCEDURE — 87088 URINE BACTERIA CULTURE: CPT

## 2020-05-01 LAB — BACTERIA UR CULT: ABNORMAL

## 2020-05-04 ENCOUNTER — TELEPHONE (OUTPATIENT)
Dept: UROLOGY | Facility: CLINIC | Age: 45
End: 2020-05-04

## 2020-05-04 DIAGNOSIS — B37.31 VAGINAL YEAST INFECTION: ICD-10-CM

## 2020-05-04 DIAGNOSIS — R35.0 URINARY FREQUENCY: ICD-10-CM

## 2020-05-04 DIAGNOSIS — N39.0 RECURRENT UTI: Primary | ICD-10-CM

## 2020-05-04 RX ORDER — NITROFURANTOIN 25; 75 MG/1; MG/1
100 CAPSULE ORAL 2 TIMES DAILY
Qty: 14 CAPSULE | Refills: 0 | Status: SHIPPED | OUTPATIENT
Start: 2020-05-04 | End: 2020-05-11

## 2020-05-04 NOTE — TELEPHONE ENCOUNTER
LVM to notify pt of rx/positive culture.    ----- Message from Meme Freedman NP sent at 5/4/2020  8:33 AM CDT -----  Please notify the patient that her urine culture is positive for an infection. I sent a prescription for Macrobid twice per day x seven days to her pharmacy. Thanks

## 2020-05-04 NOTE — TELEPHONE ENCOUNTER
Notified pt of urine culture results/prescription per NP.  Patient has concerns about recurrent UTI and the yeast infections the ax give her and would like to know what else can be done. Please advise.    ----- Message from Sheree Mejia sent at 5/4/2020 12:59 PM CDT -----  Contact: DAX GAYLE [[id number]]  Type:  Patient Returning Call    Who Called: DAX GAYLE [[id number]]    Who Left Message for Patient: Criss    Does the patient know what this is regarding?: yes    Best Call Back Number: [phone]    Additional Information:

## 2020-05-05 RX ORDER — NITROFURANTOIN MACROCRYSTALS 50 MG/1
50 CAPSULE ORAL NIGHTLY
Qty: 90 CAPSULE | Refills: 1 | Status: SHIPPED | OUTPATIENT
Start: 2020-05-05 | End: 2020-11-01

## 2020-05-05 RX ORDER — FLUCONAZOLE 150 MG/1
150 TABLET ORAL
Qty: 6 TABLET | Refills: 0 | Status: SHIPPED | OUTPATIENT
Start: 2020-05-05 | End: 2020-10-15 | Stop reason: SDUPTHER

## 2020-05-05 RX ORDER — AMOXICILLIN AND CLAVULANATE POTASSIUM 875; 125 MG/1; MG/1
1 TABLET, FILM COATED ORAL EVERY 12 HOURS
Qty: 28 TABLET | Refills: 0 | Status: SHIPPED | OUTPATIENT
Start: 2020-05-05 | End: 2020-05-19

## 2020-05-05 NOTE — TELEPHONE ENCOUNTER
Spoke with the pt. Augmentin BID x 14 days. Standing order for urine culture after completing the augmentin. Will then begin prophylactic macrobid.

## 2020-06-15 ENCOUNTER — LAB VISIT (OUTPATIENT)
Dept: LAB | Facility: OTHER | Age: 45
End: 2020-06-15
Attending: NURSE PRACTITIONER
Payer: COMMERCIAL

## 2020-06-15 DIAGNOSIS — R35.0 URINARY FREQUENCY: ICD-10-CM

## 2020-06-15 DIAGNOSIS — N39.0 RECURRENT UTI: ICD-10-CM

## 2020-06-15 PROCEDURE — 87186 SC STD MICRODIL/AGAR DIL: CPT

## 2020-06-15 PROCEDURE — 87086 URINE CULTURE/COLONY COUNT: CPT

## 2020-06-15 PROCEDURE — 87077 CULTURE AEROBIC IDENTIFY: CPT

## 2020-06-15 PROCEDURE — 87088 URINE BACTERIA CULTURE: CPT

## 2020-06-17 LAB — BACTERIA UR CULT: ABNORMAL

## 2020-06-18 ENCOUNTER — TELEPHONE (OUTPATIENT)
Dept: UROLOGY | Facility: CLINIC | Age: 45
End: 2020-06-18

## 2020-06-18 DIAGNOSIS — N39.0 RECURRENT UTI: Primary | ICD-10-CM

## 2020-06-18 RX ORDER — AMOXICILLIN AND CLAVULANATE POTASSIUM 875; 125 MG/1; MG/1
1 TABLET, FILM COATED ORAL EVERY 12 HOURS
Qty: 14 TABLET | Refills: 0 | Status: SHIPPED | OUTPATIENT
Start: 2020-06-18 | End: 2020-06-25

## 2020-06-18 NOTE — TELEPHONE ENCOUNTER
VM left with culture results. Continue augmentin. Please schedule BMP, CT urogram and cysto due to recurrent infections. Thanks!

## 2020-06-19 ENCOUNTER — TELEPHONE (OUTPATIENT)
Dept: UROLOGY | Facility: CLINIC | Age: 45
End: 2020-06-19

## 2020-06-19 NOTE — TELEPHONE ENCOUNTER
----- Message from Sheree Mejia sent at 6/19/2020 12:29 PM CDT -----  Type:  Patient Returning Call    Who Called: DAX GAYLE [[id number]]    Who Left Message for Patient: EVER Valentine    Does the patient know what this is regarding?: no    Best Call Back Number: [phone]    Additional Information:  Would like to inform provider that she is still experiencing back pain and she had to leave work. She states she is going to get the medication prescribed today and needs a work excuse.

## 2020-06-22 ENCOUNTER — TELEPHONE (OUTPATIENT)
Dept: UROLOGY | Facility: CLINIC | Age: 45
End: 2020-06-22

## 2020-06-22 NOTE — TELEPHONE ENCOUNTER
----- Message from Meme Freedman NP sent at 6/19/2020  1:16 PM CDT -----  Please schedule BMP, CT urogram and cysto. Thanks!

## 2020-06-23 ENCOUNTER — TELEPHONE (OUTPATIENT)
Dept: UROLOGY | Facility: CLINIC | Age: 45
End: 2020-06-23

## 2020-06-23 ENCOUNTER — PATIENT OUTREACH (OUTPATIENT)
Dept: ADMINISTRATIVE | Facility: OTHER | Age: 45
End: 2020-06-23

## 2020-06-23 NOTE — TELEPHONE ENCOUNTER
----- Message from [employer] sent at 6/22/2020  3:43 PM CDT -----  [employer]         Please schedule BMP, CT urogram and cysto. Thanks!

## 2020-06-29 ENCOUNTER — LAB VISIT (OUTPATIENT)
Dept: LAB | Facility: OTHER | Age: 45
End: 2020-06-29
Attending: NURSE PRACTITIONER
Payer: COMMERCIAL

## 2020-06-29 DIAGNOSIS — N39.0 RECURRENT UTI: ICD-10-CM

## 2020-06-29 LAB
ANION GAP SERPL CALC-SCNC: 10 MMOL/L (ref 8–16)
BUN SERPL-MCNC: 16 MG/DL (ref 6–20)
CALCIUM SERPL-MCNC: 8.9 MG/DL (ref 8.7–10.5)
CHLORIDE SERPL-SCNC: 103 MMOL/L (ref 95–110)
CO2 SERPL-SCNC: 24 MMOL/L (ref 23–29)
CREAT SERPL-MCNC: 0.7 MG/DL (ref 0.5–1.4)
EST. GFR  (AFRICAN AMERICAN): >60 ML/MIN/1.73 M^2
EST. GFR  (NON AFRICAN AMERICAN): >60 ML/MIN/1.73 M^2
GLUCOSE SERPL-MCNC: 101 MG/DL (ref 70–110)
POTASSIUM SERPL-SCNC: 3.8 MMOL/L (ref 3.5–5.1)
SODIUM SERPL-SCNC: 137 MMOL/L (ref 136–145)

## 2020-06-29 PROCEDURE — 36415 COLL VENOUS BLD VENIPUNCTURE: CPT

## 2020-06-29 PROCEDURE — 80048 BASIC METABOLIC PNL TOTAL CA: CPT

## 2020-06-30 ENCOUNTER — HOSPITAL ENCOUNTER (OUTPATIENT)
Dept: RADIOLOGY | Facility: OTHER | Age: 45
Discharge: HOME | End: 2020-06-30
Attending: NURSE PRACTITIONER
Payer: COMMERCIAL

## 2020-06-30 DIAGNOSIS — N39.0 RECURRENT UTI: ICD-10-CM

## 2020-06-30 PROCEDURE — 74178 CT ABD&PLV WO CNTR FLWD CNTR: CPT | Mod: TC

## 2020-06-30 PROCEDURE — 25500020 PHARM REV CODE 255: Performed by: NURSE PRACTITIONER

## 2020-06-30 PROCEDURE — 74178 CT UROGRAM ABD PELVIS W WO: ICD-10-PCS | Mod: 26,,, | Performed by: RADIOLOGY

## 2020-06-30 PROCEDURE — 74178 CT ABD&PLV WO CNTR FLWD CNTR: CPT | Mod: 26,,, | Performed by: RADIOLOGY

## 2020-06-30 RX ADMIN — IOHEXOL 125 ML: 350 INJECTION, SOLUTION INTRAVENOUS at 08:06

## 2020-07-07 ENCOUNTER — TELEPHONE (OUTPATIENT)
Dept: UROLOGY | Facility: CLINIC | Age: 45
End: 2020-07-07

## 2020-07-07 NOTE — TELEPHONE ENCOUNTER
----- Message from Grace Hooper sent at 7/7/2020  3:20 PM CDT -----  Type: Patient Call Back    Who called: Self     What is the request in detail: patient would like to speak with you regarding her ultrasound results and to inform you she still has a bladder infection. Please call     Can the clinic reply by [facility]? No     Would the patient rather a call back or a response via [facility]? Call     Best call back number: [phone] (home)

## 2020-07-08 ENCOUNTER — TELEPHONE (OUTPATIENT)
Dept: UROLOGY | Facility: CLINIC | Age: 45
End: 2020-07-08

## 2020-07-08 DIAGNOSIS — N20.0 NEPHROLITHIASIS: Primary | ICD-10-CM

## 2020-07-08 DIAGNOSIS — R30.0 DYSURIA: ICD-10-CM

## 2020-07-08 NOTE — TELEPHONE ENCOUNTER
----- Message from Meme Freedman NP sent at 7/8/2020 11:08 AM CDT -----  Regarding: RE: self  Standing order placed for urine culture for drop off. Please notify the patient that her kidneys/bladder appear normal on the CT scan. There is a large kidney stone within the kidney which is not causing pain but could be a source for these infections. Please have her complete a KUB and follow up for the cysto as scheduled. She will discuss the stone with Dr. Mitchell at her cysto visit. Thanks!  ----- Message -----  From: Flora Gifford MA  Sent: 7/8/2020  10:50 AM CDT  To: Meme Freedman NP  Subject: FW: self                                         PATIENT IS CALL FOR U/S TEST RESULTS, AND ALSO , PRESSURE, BURNING, NO TEMP, NO BLOOD,PATIENT STATED SHOULD SHE DROP OFF A SAMPLE AT THE LAB Thanks .  ----- Message -----  From: Katy James  Sent: 7/8/2020  10:36 AM CDT  To: Tano Valentine Staff  Subject: self                                             Type:  Patient Returning Call    Who Called: Self    Who Left Message for Patient:  Flora    Does the patient know what this is regarding?: no    Would the patient rather a call back or a response via [facility]? 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               
INFORMED PATIENT OF THE RESULTS AND KUB SCHEDULE AND , PATIENT WILL DROP OFF URINE ON TODAY  AND SHE WILL KEEP APPOINTMENT 07/30/20  
Good (>75%)

## 2020-07-08 NOTE — TELEPHONE ENCOUNTER
----- Message from Flora Gifford MA sent at 7/8/2020 10:50 AM CDT -----  Regarding: FW: self  PATIENT IS CALL FOR U/S TEST RESULTS, AND ALSO , PRESSURE, BURNING, NO TEMP, NO BLOOD,PATIENT STATED SHOULD SHE DROP OFF A SAMPLE AT THE LAB Thanks .  ----- Message -----  From: Katy James  Sent: 7/8/2020  10:36 AM CDT  To: Tano Valentine Staff  Subject: self                                             Type:  Patient Returning Call    Who Called: Self    Who Left Message for Patient:  Flora    Does the patient know what this is regarding?: no    Would the patient rather a call back or a response via [facility]? 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

## 2020-07-08 NOTE — TELEPHONE ENCOUNTER
----- Message from Katy James sent at 7/8/2020 10:36 AM CDT -----  Regarding: self  Type:  Patient Returning Call    Who Called: Self    Who Left Message for Patient:  Flora    Does the patient know what this is regarding?: no    Would the patient rather a call back or a response via [facility]? 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

## 2020-07-09 ENCOUNTER — HOSPITAL ENCOUNTER (OUTPATIENT)
Dept: RADIOLOGY | Facility: OTHER | Age: 45
Discharge: HOME | End: 2020-07-09
Attending: NURSE PRACTITIONER
Payer: COMMERCIAL

## 2020-07-09 DIAGNOSIS — N20.0 NEPHROLITHIASIS: ICD-10-CM

## 2020-07-09 PROCEDURE — 74018 RADEX ABDOMEN 1 VIEW: CPT | Mod: TC,FY

## 2020-07-09 PROCEDURE — 74018 XR KUB: ICD-10-PCS | Mod: 26,,, | Performed by: RADIOLOGY

## 2020-07-09 PROCEDURE — 74018 RADEX ABDOMEN 1 VIEW: CPT | Mod: 26,,, | Performed by: RADIOLOGY

## 2020-07-13 ENCOUNTER — TELEPHONE (OUTPATIENT)
Dept: UROLOGY | Facility: CLINIC | Age: 45
End: 2020-07-13

## 2020-07-13 DIAGNOSIS — N30.00 ACUTE CYSTITIS WITHOUT HEMATURIA: Primary | ICD-10-CM

## 2020-07-13 DIAGNOSIS — B37.31 VAGINAL YEAST INFECTION: ICD-10-CM

## 2020-07-13 RX ORDER — NITROFURANTOIN 25; 75 MG/1; MG/1
100 CAPSULE ORAL 2 TIMES DAILY
Qty: 28 CAPSULE | Refills: 0 | Status: SHIPPED | OUTPATIENT
Start: 2020-07-13 | End: 2020-07-27

## 2020-07-13 RX ORDER — FLUCONAZOLE 150 MG/1
150 TABLET ORAL
Qty: 4 TABLET | Refills: 0 | Status: SHIPPED | OUTPATIENT
Start: 2020-07-13 | End: 2020-11-13 | Stop reason: CLARIF

## 2020-07-13 NOTE — TELEPHONE ENCOUNTER
Patient requests labs results. Thanks!    ----- Message from Mariely Blanca sent at 7/13/2020 12:21 PM CDT -----    Name of Who is Calling:DAX GAYLE [[id number]]      What is the request in detail: Pt would like a call back regarding lab results  Please contact to further discuss and advise    Can the clinic reply by [facility]: no    What Number to [facility] Back [facility]: [phone]

## 2020-07-29 ENCOUNTER — TELEPHONE (OUTPATIENT)
Dept: UROLOGY | Facility: CLINIC | Age: 45
End: 2020-07-29

## 2020-07-29 NOTE — TELEPHONE ENCOUNTER
Spoke with the pt to inform the nurse received clarification from Meme CURTIS regarding the cystoscope is the final test to follow through with diagnosis.              Thanks Anna

## 2020-07-29 NOTE — TELEPHONE ENCOUNTER
----- Message from Memo Phillips sent at 7/29/2020  9:10 AM CDT -----  Regarding: Patient Advice  Contact: Maru Rush  Pt would like your office to know she has verified the insurance     Pt can be reached at [phone]

## 2020-07-29 NOTE — TELEPHONE ENCOUNTER
Placed a call and spoke with the pt regarding cystoscope procedure due to not covered benefit with insurance. Pt cancelling appointment and would like to if there is any further testing that can be done. Pt verbalized understanding.               Thanks Anna

## 2020-08-07 ENCOUNTER — TELEPHONE (OUTPATIENT)
Dept: UROLOGY | Facility: CLINIC | Age: 45
End: 2020-08-07

## 2020-08-07 ENCOUNTER — LAB VISIT (OUTPATIENT)
Dept: LAB | Facility: OTHER | Age: 45
End: 2020-08-07
Payer: COMMERCIAL

## 2020-08-07 DIAGNOSIS — Z03.818 ENCOUNTER FOR OBSERVATION FOR SUSPECTED EXPOSURE TO OTHER BIOLOGICAL AGENTS RULED OUT: ICD-10-CM

## 2020-08-07 PROCEDURE — U0003 INFECTIOUS AGENT DETECTION BY NUCLEIC ACID (DNA OR RNA); SEVERE ACUTE RESPIRATORY SYNDROME CORONAVIRUS 2 (SARS-COV-2) (CORONAVIRUS DISEASE [COVID-19]), AMPLIFIED PROBE TECHNIQUE, MAKING USE OF HIGH THROUGHPUT TECHNOLOGIES AS DESCRIBED BY CMS-2020-01-R: HCPCS

## 2020-08-07 NOTE — TELEPHONE ENCOUNTER
----- Message from Monica Miranda sent at 8/7/2020 11:24 AM CDT -----  Type: Patient Call Back    Who called: pt     What is the request in detail: pt asking for provider to contact her ins to confirm the test that provider wanted her to complete.     Can the clinic reply by [facility]? No     Would the patient rather a call back or a response via [facility]? Call back     Best call back number: [phone]    Additional Information:

## 2020-08-09 LAB — SARS-COV-2 RNA RESP QL NAA+PROBE: NORMAL

## 2020-09-03 ENCOUNTER — LAB VISIT (OUTPATIENT)
Dept: PRIMARY CARE CLINIC | Facility: OTHER | Age: 45
End: 2020-09-03
Payer: COMMERCIAL

## 2020-09-03 DIAGNOSIS — Z03.818 ENCOUNTER FOR OBSERVATION FOR SUSPECTED EXPOSURE TO OTHER BIOLOGICAL AGENTS RULED OUT: ICD-10-CM

## 2020-09-03 PROCEDURE — U0003 INFECTIOUS AGENT DETECTION BY NUCLEIC ACID (DNA OR RNA); SEVERE ACUTE RESPIRATORY SYNDROME CORONAVIRUS 2 (SARS-COV-2) (CORONAVIRUS DISEASE [COVID-19]), AMPLIFIED PROBE TECHNIQUE, MAKING USE OF HIGH THROUGHPUT TECHNOLOGIES AS DESCRIBED BY CMS-2020-01-R: HCPCS

## 2020-09-04 LAB — SARS-COV-2 RNA RESP QL NAA+PROBE: NOT DETECTED

## 2020-10-01 ENCOUNTER — LAB VISIT (OUTPATIENT)
Dept: PRIMARY CARE CLINIC | Facility: OTHER | Age: 45
End: 2020-10-01
Payer: COMMERCIAL

## 2020-10-01 DIAGNOSIS — Z03.818 ENCOUNTER FOR OBSERVATION FOR SUSPECTED EXPOSURE TO OTHER BIOLOGICAL AGENTS RULED OUT: ICD-10-CM

## 2020-10-01 PROCEDURE — U0003 INFECTIOUS AGENT DETECTION BY NUCLEIC ACID (DNA OR RNA); SEVERE ACUTE RESPIRATORY SYNDROME CORONAVIRUS 2 (SARS-COV-2) (CORONAVIRUS DISEASE [COVID-19]), AMPLIFIED PROBE TECHNIQUE, MAKING USE OF HIGH THROUGHPUT TECHNOLOGIES AS DESCRIBED BY CMS-2020-01-R: HCPCS

## 2020-10-02 LAB — SARS-COV-2 RNA RESP QL NAA+PROBE: NOT DETECTED

## 2020-10-15 ENCOUNTER — TELEPHONE (OUTPATIENT)
Dept: UROLOGY | Facility: CLINIC | Age: 45
End: 2020-10-15

## 2020-10-15 NOTE — TELEPHONE ENCOUNTER
Spoke to patient at 8:35am informing patient of her appointment on  10/16 to discuss her matter.  Patient verbally understood    Naila GARNER

## 2020-10-16 ENCOUNTER — OFFICE VISIT (OUTPATIENT)
Dept: UROLOGY | Facility: CLINIC | Age: 45
End: 2020-10-16
Payer: COMMERCIAL

## 2020-10-16 VITALS
DIASTOLIC BLOOD PRESSURE: 84 MMHG | WEIGHT: 170 LBS | SYSTOLIC BLOOD PRESSURE: 117 MMHG | BODY MASS INDEX: 27.32 KG/M2 | HEART RATE: 89 BPM | HEIGHT: 66 IN

## 2020-10-16 DIAGNOSIS — N30.00 ACUTE CYSTITIS WITHOUT HEMATURIA: Primary | ICD-10-CM

## 2020-10-16 DIAGNOSIS — R35.0 URINARY FREQUENCY: ICD-10-CM

## 2020-10-16 DIAGNOSIS — R39.15 URINARY URGENCY: ICD-10-CM

## 2020-10-16 DIAGNOSIS — Z98.890 S/P BLADDER REPAIR: ICD-10-CM

## 2020-10-16 DIAGNOSIS — N39.0 RECURRENT UTI: ICD-10-CM

## 2020-10-16 LAB
BILIRUB SERPL-MCNC: ABNORMAL MG/DL
BLOOD URINE, POC: 250
CLARITY, POC UA: CLEAR
COLOR, POC UA: YELLOW
GLUCOSE UR QL STRIP: NORMAL
KETONES UR QL STRIP: ABNORMAL
LEUKOCYTE ESTERASE URINE, POC: ABNORMAL
NITRITE, POC UA: ABNORMAL
PH, POC UA: 5
POC RESIDUAL URINE VOLUME: 19 ML (ref 0–100)
PROTEIN, POC: 30
SPECIFIC GRAVITY, POC UA: 1.02
UROBILINOGEN, POC UA: NORMAL

## 2020-10-16 PROCEDURE — 99214 PR OFFICE/OUTPT VISIT, EST, LEVL IV, 30-39 MIN: ICD-10-PCS | Mod: 25,S$GLB,, | Performed by: NURSE PRACTITIONER

## 2020-10-16 PROCEDURE — 3074F PR MOST RECENT SYSTOLIC BLOOD PRESSURE < 130 MM HG: ICD-10-PCS | Mod: CPTII,S$GLB,ICN, | Performed by: NURSE PRACTITIONER

## 2020-10-16 PROCEDURE — 87077 CULTURE AEROBIC IDENTIFY: CPT

## 2020-10-16 PROCEDURE — 81002 URINALYSIS NONAUTO W/O SCOPE: CPT | Mod: S$GLB,,, | Performed by: NURSE PRACTITIONER

## 2020-10-16 PROCEDURE — 3074F SYST BP LT 130 MM HG: CPT | Mod: CPTII,S$GLB,ICN, | Performed by: NURSE PRACTITIONER

## 2020-10-16 PROCEDURE — 3008F BODY MASS INDEX DOCD: CPT | Mod: CPTII,S$GLB,ICN, | Performed by: NURSE PRACTITIONER

## 2020-10-16 PROCEDURE — 87086 URINE CULTURE/COLONY COUNT: CPT

## 2020-10-16 PROCEDURE — 3079F PR MOST RECENT DIASTOLIC BLOOD PRESSURE 80-89 MM HG: ICD-10-PCS | Mod: CPTII,S$GLB,ICN, | Performed by: NURSE PRACTITIONER

## 2020-10-16 PROCEDURE — 87186 SC STD MICRODIL/AGAR DIL: CPT

## 2020-10-16 PROCEDURE — 51798 POCT BLADDER SCAN: ICD-10-PCS | Mod: S$GLB,,, | Performed by: NURSE PRACTITIONER

## 2020-10-16 PROCEDURE — 99214 OFFICE O/P EST MOD 30 MIN: CPT | Mod: 25,S$GLB,, | Performed by: NURSE PRACTITIONER

## 2020-10-16 PROCEDURE — 87088 URINE BACTERIA CULTURE: CPT

## 2020-10-16 PROCEDURE — 81002 POCT URINE DIPSTICK WITHOUT MICROSCOPE: ICD-10-PCS | Mod: S$GLB,,, | Performed by: NURSE PRACTITIONER

## 2020-10-16 PROCEDURE — 3008F PR BODY MASS INDEX (BMI) DOCUMENTED: ICD-10-PCS | Mod: CPTII,S$GLB,ICN, | Performed by: NURSE PRACTITIONER

## 2020-10-16 PROCEDURE — 3079F DIAST BP 80-89 MM HG: CPT | Mod: CPTII,S$GLB,ICN, | Performed by: NURSE PRACTITIONER

## 2020-10-16 PROCEDURE — 51798 US URINE CAPACITY MEASURE: CPT | Mod: S$GLB,,, | Performed by: NURSE PRACTITIONER

## 2020-10-16 RX ORDER — AMOXICILLIN AND CLAVULANATE POTASSIUM 875; 125 MG/1; MG/1
1 TABLET, FILM COATED ORAL EVERY 12 HOURS
Qty: 28 TABLET | Refills: 0 | Status: SHIPPED | OUTPATIENT
Start: 2020-10-16 | End: 2020-10-22 | Stop reason: SDUPTHER

## 2020-10-16 NOTE — PROGRESS NOTES
"Subjective:      Tasha Rush is a 44 y.o. female who returns today regarding her urinary symptoms.    Seen previously following intraoperative bladder injury repaired by Dr. Barroso during hysterectomy in 2018. Baltazar was removed following cystogram showed no leak.      She has had recurrent UTIs over the last year. Began macrodantin prophylaxis in May 2020.     CT urogram in June revealed a 1 cm non obstructing left lower pole stone. No solid mass, perinephric collection, or hydronephrosis. The stone was visible on KUB. She cancelled her cystoscope scheduled for August as this was "not a covered benefit."       Component Urine Culture, Routine   Latest Ref Rng & Units    7/9/2020 ESCHERICHIA COLI ESBL (A) . . .   6/15/2020 ESCHERICHIA COLI ESBL (A) . . .   4/29/2020 ESCHERICHIA COLI ESBL (A) . . .   3/5/2020 ESCHERICHIA COLI ESBL (A) . . .   3/4/2020 ESCHERICHIA COLI (A) . . .   11/20/2019 ESCHERICHIA COLI (A) . . .      She presents today reporting urinary frequency, urgency, and suprapubic pain that is "all the time." Denies dysuria, hematuria, fever/chills and N/V. States that she has increased her insurance coverage.     The following portions of the patient's history were reviewed and updated as appropriate: allergies, current medications, past family history, past medical history, past social history, past surgical history and problem list.    Review of Systems  Constitutional: no fever or chills  ENT: no nasal congestion or sore throat  Respiratory: no cough or shortness of breath  Cardiovascular: no chest pain or palpitations  Gastrointestinal: no nausea or vomiting, tolerating diet  Genitourinary: as per HPI  Hematologic/Lymphatic: no easy bruising or lymphadenopathy  Musculoskeletal: no arthralgias or myalgias  Neurological: no seizures or tremors  Behavioral/Psych: no auditory or visual hallucinations     Objective:   Vital Signs:  Vitals:    10/16/20 1503   BP: 117/84   Pulse: 89       Physical Exam " "  General: alert and oriented, no acute distress  Head: normocephalic, atraumatic  Neck: supple, no lymphadenopathy, normal ROM, no masses  Respiratory: Symmetric expansion, non-labored breathing  Cardiovascular: regular rate and rhythm, nomal pulses, no peripheral edema  Abdomen: soft, non tender, non distended, no palpable masses, no hernias, no hepatomegaly or splenomegaly  Pelvic: deferred  Lymphatic: no inguinal nodes  Skin: normal coloration and turgor, no rashes, no suspicious skin lesions noted  Neuro: alert and oriented x3, no gross deficits  Psych: normal judgment and insight, normal mood/affect and non-anxious  No CVA tenderness    Lab Review   Urinalysis demonstrates positive for nitrites, leukocytes, red blood cells  PVR: 19 mL  Lab Results   Component Value Date    WBC 9.19 03/05/2020    HGB 12.1 03/05/2020    HCT 37.6 03/05/2020    MCV 95 03/05/2020     03/05/2020     Lab Results   Component Value Date    CREATININE 0.7 06/29/2020    BUN 16 06/29/2020       Imaging   (all images personally reviewed; agree with report below)  CT urogram (6/30/20)- "1 cm non obstructing left lower pole stone. No solid mass, perinephric collection, or hydronephrosis."    Assessment:   Acute cystitis without hematuria  Recurrent UTI  Urinary frequency  Urinary urgency  S/p bladder repair     Plan:   Tasha was seen today for dysuria and urinary frequency.    Diagnoses and all orders for this visit:    Acute cystitis without hematuria  -     POCT URINE DIPSTICK WITHOUT MICROSCOPE  -     POCT Bladder Scan  -     Urine culture  -     amoxicillin-clavulanate 875-125mg (AUGMENTIN) 875-125 mg per tablet; Take 1 tablet by mouth every 12 (twelve) hours. for 14 days    Recurrent UTI  -     Cystoscopy; Future    Urinary frequency    Urinary urgency    S/P bladder repair      Plan:  --Urine culture  --Start Augmentin BID x 14 days, will adjust if needed   --Explained that while her large renal stone could be contributing to " her infections, will proceed with cysto first  --Cysto ASAP

## 2020-10-21 ENCOUNTER — TELEPHONE (OUTPATIENT)
Dept: PRIMARY CARE CLINIC | Facility: OTHER | Age: 45
End: 2020-10-21

## 2020-10-21 LAB — BACTERIA UR CULT: ABNORMAL

## 2020-10-22 ENCOUNTER — LAB VISIT (OUTPATIENT)
Dept: PRIMARY CARE CLINIC | Facility: OTHER | Age: 45
End: 2020-10-22
Payer: COMMERCIAL

## 2020-10-22 ENCOUNTER — TELEPHONE (OUTPATIENT)
Dept: UROLOGY | Facility: CLINIC | Age: 45
End: 2020-10-22

## 2020-10-22 ENCOUNTER — OFFICE VISIT (OUTPATIENT)
Dept: INTERNAL MEDICINE | Facility: CLINIC | Age: 45
End: 2020-10-22
Attending: FAMILY MEDICINE
Payer: COMMERCIAL

## 2020-10-22 VITALS
OXYGEN SATURATION: 99 % | WEIGHT: 175.25 LBS | DIASTOLIC BLOOD PRESSURE: 80 MMHG | HEIGHT: 66 IN | BODY MASS INDEX: 28.16 KG/M2 | SYSTOLIC BLOOD PRESSURE: 132 MMHG | HEART RATE: 75 BPM

## 2020-10-22 DIAGNOSIS — Z00.00 PREVENTATIVE HEALTH CARE: Primary | ICD-10-CM

## 2020-10-22 DIAGNOSIS — N30.00 ACUTE CYSTITIS WITHOUT HEMATURIA: ICD-10-CM

## 2020-10-22 DIAGNOSIS — M54.2 NECK PAIN: ICD-10-CM

## 2020-10-22 DIAGNOSIS — M25.512 CHRONIC LEFT SHOULDER PAIN: ICD-10-CM

## 2020-10-22 DIAGNOSIS — Z03.818 ENCOUNTER FOR OBSERVATION FOR SUSPECTED EXPOSURE TO OTHER BIOLOGICAL AGENTS RULED OUT: ICD-10-CM

## 2020-10-22 DIAGNOSIS — G89.29 CHRONIC LEFT SHOULDER PAIN: ICD-10-CM

## 2020-10-22 DIAGNOSIS — I10 HYPERTENSION, ESSENTIAL: ICD-10-CM

## 2020-10-22 PROCEDURE — 3008F BODY MASS INDEX DOCD: CPT | Mod: CPTII,S$GLB,, | Performed by: FAMILY MEDICINE

## 2020-10-22 PROCEDURE — 99396 PR PREVENTIVE VISIT,EST,40-64: ICD-10-PCS | Mod: S$GLB,,, | Performed by: FAMILY MEDICINE

## 2020-10-22 PROCEDURE — U0003 INFECTIOUS AGENT DETECTION BY NUCLEIC ACID (DNA OR RNA); SEVERE ACUTE RESPIRATORY SYNDROME CORONAVIRUS 2 (SARS-COV-2) (CORONAVIRUS DISEASE [COVID-19]), AMPLIFIED PROBE TECHNIQUE, MAKING USE OF HIGH THROUGHPUT TECHNOLOGIES AS DESCRIBED BY CMS-2020-01-R: HCPCS

## 2020-10-22 PROCEDURE — 3075F PR MOST RECENT SYSTOLIC BLOOD PRESS GE 130-139MM HG: ICD-10-PCS | Mod: CPTII,S$GLB,, | Performed by: FAMILY MEDICINE

## 2020-10-22 PROCEDURE — 3008F PR BODY MASS INDEX (BMI) DOCUMENTED: ICD-10-PCS | Mod: CPTII,S$GLB,, | Performed by: FAMILY MEDICINE

## 2020-10-22 PROCEDURE — 99999 PR PBB SHADOW E&M-EST. PATIENT-LVL IV: ICD-10-PCS | Mod: PBBFAC,,, | Performed by: FAMILY MEDICINE

## 2020-10-22 PROCEDURE — 99396 PREV VISIT EST AGE 40-64: CPT | Mod: S$GLB,,, | Performed by: FAMILY MEDICINE

## 2020-10-22 PROCEDURE — 3079F DIAST BP 80-89 MM HG: CPT | Mod: CPTII,S$GLB,, | Performed by: FAMILY MEDICINE

## 2020-10-22 PROCEDURE — 3079F PR MOST RECENT DIASTOLIC BLOOD PRESSURE 80-89 MM HG: ICD-10-PCS | Mod: CPTII,S$GLB,, | Performed by: FAMILY MEDICINE

## 2020-10-22 PROCEDURE — 3075F SYST BP GE 130 - 139MM HG: CPT | Mod: CPTII,S$GLB,, | Performed by: FAMILY MEDICINE

## 2020-10-22 PROCEDURE — 99999 PR PBB SHADOW E&M-EST. PATIENT-LVL IV: CPT | Mod: PBBFAC,,, | Performed by: FAMILY MEDICINE

## 2020-10-22 RX ORDER — AMOXICILLIN AND CLAVULANATE POTASSIUM 875; 125 MG/1; MG/1
1 TABLET, FILM COATED ORAL EVERY 12 HOURS
Qty: 28 TABLET | Refills: 0 | Status: SHIPPED | OUTPATIENT
Start: 2020-10-22 | End: 2020-11-05

## 2020-10-22 NOTE — TELEPHONE ENCOUNTER
----- Message from Sofía García sent at 10/22/2020  9:47 AM CDT -----  Pt called to speak to the nurse in regards to getting her results from her recent urine test.    Pt can be reached at  [phone]    Thank you

## 2020-10-22 NOTE — PROGRESS NOTES
"CHIEF COMPLAINT: Establish a primary care physician    HISTORY OF PRESENT ILLNESS: The patient is a generally healthy 44 year-old WF.  The patient is left handed.  She is complaining of LHD left shoulder pain for months with decreased range of motion.  She also is complaining of several months of Left neck and trapezius pain.      Sees URO for frequent UTIs.  It has been a while since the patient has been seen a primary care physician.  The patient wishes to establish a primary care physician.      The patient has a history of stable hypertension on current medications.  Patient denies chest pain or shortness of breath today.    REVIEW OF SYSTEMS:  GENERAL: No fever, chills, fatigability or weight loss.  SKIN: No rashes, itching or changes in color or texture of skin.  HEAD: No headaches or recent head trauma.  EYES: Visual acuity fine. No photophobia, ocular pain or diplopia.  EARS: Denies ear pain, discharge or vertigo.  NOSE: No loss of smell, no epistaxis or postnasal drip.  MOUTH & THROAT: No hoarseness or change in voice. No excessive gum bleeding.  NODES: Denies swollen glands.  CHEST: Denies GUTIERREZ, cyanosis, wheezing, cough and sputum production.  CARDIOVASCULAR: Denies chest pain, PND, orthopnea or reduced exercise tolerance.  ABDOMEN: Appetite fine. No weight loss. Denies diarrhea, abdominal pain, hematemesis or blood in stool.  URINARY: No flank pain, dysuria or hematuria.  PERIPHERAL VASCULAR: No claudication or cyanosis.  MUSCULOSKELETAL: No joint stiffness or swelling. Denies back pain.  NEUROLOGIC: No history of seizures, paralysis, alteration of gait or coordination.    SOCIAL HISTORY: The patient does smoke.  The patient consumes alcohol socially.  The patient is single.    PHYSICAL EXAMINATION:   Blood pressure 132/80, pulse 75, height 5' 6" (1.676 m), weight 79.5 kg (175 lb 4.3 oz), last menstrual period 08/14/2018, SpO2 99 %.    APPEARANCE: Well nourished, well developed, in no acute distress.  "   HEAD: Normocephalic, atraumatic.  EYES: PERRL. EOMI.  Conjunctivae without injection and  anicteric  NOSE: Mucosa pink. Airway clear.  MOUTH & THROAT: No tonsillar enlargement. No pharyngeal erythema or exudate. No stridor.  NECK: Supple.   NODES: No cervical, axillary or inguinal lymph node enlargement.  CHEST: Lungs clear to auscultation.  No retractions are noted.  No rales or rhonchi are present.  CARDIOVASCULAR: Normal S1, S2. No rubs, murmurs or gallops.  ABDOMEN: Bowel sounds normal. Not distended. Soft. No tenderness or masses.  No ascites is noted.  MUSCULOSKELETAL:  There is no clubbing, cyanosis, or edema of the extremities x4.  There is full range of motion of the lumbar spine.  There is full range of motion of the extremities x4.  There is no deformity noted.    NEUROLOGIC:       Normal speech development.      Hearing normal.      Normal gait.      Motor and sensory exams grossly normal.  PSYCHIATRIC: Patient is alert and oriented x3.  Thought processes are all normal.  There is no homicidality.  There is no suicidality.  There is no evidence of psychosis.    LABORATORY/RADIOLOGY:   Chart reviewed.  We reviewed her recent blood work today.    ASSESSMENT:   Annual  LHD left shoulder pain for months  Left neck and trapezius pain  Sees URO for frequent UTI  HTN    PLAN:  Follow up Ortho and pain  Return to clinic in one year.

## 2020-10-23 LAB — SARS-COV-2 RNA RESP QL NAA+PROBE: NOT DETECTED

## 2020-11-02 ENCOUNTER — PROCEDURE VISIT (OUTPATIENT)
Dept: UROLOGY | Facility: CLINIC | Age: 45
End: 2020-11-02
Attending: UROLOGY
Payer: COMMERCIAL

## 2020-11-02 VITALS
SYSTOLIC BLOOD PRESSURE: 128 MMHG | DIASTOLIC BLOOD PRESSURE: 79 MMHG | HEIGHT: 66 IN | HEART RATE: 80 BPM | BODY MASS INDEX: 28.12 KG/M2 | WEIGHT: 175 LBS

## 2020-11-02 DIAGNOSIS — N39.0 RECURRENT UTI: Primary | ICD-10-CM

## 2020-11-02 LAB
BILIRUB SERPL-MCNC: ABNORMAL MG/DL
BLOOD URINE, POC: 250
CLARITY, POC UA: ABNORMAL
COLOR, POC UA: YELLOW
GLUCOSE UR QL STRIP: NORMAL
KETONES UR QL STRIP: ABNORMAL
LEUKOCYTE ESTERASE URINE, POC: ABNORMAL
NITRITE, POC UA: ABNORMAL
PH, POC UA: 7
PROTEIN, POC: ABNORMAL
SPECIFIC GRAVITY, POC UA: 1.01
UROBILINOGEN, POC UA: 1

## 2020-11-02 PROCEDURE — 52000 CYSTOSCOPY: ICD-10-PCS | Mod: S$GLB,,, | Performed by: UROLOGY

## 2020-11-02 PROCEDURE — 87086 URINE CULTURE/COLONY COUNT: CPT

## 2020-11-02 PROCEDURE — 81002 URINALYSIS NONAUTO W/O SCOPE: CPT | Mod: S$GLB,,, | Performed by: UROLOGY

## 2020-11-02 PROCEDURE — 81002 POCT URINE DIPSTICK WITHOUT MICROSCOPE: ICD-10-PCS | Mod: S$GLB,,, | Performed by: UROLOGY

## 2020-11-02 PROCEDURE — 52000 CYSTOURETHROSCOPY: CPT | Mod: S$GLB,,, | Performed by: UROLOGY

## 2020-11-02 RX ORDER — CIPROFLOXACIN 2 MG/ML
400 INJECTION, SOLUTION INTRAVENOUS
Status: CANCELLED | OUTPATIENT
Start: 2020-11-02

## 2020-11-02 RX ORDER — CIPROFLOXACIN 500 MG/1
500 TABLET ORAL
Status: COMPLETED | OUTPATIENT
Start: 2020-11-02 | End: 2020-11-02

## 2020-11-02 RX ORDER — LIDOCAINE HYDROCHLORIDE 20 MG/ML
JELLY TOPICAL
Status: COMPLETED | OUTPATIENT
Start: 2020-11-02 | End: 2020-11-02

## 2020-11-02 RX ADMIN — LIDOCAINE HYDROCHLORIDE 5 ML: 20 JELLY TOPICAL at 04:11

## 2020-11-02 RX ADMIN — CIPROFLOXACIN 500 MG: 500 TABLET ORAL at 03:11

## 2020-11-02 NOTE — PROCEDURES
Cystoscopy    Date/Time: 11/2/2020 3:30 PM  Performed by: Ej Mitchell MD  Authorized by: Meme Freedman NP     Consent Done?:  Yes (Written)  Position:  Dorsal lithotomy  Anesthesia:  Lidocaine jelly  Preparation: Patient was prepped and draped in usual sterile fashion      Scope type:  Flexible cystoscope  External exam normal: Yes    Urethra normal: Yes  Bladder neck normal: Bladder neck normal   Bladder normal: Yes (No tumors, lesions, or stones noted.  Normal bilateral UOs.)      Patient tolerance:  Patient tolerated the procedure well with no immediate complications    Imaging  CT Urogram reviewed.  Left renal stone. No lesions    Impression:   Left kidney stone  Recurrent UTIs    Plan:  -- Recommend treatment for left renal stone given size and recurrent UTIs  -- Recommend URS as treatment given size and location, reviewed in detail, wishes to proceed.

## 2020-11-03 ENCOUNTER — TELEPHONE (OUTPATIENT)
Dept: ORTHOPEDICS | Facility: CLINIC | Age: 45
End: 2020-11-03

## 2020-11-03 ENCOUNTER — OFFICE VISIT (OUTPATIENT)
Dept: PAIN MEDICINE | Facility: CLINIC | Age: 45
End: 2020-11-03
Attending: FAMILY MEDICINE
Payer: COMMERCIAL

## 2020-11-03 VITALS
TEMPERATURE: 97 F | WEIGHT: 180 LBS | RESPIRATION RATE: 18 BRPM | DIASTOLIC BLOOD PRESSURE: 94 MMHG | BODY MASS INDEX: 28.93 KG/M2 | HEART RATE: 77 BPM | HEIGHT: 66 IN | OXYGEN SATURATION: 100 % | SYSTOLIC BLOOD PRESSURE: 137 MMHG

## 2020-11-03 DIAGNOSIS — M25.512 CHRONIC LEFT SHOULDER PAIN: Primary | ICD-10-CM

## 2020-11-03 DIAGNOSIS — M47.892 OTHER SPONDYLOSIS, CERVICAL REGION: ICD-10-CM

## 2020-11-03 DIAGNOSIS — G89.29 CHRONIC LEFT SHOULDER PAIN: Primary | ICD-10-CM

## 2020-11-03 DIAGNOSIS — G89.4 CHRONIC PAIN DISORDER: ICD-10-CM

## 2020-11-03 DIAGNOSIS — M50.30 DDD (DEGENERATIVE DISC DISEASE), CERVICAL: ICD-10-CM

## 2020-11-03 DIAGNOSIS — M54.2 NECK PAIN: ICD-10-CM

## 2020-11-03 PROCEDURE — 99204 OFFICE O/P NEW MOD 45 MIN: CPT | Mod: S$GLB,,, | Performed by: ANESTHESIOLOGY

## 2020-11-03 PROCEDURE — 99999 PR PBB SHADOW E&M-EST. PATIENT-LVL V: CPT | Mod: PBBFAC,,, | Performed by: ANESTHESIOLOGY

## 2020-11-03 PROCEDURE — 99999 PR PBB SHADOW E&M-EST. PATIENT-LVL V: ICD-10-PCS | Mod: PBBFAC,,, | Performed by: ANESTHESIOLOGY

## 2020-11-03 PROCEDURE — 99204 PR OFFICE/OUTPT VISIT, NEW, LEVL IV, 45-59 MIN: ICD-10-PCS | Mod: S$GLB,,, | Performed by: ANESTHESIOLOGY

## 2020-11-03 RX ORDER — METHOCARBAMOL 500 MG/1
500 TABLET, FILM COATED ORAL NIGHTLY PRN
COMMUNITY
End: 2020-11-13 | Stop reason: CLARIF

## 2020-11-03 RX ORDER — GABAPENTIN 100 MG/1
100 CAPSULE ORAL 3 TIMES DAILY
Qty: 90 CAPSULE | Refills: 0 | Status: SHIPPED | OUTPATIENT
Start: 2020-11-03 | End: 2021-09-17

## 2020-11-03 RX ORDER — MELOXICAM 15 MG/1
15 TABLET ORAL DAILY
Qty: 30 TABLET | Refills: 1 | Status: SHIPPED | OUTPATIENT
Start: 2020-11-03 | End: 2021-09-17

## 2020-11-03 NOTE — PROGRESS NOTES
Chronic Pain - New Consult    Referring Physician: Speedy Cavanaugh*    Chief Complaint: Left shoulder and left sided neck pain     SUBJECTIVE:    Tasha Rush presents to the clinic for the evaluation of left shoulder pain. The pain started 1 year ago following no injury and symptoms have been worsening.The pain is located in the left shoulder area and radiates to the neck .  The pain is described as aching, burning, tight band and tingling and is rated as 4/10. The pain is rated with a score of  4/10 on the BEST day and a score of 4/10 on the WORST day.  The pain does not radiate down her arm. Symptoms interfere with daily activity, sleeping and work. The pain is exacerbated by Lifting.  The pain is mitigated by heat, ice and medications. She reports spending 4 hours per day reclining. The patient reports 5 hours of uninterrupted sleep per night. She also reports bilateral numbness at night in both her hands and forearms that has been going on for approximately 2 months.  She denies fever, chills, unexplained weight loss, nausea, vomiting, loss of bowel/bladder function.       Physical Therapy/Home Exercise: no      Pain Disability Index Review:  Last 3 PDI Scores 11/3/2020   Pain Disability Index (PDI) 70       Pain Medications:    - Others: none     report:  Reviewed and consistent with medication use as prescribed.    Pain Procedures: none    Imaging: None    Past Medical History:   Diagnosis Date    Abnormal Pap smear of cervix     Bacterial vaginosis     RECURRENT    Cervical dysplasia     Gastritis     Hypertension     S/P bladder repair 8/26/2018     Past Surgical History:   Procedure Laterality Date    CERVICAL BIOPSY  W/ LOOP ELECTRODE EXCISION      CYSTOTOMY FOR EXCISION OF BLADDER DIVERTICULUM N/A 8/24/2018    Procedure: CYSTOTOMY FOR BLADDER PERFORATION;  Surgeon: Hardy Bueno MD;  Location: [facility];  Service: OB/GYN;  Laterality: N/A;    fibroid removal      HERNIA REPAIR   2009    umbilical    LAPAROSCOPIC TOTAL HYSTERECTOMY N/A 8/24/2018    Procedure: HYSTERECTOMY, TOTAL, LAPAROSCOPIC, Attempted;  Surgeon: Hardy Bueno MD;  Location: [facility] OR;  Service: OB/GYN;  Laterality: N/A;  RN PREOP 8/20/2018------UPT-----NEED H/P    MYOMECTOMY      TOTAL ABDOMINAL HYSTERECTOMY N/A 8/24/2018    Procedure: HYSTERECTOMY, TOTAL, ABDOMINAL;  Surgeon: Hardy Bueno MD;  Location: [facility] OR;  Service: OB/GYN;  Laterality: N/A;     Social History     Socioeconomic History    Marital status: Single     Spouse name: Not on file    Number of children: Not on file    Years of education: Not on file    Highest education level: Not on file   Occupational History    Occupation: Medical asst at [employer]   Social Needs    Financial resource strain: Not on file    Food insecurity     Worry: Not on file     Inability: Not on file    Transportation needs     Medical: Not on file     Non-medical: Not on file   Tobacco Use    Smoking status: Current Some Day Smoker     Packs/day: 0.25    Smokeless tobacco: Never Used    Tobacco comment: 1 pack / 2 weeks   Substance and Sexual Activity    Alcohol use: Yes     Comment: occasional    Drug use: No    Sexual activity: Yes   Lifestyle    Physical activity     Days per week: Not on file     Minutes per session: Not on file    Stress: Not on file   Relationships    Social connections     Talks on phone: Not on file     Gets together: Not on file     Attends Shinto service: Not on file     Active member of club or organization: Not on file     Attends meetings of clubs or organizations: Not on file     Relationship status: Not on file   Other Topics Concern    Not on file   Social History Narrative    Single.  Lives w/adopted 5 yr old daughter.  Pt lives next door to her own parents.       Family History   Problem Relation Age of Onset    Diabetes Father     Hyperlipidemia Father     Diabetes Mother     Multiple sclerosis Mother     No Known Problems  Brother     Colon cancer Paternal Uncle     Breast cancer Neg Hx     Ovarian cancer Neg Hx        Review of patient's allergies indicates:   Allergen Reactions    Ace inhibitors Other (See Comments)     cough    Dilaudid [hydromorphone]      SOB + anxiety        Current Outpatient Medications   Medication Sig    amLODIPine (NORVASC) 10 MG tablet TAKE ONE TABLET BY MOUTH ONE TIME DAILY    amoxicillin-clavulanate 875-125mg (AUGMENTIN) 875-125 mg per tablet Take 1 tablet by mouth every 12 (twelve) hours. for 14 days    ciclopirox (PENLAC) 8 % Soln Apply topically nightly. Remove with rubbing alcohol every 7 days    hydroCHLOROthiazide (HYDRODIURIL) 25 MG tablet TAKE ONE TABLET BY MOUTH ONE TIME DAILY     fluconazole (DIFLUCAN) 150 MG Tab Take 1 tablet (150 mg total) by mouth every 72 hours as needed. (Patient not taking: Reported on 10/22/2020)    naproxen (NAPROSYN) 500 MG tablet Take 1 tablet (500 mg total) by mouth 2 (two) times daily as needed (pain). (Patient not taking: Reported on 10/22/2020)    valACYclovir (VALTREX) 1000 MG tablet Take 1 tablet (1,000 mg total) by mouth 3 (three) times daily. for 7 days (Patient not taking: Reported on 10/22/2020)     No current facility-administered medications for this visit.        REVIEW OF SYSTEMS:    GENERAL:  No weight loss, malaise or fevers.  HEENT:  + headaches.  NECK:  Negative for lumps, goiter and significant neck swelling. +Neck pain  RESPIRATORY:  Negative for cough, wheezing or shortness of breath.  CARDIOVASCULAR:  Negative for chest pain, leg swelling or palpitations.  GI:  Negative for abdominal discomfort, blood in stools or black stools or change in bowel habits.  MUSCULOSKELETAL:  +Left shoulder pain, Left sided neck pain See HPI.  SKIN:  Negative for lesions, rash, and itching.  PSYCH:  +Insomnia Negative for mood disorder and recent psychosocial stressors.  HEMATOLOGY/LYMPHOLOGY:  Negative for prolonged bleeding, bruising easily or swollen  "nodes.  NEURO:   No history of headaches, syncope, paralysis, seizures or tremors.  All other reviewed and negative other than HPI.    OBJECTIVE:    Temp 97.2 °F (36.2 °C)   Resp 18   Ht 5' 6" (1.676 m)   Wt 81.6 kg (180 lb)   LMP 08/14/2018   SpO2 100%   BMI 29.05 kg/m²     PHYSICAL EXAMINATION:    General appearance: Well appearing, in no acute distress, alert and oriented x3.  Psych:  Mood and affect appropriate.  Skin: Skin color, texture, turgor normal, no rashes or lesions, in both upper and lower body.  Head/face:  Normocephalic, atraumatic. No palpable lymph nodes.  Neck:  Tenderness to palpation over cervical paraspinal muscles.  Spurling Negative. No pain with neck flexion, extension, or lateral flexion.  Range of motion of cervical spine.  Cor: RRR  Pulm: CTA  GI:  Soft and non-tender.  Back: Straight leg raising in the sitting and supine positions is negative to radicular pain. No pain to palpation over the spine or costovertebral angles. Normal range of motion without pain reproduction.  Extremities: Peripheral joint ROM is full and pain free without obvious instability or laxity in all four extremities. No deformities, edema, or skin discoloration. Good capillary refill.  Musculoskeletal: Hip, sacroiliac and knee provocative maneuvers are negative. Bilateral upper and lower extremity strength is normal and symmetric.  No atrophy or tone abnormalities are noted.  Range of motion of left shoulder joint.  Neuro: Bilateral upper and lower extremity coordination and muscle stretch reflexes are physiologic and symmetric.  Plantar response are downgoing. No loss of sensation is noted.  Gait: normal.    ASSESSMENT: 44 y.o. year old female with left neck and left shoulder pain.    1. Chronic left shoulder pain  X-Ray Shoulder Left 1 View   2. Neck pain  Ambulatory referral/consult to Pain Clinic   3. Other spondylosis, cervical region  Ambulatory referral/consult to Physical/Occupational Therapy    MRI " Cervical Spine Without Contrast    X-Ray Cervical Spine 5 View With Flex And Ext    meloxicam (MOBIC) 15 MG tablet    gabapentin (NEURONTIN) 100 MG capsule    EMG W/ ULTRASOUND AND NERVE CONDUCTION TEST 2 Extremities   4. DDD (degenerative disc disease), cervical     5. Chronic pain disorder           PLAN:     - Neck and shoulder xray ordered  - MRI neck ordered  - EMG bilateral upper extremity ordered  - Physical therapy referral placed  - She has an appointment with orthopedics tomorrow for further shoulder evaluation  - Patient started on Gabapentin 100 mg TID, Mobic 15 mg once daily as needed and continue Robaxin as needed.  - I have stressed the importance of physical activity and a home exercise plan to help with pain and improve health.  - RTC 3 weeks   - Counseled patient regarding the importance of activity modification and physical therapy.    The above plan and management options were discussed at length with patient. Patient is in agreement with the above and verbalized understanding. It will be communicated with the referring physician via electronic record, fax, or mail.    Ama James MD  11/3/20    I have reviewed and concur with the resident's history, physical, assessment, and plan.  I have personally interviewed and examined the patient at bedside.  See below addendum for my evaluation and additional findings.  44-year-old female with chronic neck and shoulder pain likely consistent with cervical spondylosis left shoulder rotator cuff injury.  Will order left shoulder and neck x-ray x-ray and cervical spine MRI to further evaluate the etiology of her pain.  Patient also reports bilateral hand pain and tingling we will order EMG to evaluate for possible peripheral neuropathy versus cervical radiculopathy.  I will also refer the patient to physical therapy.  She may continue muscle relaxant and will order gabapentin 100 mg at bedtime to increase as tolerated to 100 mg t.i.d. and Mobic 15 mg  once a day as needed.  Follow up with her in 2-3 weeks and consider interventions pending imaging and EMG results.    Sherman Washington MD

## 2020-11-03 NOTE — LETTER
November 3, 2020      Speedy Cavanaugh MD  [address]  [address]  [facility] [address]           [facility] [address]  [address]  [facility] [address]  Phone: [phone]  Fax: [phone]          Patient: Tasha Rush   MR Number: [id number]   YOB: 1975   Date of Visit: 11/3/2020       Dear Dr. Speedy Cavanaugh:    Thank you for referring Tasha Rush to me for evaluation. Attached you will find relevant portions of my assessment and plan of care.    If you have questions, please do not hesitate to call me. I look forward to following Tasha Rush along with you.    Sincerely,    Sherman Washington MD    Enclosure  CC:  No Recipients    If you would like to receive this communication electronically, please contact [email or web address] or [phone] to request more information on [employer] Link access.    For providers and/or their staff who would like to refer a patient to [facility], please contact us through our one-stop-shop provider referral line, [facility], at [phone].    If you feel you have received this communication in error or would no longer like to receive these types of communications, please e-mail [email or web address]

## 2020-11-04 ENCOUNTER — HOSPITAL ENCOUNTER (OUTPATIENT)
Dept: RADIOLOGY | Facility: OTHER | Age: 45
Discharge: HOME | End: 2020-11-04
Attending: ORTHOPAEDIC SURGERY
Payer: COMMERCIAL

## 2020-11-04 ENCOUNTER — OFFICE VISIT (OUTPATIENT)
Dept: ORTHOPEDICS | Facility: CLINIC | Age: 45
End: 2020-11-04
Attending: FAMILY MEDICINE
Payer: COMMERCIAL

## 2020-11-04 VITALS — HEIGHT: 66 IN | WEIGHT: 180 LBS | BODY MASS INDEX: 28.93 KG/M2

## 2020-11-04 DIAGNOSIS — M25.512 CHRONIC LEFT SHOULDER PAIN: ICD-10-CM

## 2020-11-04 DIAGNOSIS — G89.29 CHRONIC LEFT SHOULDER PAIN: ICD-10-CM

## 2020-11-04 LAB — BACTERIA UR CULT: NORMAL

## 2020-11-04 PROCEDURE — 20610 DRAIN/INJ JOINT/BURSA W/O US: CPT | Mod: PBBFAC | Performed by: ORTHOPAEDIC SURGERY

## 2020-11-04 PROCEDURE — 73030 X-RAY EXAM OF SHOULDER: CPT | Mod: TC,FY,LT

## 2020-11-04 PROCEDURE — 20610 PR DRAIN/INJECT LARGE JOINT/BURSA: ICD-10-PCS | Mod: LT,S$GLB,, | Performed by: ORTHOPAEDIC SURGERY

## 2020-11-04 PROCEDURE — 73030 X-RAY EXAM OF SHOULDER: CPT | Mod: 26,LT,, | Performed by: RADIOLOGY

## 2020-11-04 PROCEDURE — 20610 DRAIN/INJ JOINT/BURSA W/O US: CPT | Mod: LT,S$GLB,, | Performed by: ORTHOPAEDIC SURGERY

## 2020-11-04 PROCEDURE — 99203 OFFICE O/P NEW LOW 30 MIN: CPT | Mod: 25,S$GLB,, | Performed by: ORTHOPAEDIC SURGERY

## 2020-11-04 PROCEDURE — 99999 PR PBB SHADOW E&M-EST. PATIENT-LVL III: CPT | Mod: PBBFAC,,, | Performed by: ORTHOPAEDIC SURGERY

## 2020-11-04 PROCEDURE — 99999 PR PBB SHADOW E&M-EST. PATIENT-LVL III: ICD-10-PCS | Mod: PBBFAC,,, | Performed by: ORTHOPAEDIC SURGERY

## 2020-11-04 PROCEDURE — 73030 XR SHOULDER COMPLETE 2 OR MORE VIEWS LEFT: ICD-10-PCS | Mod: 26,LT,, | Performed by: RADIOLOGY

## 2020-11-04 PROCEDURE — 99203 PR OFFICE/OUTPT VISIT, NEW, LEVL III, 30-44 MIN: ICD-10-PCS | Mod: 25,S$GLB,, | Performed by: ORTHOPAEDIC SURGERY

## 2020-11-04 RX ORDER — TRIAMCINOLONE ACETONIDE 40 MG/ML
40 INJECTION, SUSPENSION INTRA-ARTICULAR; INTRAMUSCULAR
Status: COMPLETED | OUTPATIENT
Start: 2020-11-04 | End: 2020-11-04

## 2020-11-04 RX ADMIN — TRIAMCINOLONE ACETONIDE 40 MG: 40 INJECTION, SUSPENSION INTRA-ARTICULAR; INTRAMUSCULAR at 02:11

## 2020-11-04 NOTE — PROGRESS NOTES
Subjective:      Patient ID: Tasha Rush is a 44 y.o. female.    Chief Complaint: Pain and Tingling of the Left Shoulder      HPI  Tasha Rush is a  44 y.o. female presenting today for left shoulder pain.  There was not a history of trauma.  Onset of symptoms began more than a year ago  She does can take care of her quadriplegic brother and has to do some lifting which may have brought on her symptoms  She does have pain with lifting on the left arm  No numbness or tingling reported  .      Review of patient's allergies indicates:   Allergen Reactions    Ace inhibitors Other (See Comments)     cough    Dilaudid [hydromorphone]      SOB + anxiety          Current Outpatient Medications   Medication Sig Dispense Refill    amLODIPine (NORVASC) 10 MG tablet TAKE ONE TABLET BY MOUTH ONE TIME DAILY 90 tablet 0    amoxicillin-clavulanate 875-125mg (AUGMENTIN) 875-125 mg per tablet Take 1 tablet by mouth every 12 (twelve) hours. for 14 days 28 tablet 0    gabapentin (NEURONTIN) 100 MG capsule Take 1 capsule (100 mg total) by mouth 3 (three) times daily. 90 capsule 0    hydroCHLOROthiazide (HYDRODIURIL) 25 MG tablet TAKE ONE TABLET BY MOUTH ONE TIME DAILY  90 tablet 0    meloxicam (MOBIC) 15 MG tablet Take 1 tablet (15 mg total) by mouth once daily. 30 tablet 1    ciclopirox (PENLAC) 8 % Soln Apply topically nightly. Remove with rubbing alcohol every 7 days (Patient not taking: Reported on 11/4/2020) 6.6 mL 11    fluconazole (DIFLUCAN) 150 MG Tab Take 1 tablet (150 mg total) by mouth every 72 hours as needed. (Patient not taking: Reported on 10/22/2020) 4 tablet 0    methocarbamoL (ROBAXIN) 500 MG Tab Take 500 mg by mouth nightly as needed.      naproxen (NAPROSYN) 500 MG tablet Take 1 tablet (500 mg total) by mouth 2 (two) times daily as needed (pain). (Patient not taking: Reported on 10/22/2020) 60 tablet 0    valACYclovir (VALTREX) 1000 MG tablet Take 1 tablet (1,000 mg total) by mouth 3 (three)  "times daily. for 7 days (Patient not taking: Reported on 10/22/2020) 21 tablet 0     Current Facility-Administered Medications   Medication Dose Route Frequency Provider Last Rate Last Dose    [COMPLETED] triamcinolone acetonide injection 40 mg  40 mg Other 1 time in Clinic/HOD Derrick Caldera Jr., MD   40 mg at 11/04/20 1415       Past Medical History:   Diagnosis Date    Abnormal Pap smear of cervix     Bacterial vaginosis     RECURRENT    Cervical dysplasia     Gastritis     Hypertension     S/P bladder repair 8/26/2018       Past Surgical History:   Procedure Laterality Date    CERVICAL BIOPSY  W/ LOOP ELECTRODE EXCISION      CYSTOTOMY FOR EXCISION OF BLADDER DIVERTICULUM N/A 8/24/2018    Procedure: CYSTOTOMY FOR BLADDER PERFORATION;  Surgeon: Hardy Bueno MD;  Location: [facility] OR;  Service: OB/GYN;  Laterality: N/A;    fibroid removal      HERNIA REPAIR  2009    umbilical    LAPAROSCOPIC TOTAL HYSTERECTOMY N/A 8/24/2018    Procedure: HYSTERECTOMY, TOTAL, LAPAROSCOPIC, Attempted;  Surgeon: Hardy Bueno MD;  Location: [facility] OR;  Service: OB/GYN;  Laterality: N/A;  RN PREOP 8/20/2018------UPT-----NEED H/P    MYOMECTOMY      TOTAL ABDOMINAL HYSTERECTOMY N/A 8/24/2018    Procedure: HYSTERECTOMY, TOTAL, ABDOMINAL;  Surgeon: Hardy Bueno MD;  Location: [facility] OR;  Service: OB/GYN;  Laterality: N/A;       Review of Systems:  ROS    OBJECTIVE:     PHYSICAL EXAM:  Height: 5' 6" (167.6 cm) Weight: 81.6 kg (180 lb)  Vitals:    11/04/20 1350   Weight: 81.6 kg (180 lb)   Height: 5' 6" (1.676 m)   PainSc: 10-Worst pain ever     Well developed, well nourished female in no acute distress  Alert and oriented x 3  HEENT- Normal exam  Lungs- Clear to auscultation  Heart- Regular rate and rhythm  Abdomen- Soft nontender  Extremity exam- examination left shoulder no tenderness no swelling  Range of motion full  Positive impingement sign  Positive supraspinatus stress test  Rotator cuff strength slightly decreased on the " left  Neurologic exam normal      RADIOGRAPHS:  AP lateral x-ray left shoulder demonstrates no significant abnormality  Comments: I have personally reviewed the imaging and I agree with the above radiologist's report.    ASSESSMENT/PLAN:     IMPRESSION:  1.  Left shoulder pain chronic.  2.  Possible rotator cuff tear left shoulder    PLAN:  Because of the duration of her symptoms more than a year I have ordered an MRI scan left shoulder to check the rotator cuff  Additionally injection performed left shoulder  After pause for time-out identified the left shoulder  Injected with Kenalog 40 mg 2 cc xylocaine sterile technique  She tolerated the procedure well without complication follow-up after the MRI is complete       - We talked at length about the anatomy and pathophysiology of   Encounter Diagnosis   Name Primary?    Chronic left shoulder pain            Disclaimer: This note has been generated using voice-recognition software. There may be typographical errors that have been missed during proof-reading.

## 2020-11-04 NOTE — LETTER
November 4, 2020      Speedy Cavanaugh MD  [address]  [facility] [address]           [facility] [address]  [address], [address]  [facility] [address]  Phone: [phone]          Patient: Tasha Rush   MR Number: [id number]   YOB: 1975   Date of Visit: 11/4/2020       Dear Dr. Speedy Cavanaugh:    Thank you for referring Tasha Rush to me for evaluation. Attached you will find relevant portions of my assessment and plan of care.    If you have questions, please do not hesitate to call me. I look forward to following Tasha Rush along with you.    Sincerely,    Derrick Caldera Jr., MD    Enclosure  CC:  No Recipients    If you would like to receive this communication electronically, please contact [email or web address] or [phone] to request more information on [employer] Link access.    For providers and/or their staff who would like to refer a patient to [facility], please contact us through our one-stop-shop provider referral line, [facility], at [phone].    If you feel you have received this communication in error or would no longer like to receive these types of communications, please e-mail [email or web address]

## 2020-11-05 ENCOUNTER — TELEPHONE (OUTPATIENT)
Dept: PAIN MEDICINE | Facility: CLINIC | Age: 45
End: 2020-11-05

## 2020-11-05 NOTE — TELEPHONE ENCOUNTER
Please respond  Received: Today  Message Henok   Shayla S Dean Whitaker Staff             Financial Call Center: pt is not financially cleared yet.   Pt has a liability and/or residual balance due.              Is this procedure medically urgent or non-medically   Please respond via In-basket or contact [facility] @ [phone]                 Medical Urgency Definition     If you can Answer yes to one of the following questions, the   Case will be considered Medically Urgent and will be done as   Scheduled irrespective of whether [facility] will receive payment.     *If this particular case is not done as scheduled, would the patient   Experience loss of life?     *Loss of Limb?     *Irreversible loss of function?     *Would their condition significantly worsen?     If none of these questions have a yes answer, the case   Should be postponed until such a time as the finances   can be sorted out.

## 2020-11-09 ENCOUNTER — TELEPHONE (OUTPATIENT)
Dept: UROLOGY | Facility: CLINIC | Age: 45
End: 2020-11-09

## 2020-11-09 ENCOUNTER — HOSPITAL ENCOUNTER (OUTPATIENT)
Dept: PREADMISSION TESTING | Facility: OTHER | Age: 45
Discharge: HOME | End: 2020-11-09
Attending: UROLOGY
Payer: COMMERCIAL

## 2020-11-09 DIAGNOSIS — N39.0 RECURRENT UTI: ICD-10-CM

## 2020-11-09 DIAGNOSIS — N30.00 ACUTE CYSTITIS WITHOUT HEMATURIA: Primary | ICD-10-CM

## 2020-11-09 NOTE — TELEPHONE ENCOUNTER
Spoke to pre-op patient did not show for appointment, covid test also not done. Message left for the patient.

## 2020-11-09 NOTE — TELEPHONE ENCOUNTER
Spoke to the patient surgery reschedule to 11/18/pre-op , covid also schedule . Patient was informed

## 2020-11-13 ENCOUNTER — TELEPHONE (OUTPATIENT)
Dept: UROLOGY | Facility: CLINIC | Age: 45
End: 2020-11-13

## 2020-11-13 ENCOUNTER — ANESTHESIA EVENT (OUTPATIENT)
Dept: SURGERY | Facility: OTHER | Age: 45
End: 2020-11-13
Payer: COMMERCIAL

## 2020-11-13 ENCOUNTER — HOSPITAL ENCOUNTER (OUTPATIENT)
Dept: PREADMISSION TESTING | Facility: OTHER | Age: 45
Discharge: HOME | End: 2020-11-13
Attending: UROLOGY
Payer: COMMERCIAL

## 2020-11-13 VITALS
TEMPERATURE: 97 F | SYSTOLIC BLOOD PRESSURE: 144 MMHG | BODY MASS INDEX: 28.93 KG/M2 | DIASTOLIC BLOOD PRESSURE: 75 MMHG | HEART RATE: 80 BPM | HEIGHT: 66 IN | OXYGEN SATURATION: 100 % | WEIGHT: 180 LBS

## 2020-11-13 DIAGNOSIS — Z01.818 PREOP TESTING: Primary | ICD-10-CM

## 2020-11-13 LAB
ANION GAP SERPL CALC-SCNC: 12 MMOL/L (ref 8–16)
BASOPHILS # BLD AUTO: 0.05 K/UL (ref 0–0.2)
BASOPHILS NFR BLD: 0.6 % (ref 0–1.9)
BUN SERPL-MCNC: 13 MG/DL (ref 6–20)
CALCIUM SERPL-MCNC: 9.7 MG/DL (ref 8.7–10.5)
CHLORIDE SERPL-SCNC: 104 MMOL/L (ref 95–110)
CO2 SERPL-SCNC: 23 MMOL/L (ref 23–29)
CREAT SERPL-MCNC: 0.8 MG/DL (ref 0.5–1.4)
DIFFERENTIAL METHOD: ABNORMAL
EOSINOPHIL # BLD AUTO: 0.1 K/UL (ref 0–0.5)
EOSINOPHIL NFR BLD: 0.7 % (ref 0–8)
ERYTHROCYTE [DISTWIDTH] IN BLOOD BY AUTOMATED COUNT: 14.8 % (ref 11.5–14.5)
EST. GFR  (AFRICAN AMERICAN): >60 ML/MIN/1.73 M^2
EST. GFR  (NON AFRICAN AMERICAN): >60 ML/MIN/1.73 M^2
GLUCOSE SERPL-MCNC: 98 MG/DL (ref 70–110)
HCT VFR BLD AUTO: 38.5 % (ref 37–48.5)
HGB BLD-MCNC: 12.4 G/DL (ref 12–16)
IMM GRANULOCYTES # BLD AUTO: 0.02 K/UL (ref 0–0.04)
IMM GRANULOCYTES NFR BLD AUTO: 0.2 % (ref 0–0.5)
LYMPHOCYTES # BLD AUTO: 2.8 K/UL (ref 1–4.8)
LYMPHOCYTES NFR BLD: 30.9 % (ref 18–48)
MCH RBC QN AUTO: 30 PG (ref 27–31)
MCHC RBC AUTO-ENTMCNC: 32.2 G/DL (ref 32–36)
MCV RBC AUTO: 93 FL (ref 82–98)
MONOCYTES # BLD AUTO: 0.6 K/UL (ref 0.3–1)
MONOCYTES NFR BLD: 7.2 % (ref 4–15)
NEUTROPHILS # BLD AUTO: 5.4 K/UL (ref 1.8–7.7)
NEUTROPHILS NFR BLD: 60.4 % (ref 38–73)
NRBC BLD-RTO: 0 /100 WBC
PLATELET # BLD AUTO: 250 K/UL (ref 150–350)
PMV BLD AUTO: 8.9 FL (ref 9.2–12.9)
POTASSIUM SERPL-SCNC: 3.4 MMOL/L (ref 3.5–5.1)
RBC # BLD AUTO: 4.13 M/UL (ref 4–5.4)
SODIUM SERPL-SCNC: 139 MMOL/L (ref 136–145)
WBC # BLD AUTO: 8.95 K/UL (ref 3.9–12.7)

## 2020-11-13 PROCEDURE — 80048 BASIC METABOLIC PNL TOTAL CA: CPT

## 2020-11-13 PROCEDURE — 85025 COMPLETE CBC W/AUTO DIFF WBC: CPT

## 2020-11-13 PROCEDURE — 36415 COLL VENOUS BLD VENIPUNCTURE: CPT

## 2020-11-13 RX ORDER — LIDOCAINE HYDROCHLORIDE 10 MG/ML
0.5 INJECTION, SOLUTION EPIDURAL; INFILTRATION; INTRACAUDAL; PERINEURAL ONCE
Status: CANCELLED | OUTPATIENT
Start: 2020-11-13 | End: 2020-11-13

## 2020-11-13 RX ORDER — MIDAZOLAM HYDROCHLORIDE 1 MG/ML
2 INJECTION INTRAMUSCULAR; INTRAVENOUS
Status: CANCELLED | OUTPATIENT
Start: 2020-11-13 | End: 2020-11-13

## 2020-11-13 RX ORDER — CELECOXIB 200 MG/1
400 CAPSULE ORAL
Status: CANCELLED | OUTPATIENT
Start: 2020-11-13 | End: 2020-11-13

## 2020-11-13 RX ORDER — SODIUM CHLORIDE, SODIUM LACTATE, POTASSIUM CHLORIDE, CALCIUM CHLORIDE 600; 310; 30; 20 MG/100ML; MG/100ML; MG/100ML; MG/100ML
INJECTION, SOLUTION INTRAVENOUS CONTINUOUS
Status: CANCELLED | OUTPATIENT
Start: 2020-11-13

## 2020-11-13 RX ORDER — ACETAMINOPHEN 500 MG
1000 TABLET ORAL
Status: CANCELLED | OUTPATIENT
Start: 2020-11-13 | End: 2020-11-13

## 2020-11-13 NOTE — DISCHARGE INSTRUCTIONS
Information to Prepare you for your Surgery    PRE-ADMIT TESTING -  [phone]    [address]  [place] [facility]          Your surgery has been scheduled at [facility]. We are pleased to have the opportunity to serve you. For Further Information please call [phone].    On the day of surgery please report to the Information Desk on the 1st floor.    · CONTACT YOUR PHYSICIAN'S OFFICE THE DAY PRIOR TO YOUR SURGERY TO OBTAIN YOUR ARRIVAL TIME.     · The evening before surgery do not eat anything after 9 p.m. ( this includes hard candy, chewing gum and mints).  You may only have GATORADE, POWERADE AND WATER  from 9 p.m. until you leave your home.   DO NOT DRINK ANY LIQUIDS ON THE WAY TO THE HOSPITAL.      SPECIAL MEDICATION INSTRUCTIONS: TAKE medications checked off by the Anesthesiologist on your Medication List.    Angiogram Patients: Take medications as instructed by your physician, including aspirin.     Surgery Patients:    If you take ASPIRIN - Your PHYSICIAN/SURGEON will need to inform you IF/OR when you need to stop taking aspirin prior to your surgery.     Do Not take any medications containing IBUPROFEN.  Do Not Wear any make-up or dark nail polish   (especially eye make-up) to surgery. If you come to surgery with makeup on you will be required to remove the makeup or nail polish.    Do not shave your surgical area at least 5 days prior to your surgery. The surgical prep will be performed at the hospital according to Infection Control regulations.    Leave all valuables at home.   Do Not wear any jewelry or watches, including any metal in body piercings. Jewelry must be removed prior to coming to the hospital.  There is a possibility that rings that are unable to be removed may be cut off if they are on the surgical extremity.    Contact Lens must be removed before surgery. Either do not wear the contact lens or bring a case and solution for  storage.  Please bring a container for eyeglasses or dentures as required.  Bring any paperwork your physician has provided, such as consent forms,  history and physicals, doctor's orders, etc.   Bring comfortable clothes that are loose fitting to wear upon discharge. Take into consideration the type of surgery being performed.  Maintain your diet as advised per your physician the day prior to surgery.      Adequate rest the night before surgery is advised.   Park in the Parking lot behind the hospital or in the [place] [facility] across the street from the parking lot. Parking is complimentary.  If you will be discharged the same day as your procedure, please arrange for a responsible adult to drive you home or to accompany you if traveling by taxi.   YOU WILL NOT BE PERMITTED TO DRIVE OR TO LEAVE THE HOSPITAL ALONE AFTER SURGERY.   If you are being discharged the same day, it is strongly recommended that you arrange for someone to remain with you for the first 24 hrs following your surgery.    The Surgeon will speak to your family/visitor after your surgery regarding the outcome of your surgery and post op care.  The Surgeon may speak to you after your surgery, but there is a possibility you may not remember the details.  Please check with your family members regarding the conversation with the Surgeon.    We strongly recommend whoever is bringing you home be present for discharge instructions.  This will ensure a thorough understanding for your post op home care.    ALL CHILDREN MUST ALWAYS BE ACCOMPANIED BY AN ADULT.    Visitors-Refer to current Visitor policy handouts.    Thank you for your cooperation.  The Staff of [facility].                Bathing Instructions with Hibiclens     Shower the evening before and morning of your procedure with Hibiclens:   Wash your face with water and your regular face wash/soap   Apply Hibiclens directly on your skin or on a wet washcloth and wash  gently. When showering: Move away from the shower stream when applying Hibiclens to avoid rinsing off too soon.   Rinse thoroughly with warm water   Do not dilute Hibiclens         Dry off as usual, do not use any deodorant, powder, body lotions, perfume, after shave or cologne.

## 2020-11-13 NOTE — ANESTHESIA PREPROCEDURE EVALUATION
11/13/2020  Tasha Rush is a 45 y.o., female.    Anesthesia Evaluation    I have reviewed the Patient Summary Reports.    I have reviewed the Nursing Notes. I have reviewed the NPO Status.   I have reviewed the Medications.     Review of Systems  Anesthesia Hx:  No problems with previous Anesthesia  History of prior surgery of interest to airway management or planning: Previous anesthesia: General Aug 2018 laparoscopic [facility] with general anesthesia.  Denies Family Hx of Anesthesia complications.   Denies Personal Hx of Anesthesia complications.   Social:  Smoker    Hematology/Oncology:  Hematology Normal   Oncology Normal     EENT/Dental:EENT/Dental Normal   Cardiovascular:   Exercise tolerance: good Hypertension, well controlled    Pulmonary:  Pulmonary Normal    Renal/:   renal calculi    Hepatic/GI:  Hepatic/GI Normal    Musculoskeletal:   Arthritis   Spine Disorders: cervical Degenerative disease and Chronic Pain    Neurological:   Headaches   Chronic Pain Syndrome   Endocrine:  Endocrine Normal    Dermatological:  Skin Normal    Psych:  Psychiatric Normal           Physical Exam  General:  Well nourished    Airway/Jaw/Neck:  Airway Findings: Mouth Opening: Normal Tongue: Normal  General Airway Assessment: Adult  Mallampati: II      Dental:  Dental Findings: In tact        Mental Status:  Mental Status Findings:  Cooperative, Alert and Oriented         Anesthesia Plan  Type of Anesthesia, risks & benefits discussed:  Anesthesia Type:  general  Patient's Preference:   Intra-op Monitoring Plan: standard ASA monitors  Intra-op Monitoring Plan Comments:   Post Op Pain Control Plan: per primary service following discharge from PACU and multimodal analgesia  Post Op Pain Control Plan Comments:   Induction:   IV  Beta Blocker:         Informed Consent: Patient understands risks and agrees with  Anesthesia plan.  Questions answered. Anesthesia consent signed with patient.  ASA Score: 2     Day of Surgery Review of History & Physical:    H&P update referred to the surgeon.     Anesthesia Plan Notes: CBc and BMP today        Ready For Surgery From Anesthesia Perspective.

## 2020-11-15 ENCOUNTER — LAB VISIT (OUTPATIENT)
Dept: URGENT CARE | Facility: CLINIC | Age: 45
End: 2020-11-15
Payer: COMMERCIAL

## 2020-11-15 DIAGNOSIS — N30.00 ACUTE CYSTITIS WITHOUT HEMATURIA: ICD-10-CM

## 2020-11-15 PROCEDURE — 99211 OFF/OP EST MAY X REQ PHY/QHP: CPT | Mod: S$GLB,,, | Performed by: FAMILY MEDICINE

## 2020-11-15 PROCEDURE — U0003 INFECTIOUS AGENT DETECTION BY NUCLEIC ACID (DNA OR RNA); SEVERE ACUTE RESPIRATORY SYNDROME CORONAVIRUS 2 (SARS-COV-2) (CORONAVIRUS DISEASE [COVID-19]), AMPLIFIED PROBE TECHNIQUE, MAKING USE OF HIGH THROUGHPUT TECHNOLOGIES AS DESCRIBED BY CMS-2020-01-R: HCPCS

## 2020-11-15 PROCEDURE — 99211 PR OFFICE/OUTPT VISIT, EST, LEVL I: ICD-10-PCS | Mod: S$GLB,,, | Performed by: FAMILY MEDICINE

## 2020-11-15 NOTE — PROGRESS NOTES
Subjective:       Patient ID: Tasah Rush is a 45 y.o. female.    Chief Complaint: Preop Covid Swab    Preop covid swab completed by sw.  Labeled with purple dot.     ROS     Objective:      Physical Exam    Assessment:       1. Acute cystitis without hematuria        Plan:                   No follow-ups on file.

## 2020-11-16 ENCOUNTER — HOSPITAL ENCOUNTER (OUTPATIENT)
Dept: RADIOLOGY | Facility: OTHER | Age: 45
Discharge: HOME | End: 2020-11-16
Attending: ORTHOPAEDIC SURGERY
Payer: COMMERCIAL

## 2020-11-16 DIAGNOSIS — M25.512 CHRONIC LEFT SHOULDER PAIN: ICD-10-CM

## 2020-11-16 DIAGNOSIS — G89.29 CHRONIC LEFT SHOULDER PAIN: ICD-10-CM

## 2020-11-16 LAB — SARS-COV-2 RNA RESP QL NAA+PROBE: NOT DETECTED

## 2020-11-16 PROCEDURE — 73221 MRI JOINT UPR EXTREM W/O DYE: CPT | Mod: TC,LT

## 2020-11-16 PROCEDURE — 73221 MRI JOINT UPR EXTREM W/O DYE: CPT | Mod: 26,LT,, | Performed by: RADIOLOGY

## 2020-11-16 PROCEDURE — 73221 MRI SHOULDER WITHOUT CONTRAST LEFT: ICD-10-PCS | Mod: 26,LT,, | Performed by: RADIOLOGY

## 2020-11-17 ENCOUNTER — TELEPHONE (OUTPATIENT)
Dept: UROLOGY | Facility: CLINIC | Age: 45
End: 2020-11-17

## 2020-11-18 ENCOUNTER — ANESTHESIA (OUTPATIENT)
Dept: SURGERY | Facility: OTHER | Age: 45
End: 2020-11-18
Payer: COMMERCIAL

## 2020-11-18 ENCOUNTER — HOSPITAL ENCOUNTER (OUTPATIENT)
Facility: OTHER | Age: 45
Discharge: HOME | End: 2020-11-18
Attending: UROLOGY | Admitting: UROLOGY
Payer: COMMERCIAL

## 2020-11-18 ENCOUNTER — TELEPHONE (OUTPATIENT)
Dept: UROLOGY | Facility: CLINIC | Age: 45
End: 2020-11-18

## 2020-11-18 VITALS
HEIGHT: 66 IN | HEART RATE: 74 BPM | SYSTOLIC BLOOD PRESSURE: 146 MMHG | RESPIRATION RATE: 16 BRPM | OXYGEN SATURATION: 98 % | BODY MASS INDEX: 28.93 KG/M2 | TEMPERATURE: 99 F | WEIGHT: 180 LBS | DIASTOLIC BLOOD PRESSURE: 96 MMHG

## 2020-11-18 DIAGNOSIS — N20.0 NEPHROLITHIASIS: Primary | ICD-10-CM

## 2020-11-18 DIAGNOSIS — N39.0 RECURRENT UTI: ICD-10-CM

## 2020-11-18 PROCEDURE — 36000706: Performed by: UROLOGY

## 2020-11-18 PROCEDURE — C2617 STENT, NON-COR, TEM W/O DEL: HCPCS | Performed by: UROLOGY

## 2020-11-18 PROCEDURE — 63600175 PHARM REV CODE 636 W HCPCS: Performed by: ANESTHESIOLOGY

## 2020-11-18 PROCEDURE — 52356 CYSTO/URETERO W/LITHOTRIPSY: CPT | Mod: LT,,, | Performed by: UROLOGY

## 2020-11-18 PROCEDURE — 90686 IIV4 VACC NO PRSV 0.5 ML IM: CPT | Performed by: UROLOGY

## 2020-11-18 PROCEDURE — 25000003 PHARM REV CODE 250: Performed by: ANESTHESIOLOGY

## 2020-11-18 PROCEDURE — 36000707: Performed by: UROLOGY

## 2020-11-18 PROCEDURE — 63600175 PHARM REV CODE 636 W HCPCS: Performed by: UROLOGY

## 2020-11-18 PROCEDURE — 52356 PR CYSTO/URETERO W/LITHOTRIPSY: ICD-10-PCS | Mod: LT,,, | Performed by: UROLOGY

## 2020-11-18 PROCEDURE — 71000033 HC RECOVERY, INTIAL HOUR: Performed by: UROLOGY

## 2020-11-18 PROCEDURE — C1769 GUIDE WIRE: HCPCS | Performed by: UROLOGY

## 2020-11-18 PROCEDURE — 63600175 PHARM REV CODE 636 W HCPCS: Performed by: NURSE ANESTHETIST, CERTIFIED REGISTERED

## 2020-11-18 PROCEDURE — 25000003 PHARM REV CODE 250: Performed by: NURSE ANESTHETIST, CERTIFIED REGISTERED

## 2020-11-18 PROCEDURE — 25000003 PHARM REV CODE 250: Performed by: UROLOGY

## 2020-11-18 PROCEDURE — 37000008 HC ANESTHESIA 1ST 15 MINUTES: Performed by: UROLOGY

## 2020-11-18 PROCEDURE — 88300 SURGICAL PATH GROSS: CPT | Performed by: PATHOLOGY

## 2020-11-18 PROCEDURE — 82365 CALCULUS SPECTROSCOPY: CPT

## 2020-11-18 PROCEDURE — 88300 PR  SURG PATH,GROSS,LEVEL I: ICD-10-PCS | Mod: 26,,, | Performed by: PATHOLOGY

## 2020-11-18 PROCEDURE — 88300 SURGICAL PATH GROSS: CPT | Mod: 26,,, | Performed by: PATHOLOGY

## 2020-11-18 PROCEDURE — C1894 INTRO/SHEATH, NON-LASER: HCPCS | Performed by: UROLOGY

## 2020-11-18 PROCEDURE — 71000016 HC POSTOP RECOV ADDL HR: Performed by: UROLOGY

## 2020-11-18 PROCEDURE — 27201423 OPTIME MED/SURG SUP & DEVICES STERILE SUPPLY: Performed by: UROLOGY

## 2020-11-18 PROCEDURE — 71000015 HC POSTOP RECOV 1ST HR: Performed by: UROLOGY

## 2020-11-18 PROCEDURE — 90471 IMMUNIZATION ADMIN: CPT | Performed by: UROLOGY

## 2020-11-18 PROCEDURE — 37000009 HC ANESTHESIA EA ADD 15 MINS: Performed by: UROLOGY

## 2020-11-18 DEVICE — STENT URETERAL UNIV 6FR 24CM: Type: IMPLANTABLE DEVICE | Site: URETER | Status: FUNCTIONAL

## 2020-11-18 RX ORDER — OXYCODONE HYDROCHLORIDE 5 MG/1
5 TABLET ORAL
Status: DISCONTINUED | OUTPATIENT
Start: 2020-11-18 | End: 2020-11-18 | Stop reason: HOSPADM

## 2020-11-18 RX ORDER — ONDANSETRON 2 MG/ML
4 INJECTION INTRAMUSCULAR; INTRAVENOUS EVERY 12 HOURS PRN
Status: DISCONTINUED | OUTPATIENT
Start: 2020-11-18 | End: 2020-11-18

## 2020-11-18 RX ORDER — PROPOFOL 10 MG/ML
VIAL (ML) INTRAVENOUS
Status: DISCONTINUED | OUTPATIENT
Start: 2020-11-18 | End: 2020-11-18

## 2020-11-18 RX ORDER — ATROPA BELLADONNA AND OPIUM 16.2; 6 MG/1; MG/1
SUPPOSITORY RECTAL
Status: DISCONTINUED | OUTPATIENT
Start: 2020-11-18 | End: 2020-11-18 | Stop reason: HOSPADM

## 2020-11-18 RX ORDER — HYDROMORPHONE HYDROCHLORIDE 2 MG/ML
0.4 INJECTION, SOLUTION INTRAMUSCULAR; INTRAVENOUS; SUBCUTANEOUS EVERY 5 MIN PRN
Status: DISCONTINUED | OUTPATIENT
Start: 2020-11-18 | End: 2020-11-18 | Stop reason: HOSPADM

## 2020-11-18 RX ORDER — KETOROLAC TROMETHAMINE 30 MG/ML
15 INJECTION, SOLUTION INTRAMUSCULAR; INTRAVENOUS ONCE
Status: COMPLETED | OUTPATIENT
Start: 2020-11-18 | End: 2020-11-18

## 2020-11-18 RX ORDER — OXYCODONE HYDROCHLORIDE 5 MG/1
5 TABLET ORAL ONCE
Status: COMPLETED | OUTPATIENT
Start: 2020-11-18 | End: 2020-11-18

## 2020-11-18 RX ORDER — TAMSULOSIN HYDROCHLORIDE 0.4 MG/1
0.4 CAPSULE ORAL NIGHTLY
Qty: 5 CAPSULE | Refills: 0 | Status: SHIPPED | OUTPATIENT
Start: 2020-11-18 | End: 2021-06-22

## 2020-11-18 RX ORDER — ONDANSETRON 2 MG/ML
4 INJECTION INTRAMUSCULAR; INTRAVENOUS DAILY PRN
Status: DISCONTINUED | OUTPATIENT
Start: 2020-11-18 | End: 2020-11-18 | Stop reason: HOSPADM

## 2020-11-18 RX ORDER — PHENAZOPYRIDINE HYDROCHLORIDE 100 MG/1
200 TABLET, FILM COATED ORAL EVERY 8 HOURS PRN
Qty: 20 TABLET | Refills: 0 | Status: SHIPPED | OUTPATIENT
Start: 2020-11-18 | End: 2021-09-17

## 2020-11-18 RX ORDER — KETOROLAC TROMETHAMINE 30 MG/ML
15 INJECTION, SOLUTION INTRAMUSCULAR; INTRAVENOUS EVERY 6 HOURS PRN
Status: DISCONTINUED | OUTPATIENT
Start: 2020-11-18 | End: 2020-11-18

## 2020-11-18 RX ORDER — CELECOXIB 200 MG/1
400 CAPSULE ORAL
Status: COMPLETED | OUTPATIENT
Start: 2020-11-18 | End: 2020-11-18

## 2020-11-18 RX ORDER — LIDOCAINE HCL/PF 100 MG/5ML
SYRINGE (ML) INTRAVENOUS
Status: DISCONTINUED | OUTPATIENT
Start: 2020-11-18 | End: 2020-11-18

## 2020-11-18 RX ORDER — SODIUM CHLORIDE, SODIUM LACTATE, POTASSIUM CHLORIDE, CALCIUM CHLORIDE 600; 310; 30; 20 MG/100ML; MG/100ML; MG/100ML; MG/100ML
INJECTION, SOLUTION INTRAVENOUS CONTINUOUS
Status: DISCONTINUED | OUTPATIENT
Start: 2020-11-18 | End: 2020-11-18 | Stop reason: HOSPADM

## 2020-11-18 RX ORDER — SODIUM CHLORIDE 0.9 % (FLUSH) 0.9 %
3 SYRINGE (ML) INJECTION
Status: DISCONTINUED | OUTPATIENT
Start: 2020-11-18 | End: 2020-11-18 | Stop reason: HOSPADM

## 2020-11-18 RX ORDER — ACETAMINOPHEN 500 MG
1000 TABLET ORAL
Status: COMPLETED | OUTPATIENT
Start: 2020-11-18 | End: 2020-11-18

## 2020-11-18 RX ORDER — CIPROFLOXACIN 2 MG/ML
400 INJECTION, SOLUTION INTRAVENOUS
Status: COMPLETED | OUTPATIENT
Start: 2020-11-18 | End: 2020-11-18

## 2020-11-18 RX ORDER — PHENAZOPYRIDINE HYDROCHLORIDE 200 MG/1
200 TABLET, FILM COATED ORAL ONCE AS NEEDED
Status: DISCONTINUED | OUTPATIENT
Start: 2020-11-18 | End: 2020-11-18 | Stop reason: HOSPADM

## 2020-11-18 RX ORDER — ACETAMINOPHEN 325 MG/1
650 TABLET ORAL EVERY 4 HOURS PRN
Status: DISCONTINUED | OUTPATIENT
Start: 2020-11-18 | End: 2020-11-18

## 2020-11-18 RX ORDER — MEPERIDINE HYDROCHLORIDE 25 MG/ML
12.5 INJECTION INTRAMUSCULAR; INTRAVENOUS; SUBCUTANEOUS ONCE AS NEEDED
Status: DISCONTINUED | OUTPATIENT
Start: 2020-11-18 | End: 2020-11-18 | Stop reason: HOSPADM

## 2020-11-18 RX ORDER — FENTANYL CITRATE 50 UG/ML
INJECTION, SOLUTION INTRAMUSCULAR; INTRAVENOUS
Status: DISCONTINUED | OUTPATIENT
Start: 2020-11-18 | End: 2020-11-18

## 2020-11-18 RX ORDER — LIDOCAINE HYDROCHLORIDE 10 MG/ML
0.5 INJECTION, SOLUTION EPIDURAL; INFILTRATION; INTRACAUDAL; PERINEURAL ONCE
Status: DISCONTINUED | OUTPATIENT
Start: 2020-11-18 | End: 2020-11-18 | Stop reason: HOSPADM

## 2020-11-18 RX ORDER — CYCLOBENZAPRINE HCL 5 MG
10 TABLET ORAL ONCE
Status: COMPLETED | OUTPATIENT
Start: 2020-11-18 | End: 2020-11-18

## 2020-11-18 RX ORDER — DEXAMETHASONE SODIUM PHOSPHATE 4 MG/ML
INJECTION, SOLUTION INTRA-ARTICULAR; INTRALESIONAL; INTRAMUSCULAR; INTRAVENOUS; SOFT TISSUE
Status: DISCONTINUED | OUTPATIENT
Start: 2020-11-18 | End: 2020-11-18

## 2020-11-18 RX ORDER — PHENYLEPHRINE HYDROCHLORIDE 10 MG/ML
INJECTION INTRAVENOUS
Status: DISCONTINUED | OUTPATIENT
Start: 2020-11-18 | End: 2020-11-18

## 2020-11-18 RX ORDER — ATROPA BELLADONNA AND OPIUM 16.2; 6 MG/1; MG/1
60 SUPPOSITORY RECTAL ONCE
Status: COMPLETED | OUTPATIENT
Start: 2020-11-18 | End: 2020-11-18

## 2020-11-18 RX ORDER — ONDANSETRON HYDROCHLORIDE 2 MG/ML
INJECTION, SOLUTION INTRAMUSCULAR; INTRAVENOUS
Status: DISCONTINUED | OUTPATIENT
Start: 2020-11-18 | End: 2020-11-18

## 2020-11-18 RX ORDER — OXYBUTYNIN CHLORIDE 5 MG/1
5 TABLET ORAL 3 TIMES DAILY PRN
Qty: 30 TABLET | Refills: 0 | Status: SHIPPED | OUTPATIENT
Start: 2020-11-18 | End: 2021-09-17

## 2020-11-18 RX ORDER — MIDAZOLAM HYDROCHLORIDE 1 MG/ML
2 INJECTION INTRAMUSCULAR; INTRAVENOUS
Status: DISCONTINUED | OUTPATIENT
Start: 2020-11-18 | End: 2020-11-18 | Stop reason: HOSPADM

## 2020-11-18 RX ADMIN — GLYCOPYRROLATE 0.2 MG: 0.2 INJECTION, SOLUTION INTRAMUSCULAR; INTRAVITREAL at 08:11

## 2020-11-18 RX ADMIN — CYCLOBENZAPRINE HYDROCHLORIDE 10 MG: 5 TABLET, FILM COATED ORAL at 11:11

## 2020-11-18 RX ADMIN — CIPROFLOXACIN 400 MG: 2 INJECTION, SOLUTION INTRAVENOUS at 08:11

## 2020-11-18 RX ADMIN — CARBOXYMETHYLCELLULOSE SODIUM 2 DROP: 2.5 SOLUTION/ DROPS OPHTHALMIC at 08:11

## 2020-11-18 RX ADMIN — SODIUM CHLORIDE, SODIUM LACTATE, POTASSIUM CHLORIDE, AND CALCIUM CHLORIDE: 600; 310; 30; 20 INJECTION, SOLUTION INTRAVENOUS at 09:11

## 2020-11-18 RX ADMIN — OXYCODONE HYDROCHLORIDE 5 MG: 5 TABLET ORAL at 10:11

## 2020-11-18 RX ADMIN — PHENYLEPHRINE HYDROCHLORIDE 200 MCG: 10 INJECTION INTRAVENOUS at 09:11

## 2020-11-18 RX ADMIN — ATROPA BELLADONNA AND OPIUM 60 MG: 16.2; 6 SUPPOSITORY RECTAL at 01:11

## 2020-11-18 RX ADMIN — DEXAMETHASONE SODIUM PHOSPHATE 8 MG: 4 INJECTION, SOLUTION INTRAMUSCULAR; INTRAVENOUS at 08:11

## 2020-11-18 RX ADMIN — CELECOXIB 400 MG: 200 CAPSULE ORAL at 07:11

## 2020-11-18 RX ADMIN — SODIUM CHLORIDE, SODIUM LACTATE, POTASSIUM CHLORIDE, AND CALCIUM CHLORIDE: 600; 310; 30; 20 INJECTION, SOLUTION INTRAVENOUS at 08:11

## 2020-11-18 RX ADMIN — OXYCODONE 5 MG: 5 TABLET ORAL at 12:11

## 2020-11-18 RX ADMIN — KETOROLAC TROMETHAMINE 15 MG: 30 INJECTION, SOLUTION INTRAMUSCULAR at 01:11

## 2020-11-18 RX ADMIN — PROPOFOL 200 MG: 10 INJECTION, EMULSION INTRAVENOUS at 08:11

## 2020-11-18 RX ADMIN — LIDOCAINE HYDROCHLORIDE 75 MG: 20 INJECTION, SOLUTION INTRAVENOUS at 08:11

## 2020-11-18 RX ADMIN — INFLUENZA VIRUS VACCINE 0.5 ML: 15; 15; 15; 15 SUSPENSION INTRAMUSCULAR at 11:11

## 2020-11-18 RX ADMIN — ACETAMINOPHEN 1000 MG: 500 TABLET, FILM COATED ORAL at 07:11

## 2020-11-18 RX ADMIN — PHENYLEPHRINE HYDROCHLORIDE 100 MCG: 10 INJECTION INTRAVENOUS at 09:11

## 2020-11-18 RX ADMIN — LIDOCAINE HYDROCHLORIDE 100 MG: 20 INJECTION, SOLUTION INTRAVENOUS at 09:11

## 2020-11-18 RX ADMIN — FENTANYL CITRATE 50 MCG: 50 INJECTION, SOLUTION INTRAMUSCULAR; INTRAVENOUS at 09:11

## 2020-11-18 RX ADMIN — FENTANYL CITRATE 100 MCG: 50 INJECTION, SOLUTION INTRAMUSCULAR; INTRAVENOUS at 08:11

## 2020-11-18 RX ADMIN — ONDANSETRON 4 MG: 2 INJECTION, SOLUTION INTRAMUSCULAR; INTRAVENOUS at 09:11

## 2020-11-18 RX ADMIN — PROMETHAZINE HYDROCHLORIDE 6.25 MG: 25 INJECTION INTRAMUSCULAR; INTRAVENOUS at 01:11

## 2020-11-18 NOTE — BRIEF OP NOTE
[facility]  Brief Operative Note     SUMMARY     Surgery Date: 11/18/2020     Surgeon(s) and Role:     * Ej Mitchell MD - Primary    Assisting Surgeon: None    Pre-op Diagnosis:  Recurrent UTI [N39.0]    Post-op Diagnosis:  Post-Op Diagnosis Codes:     * Recurrent UTI [N39.0]    Procedure(s) (LRB):  EXTRACTION-STONE-URETEROSCOPY (Left)    Anesthesia: General    Description of the findings of the procedure: Left ureteroscopy with 1cm stone in lower pole. Fragmented with laser and fragments extracted through access sheath. 6x24 JJ stent placed.    Estimated Blood Loss: 0cc         Specimens:   Specimen (12h ago, onward)    None          Discharge Note    SUMMARY     Admit Date: 11/18/2020    Discharge Date and Time: 11/18/2020    Hospital Course: Patient was admitted for elective outpatient procedure, which was uncomplicated and well tolerated.      Final Diagnosis: Post-Op Diagnosis Codes:     * Recurrent UTI [N39.0]    Disposition: Home or Self Care    Follow Up/Patient Instructions:     Medications:  Reconciled Home Medications:   Current Discharge Medication List      START taking these medications    Details   oxybutynin (DITROPAN) 5 MG Tab Take 1 tablet (5 mg total) by mouth 3 (three) times daily as needed (bladder spasm).  Qty: 30 tablet, Refills: 0      phenazopyridine (PYRIDIUM) 100 MG tablet Take 2 tablets (200 mg total) by mouth every 8 (eight) hours as needed (burning with urination).  Qty: 20 tablet, Refills: 0      tamsulosin (FLOMAX) 0.4 mg Cap Take 1 capsule (0.4 mg total) by mouth every evening.  Qty: 5 capsule, Refills: 0         CONTINUE these medications which have NOT CHANGED    Details   amLODIPine (NORVASC) 10 MG tablet TAKE ONE TABLET BY MOUTH ONE TIME DAILY  Qty: 90 tablet, Refills: 0    Associated Diagnoses: Hypertension, essential      hydroCHLOROthiazide (HYDRODIURIL) 25 MG tablet TAKE ONE TABLET BY MOUTH ONE TIME DAILY   Qty: 90 tablet, Refills: 0    Associated Diagnoses:  Hypertension, essential      ciclopirox (PENLAC) 8 % Soln Apply topically nightly. Remove with rubbing alcohol every 7 days  Qty: 6.6 mL, Refills: 11      gabapentin (NEURONTIN) 100 MG capsule Take 1 capsule (100 mg total) by mouth 3 (three) times daily.  Qty: 90 capsule, Refills: 0    Associated Diagnoses: Other spondylosis, cervical region      meloxicam (MOBIC) 15 MG tablet Take 1 tablet (15 mg total) by mouth once daily.  Qty: 30 tablet, Refills: 1    Associated Diagnoses: Other spondylosis, cervical region           Discharge Procedure Orders   Diet general     Follow-up Information     Meme Freedman NP In 1 month.    Specialty: Urology  Why: For post-operative follow-up (Office will call to schedule appointment and ultrasound)  Contact information:  [address] [facility] [address] [phone]

## 2020-11-18 NOTE — ANESTHESIA POSTPROCEDURE EVALUATION
Anesthesia Post Evaluation    Patient: Tasha Rush    Procedure(s) Performed: Procedure(s) (LRB):  EXTRACTION-STONE-URETEROSCOPY (Left)    Final Anesthesia Type: general    Patient location during evaluation: PACU  Patient participation: Yes- Able to Participate  Level of consciousness: awake and alert  Post-procedure vital signs: reviewed and stable  Pain management: adequate  Airway patency: patent    PONV status at discharge: No PONV  Anesthetic complications: no      Cardiovascular status: blood pressure returned to baseline  Respiratory status: unassisted  Hydration status: euvolemic  Follow-up not needed.          Vitals Value Taken Time   /84 11/18/20 1033   Temp 36.5 °C (97.7 °F) 11/18/20 1030   Pulse 77 11/18/20 1039   Resp 16 11/18/20 1030   SpO2 97 % 11/18/20 1039   Vitals shown include unvalidated device data.      Event Time   Out of Recovery 10:40:00         Pain/Igor Score: Pain Rating Prior to Med Admin: 0 (11/18/2020 10:00 AM)  Igor Score: 9 (11/18/2020 10:30 AM)

## 2020-11-18 NOTE — TRANSFER OF CARE
"Anesthesia Transfer of Care Note    Patient: Tasha Rush    Procedure(s) Performed: Procedure(s) (LRB):  EXTRACTION-STONE-URETEROSCOPY (Left)    Patient location: PACU    Anesthesia Type: general    Transport from OR: Transported from OR on 2-3 L/min O2 by NC with adequate spontaneous ventilation    Post pain: adequate analgesia    Post assessment: no apparent anesthetic complications    Post vital signs: stable    Level of consciousness: awake and responds to stimulation    Nausea/Vomiting: no nausea/vomiting    Complications: none    Transfer of care protocol was followed      Last vitals:   Visit Vitals  BP (!) 168/93 (BP Location: Right arm, Patient Position: Sitting)   Pulse 78   Temp 36.5 °C (97.7 °F) (Skin)   Resp 18   Ht 5' 6" (1.676 m)   Wt 81.6 kg (180 lb)   LMP 08/14/2018   SpO2 98%   Breastfeeding No   BMI 29.05 kg/m²     "

## 2020-11-18 NOTE — H&P
"Subjective:      Tasha Rush is a 45 y.o. female with recurrent UTIs and renal stones. Has opted for left URS for stone treatment. No changes since last visit.     The following portions of the patient's history were reviewed and updated as appropriate: allergies, current medications, past family history, past medical history, past social history, past surgical history and problem list.    Review of Systems  Constitutional: no fever or chills  ENT: no nasal congestion or sore throat  Respiratory: no cough or shortness of breath  Cardiovascular: no chest pain or palpitations  Gastrointestinal: no nausea or vomiting, tolerating diet  Genitourinary: as per HPI  Hematologic/Lymphatic: no easy bruising or lymphadenopathy  Musculoskeletal: no arthralgias or myalgias  Neurological: no seizures or tremors  Behavioral/Psych: no auditory or visual hallucinations     Objective:   Vital Signs:BP (!) 168/93 (BP Location: Right arm, Patient Position: Sitting)   Pulse 78   Temp 97.7 °F (36.5 °C) (Skin)   Resp 18   Ht 5' 6" (1.676 m)   Wt 81.6 kg (180 lb)   LMP 08/14/2018   SpO2 98%   Breastfeeding No   BMI 29.05 kg/m²     Physical Exam   General: alert and oriented, no acute distress  Head: normocephalic, atraumatic  Neck: supple, no lymphadenopathy, normal ROM, no masses  Respiratory: Symmetric expansion, non-labored breathing  Cardiovascular: regular rate and rhythm, nomal pulses, no peripheral edema  Skin: normal coloration and turgor, no rashes, no suspicious skin lesions noted  Neuro: alert and oriented x3, no gross deficits  Psych: normal judgment and insight, normal mood/affect and non-anxious    Lab Review     Lab Results   Component Value Date    WBC 8.95 11/13/2020    HGB 12.4 11/13/2020    HCT 38.5 11/13/2020    MCV 93 11/13/2020     11/13/2020     Lab Results   Component Value Date    CREATININE 0.8 11/13/2020    BUN 13 11/13/2020       Imaging (all images personally reviewed; agree with report " below)  Results for orders placed during the hospital encounter of 06/30/20   CT Urogram Abd Pelvis W WO    Narrative EXAMINATION:  CT UROGRAM ABD PELVIS W WO    CLINICAL HISTORY:  recurrent uti, r/o renal abscess;Urinary tract infection, site not specified    TECHNIQUE:  Low dose axial, sagittal and coronal reformations were obtained from the lung bases to the pubic symphysis before and following the IV administration of 125 mL of Omnipaque 350.  Timing was optimized for nephrogram and excretory renal phases.    COMPARISON:  Ultrasound 03/04/2020, CT 01/21/2018    FINDINGS:  Limited evaluation of the heart and lung bases show no acute or concerning findings.    The liver is slightly enlarged.  No solid mass or biliary ductal dilatation.  The gallbladder is unremarkable.    Spleen, adrenal glands, and pancreas show no focal abnormality.    Bilateral kidneys are normal in size and position.  1 cm left lower pole calculus.  No solid mass or hydronephrosis.  The ureters are normal in course and caliber.  The urinary bladder shows no focal wall thickening.  The uterus is absent.    Gastrointestinal tract shows no wall thickening or obstruction.  The appendix is absent.  No free fluid or free gas.  No concerning lymphadenopathy.    Vascular structures show mild atherosclerotic calcification.  Evidence of prior ventral hernia repair with a small residual fat containing hernia.  Small fat containing umbilical hernia.  No acute osseous findings.      Impression 1 cm nonobstructing left lower pole calculus.  No solid mass, perinephric collection, or hydronephrosis.      Electronically signed by: Scottie Gardner MD  Date:    06/30/2020  Time:    08:56          Assessment:     1. Recurrent UTI    2.      Nephrolithaisis    Plan:   1. Left URS for renal stone

## 2020-11-18 NOTE — PROGRESS NOTES
Notified Dr. Mitchell patient c/o severe 10/10, unrelieved pain after Toradol and Opium suppository. Patient is moaning, crying, unable to sit or stand comfortably. Vomtting relieved by phenergan but still intermittent dry heaving. Md verbalized understanding, will come see patient when current OR case completed. No new orders, continue with supportive care. RN verbalized understanding.

## 2020-11-18 NOTE — OR NURSING
Pt states that she is finally feeling better.  Resting quietly on R side.  Dr Mitchell at bedside, talking with her.

## 2020-11-18 NOTE — TELEPHONE ENCOUNTER
----- Message from Ej Mitchell MD sent at 11/18/2020  9:54 AM CST -----  Please schedule for renal US (ordered) and FU with Meme in 1 month.  Pt had ureteroscopy today and will remove stent at home.  Thanks.

## 2020-11-18 NOTE — OR NURSING
C/o LLQ abdominal pain and L flank pain 10/10, crying, unable to get comfortable standing, sitting or lying.  Dr Mitchell called and orders received for Toradol and B&O suppositiory

## 2020-11-18 NOTE — ANESTHESIA PROCEDURE NOTES
Intubation  Performed by: Ayde Allison CRNA  Authorized by: Ayde Allison CRNA     Intubation:     Induction:  Intravenous    Intubated:  Postinduction    Mask Ventilation:  N/a    Attempts:  1    Attempted By:  CRNA    Method of Intubation:  Other (see comments) (Manual placement.)    Difficult Airway Encountered?: No      Complications:  None    Airway Device:  Supraglottic airway/LMA    Airway Device Size:  4.5    Style/Cuff Inflation:  Cuffed    Inflation Amount (mL):  3    Secured at:  The lips    Placement Verified By:  Capnometry    Complicating Factors:  Poor neck/head extension (Neck remained alligned during LMA insertion.)    Findings Post-Intubation:  BS equal bilateral

## 2020-11-18 NOTE — OR NURSING
Pt crying, c/o LLQabdominal cramping and L flank Aching 9/10.  Message sent to Dr Mitchell via secure chat.  Dr. haley called.  Pt has listed allergy to Dialudid.  Order for additional oxycodone received.

## 2020-11-18 NOTE — OP NOTE
Operative Note       Surgery Date: 11/18/2020     Surgeon: Juarez Mitchell MD    Assistant Surgeon: None    Pre-op Diagnosis:  Recurrent UTI [N39.0]    Post-op Diagnosis: Post-Op Diagnosis Codes:     * Recurrent UTI [N39.0]    Procedures:  Procedure(s) (LRB):  EXTRACTION-STONE-URETEROSCOPY (Left)    Anesthesia: General    Indications for Procedure:  The patient is a 45 y.o. year old female with recurrent UTIs and large left lower pole renal stone.  She presents today for surgical treatment with ureteroscopy.  The full risks and benefits of the procedure have been explained and detail and she wishes to proceed.    Procedure Description:  The patient was brought to the operative suite and placed under General anesthesia. She was placed in lithotomy position and prepped and draped in usual sterile fashion.  Time out was performed prior to starting the procedure.     film was obtained, which confirmed the location of the stone in the left kidney.  Rigid cystoscopy was performed without abnormality noted in the urethra or bladder.  The left ureteral orifice was identified and Motion wire was advanced into the renal pelvis under fluoroscopic guidance without difficulty.  A 28cm ureteral access sheath was then placed over the wire under fluoroscopic guidance.     Flexible ureteroscopy was then performed and the stone was identified within the lower pole. Laser lithotripsy was then performed using the Holmium laser and the stone was fragmented into multiple pieces. Any large, potentially obstructing fragments were extracted using the [place] basket. These fragments were collected and submitted as a specimen. Most of the stone was removed in this manner. Remaining smaller fragments were further fragmented in a popcorn fashion in the lower pole as well as the upper pole, where several had migrated.     The entire renal pelvis was then inspected and all calices were noted to be void of any large stone fragments. The  ureteral access sheath was removed under direct vision with no injuries or additional stones identified. Under fluoroscopic guidance, the guidewire was then exchanged for a 6x24 double-J ureteral stent. Good coils were confirmed within the renal pelvis and the bladder using fluoroscopy and cytoscopy. The string was not removed from the stent prior to placement. The bladder was then emptied and the cystoscope removed. The patient was then awakened and transferred to PACU in stable condition. She will remove the stent at home Monday and follow-up with me in 1 month.       Complications: No    Estimated Blood Loss: 0cc         Specimens Removed:   Specimen (12h ago, onward)    None          Implants:   Implant Name Type Inv. Item Serial No.  Lot No. LRB No. Used Action   STENT URETERAL UNIV 6FR 24CM - [id number]  STENT URETERAL UNIV 6FR 24CM  [employer] [id number] Left 1 Implanted              Disposition: PACU - hemodynamically stable.           Condition: Good    Attestation:  I performed the procedure.

## 2020-11-18 NOTE — OR NURSING
Pt c/o cramping 7/10 LLQ abdomen.  Pt has stated allergy to Dilaudid.  Dr. Lee called.  Order for flexeril received.

## 2020-11-18 NOTE — PLAN OF CARE
Tasha Rush has met all discharge criteria from Phase II. Vital Signs are stable, ambulating  without difficulty.Pain is now under control and tolerable for the pt. Pain score 2 at this time. No nausea. Discharge instructions given, patient verbalized understanding. Discharged from facility via wheelchair in stable condition.

## 2020-11-18 NOTE — DISCHARGE INSTRUCTIONS
Patient may remove stent at home by pulling string Monday.  Call office with any questions or concerns.       Ureteral Stents  A ureteral stent is a soft plastic tube with holes in it. Its temporarily inserted into a ureter to help drain urine into the bladder. One end goes in the kidney. The other end goes in the bladder. A coil on each end holds the stent in place. The stent cant be seen from outside the body. It shouldnt interfere with your normal routine. Your stent will be put in by a doctor trained in treating the urinary tract (a urologist) or another specialist. The procedure is done in a hospital or surgery center. Youll likely go home the same day.  When is a ureteral stent used?  A ureteral stent may be used:  · To bypass a blockage in a kidney or ureter.  · During kidney stone removal.  · To let a ureter heal after surgery.    Before the Procedure  Your healthcare provider will give you instructions to prepare for the procedure. X-rays or other imaging tests of your kidneys and ureters may be done beforehand.  During the procedure  · You receive medicine to prevent pain and help you relax or sleep during the procedure. Once this takes effect, the procedure starts.  · The doctor inserts a cystoscope (lighted instrument) through the urethra and into the bladder. This shows the opening to the ureter.  · A thin wire is carefully threaded through the cystoscope, up the ureter, and into the kidney. The stent is inserted over the wire.  · A fluoroscope (special X-ray machine) is used to help position the stent. When the stent is in place, the wire and cystoscope are removed.  While you have a stent  · Some discomfort is normal. Certain movements may trigger pain or a feeling that you need to urinate. You may also feel mild soreness or pressure before or during urination. These symptoms will go away a few days after the stent is removed.  · Medicine to control pain or bladder spasms or to prevent infection  may be prescribed. Take this as directed.  · Drink plenty of fluids to help flush out your urinary tract.  · Your urine may be slightly pink or red. This is due to bleeding caused by minor irritation from the stent. This may happen on and off while you have the stent.  · As with any synthetic device placed in the body, there is a risk of infection. The stent may have to be removed if this happens.   How long will you need a stent?  The stent is often taken out after the blockage in the ureter is treated or the ureter has healed. This may take 1 week to 2 weeks, or longer. If a stent is needed for a long time, it may need to be changed every few months.  When to call your healthcare provider  Contact your healthcare provider right away if:  · Your urine contains blood clots or you see a large amount of blood-tinged urine  · You have symptoms similar to those you had before the stent was placed  · You constantly leak urine  · You have a fever over 100.4°F (38°C), chills, nausea, or vomiting  · Your pain is not relieved with medicine  · The end of the stent comes out of the urethra   Date Last Reviewed: 1/1/2017  © 7544-6490 [employer]. [address] [place] [address]. All rights reserved. This information is not intended as a substitute for professional medical care. Always follow your healthcare professional's instructions.      Anesthesia: After Your Surgery  Youve just had surgery. During surgery, you received medication called anesthesia to keep you comfortable and pain-free. After surgery, you may experience some pain or nausea. This is common. Here are some tips for feeling better and recovering after surgery.    Going home  Your doctor or nurse will show you how to take care of yourself when you go home. He or she will also answer your questions. Have an adult family member or friend drive you home. For the first 24 hours after your surgery:  · Do not drive or use heavy  equipment.  · Do not make important decisions or sign legal documents.  · Avoid alcohol.  · Have someone stay with you, if needed. He or she can watch for problems and help keep you safe.  · Take your time getting up from a seated or lying position. You may experience dizziness for 24 hours  Be sure to keep all follow-up appointments with your doctor. And rest after your procedure for as long as your doctor tells you to.    Coping with pain  If you have pain after surgery, pain medication will help you feel better. Take it as directed, before pain becomes severe. Also, ask your doctor or pharmacist about other ways to control pain, such as with heat, ice, and relaxation. And follow any other instructions your surgeon or nurse gives you.    URINARY RETENTION  Should you experience a decrease in your urine output or are unable to urinate following surgery, this can be due to the medications given during surgery.  We recommend you going to the nearest Emergency Department.    Tips for taking pain medication  To get the best relief possible, remember these points:  · Pain medications can upset your stomach. Taking them with a little food may help.  · Most pain relievers taken by mouth need at least 20 to 30 minutes to take effect.  · Taking medication on a schedule can help you remember to take it. Try to time your medication so that you can take it before beginning an activity, such as dressing, walking, or sitting down for dinner.  · Constipation is a common side effect of pain medications. Contact your doctor before taking any medications like laxatives or stool softeners to help relieve constipation. Also ask about any dietary restrictions, because drinking lots of fluids and eating foods like fruits and vegetables that are high in fiber can also help. Remember, dont take laxatives unless your surgeon has prescribed them.  · Mixing alcohol and pain medication can cause dizziness and slow your breathing. It can even  be fatal. Dont drink alcohol while taking pain medication.  · Pain medication can slow your reflexes. Dont drive or operate machinery while taking pain medication.  If your health care provider tells you to take acetaminophen to help relieve your pain, ask him or her how much you are supposed to take each day. (Acetaminophen is the generic name for Tylenol and other brand-name pain relievers.) Acetaminophen or other pain relievers may interact with your prescription medicines or other over-the-counter (OTC) drugs. Some prescription medications contain acetaminophen along with other active ingredients. Using both prescription and OTC acetaminophen for pain can cause you to overdose. The FDA recommends that you read the labels on your OTC medications carefully. This will help you to clearly understand the list of active ingredients, dosing instructions, and any warnings. It may also help you avoid taking too much acetaminophen. If you have questions or don't understand the information, ask your pharmacist or health care provider to explain it to you before you take the OTC medication.    Managing nausea  Some people have an upset stomach after surgery. This is often due to anesthesia, pain, pain medications, or the stress of surgery. The following tips will help you manage nausea and get good nutrition as you recover. If you were on a special diet before surgery, ask your doctor if you should follow it during recovery. These tips may help:  · Dont push yourself to eat. Your body will tell you when to eat and how much.  · Start off with clear liquids and soup. They are easier to digest.  · Progress to semi-solid foods (mashed potatoes, applesauce, and gelatin) as you feel ready.  · Slowly move to solid foods. Dont eat fatty, rich, or spicy foods at first.  · Dont force yourself to have three large meals a day. Instead, eat smaller amounts more often.  · Take pain medications with a small amount of solid food, such  as crackers or toast to avoid nausea.      Call your surgeon if    · You feel too sleepy, dizzy, or groggy (medication may be too strong).  · You have side effects like nausea, vomiting, or skin changes (rash, itching, or hives).   © 9869-0390 [employer]. [address] [place] [address], [place] [address]. All rights reserved. This information is not intended as a substitute for professional medical care. Always follow your healthcare professional's instructions.

## 2020-11-19 LAB
FINAL PATHOLOGIC DIAGNOSIS: NORMAL
GROSS: NORMAL
Lab: NORMAL

## 2020-11-20 ENCOUNTER — HOSPITAL ENCOUNTER (EMERGENCY)
Facility: OTHER | Age: 45
Discharge: HOME | End: 2020-11-20
Attending: EMERGENCY MEDICINE
Payer: COMMERCIAL

## 2020-11-20 VITALS
RESPIRATION RATE: 21 BRPM | HEART RATE: 90 BPM | OXYGEN SATURATION: 96 % | TEMPERATURE: 100 F | SYSTOLIC BLOOD PRESSURE: 97 MMHG | DIASTOLIC BLOOD PRESSURE: 57 MMHG

## 2020-11-20 DIAGNOSIS — T83.592A: ICD-10-CM

## 2020-11-20 DIAGNOSIS — N39.0 URINARY TRACT INFECTION WITH HEMATURIA, SITE UNSPECIFIED: ICD-10-CM

## 2020-11-20 DIAGNOSIS — Z96.0 URETERAL STENT RETAINED: ICD-10-CM

## 2020-11-20 DIAGNOSIS — R10.9 LEFT FLANK PAIN: Primary | ICD-10-CM

## 2020-11-20 DIAGNOSIS — R31.9 URINARY TRACT INFECTION WITH HEMATURIA, SITE UNSPECIFIED: ICD-10-CM

## 2020-11-20 LAB
ALBUMIN SERPL BCP-MCNC: 3.8 G/DL (ref 3.5–5.2)
ALP SERPL-CCNC: 80 U/L (ref 55–135)
ALT SERPL W/O P-5'-P-CCNC: 23 U/L (ref 10–44)
ANION GAP SERPL CALC-SCNC: 8 MMOL/L (ref 8–16)
AST SERPL-CCNC: 18 U/L (ref 10–40)
BACTERIA #/AREA URNS HPF: ABNORMAL /HPF
BASOPHILS # BLD AUTO: 0.04 K/UL (ref 0–0.2)
BASOPHILS NFR BLD: 0.3 % (ref 0–1.9)
BILIRUB SERPL-MCNC: 1.7 MG/DL (ref 0.1–1)
BILIRUB UR QL STRIP: ABNORMAL
BUN SERPL-MCNC: 11 MG/DL (ref 6–20)
CALCIUM SERPL-MCNC: 9.3 MG/DL (ref 8.7–10.5)
CHLORIDE SERPL-SCNC: 101 MMOL/L (ref 95–110)
CLARITY UR: ABNORMAL
CO2 SERPL-SCNC: 24 MMOL/L (ref 23–29)
COLOR UR: ABNORMAL
CREAT SERPL-MCNC: 1.1 MG/DL (ref 0.5–1.4)
DIFFERENTIAL METHOD: ABNORMAL
EOSINOPHIL # BLD AUTO: 0.1 K/UL (ref 0–0.5)
EOSINOPHIL NFR BLD: 0.6 % (ref 0–8)
ERYTHROCYTE [DISTWIDTH] IN BLOOD BY AUTOMATED COUNT: 14.7 % (ref 11.5–14.5)
EST. GFR  (AFRICAN AMERICAN): >60 ML/MIN/1.73 M^2
EST. GFR  (NON AFRICAN AMERICAN): 60 ML/MIN/1.73 M^2
GLUCOSE SERPL-MCNC: 138 MG/DL (ref 70–110)
GLUCOSE UR QL STRIP: ABNORMAL
HCT VFR BLD AUTO: 37.5 % (ref 37–48.5)
HGB BLD-MCNC: 12.7 G/DL (ref 12–16)
HGB UR QL STRIP: ABNORMAL
HYALINE CASTS #/AREA URNS LPF: 0 /LPF
IMM GRANULOCYTES # BLD AUTO: 0.07 K/UL (ref 0–0.04)
IMM GRANULOCYTES NFR BLD AUTO: 0.5 % (ref 0–0.5)
KETONES UR QL STRIP: ABNORMAL
LEUKOCYTE ESTERASE UR QL STRIP: ABNORMAL
LYMPHOCYTES # BLD AUTO: 1.2 K/UL (ref 1–4.8)
LYMPHOCYTES NFR BLD: 8.3 % (ref 18–48)
MCH RBC QN AUTO: 31.1 PG (ref 27–31)
MCHC RBC AUTO-ENTMCNC: 33.9 G/DL (ref 32–36)
MCV RBC AUTO: 92 FL (ref 82–98)
MICROSCOPIC COMMENT: ABNORMAL
MONOCYTES # BLD AUTO: 1.2 K/UL (ref 0.3–1)
MONOCYTES NFR BLD: 8.2 % (ref 4–15)
NEUTROPHILS # BLD AUTO: 11.9 K/UL (ref 1.8–7.7)
NEUTROPHILS NFR BLD: 82.1 % (ref 38–73)
NITRITE UR QL STRIP: POSITIVE
NRBC BLD-RTO: 0 /100 WBC
PH UR STRIP: 5 [PH] (ref 5–8)
PLATELET # BLD AUTO: 214 K/UL (ref 150–350)
PMV BLD AUTO: 9.4 FL (ref 9.2–12.9)
POTASSIUM SERPL-SCNC: 3.2 MMOL/L (ref 3.5–5.1)
PROT SERPL-MCNC: 7.2 G/DL (ref 6–8.4)
PROT UR QL STRIP: ABNORMAL
RBC # BLD AUTO: 4.08 M/UL (ref 4–5.4)
RBC #/AREA URNS HPF: 50 /HPF (ref 0–4)
SODIUM SERPL-SCNC: 133 MMOL/L (ref 136–145)
SP GR UR STRIP: 1.02 (ref 1–1.03)
SQUAMOUS #/AREA URNS HPF: 7 /HPF
URN SPEC COLLECT METH UR: ABNORMAL
UROBILINOGEN UR STRIP-ACNC: >=8 EU/DL
WBC # BLD AUTO: 14.48 K/UL (ref 3.9–12.7)
WBC #/AREA URNS HPF: >100 /HPF (ref 0–5)
WBC CLUMPS URNS QL MICRO: ABNORMAL

## 2020-11-20 PROCEDURE — 96375 TX/PRO/DX INJ NEW DRUG ADDON: CPT

## 2020-11-20 PROCEDURE — 87186 SC STD MICRODIL/AGAR DIL: CPT

## 2020-11-20 PROCEDURE — 25000003 PHARM REV CODE 250: Performed by: EMERGENCY MEDICINE

## 2020-11-20 PROCEDURE — 63600175 PHARM REV CODE 636 W HCPCS: Performed by: EMERGENCY MEDICINE

## 2020-11-20 PROCEDURE — 99284 EMERGENCY DEPT VISIT MOD MDM: CPT | Mod: 25

## 2020-11-20 PROCEDURE — 87077 CULTURE AEROBIC IDENTIFY: CPT

## 2020-11-20 PROCEDURE — 85025 COMPLETE CBC W/AUTO DIFF WBC: CPT

## 2020-11-20 PROCEDURE — 96376 TX/PRO/DX INJ SAME DRUG ADON: CPT

## 2020-11-20 PROCEDURE — 80053 COMPREHEN METABOLIC PANEL: CPT

## 2020-11-20 PROCEDURE — 87088 URINE BACTERIA CULTURE: CPT

## 2020-11-20 PROCEDURE — 81000 URINALYSIS NONAUTO W/SCOPE: CPT

## 2020-11-20 PROCEDURE — 96365 THER/PROPH/DIAG IV INF INIT: CPT

## 2020-11-20 PROCEDURE — 87086 URINE CULTURE/COLONY COUNT: CPT

## 2020-11-20 PROCEDURE — 96361 HYDRATE IV INFUSION ADD-ON: CPT

## 2020-11-20 RX ORDER — MORPHINE SULFATE 2 MG/ML
6 INJECTION, SOLUTION INTRAMUSCULAR; INTRAVENOUS
Status: COMPLETED | OUTPATIENT
Start: 2020-11-20 | End: 2020-11-20

## 2020-11-20 RX ORDER — DOCUSATE SODIUM 100 MG/1
100 CAPSULE, LIQUID FILLED ORAL 2 TIMES DAILY PRN
Qty: 60 CAPSULE | Refills: 0 | Status: SHIPPED | OUTPATIENT
Start: 2020-11-20 | End: 2021-09-17

## 2020-11-20 RX ORDER — KETOROLAC TROMETHAMINE 30 MG/ML
15 INJECTION, SOLUTION INTRAMUSCULAR; INTRAVENOUS
Status: COMPLETED | OUTPATIENT
Start: 2020-11-20 | End: 2020-11-20

## 2020-11-20 RX ORDER — OXYCODONE AND ACETAMINOPHEN 5; 325 MG/1; MG/1
2 TABLET ORAL
Status: COMPLETED | OUTPATIENT
Start: 2020-11-20 | End: 2020-11-20

## 2020-11-20 RX ORDER — CEPHALEXIN 500 MG/1
500 CAPSULE ORAL 4 TIMES DAILY
Qty: 28 CAPSULE | Refills: 0 | Status: SHIPPED | OUTPATIENT
Start: 2020-11-20 | End: 2020-11-27

## 2020-11-20 RX ORDER — DICYCLOMINE HYDROCHLORIDE 20 MG/1
20 TABLET ORAL 4 TIMES DAILY
Qty: 20 TABLET | Refills: 0 | Status: SHIPPED | OUTPATIENT
Start: 2020-11-20 | End: 2020-12-10

## 2020-11-20 RX ADMIN — SODIUM CHLORIDE 1000 ML: 0.9 INJECTION, SOLUTION INTRAVENOUS at 02:11

## 2020-11-20 RX ADMIN — MORPHINE SULFATE 6 MG: 2 INJECTION, SOLUTION INTRAMUSCULAR; INTRAVENOUS at 02:11

## 2020-11-20 RX ADMIN — KETOROLAC TROMETHAMINE 15 MG: 30 INJECTION, SOLUTION INTRAMUSCULAR at 02:11

## 2020-11-20 RX ADMIN — KETOROLAC TROMETHAMINE 15 MG: 30 INJECTION, SOLUTION INTRAMUSCULAR at 08:11

## 2020-11-20 RX ADMIN — SODIUM CHLORIDE 1000 ML: 0.9 INJECTION, SOLUTION INTRAVENOUS at 05:11

## 2020-11-20 RX ADMIN — OXYCODONE HYDROCHLORIDE AND ACETAMINOPHEN 2 TABLET: 5; 325 TABLET ORAL at 08:11

## 2020-11-20 RX ADMIN — CEFTRIAXONE 1 G: 1 INJECTION, SOLUTION INTRAVENOUS at 09:11

## 2020-11-20 NOTE — ED TRIAGE NOTES
"Pt presents to ED with c/o bilateral flank pain that radiates around to the lower abdomin on the left side since 6pm yesterday. States she just had a kidney stone removed with stents placed on 11/18/2020. Reports having "mild" nausea. Denies fever, chills, SOB, chest pain, vomiting, diarrhea, dysuria, dizziness, and hematuria  "

## 2020-11-20 NOTE — ASSESSMENT & PLAN NOTE
46 yo female who underwent left URS with extraction of 1 cm lower pole stone with stent placement presents to ED with dysuria and stent discomfort.    - Discomfort largely subsided upon examination  - Discussed importance of keeping stent in place for as long as she can tolerate. Recommend taking ditropan, flomax, and pyridium to help with stent discomfort. However, if she experiences this pain again, she may remove stent by pulling on strings.  - Repeat UA, send for culture  - Recommend empiric antibiotic therapy for presumed UTI  - Afebrile. No pyelonephritis suspected  - Refill oxybutynin, flomax, and pyridium as needed    Dispo: OK for discharge with ditropan and empiric antibiotics. F/u urine culture. May pull stent at home if discomfort resumes

## 2020-11-20 NOTE — ED NOTES
Patient resting comfortably in bed.  Patient in NAD.  Respirations even, unlabored.  Bed rails up x2.  Patient on continuous cardiac monitor, BP cuff, and pulse oximeter.  Patient denies needs/complaints at this time.

## 2020-11-20 NOTE — ED NOTES
Pt AAOx4, resting comfortably in bed, reports pain is currently 5/10, NAD, respirations E/UL, updated on POC, wheels locked and in low position, call bell with in reach, Comfort positioning and restroom needs were addressed. Necessary items were placed with in reach and was advised when a reassessment would take place. Will continue to monitor

## 2020-11-20 NOTE — CONSULTS
[facility]  Urology  Consult Note    Patient Name: Tasha Rush  MRN: [id number]  Admission Date: 11/20/2020  Hospital Length of Stay: 0   Code Status: Prior   Attending Provider: Rizwana Bear MD   Consulting Provider: Dragan Morrison MD  Primary Care Physician: Primary Doctor No  Principal Problem:<principal problem not specified>    Consults    Subjective:     HPI:  45 y.o. female with hx of HTN and kidney stones who presents with complaint of left flank pain for one day. She had left ureteroscopy and stent placement with strings by Dr. Mitchell on 11/18 for a 1 cm nonobstructing lower pole stone. Surgery was uncomplicated. In the PACU, the patient did have some complaints of uncontrolled pain, but this was controlled and she was sent home with ditropan and pyridium. She has had little PO intake, but has been drinking water. She felt at baseline when she was discharged home for a couple hours before pain began. She has had temporary relief with Rx pain medication and aleve.    CT RSS today shows her left stent in good position with no evidence of obstructing stone or hydronephrosis. Upon further discussion of her pain, she appears to be having crampy bladder pain and some irritation to her left flank that is attributable to stent discomfort. She also has a leukocytosis to 15. UA was collected but spilled en route to the lab.    Past Medical History:   Diagnosis Date    Abnormal Pap smear of cervix     Bacterial vaginosis     RECURRENT    Cervical dysplasia     Gastritis     Hypertension     Renal disorder     kidney stones    S/P bladder repair 8/26/2018       Past Surgical History:   Procedure Laterality Date    APPENDECTOMY      CERVICAL BIOPSY  W/ LOOP ELECTRODE EXCISION      CYSTOSCOPY W/ URETERAL STENT PLACEMENT  11/18/2020    Procedure: CYSTOSCOPY, WITH URETERAL STENT INSERTION;  Surgeon: Ej Mitchell MD;  Location: [facility];  Service: Urology;;    CYSTOTOMY FOR  EXCISION OF BLADDER DIVERTICULUM N/A 8/24/2018    Procedure: CYSTOTOMY FOR BLADDER PERFORATION;  Surgeon: Hardy Bueno MD;  Location: [facility] OR;  Service: OB/GYN;  Laterality: N/A;    fibroid removal      HERNIA REPAIR  2009    umbilical    LAPAROSCOPIC TOTAL HYSTERECTOMY N/A 8/24/2018    Procedure: HYSTERECTOMY, TOTAL, LAPAROSCOPIC, Attempted;  Surgeon: Hardy Bueno MD;  Location: [facility] OR;  Service: OB/GYN;  Laterality: N/A;  RN PREOP 8/20/2018------UPT-----NEED H/P    LASER LITHOTRIPSY  11/18/2020    Procedure: LITHOTRIPSY, USING LASER;  Surgeon: Ej Mitchell MD;  Location: [facility] OR;  Service: Urology;;    MYOMECTOMY      TOTAL ABDOMINAL HYSTERECTOMY N/A 8/24/2018    Procedure: HYSTERECTOMY, TOTAL, ABDOMINAL;  Surgeon: Hardy Bueno MD;  Location: [facility] OR;  Service: OB/GYN;  Laterality: N/A;    URETEROSCOPIC REMOVAL OF URETERIC CALCULUS Left 11/18/2020    Procedure: EXTRACTION-STONE-URETEROSCOPY;  Surgeon: Ej Mitchell MD;  Location: [facility] OR;  Service: Urology;  Laterality: Left;       Review of patient's allergies indicates:   Allergen Reactions    Ace inhibitors Other (See Comments)     cough    Dilaudid [hydromorphone]      SOB + anxiety     Will take if needed.     Can take oxycodone       Family History     Problem Relation (Age of Onset)    Colon cancer Paternal Uncle    Diabetes Father, Mother    Hyperlipidemia Father    Multiple sclerosis Mother    No Known Problems Brother          Tobacco Use    Smoking status: Current Some Day Smoker     Packs/day: 0.25    Smokeless tobacco: Never Used    Tobacco comment: 1 pack / 2 weeks   Substance and Sexual Activity    Alcohol use: Yes     Comment: occasional    Drug use: No    Sexual activity: Yes       Review of Systems   Constitutional: Negative for appetite change, chills, fatigue, fever and unexpected weight change.   HENT: Negative.    Eyes: Negative.    Respiratory: Negative for cough, chest tightness and shortness of breath.     Cardiovascular: Negative for chest pain, palpitations and leg swelling.   Gastrointestinal: Negative for abdominal pain, constipation, diarrhea, nausea and vomiting.   Genitourinary: Positive for flank pain, frequency, pelvic pain and urgency. Negative for difficulty urinating, dysuria and hematuria.   Musculoskeletal: Negative for back pain.   Skin: Negative for rash.   Neurological: Negative for dizziness, syncope, numbness and headaches.   Hematological: Does not bruise/bleed easily.   Psychiatric/Behavioral: Negative.        Objective:     Temp:  [98.2 °F (36.8 °C)-101.9 °F (38.8 °C)] 99.8 °F (37.7 °C)  Pulse:  [] 108  Resp:  [13-21] 20  SpO2:  [96 %-100 %] 99 %  BP: (113-148)/(68-96) 141/86     There is no height or weight on file to calculate BMI.    Date 11/20/20 0700 - 11/21/20 0659   Shift 0032-7040 9228-4238 1604-5952 24 Hour Total   INTAKE   IV Piggyback 1000   1000   Shift Total 1000   1000   OUTPUT   Shift Total       Weight (kg)              Drains     None                 Physical Exam  Constitutional:       General: She is not in acute distress.     Appearance: She is well-developed.   Eyes:      General: No scleral icterus.  Cardiovascular:      Rate and Rhythm: Normal rate.   Pulmonary:      Effort: Pulmonary effort is normal. No respiratory distress.   Abdominal:      General: Bowel sounds are normal. There is no distension.      Palpations: Abdomen is soft.      Comments: No CVA tenderness.   Genitourinary:     Comments: Non palpable bladder.  Musculoskeletal: Normal range of motion.   Skin:     General: Skin is warm and dry.      Findings: No rash.   Neurological:      Mental Status: She is alert and oriented to person, place, and time.   Psychiatric:         Behavior: Behavior normal.         Significant Labs:    BMP:  Recent Labs   Lab 11/13/20  1459 11/20/20  0236    133*   K 3.4* 3.2*    101   CO2 23 24   BUN 13 11   CREATININE 0.8 1.1   CALCIUM 9.7 9.3       CBC:  Recent  Labs   Lab 11/13/20  1459 11/20/20  0236   WBC 8.95 14.48*   HGB 12.4 12.7   HCT 38.5 37.5    214       Blood Culture: No results for input(s): LABBLOO in the last 168 hours.  Urine Culture: No results for input(s): LABURIN in the last 168 hours.  Urine Studies:   Recent Labs   Lab 11/20/20  0800   COLORU Orange*   APPEARANCEUA Cloudy*   PHUR 5.0   SPECGRAV 1.025   PROTEINUA 3+*   GLUCUA Trace*   KETONESU Trace*   BILIRUBINUA 1+*   OCCULTUA 3+*   NITRITE Positive*   UROBILINOGEN >=8.0*   LEUKOCYTESUR 2+*   RBCUA 50*   WBCUA >100*   BACTERIA Many*   SQUAMEPITHEL 7   HYALINECASTS 0       Significant Imaging:  CT: I have reviewed all results within the past 24 hours and my personal findings are:      Left kidney with some residual non obstructing calcifications of the left lower pole. Left stent in good position with no hydronephrosis. Right kidney and ureter unremarkable for hydro, stone, or mass. Distal coil of stent in bladder in good position.                    Assessment and Plan:     Nephrolithiasis  46 yo female who underwent left URS with extraction of 1 cm lower pole stone with stent placement presents to ED with dysuria and stent discomfort.    - Discomfort largely subsided upon examination  - Discussed importance of keeping stent in place for as long as she can tolerate. Recommend taking ditropan, flomax, and pyridium to help with stent discomfort. However, if she experiences this pain again, she may remove stent by pulling on strings.  - Repeat UA, send for culture  - Recommend empiric antibiotic therapy for presumed UTI  - Afebrile. No pyelonephritis suspected  - Refill oxybutynin, flomax, and pyridium as needed    Dispo: OK for discharge with ditropan and empiric antibiotics. F/u urine culture. May pull stent at home if discomfort resumes        VTE Risk Mitigation (From admission, onward)    None          Thank you for your consult. I will sign off. Please contact us if you have any additional  questions.    Dragan Morrison MD  Urology  [facility]

## 2020-11-20 NOTE — HPI
45 y.o. female with hx of HTN and kidney stones who presents with complaint of left flank pain for one day. She had left ureteroscopy and stent placement with strings by Dr. Mitchell on 11/18 for a 1 cm nonobstructing lower pole stone. Surgery was uncomplicated. In the PACU, the patient did have some complaints of uncontrolled pain, but this was controlled and she was sent home with ditropan and pyridium. She has had little PO intake, but has been drinking water. She felt at baseline when she was discharged home for a couple hours before pain began. She has had temporary relief with Rx pain medication and aleve.    CT RSS today shows her left stent in good position with no evidence of obstructing stone or hydronephrosis. Upon further discussion of her pain, she appears to be having crampy bladder pain and some irritation to her left flank that is attributable to stent discomfort. She also has a leukocytosis to 15. UA was collected but spilled en route to the lab.

## 2020-11-20 NOTE — Clinical Note
"Tasha Keller" Victor Manuel was seen and treated in our emergency department on 11/20/2020.  She may return to work on 11/22/2020.       If you have any questions or concerns, please don't hesitate to call.      Kristen Nicholas RN    "

## 2020-11-20 NOTE — ED NOTES
Patient sitting up in bed, appears to be in NAD.  Patient's respirations even, unlabored.  Patient on continuous cardiac monitor, automatic BP cuff, and pulse oximeter.

## 2020-11-20 NOTE — ED PROVIDER NOTES
"Encounter Date: 11/20/2020    SCRIBE #1 NOTE: I, Malinda Amaro, am scribing for, and in the presence of, Dr. Bear.       History     Chief Complaint   Patient presents with    Flank Pain     x1 day pt had stent placed yesterday c/o increasing R flank and abdominal pain today     Time seen by provider: 2:16 AM    This is a 45 y.o. female with hx of HTN and kidney stones who presents with complaint of left flank pain for one day. She had extraction-stone-ureteroscopy on the left with stent placement by Dr. Mitchell on 11/18. She had N/V after surgery, but no vomiting at home. She has had little PO intake, but has been drinking water. She felt at baseline when she was discharged home for a couple hours before pain began. The pain is described as "sharp," is rated "10/10," and radiates to the front of her abdomen. She has had temporary relief with Rx pain medication and aleve. She reports chills, congestion, sore throat, and decreased urine output. She denies fever. She reports side effect of "something trickling down my body" when taking dilaudid. She has no other allergies. This is the extent of the patient's complaints at this time.    The history is provided by the patient and medical records.     Review of patient's allergies indicates:   Allergen Reactions    Ace inhibitors Other (See Comments)     cough    Dilaudid [hydromorphone]      SOB + anxiety     Will take if needed.     Can take oxycodone     Past Medical History:   Diagnosis Date    Abnormal Pap smear of cervix     Bacterial vaginosis     RECURRENT    Cervical dysplasia     Gastritis     Hypertension     Renal disorder     kidney stones    S/P bladder repair 8/26/2018     Past Surgical History:   Procedure Laterality Date    APPENDECTOMY      CERVICAL BIOPSY  W/ LOOP ELECTRODE EXCISION      CYSTOSCOPY W/ URETERAL STENT PLACEMENT  11/18/2020    Procedure: CYSTOSCOPY, WITH URETERAL STENT INSERTION;  Surgeon: Ej Mitchell MD;  Location: " [facility] OR;  Service: Urology;;    CYSTOTOMY FOR EXCISION OF BLADDER DIVERTICULUM N/A 8/24/2018    Procedure: CYSTOTOMY FOR BLADDER PERFORATION;  Surgeon: Hardy Bueno MD;  Location: [facility] OR;  Service: OB/GYN;  Laterality: N/A;    fibroid removal      HERNIA REPAIR  2009    umbilical    LAPAROSCOPIC TOTAL HYSTERECTOMY N/A 8/24/2018    Procedure: HYSTERECTOMY, TOTAL, LAPAROSCOPIC, Attempted;  Surgeon: Hardy Bueno MD;  Location: [facility] OR;  Service: OB/GYN;  Laterality: N/A;  RN PREOP 8/20/2018------UPT-----NEED H/P    LASER LITHOTRIPSY  11/18/2020    Procedure: LITHOTRIPSY, USING LASER;  Surgeon: Ej Mitchell MD;  Location: [facility] OR;  Service: Urology;;    MYOMECTOMY      TOTAL ABDOMINAL HYSTERECTOMY N/A 8/24/2018    Procedure: HYSTERECTOMY, TOTAL, ABDOMINAL;  Surgeon: Hardy Bueno MD;  Location: [facility] OR;  Service: OB/GYN;  Laterality: N/A;    URETEROSCOPIC REMOVAL OF URETERIC CALCULUS Left 11/18/2020    Procedure: EXTRACTION-STONE-URETEROSCOPY;  Surgeon: Ej Mitchell MD;  Location: [facility] OR;  Service: Urology;  Laterality: Left;     Family History   Problem Relation Age of Onset    Diabetes Father     Hyperlipidemia Father     Diabetes Mother     Multiple sclerosis Mother     No Known Problems Brother     Colon cancer Paternal Uncle     Breast cancer Neg Hx     Ovarian cancer Neg Hx      Social History     Tobacco Use    Smoking status: Current Some Day Smoker     Packs/day: 0.25    Smokeless tobacco: Never Used    Tobacco comment: 1 pack / 2 weeks   Substance Use Topics    Alcohol use: Yes     Comment: occasional    Drug use: No     Review of Systems   Constitutional: Positive for chills. Negative for fever.   HENT: Positive for congestion and sore throat.    Eyes: Negative for visual disturbance.   Respiratory: Negative for cough and shortness of breath.    Cardiovascular: Negative for chest pain and palpitations.   Gastrointestinal: Negative for abdominal pain, diarrhea, nausea and  vomiting.   Genitourinary: Positive for decreased urine volume and flank pain. Negative for difficulty urinating, dysuria, hematuria, urgency and vaginal discharge.   Musculoskeletal: Negative for joint swelling, neck pain and neck stiffness.   Skin: Negative for rash and wound.   Neurological: Negative for weakness, numbness and headaches.   Psychiatric/Behavioral: Negative for confusion.       Physical Exam     Initial Vitals [11/20/20 0221]   BP Pulse Resp Temp SpO2   (!) 148/96 89 19 98.2 °F (36.8 °C) 100 %      MAP       --         Physical Exam    Nursing note and vitals reviewed.  Constitutional: She appears well-developed and well-nourished. She is not diaphoretic. She appears distressed.   Appears uncomfortable. Moaning.   HENT:   Head: Normocephalic and atraumatic.   Eyes: EOM are normal. Pupils are equal, round, and reactive to light.   Neck: Normal range of motion. Neck supple.   Cardiovascular: Normal rate, regular rhythm and normal heart sounds. Exam reveals no gallop and no friction rub.    No murmur heard.  Pulmonary/Chest: Breath sounds normal. No respiratory distress. She has no wheezes. She has no rhonchi. She has no rales.   Abdominal: Soft. Bowel sounds are normal. She exhibits no distension. There is no abdominal tenderness. There is CVA tenderness (left). There is no rebound and no guarding.   Musculoskeletal: Normal range of motion. No tenderness or edema.   Neurological: She is alert and oriented to person, place, and time.   Skin: Skin is warm and dry.         ED Course   Procedures  Labs Reviewed   CBC W/ AUTO DIFFERENTIAL - Abnormal; Notable for the following components:       Result Value    WBC 14.48 (*)     MCH 31.1 (*)     RDW 14.7 (*)     Gran # (ANC) 11.9 (*)     Immature Grans (Abs) 0.07 (*)     Mono # 1.2 (*)     Gran % 82.1 (*)     Lymph % 8.3 (*)     All other components within normal limits   COMPREHENSIVE METABOLIC PANEL - Abnormal; Notable for the following components:     Sodium 133 (*)     Potassium 3.2 (*)     Glucose 138 (*)     Total Bilirubin 1.7 (*)     All other components within normal limits   URINALYSIS, REFLEX TO URINE CULTURE          Imaging Results          CT Renal Stone Study ABD Pelvis WO (Final result)  Result time 11/20/20 03:44:29    Final result by Steven Swanson MD (11/20/20 03:44:29)                 Impression:      Left ureteral stent is noted, there is mild pelvocaliceal dilatation without significant ureteral dilatation, there is mild left periureteral and perinephric stranding that may relate to edema/inflammation.  Correlation for signs or symptoms of infection otherwise needed.  Clinical and historical correlation otherwise needed.    Suspected small left adnexal cyst.      Electronically signed by: Steven Swanson  Date:    11/20/2020  Time:    03:44             Narrative:    EXAMINATION:  CT RENAL STONE STUDY ABD PELVIS WO    CLINICAL HISTORY:  left flank pain, recent stent;    TECHNIQUE:  Low dose axial images, sagittal and coronal reformations were obtained from the lung bases to the pubic symphysis.  Contrast was not administered.    COMPARISON:  June 30, 2020    FINDINGS:  There is a left ureteral stent, the proximal portion of the stent at the upper pole of the left kidney, the distal aspect is within the urinary bladder.  There is mild periureteral stranding along the course of the left ureter, there is mild perinephric stranding of the left kidney.  Significant ureteral dilatation is not appreciated, there is mild pelvocaliceal dilatation.  A small amount of air density seen within the upper pole calyx on the left.  Nonobstructing nephrolithiasis involving the mid to lower pole calices on the left.  There is no evidence for calculus adjacent to the stent along the course of the left ureter.  Mild density of the right kidney may relate to medullary nephrocalcinosis.  There is no evidence for right-sided ureteral calculus or obstructive  uropathy.  The urinary bladder appears unremarkable for degree of distention.    Postoperative change of the anterior abdominal wall noted.  The visualized lung bases appear clear.  When accounting for limitations of the examination there is no evidence for acute process of the stomach, liver, gallbladder, pancreas, spleen, or adrenal glands.  The abdominal aorta appears normal in caliber.  The patient appears status post hysterectomy, it appears that the left ovary remains, there are suspected small cysts of the left ovary measuring up to approximately 2 cm.  There is no evidence for bowel obstructive process.  The appendix is not identified there is appearance of may relate to prior appendectomy.  There is no evidence for free intraperitoneal air.  The osseous structures demonstrate chronic change, prominent changes of the sacroiliac joints noted, appears similar to the prior study may relate to changes of sacroiliitis.                                 Medical Decision Making:   History:   Old Medical Records: I decided to obtain old medical records.  Clinical Tests:   Lab Tests: Ordered and Reviewed  Radiological Study: Ordered and Reviewed  ED Management:  Emergent evaluation a 45-year-old female who presents with complaint of uncontrolled pain after urologic stent placement.  Vital signs are benign, afebrile.  Patient also reports URI type symptoms, but this is secondary complaint.  On examination she is distressed and uncomfortable, moaning.  She was treated with Toradol and morphine with improvement.  She is hydrated with IV fluids.  Labs reveal leukocytosis, stable creatinine.  CT renal stone study shows stent in place with inflammatory type reaction.  Urinalysis is pending at time of shift change.  Dr. Herrera assumed care, and will follow up UA results and consult Urology as needed.    Addendum:  CT shows inflammation.  Patient is evaluated by Urology and discharged with antibiotics.  Pain was controlled  and she is discharged in improved condition and given strict return precautions.            Scribe Attestation:   Scribe #1: I performed the above scribed service and the documentation accurately describes the services I performed. I attest to the accuracy of the note.    Attending Attestation:           Physician Attestation for Scribe:  Physician Attestation Statement for Scribe #1: I, Dr. Bear, reviewed documentation, as scribed by Malinda Amaro in my presence, and it is both accurate and complete.                 ED Course as of Nov 23 0635 Fri Nov 20, 2020   0518 Patient is resting comfortably, states pain is improved to 4/10.  IV fluids are in progress.  Still awaiting urine sample, patient states she cannot produce 1 yet.    [AK]   0841 Patient was evaluated by Urology, Dr. Morrison, in the ED. Stent to stay in place. Request abx and discharge    [MA]   0855 Discussed with patient the plan.  Patient expressed understanding and is comfortable going home.  Patient requested something help her have a bowel movement since it has been several days since she has had 1.  I educated the patient on warning signs and symptoms which she must seek immediate medical attention.    [MA]      ED Course User Index  [AK] Rizwana Bear MD  [MA] Noah Herrera MD            Clinical Impression:     ICD-10-CM ICD-9-CM   1. Left flank pain  R10.9 789.09   2. Inflammatory reaction due to indwelling ureteral stent, initial encounter  T83.592A 996.64                                               Rizwana Bear MD  11/20/20 0614       Rizwana Bear MD  11/23/20 0636

## 2020-11-20 NOTE — SUBJECTIVE & OBJECTIVE
Past Medical History:   Diagnosis Date    Abnormal Pap smear of cervix     Bacterial vaginosis     RECURRENT    Cervical dysplasia     Gastritis     Hypertension     Renal disorder     kidney stones    S/P bladder repair 8/26/2018       Past Surgical History:   Procedure Laterality Date    APPENDECTOMY      CERVICAL BIOPSY  W/ LOOP ELECTRODE EXCISION      CYSTOSCOPY W/ URETERAL STENT PLACEMENT  11/18/2020    Procedure: CYSTOSCOPY, WITH URETERAL STENT INSERTION;  Surgeon: Ej Mitchell MD;  Location: [facility];  Service: Urology;;    CYSTOTOMY FOR EXCISION OF BLADDER DIVERTICULUM N/A 8/24/2018    Procedure: CYSTOTOMY FOR BLADDER PERFORATION;  Surgeon: Hardy Bueno MD;  Location: [facility] OR;  Service: OB/GYN;  Laterality: N/A;    fibroid removal      HERNIA REPAIR  2009    umbilical    LAPAROSCOPIC TOTAL HYSTERECTOMY N/A 8/24/2018    Procedure: HYSTERECTOMY, TOTAL, LAPAROSCOPIC, Attempted;  Surgeon: Hardy Bueno MD;  Location: [facility] OR;  Service: OB/GYN;  Laterality: N/A;  RN PREOP 8/20/2018------UPT-----NEED H/P    LASER LITHOTRIPSY  11/18/2020    Procedure: LITHOTRIPSY, USING LASER;  Surgeon: Ej Mitchell MD;  Location: [facility];  Service: Urology;;    MYOMECTOMY      TOTAL ABDOMINAL HYSTERECTOMY N/A 8/24/2018    Procedure: HYSTERECTOMY, TOTAL, ABDOMINAL;  Surgeon: Hardy Bueno MD;  [facility];  Service: OB/GYN;  Laterality: N/A;    URETEROSCOPIC REMOVAL OF URETERIC CALCULUS Left 11/18/2020    Procedure: EXTRACTION-STONE-URETEROSCOPY;  Surgeon: Ej Mitchell MD;  Location: [facility];  Service: Urology;  Laterality: Left;       Review of patient's allergies indicates:   Allergen Reactions    Ace inhibitors Other (See Comments)     cough    Dilaudid [hydromorphone]      SOB + anxiety     Will take if needed.     Can take oxycodone       Family History     Problem Relation (Age of Onset)    Colon cancer Paternal Uncle    Diabetes Father, Mother    Hyperlipidemia Father    Multiple  sclerosis Mother    No Known Problems Brother          Tobacco Use    Smoking status: Current Some Day Smoker     Packs/day: 0.25    Smokeless tobacco: Never Used    Tobacco comment: 1 pack / 2 weeks   Substance and Sexual Activity    Alcohol use: Yes     Comment: occasional    Drug use: No    Sexual activity: Yes       Review of Systems   Constitutional: Negative for appetite change, chills, fatigue, fever and unexpected weight change.   HENT: Negative.    Eyes: Negative.    Respiratory: Negative for cough, chest tightness and shortness of breath.    Cardiovascular: Negative for chest pain, palpitations and leg swelling.   Gastrointestinal: Negative for abdominal pain, constipation, diarrhea, nausea and vomiting.   Genitourinary: Positive for flank pain, frequency, pelvic pain and urgency. Negative for difficulty urinating, dysuria and hematuria.   Musculoskeletal: Negative for back pain.   Skin: Negative for rash.   Neurological: Negative for dizziness, syncope, numbness and headaches.   Hematological: Does not bruise/bleed easily.   Psychiatric/Behavioral: Negative.        Objective:     Temp:  [98.2 °F (36.8 °C)-101.9 °F (38.8 °C)] 99.8 °F (37.7 °C)  Pulse:  [] 108  Resp:  [13-21] 20  SpO2:  [96 %-100 %] 99 %  BP: (113-148)/(68-96) 141/86     There is no height or weight on file to calculate BMI.    Date 11/20/20 0700 - 11/21/20 0659   Shift 4391-1290 3318-0751 0043-6411 24 Hour Total   INTAKE   IV Piggyback 1000   1000   Shift Total 1000   1000   OUTPUT   Shift Total       Weight (kg)              Drains     None                 Physical Exam  Constitutional:       General: She is not in acute distress.     Appearance: She is well-developed.   Eyes:      General: No scleral icterus.  Cardiovascular:      Rate and Rhythm: Normal rate.   Pulmonary:      Effort: Pulmonary effort is normal. No respiratory distress.   Abdominal:      General: Bowel sounds are normal. There is no distension.       Palpations: Abdomen is soft.      Comments: No CVA tenderness.   Genitourinary:     Comments: Non palpable bladder.  Musculoskeletal: Normal range of motion.   Skin:     General: Skin is warm and dry.      Findings: No rash.   Neurological:      Mental Status: She is alert and oriented to person, place, and time.   Psychiatric:         Behavior: Behavior normal.         Significant Labs:    BMP:  Recent Labs   Lab 11/13/20  1459 11/20/20  0236    133*   K 3.4* 3.2*    101   CO2 23 24   BUN 13 11   CREATININE 0.8 1.1   CALCIUM 9.7 9.3       CBC:  Recent Labs   Lab 11/13/20  1459 11/20/20  0236   WBC 8.95 14.48*   HGB 12.4 12.7   HCT 38.5 37.5    214       Blood Culture: No results for input(s): LABBLOO in the last 168 hours.  Urine Culture: No results for input(s): LABURIN in the last 168 hours.  Urine Studies:   Recent Labs   Lab 11/20/20  0800   COLORU Orange*   APPEARANCEUA Cloudy*   PHUR 5.0   SPECGRAV 1.025   PROTEINUA 3+*   GLUCUA Trace*   KETONESU Trace*   BILIRUBINUA 1+*   OCCULTUA 3+*   NITRITE Positive*   UROBILINOGEN >=8.0*   LEUKOCYTESUR 2+*   RBCUA 50*   WBCUA >100*   BACTERIA Many*   SQUAMEPITHEL 7   HYALINECASTS 0       Significant Imaging:  CT: I have reviewed all results within the past 24 hours and my personal findings are:      Left kidney with some residual non obstructing calcifications of the left lower pole. Left stent in good position with no hydronephrosis. Right kidney and ureter unremarkable for hydro, stone, or mass. Distal coil of stent in bladder in good position.

## 2020-11-23 LAB
BACTERIA UR CULT: ABNORMAL
COMPN STONE: NORMAL
SPECIMEN SOURCE: NORMAL
STONE ANALYSIS IR-IMP: NORMAL

## 2020-11-23 RX ORDER — NITROFURANTOIN 25; 75 MG/1; MG/1
100 CAPSULE ORAL 2 TIMES DAILY
Qty: 10 CAPSULE | Refills: 0 | Status: SHIPPED | OUTPATIENT
Start: 2020-11-23 | End: 2020-11-28

## 2020-12-01 ENCOUNTER — TELEPHONE (OUTPATIENT)
Dept: PAIN MEDICINE | Facility: CLINIC | Age: 45
End: 2020-12-01

## 2020-12-01 NOTE — TELEPHONE ENCOUNTER
Good morning,afternoon Mr./Mrs.    My name is Dolores I am contacting you from [facility] regarding your appointment scheduled for, 12/02/20 with,Sherman Washington just confirming you will be able to make it.        Staff left voicemail reminding patient of her appointment

## 2020-12-02 NOTE — PROGRESS NOTES
Subjective:      Tasha Rush is a 45 y.o. female who returns today regarding her urinary symptoms.    Seen previously following intraoperative bladder injury repaired by Dr. Barroso during hysterectomy in 2018. Baltazar was removed following cystogram showed no leak.      She has had recurrent UTIs over the last year. Began macrodantin prophylaxis in May 2020.     Component Urine Culture, Routine   Latest Ref Rng & Units    11/20/2020 ESCHERICHIA COLI ESBL (A) . . .   11/2/2020 No significant growth   10/16/2020 ESCHERICHIA COLI ESBL (A) . . .   7/9/2020 ESCHERICHIA COLI ESBL (A) . . .   6/15/2020 ESCHERICHIA COLI ESBL (A) . . .   4/29/2020 ESCHERICHIA COLI ESBL (A) . . .   3/5/2020 ESCHERICHIA COLI ESBL (A) . . .   3/4/2020 ESCHERICHIA COLI (A) . . .     CT urogram in June revealed a 1 cm non obstructing left lower pole stone. No solid mass, perinephric collection, or hydronephrosis. Unfortunately her insurance did not cover a cystoscope at that time.     Normal cystoscope in November. She is now s/p left ureteroscopy with LL for the large renal stone by Dr. Mitchell on 11/20. She was evaluated in the ED on 11/20 for continued pain which was likely stent related. Urine culture was again positive for infection. She completed a prescription for macrobid.  She removed the stent as instructed.    She presents today reporting bilateral flank pain L>R as well as very malodorous urine. Denies dysuria, frequency and urgency. Denies fever/chills and N/V.    The following portions of the patient's history were reviewed and updated as appropriate: allergies, current medications, past family history, past medical history, past social history, past surgical history and problem list.    Review of Systems  Constitutional: no fever or chills  ENT: no nasal congestion or sore throat  Respiratory: no cough or shortness of breath  Cardiovascular: no chest pain or palpitations  Gastrointestinal: no nausea or vomiting, tolerating  "diet  Genitourinary: as per HPI  Hematologic/Lymphatic: no easy bruising or lymphadenopathy  Musculoskeletal: no arthralgias or myalgias  Neurological: no seizures or tremors  Behavioral/Psych: no auditory or visual hallucinations     Objective:   Vital Signs:   Vitals:    12/03/20 0718   BP: (!) 135/90   Pulse: 68     Physical Exam   General: alert and oriented, no acute distress  Head: normocephalic, atraumatic  Neck: supple, no lymphadenopathy, normal ROM, no masses  Respiratory: Symmetric expansion, non-labored breathing  Cardiovascular: regular rate and rhythm, nomal pulses, no peripheral edema  Abdomen: soft, non tender, non distended, no palpable masses, no hernias, no hepatomegaly or splenomegaly  Pelvic: deferred  Lymphatic: no inguinal nodes  Skin: normal coloration and turgor, no rashes, no suspicious skin lesions noted  Neuro: alert and oriented x3, no gross deficits  Psych: normal judgment and insight, normal mood/affect and non-anxious  Mild left CVA tenderness    Lab Review   Urinalysis demonstrates positive for nitrites, leukocytes, red blood cells, protein  Lab Results   Component Value Date    WBC 14.48 (H) 11/20/2020    HGB 12.7 11/20/2020    HCT 37.5 11/20/2020    MCV 92 11/20/2020     11/20/2020     Lab Results   Component Value Date    CREATININE 1.1 11/20/2020    BUN 11 11/20/2020       Imaging   (all images personally reviewed; agree with report below  CT stone study (11/20/20)- "There is a left ureteral stent, the proximal portion of the stent at the upper pole of the left kidney, the distal aspect is within the urinary bladder.  There is mild periureteral stranding along the course of the left ureter, there is mild perinephric stranding of the left kidney.  Significant ureteral dilatation is not appreciated, there is mild pelvocaliceal dilatation.  A small amount of air density seen within the upper pole calyx on the left.  Nonobstructing nephrolithiasis involving the mid to lower " "pole calices on the left.  There is no evidence for calculus adjacent to the stent along the course of the left ureter.  Mild density of the right kidney may relate to medullary nephrocalcinosis.  There is no evidence for right-sided ureteral calculus or obstructive uropathy.  The urinary bladder appears unremarkable for degree of distention."    Assessment:   Acute cystitis with hematuria  Recurrent UTI  Nephrolithiasis    Plan:   Diagnoses and all orders for this visit:    Acute cystitis without hematuria  -     Urine culture  -     gentamicin injection 80 mg  -     Ambulatory referral/consult to Infectious Disease; Future    Recurrent UTI    Nephrolithiasis    Plan:  --Urine culture today   --Gent IM x 3, first dose today  --Follow up tomorrow for 2nd injection  --Referral to ID for further eval    "

## 2020-12-03 ENCOUNTER — OFFICE VISIT (OUTPATIENT)
Dept: UROLOGY | Facility: CLINIC | Age: 45
End: 2020-12-03
Payer: COMMERCIAL

## 2020-12-03 VITALS — HEART RATE: 68 BPM | SYSTOLIC BLOOD PRESSURE: 135 MMHG | DIASTOLIC BLOOD PRESSURE: 90 MMHG

## 2020-12-03 DIAGNOSIS — N20.0 NEPHROLITHIASIS: ICD-10-CM

## 2020-12-03 DIAGNOSIS — N30.00 ACUTE CYSTITIS WITHOUT HEMATURIA: Primary | ICD-10-CM

## 2020-12-03 DIAGNOSIS — N39.0 RECURRENT UTI: ICD-10-CM

## 2020-12-03 PROCEDURE — 96372 PR INJECTION,THERAP/PROPH/DIAG2ST, IM OR SUBCUT: ICD-10-PCS | Mod: 59,S$GLB,, | Performed by: NURSE PRACTITIONER

## 2020-12-03 PROCEDURE — 87088 URINE BACTERIA CULTURE: CPT

## 2020-12-03 PROCEDURE — 1126F AMNT PAIN NOTED NONE PRSNT: CPT | Mod: S$GLB,,, | Performed by: NURSE PRACTITIONER

## 2020-12-03 PROCEDURE — 99213 PR OFFICE/OUTPT VISIT, EST, LEVL III, 20-29 MIN: ICD-10-PCS | Mod: 25,S$GLB,, | Performed by: NURSE PRACTITIONER

## 2020-12-03 PROCEDURE — 3080F PR MOST RECENT DIASTOLIC BLOOD PRESSURE >= 90 MM HG: ICD-10-PCS | Mod: CPTII,S$GLB,, | Performed by: NURSE PRACTITIONER

## 2020-12-03 PROCEDURE — 3075F SYST BP GE 130 - 139MM HG: CPT | Mod: CPTII,S$GLB,, | Performed by: NURSE PRACTITIONER

## 2020-12-03 PROCEDURE — 1126F PR PAIN SEVERITY QUANTIFIED, NO PAIN PRESENT: ICD-10-PCS | Mod: S$GLB,,, | Performed by: NURSE PRACTITIONER

## 2020-12-03 PROCEDURE — 87186 SC STD MICRODIL/AGAR DIL: CPT

## 2020-12-03 PROCEDURE — 99213 OFFICE O/P EST LOW 20 MIN: CPT | Mod: 25,S$GLB,, | Performed by: NURSE PRACTITIONER

## 2020-12-03 PROCEDURE — 96372 THER/PROPH/DIAG INJ SC/IM: CPT | Mod: 59,S$GLB,, | Performed by: NURSE PRACTITIONER

## 2020-12-03 PROCEDURE — 87077 CULTURE AEROBIC IDENTIFY: CPT

## 2020-12-03 PROCEDURE — 87086 URINE CULTURE/COLONY COUNT: CPT

## 2020-12-03 PROCEDURE — 3075F PR MOST RECENT SYSTOLIC BLOOD PRESS GE 130-139MM HG: ICD-10-PCS | Mod: CPTII,S$GLB,, | Performed by: NURSE PRACTITIONER

## 2020-12-03 PROCEDURE — 3080F DIAST BP >= 90 MM HG: CPT | Mod: CPTII,S$GLB,, | Performed by: NURSE PRACTITIONER

## 2020-12-03 RX ORDER — GENTAMICIN SULFATE 40 MG/ML
80 INJECTION, SOLUTION INTRAMUSCULAR; INTRAVENOUS
Status: COMPLETED | OUTPATIENT
Start: 2020-12-03 | End: 2020-12-03

## 2020-12-03 RX ADMIN — GENTAMICIN SULFATE 80 MG: 40 INJECTION, SOLUTION INTRAMUSCULAR; INTRAVENOUS at 07:12

## 2020-12-04 ENCOUNTER — OFFICE VISIT (OUTPATIENT)
Dept: UROLOGY | Facility: CLINIC | Age: 45
End: 2020-12-04
Payer: COMMERCIAL

## 2020-12-04 VITALS
BODY MASS INDEX: 28.91 KG/M2 | DIASTOLIC BLOOD PRESSURE: 68 MMHG | HEIGHT: 66 IN | HEART RATE: 69 BPM | SYSTOLIC BLOOD PRESSURE: 101 MMHG | WEIGHT: 179.88 LBS

## 2020-12-04 DIAGNOSIS — R10.9 FLANK PAIN: Primary | ICD-10-CM

## 2020-12-04 PROCEDURE — 3008F PR BODY MASS INDEX (BMI) DOCUMENTED: ICD-10-PCS | Mod: CPTII,S$GLB,, | Performed by: NURSE PRACTITIONER

## 2020-12-04 PROCEDURE — 3008F BODY MASS INDEX DOCD: CPT | Mod: CPTII,S$GLB,, | Performed by: NURSE PRACTITIONER

## 2020-12-04 PROCEDURE — 3074F SYST BP LT 130 MM HG: CPT | Mod: CPTII,S$GLB,, | Performed by: NURSE PRACTITIONER

## 2020-12-04 PROCEDURE — 99499 UNLISTED E&M SERVICE: CPT | Mod: S$GLB,,, | Performed by: NURSE PRACTITIONER

## 2020-12-04 PROCEDURE — 1126F PR PAIN SEVERITY QUANTIFIED, NO PAIN PRESENT: ICD-10-PCS | Mod: S$GLB,,, | Performed by: NURSE PRACTITIONER

## 2020-12-04 PROCEDURE — 99499 NO LOS: ICD-10-PCS | Mod: S$GLB,,, | Performed by: NURSE PRACTITIONER

## 2020-12-04 PROCEDURE — 3074F PR MOST RECENT SYSTOLIC BLOOD PRESSURE < 130 MM HG: ICD-10-PCS | Mod: CPTII,S$GLB,, | Performed by: NURSE PRACTITIONER

## 2020-12-04 PROCEDURE — 3078F DIAST BP <80 MM HG: CPT | Mod: CPTII,S$GLB,, | Performed by: NURSE PRACTITIONER

## 2020-12-04 PROCEDURE — 3078F PR MOST RECENT DIASTOLIC BLOOD PRESSURE < 80 MM HG: ICD-10-PCS | Mod: CPTII,S$GLB,, | Performed by: NURSE PRACTITIONER

## 2020-12-04 PROCEDURE — 96372 THER/PROPH/DIAG INJ SC/IM: CPT | Mod: S$GLB,,, | Performed by: NURSE PRACTITIONER

## 2020-12-04 PROCEDURE — 96372 PR INJECTION,THERAP/PROPH/DIAG2ST, IM OR SUBCUT: ICD-10-PCS | Mod: S$GLB,,, | Performed by: NURSE PRACTITIONER

## 2020-12-04 PROCEDURE — 1126F AMNT PAIN NOTED NONE PRSNT: CPT | Mod: S$GLB,,, | Performed by: NURSE PRACTITIONER

## 2020-12-04 RX ORDER — GENTAMICIN SULFATE 40 MG/ML
80 INJECTION, SOLUTION INTRAMUSCULAR; INTRAVENOUS
Status: COMPLETED | OUTPATIENT
Start: 2020-12-04 | End: 2020-12-04

## 2020-12-04 RX ADMIN — GENTAMICIN SULFATE 80 MG: 40 INJECTION, SOLUTION INTRAMUSCULAR; INTRAVENOUS at 11:12

## 2020-12-04 NOTE — PROGRESS NOTES
Subjective:      Tasha Rush is a 45 y.o. female who returns today regarding her urinary symptoms.    Seen previously following intraoperative bladder injury repaired by Dr. Barroso during hysterectomy in 2018. Baltazar was removed following cystogram showed no leak.      She has had recurrent UTIs over the last year. Began macrodantin prophylaxis in May 2020. She has completed numerous long courses of culture appropriate oral antibiotics without improvement.     Component Urine Culture, Routine   Latest Ref Rng & Units    11/20/2020 ESCHERICHIA COLI ESBL (A) . . .   11/2/2020 No significant growth   10/16/2020 ESCHERICHIA COLI ESBL (A) . . .   7/9/2020 ESCHERICHIA COLI ESBL (A) . . .   6/15/2020 ESCHERICHIA COLI ESBL (A) . . .   4/29/2020 ESCHERICHIA COLI ESBL (A) . . .   3/5/2020 ESCHERICHIA COLI ESBL (A) . . .   3/4/2020 ESCHERICHIA COLI (A) . . .     CT urogram in June revealed a 1 cm non obstructing left lower pole stone. No solid mass, perinephric collection, or hydronephrosis. Unfortunately her insurance did not cover a cystoscope at that time.     Normal cystoscope in November with Dr. Mitchell. She is now s/p left ureteroscopy with LL for the large renal stone by Dr. Mitchell on 11/20. She was evaluated in the ED on 11/20 for continued pain which was likely stent related. Urine culture was again positive for infection at that time. She completed a prescription for macrobid and removed the stent as instructed.    She presented to the clinic yesterday reporting bilateral flank pain L>R as well as very malodorous urine. Urine culture is pending. She received gent IM x 1 yesterday. Reports mild improvement in her bladder symptoms today. Denies dysuria, frequency and urgency. Denies fever/chills and N/V.    The following portions of the patient's history were reviewed and updated as appropriate: allergies, current medications, past family history, past medical history, past social history, past surgical history  "and problem list.    Review of Systems  Constitutional: no fever or chills  ENT: no nasal congestion or sore throat  Respiratory: no cough or shortness of breath  Cardiovascular: no chest pain or palpitations  Gastrointestinal: no nausea or vomiting, tolerating diet  Genitourinary: as per HPI  Hematologic/Lymphatic: no easy bruising or lymphadenopathy  Musculoskeletal: no arthralgias or myalgias  Neurological: no seizures or tremors  Behavioral/Psych: no auditory or visual hallucinations     Objective:   Vital Signs:   Vitals:    12/04/20 1141   BP: 101/68   Pulse: 69     Physical Exam   General: alert and oriented, no acute distress  Head: normocephalic, atraumatic  Neck: supple, no lymphadenopathy, normal ROM, no masses  Respiratory: Symmetric expansion, non-labored breathing  Cardiovascular: regular rate and rhythm, nomal pulses, no peripheral edema  Abdomen: soft, non tender, non distended, no palpable masses, no hernias, no hepatomegaly or splenomegaly  Pelvic: deferred  Lymphatic: no inguinal nodes  Skin: normal coloration and turgor, no rashes, no suspicious skin lesions noted  Neuro: alert and oriented x3, no gross deficits  Psych: normal judgment and insight, normal mood/affect and non-anxious  Mild left CVA tenderness    Lab Review   Urinalysis demonstrates: no specimen   Lab Results   Component Value Date    WBC 14.48 (H) 11/20/2020    HGB 12.7 11/20/2020    HCT 37.5 11/20/2020    MCV 92 11/20/2020     11/20/2020     Lab Results   Component Value Date    CREATININE 1.1 11/20/2020    BUN 11 11/20/2020       Imaging   (all images personally reviewed; agree with report below  CT stone study (11/20/20)- "There is a left ureteral stent, the proximal portion of the stent at the upper pole of the left kidney, the distal aspect is within the urinary bladder.  There is mild periureteral stranding along the course of the left ureter, there is mild perinephric stranding of the left kidney.  Significant " "ureteral dilatation is not appreciated, there is mild pelvocaliceal dilatation.  A small amount of air density seen within the upper pole calyx on the left.  Nonobstructing nephrolithiasis involving the mid to lower pole calices on the left.  There is no evidence for calculus adjacent to the stent along the course of the left ureter.  Mild density of the right kidney may relate to medullary nephrocalcinosis.  There is no evidence for right-sided ureteral calculus or obstructive uropathy.  The urinary bladder appears unremarkable for degree of distention."    Assessment:   Acute cystitis with hematuria  Recurrent UTI  Nephrolithiasis    Plan:   Tasha was seen today for follow-up.    Diagnoses and all orders for this visit:    Flank pain  -     gentamicin injection 80 mg    Plan:  --Urine culture pending   --Gent IM x 3, 2nd dose today  --Follow up Monday for 3rd injection  --Referral to ID for further eval  "

## 2020-12-07 ENCOUNTER — OFFICE VISIT (OUTPATIENT)
Dept: UROLOGY | Facility: CLINIC | Age: 45
End: 2020-12-07
Payer: COMMERCIAL

## 2020-12-07 VITALS — SYSTOLIC BLOOD PRESSURE: 151 MMHG | DIASTOLIC BLOOD PRESSURE: 97 MMHG | HEART RATE: 66 BPM

## 2020-12-07 DIAGNOSIS — N39.0 RECURRENT UTI: ICD-10-CM

## 2020-12-07 DIAGNOSIS — N20.0 NEPHROLITHIASIS: ICD-10-CM

## 2020-12-07 DIAGNOSIS — N30.00 ACUTE CYSTITIS WITHOUT HEMATURIA: Primary | ICD-10-CM

## 2020-12-07 LAB — BACTERIA UR CULT: ABNORMAL

## 2020-12-07 PROCEDURE — 96372 THER/PROPH/DIAG INJ SC/IM: CPT | Mod: S$GLB,,, | Performed by: NURSE PRACTITIONER

## 2020-12-07 PROCEDURE — 1126F AMNT PAIN NOTED NONE PRSNT: CPT | Mod: S$GLB,,, | Performed by: NURSE PRACTITIONER

## 2020-12-07 PROCEDURE — 96372 PR INJECTION,THERAP/PROPH/DIAG2ST, IM OR SUBCUT: ICD-10-PCS | Mod: S$GLB,,, | Performed by: NURSE PRACTITIONER

## 2020-12-07 PROCEDURE — 3077F PR MOST RECENT SYSTOLIC BLOOD PRESSURE >= 140 MM HG: ICD-10-PCS | Mod: CPTII,S$GLB,, | Performed by: NURSE PRACTITIONER

## 2020-12-07 PROCEDURE — 3080F PR MOST RECENT DIASTOLIC BLOOD PRESSURE >= 90 MM HG: ICD-10-PCS | Mod: CPTII,S$GLB,, | Performed by: NURSE PRACTITIONER

## 2020-12-07 PROCEDURE — 1126F PR PAIN SEVERITY QUANTIFIED, NO PAIN PRESENT: ICD-10-PCS | Mod: S$GLB,,, | Performed by: NURSE PRACTITIONER

## 2020-12-07 PROCEDURE — 99499 UNLISTED E&M SERVICE: CPT | Mod: S$GLB,,, | Performed by: NURSE PRACTITIONER

## 2020-12-07 PROCEDURE — 3080F DIAST BP >= 90 MM HG: CPT | Mod: CPTII,S$GLB,, | Performed by: NURSE PRACTITIONER

## 2020-12-07 PROCEDURE — 99499 NO LOS: ICD-10-PCS | Mod: S$GLB,,, | Performed by: NURSE PRACTITIONER

## 2020-12-07 PROCEDURE — 3077F SYST BP >= 140 MM HG: CPT | Mod: CPTII,S$GLB,, | Performed by: NURSE PRACTITIONER

## 2020-12-07 RX ORDER — GENTAMICIN SULFATE 40 MG/ML
80 INJECTION, SOLUTION INTRAMUSCULAR; INTRAVENOUS
Status: COMPLETED | OUTPATIENT
Start: 2020-12-07 | End: 2020-12-07

## 2020-12-07 RX ADMIN — GENTAMICIN SULFATE 80 MG: 40 INJECTION, SOLUTION INTRAMUSCULAR; INTRAVENOUS at 08:12

## 2020-12-07 NOTE — PROGRESS NOTES
Subjective:      Tasha Rush is a 45 y.o. female who returns today regarding her urinary symptoms.    Seen previously following intraoperative bladder injury repaired by Dr. Barroso during hysterectomy in 2018. Baltazar was removed following cystogram showed no leak.      She has had recurrent UTIs over the last year. Began macrodantin prophylaxis in May 2020. She has completed numerous long courses of culture appropriate oral antibiotics without improvement.     Component Urine Culture, Routine   Latest Ref Rng & Units    11/20/2020 ESCHERICHIA COLI ESBL (A) . . .   11/2/2020 No significant growth   10/16/2020 ESCHERICHIA COLI ESBL (A) . . .   7/9/2020 ESCHERICHIA COLI ESBL (A) . . .   6/15/2020 ESCHERICHIA COLI ESBL (A) . . .   4/29/2020 ESCHERICHIA COLI ESBL (A) . . .   3/5/2020 ESCHERICHIA COLI ESBL (A) . . .   3/4/2020 ESCHERICHIA COLI (A) . . .     CT urogram in June revealed a 1 cm non obstructing left lower pole stone. No solid mass, perinephric collection, or hydronephrosis. Unfortunately her insurance did not cover a cystoscope at that time.     Normal cystoscope in November with Dr. Mitchell. She is now s/p left ureteroscopy with LL for the large renal stone by Dr. Mitchell on 11/20. She was evaluated in the ED on 11/20 for continued pain which was likely stent related. Urine culture was again positive for infection at that time. She completed a prescription for macrobid and removed the stent as instructed.    She presented to the clinic yesterday reporting bilateral flank pain L>R as well as very malodorous urine. Urine culture is pending. She received gent IM on 12/3 and 12/4 and returns today for her final dose. Reports mild improvement in her bladder symptoms today. Denies dysuria, frequency and urgency. Denies fever/chills and N/V.    The following portions of the patient's history were reviewed and updated as appropriate: allergies, current medications, past family history, past medical history, past  "social history, past surgical history and problem list.    Review of Systems  Constitutional: no fever or chills  ENT: no nasal congestion or sore throat  Respiratory: no cough or shortness of breath  Cardiovascular: no chest pain or palpitations  Gastrointestinal: no nausea or vomiting, tolerating diet  Genitourinary: as per HPI  Hematologic/Lymphatic: no easy bruising or lymphadenopathy  Musculoskeletal: no arthralgias or myalgias  Neurological: no seizures or tremors  Behavioral/Psych: no auditory or visual hallucinations     Objective:   Vital Signs:   Vitals:    12/07/20 0759   BP: (!) 151/97   Pulse: 66     Physical Exam   General: alert and oriented, no acute distress  Head: normocephalic, atraumatic  Neck: supple, no lymphadenopathy, normal ROM, no masses  Respiratory: Symmetric expansion, non-labored breathing  Cardiovascular: regular rate and rhythm, nomal pulses, no peripheral edema  Abdomen: soft, non tender, non distended, no palpable masses, no hernias, no hepatomegaly or splenomegaly  Pelvic: deferred  Lymphatic: no inguinal nodes  Skin: normal coloration and turgor, no rashes, no suspicious skin lesions noted  Neuro: alert and oriented x3, no gross deficits  Psych: normal judgment and insight, normal mood/affect and non-anxious  Mild left CVA tenderness    Lab Review   Urinalysis demonstrates: no specimen   Lab Results   Component Value Date    WBC 14.48 (H) 11/20/2020    HGB 12.7 11/20/2020    HCT 37.5 11/20/2020    MCV 92 11/20/2020     11/20/2020     Lab Results   Component Value Date    CREATININE 1.1 11/20/2020    BUN 11 11/20/2020       Imaging   (all images personally reviewed; agree with report below  CT stone study (11/20/20)- "There is a left ureteral stent, the proximal portion of the stent at the upper pole of the left kidney, the distal aspect is within the urinary bladder.  There is mild periureteral stranding along the course of the left ureter, there is mild perinephric " "stranding of the left kidney.  Significant ureteral dilatation is not appreciated, there is mild pelvocaliceal dilatation.  A small amount of air density seen within the upper pole calyx on the left.  Nonobstructing nephrolithiasis involving the mid to lower pole calices on the left.  There is no evidence for calculus adjacent to the stent along the course of the left ureter.  Mild density of the right kidney may relate to medullary nephrocalcinosis.  There is no evidence for right-sided ureteral calculus or obstructive uropathy.  The urinary bladder appears unremarkable for degree of distention."    Assessment:   Acute cystitis with hematuria  Recurrent UTI  Nephrolithiasis    Plan:   Diagnoses and all orders for this visit:    Acute cystitis without hematuria  -     gentamicin injection 80 mg    Recurrent UTI    Nephrolithiasis    Plan:  --Urine culture pending   --Gent IM x 3, 3rd dose today  --Referral to ID for further eval  --Follow up as scheduled with Dr. Mitchell for post op follow up     "

## 2020-12-15 ENCOUNTER — LAB VISIT (OUTPATIENT)
Dept: PRIMARY CARE CLINIC | Facility: OTHER | Age: 45
End: 2020-12-15
Payer: COMMERCIAL

## 2020-12-15 DIAGNOSIS — Z03.818 ENCOUNTER FOR OBSERVATION FOR SUSPECTED EXPOSURE TO OTHER BIOLOGICAL AGENTS RULED OUT: ICD-10-CM

## 2020-12-15 PROCEDURE — U0003 INFECTIOUS AGENT DETECTION BY NUCLEIC ACID (DNA OR RNA); SEVERE ACUTE RESPIRATORY SYNDROME CORONAVIRUS 2 (SARS-COV-2) (CORONAVIRUS DISEASE [COVID-19]), AMPLIFIED PROBE TECHNIQUE, MAKING USE OF HIGH THROUGHPUT TECHNOLOGIES AS DESCRIBED BY CMS-2020-01-R: HCPCS

## 2020-12-17 LAB — SARS-COV-2 RNA RESP QL NAA+PROBE: NOT DETECTED

## 2020-12-18 ENCOUNTER — OFFICE VISIT (OUTPATIENT)
Dept: INFECTIOUS DISEASES | Facility: CLINIC | Age: 45
End: 2020-12-18
Payer: COMMERCIAL

## 2020-12-18 ENCOUNTER — HOSPITAL ENCOUNTER (OUTPATIENT)
Dept: RADIOLOGY | Facility: OTHER | Age: 45
Discharge: HOME | End: 2020-12-18
Attending: UROLOGY
Payer: COMMERCIAL

## 2020-12-18 VITALS
HEART RATE: 78 BPM | WEIGHT: 177.25 LBS | HEIGHT: 66 IN | DIASTOLIC BLOOD PRESSURE: 101 MMHG | SYSTOLIC BLOOD PRESSURE: 147 MMHG | TEMPERATURE: 98 F | BODY MASS INDEX: 28.49 KG/M2

## 2020-12-18 DIAGNOSIS — N20.0 NEPHROLITHIASIS: ICD-10-CM

## 2020-12-18 DIAGNOSIS — N30.00 ACUTE CYSTITIS WITHOUT HEMATURIA: Primary | ICD-10-CM

## 2020-12-18 PROCEDURE — 99205 PR OFFICE/OUTPT VISIT, NEW, LEVL V, 60-74 MIN: ICD-10-PCS | Mod: S$PBB,,, | Performed by: INTERNAL MEDICINE

## 2020-12-18 PROCEDURE — 76770 US EXAM ABDO BACK WALL COMP: CPT | Mod: TC

## 2020-12-18 PROCEDURE — 99999 PR PBB SHADOW E&M-EST. PATIENT-LVL IV: ICD-10-PCS | Mod: PBBFAC,,, | Performed by: INTERNAL MEDICINE

## 2020-12-18 PROCEDURE — 99205 OFFICE O/P NEW HI 60 MIN: CPT | Mod: S$PBB,,, | Performed by: INTERNAL MEDICINE

## 2020-12-18 PROCEDURE — 99999 PR PBB SHADOW E&M-EST. PATIENT-LVL IV: CPT | Mod: PBBFAC,,, | Performed by: INTERNAL MEDICINE

## 2020-12-18 PROCEDURE — 76770 US EXAM ABDO BACK WALL COMP: CPT | Mod: 26,,, | Performed by: RADIOLOGY

## 2020-12-18 PROCEDURE — 76770 US RETROPERITONEAL COMPLETE: ICD-10-PCS | Mod: 26,,, | Performed by: RADIOLOGY

## 2020-12-18 NOTE — PROGRESS NOTES
Infectious Diseases Clinic Note    Subjective:       Patient ID: Tasha Rush is a 45 y.o. female.    Chief Complaint: No chief complaint on file.    HPI     44 y/o F h/o intraoperative bladder injury during hysterectomy in 2018 s/p repair with a few UTIs after this and now recurrent UTIs starting over past year. CT urogram 6/2020 with 1cm nonobstructing L lower polse stone, normal cysto in 11/2020 s/p  left ureteroscopy with LL for the large renal stone by Dr. Mitchell on 11/20 s/p stent placement. She was evaluated in the ED on 11/20 for continued pain which was likely stent related.  Stent now removed.  Seen 12/3 in  for b/l flank pain and malodorous urine given gentamycin injection x 3 started 12/3. Felt better after injections.  Still has slight L flank pain when holding urine in prior to going to urinate, still also has frequency but has good stream.  Drinking 3 bottles of water per day. Overall currently feeling well without significant LUTS    surg hx above  fam hx n/a  Soc hx n/a  Past Medical History:   Diagnosis Date    Abnormal Pap smear of cervix     Bacterial vaginosis     RECURRENT    Cervical dysplasia     Gastritis     Hypertension     Renal disorder     kidney stones    S/P bladder repair 8/26/2018       Social History     Socioeconomic History    Marital status: Single     Spouse name: Not on file    Number of children: Not on file    Years of education: Not on file    Highest education level: Not on file   Occupational History    Occupation: Medical asst at [employer]   Social Needs    Financial resource strain: Not on file    Food insecurity     Worry: Not on file     Inability: Not on file    Transportation needs     Medical: Not on file     Non-medical: Not on file   Tobacco Use    Smoking status: Current Some Day Smoker     Packs/day: 0.25    Smokeless tobacco: Never Used    Tobacco comment: 1 pack / 2 weeks   Substance and Sexual Activity    Alcohol use: Yes     Comment:  occasional    Drug use: No    Sexual activity: Yes   Lifestyle    Physical activity     Days per week: Not on file     Minutes per session: Not on file    Stress: Not on file   Relationships    Social connections     Talks on phone: Not on file     Gets together: Not on file     Attends [employer] service: Not on file     Active member of club or organization: Not on file     Attends meetings of clubs or organizations: Not on file     Relationship status: Not on file   Other Topics Concern    Not on file   Social History Narrative    Single.  Lives w/adopted 5 yr old daughter.  Pt lives next door to her own parents.           Current Outpatient Medications:     amLODIPine (NORVASC) 10 MG tablet, TAKE ONE TABLET BY MOUTH ONE TIME DAILY, Disp: 90 tablet, Rfl: 0    hydroCHLOROthiazide (HYDRODIURIL) 25 MG tablet, TAKE ONE TABLET BY MOUTH ONE TIME DAILY , Disp: 90 tablet, Rfl: 0    ciclopirox (PENLAC) 8 % Soln, Apply topically nightly. Remove with rubbing alcohol every 7 days (Patient not taking: Reported on 12/18/2020), Disp: 6.6 mL, Rfl: 11    docusate sodium (COLACE) 100 MG capsule, Take 1 capsule (100 mg total) by mouth 2 (two) times daily as needed for Constipation. (Patient not taking: Reported on 12/18/2020), Disp: 60 capsule, Rfl: 0    gabapentin (NEURONTIN) 100 MG capsule, Take 1 capsule (100 mg total) by mouth 3 (three) times daily. (Patient not taking: Reported on 12/18/2020), Disp: 90 capsule, Rfl: 0    meloxicam (MOBIC) 15 MG tablet, Take 1 tablet (15 mg total) by mouth once daily. (Patient not taking: Reported on 12/18/2020), Disp: 30 tablet, Rfl: 1    oxybutynin (DITROPAN) 5 MG Tab, Take 1 tablet (5 mg total) by mouth 3 (three) times daily as needed (bladder spasm). (Patient not taking: Reported on 12/18/2020), Disp: 30 tablet, Rfl: 0    phenazopyridine (PYRIDIUM) 100 MG tablet, Take 2 tablets (200 mg total) by mouth every 8 (eight) hours as needed (burning with urination). (Patient not  taking: Reported on 12/18/2020), Disp: 20 tablet, Rfl: 0    tamsulosin (FLOMAX) 0.4 mg Cap, Take 1 capsule (0.4 mg total) by mouth every evening. (Patient not taking: Reported on 12/18/2020), Disp: 5 capsule, Rfl: 0    Review of Systems   Constitutional: Negative for activity change, chills and fever.   HENT: Negative for congestion, mouth sores, rhinorrhea, sinus pressure and sore throat.    Eyes: Negative for photophobia, pain and redness.   Respiratory: Negative for cough, chest tightness, shortness of breath and wheezing.    Cardiovascular: Negative for chest pain and leg swelling.   Gastrointestinal: Negative for abdominal distention, abdominal pain, diarrhea, nausea and vomiting.   Endocrine: Negative for polyuria.   Genitourinary: Negative for decreased urine volume, dysuria and flank pain.   Musculoskeletal: Negative for joint swelling and neck pain.   Skin: Negative for color change.   Allergic/Immunologic: Negative for food allergies.   Neurological: Negative for dizziness, weakness and headaches.   Hematological: Negative for adenopathy.   Psychiatric/Behavioral: Negative for agitation and confusion. The patient is not nervous/anxious.            Objective:      Vitals:    12/18/20 0947   BP: (!) 147/101   Pulse: 78   Temp: 98.3 °F (36.8 °C)     Physical Exam  Constitutional:       General: She is not in acute distress.     Appearance: She is well-developed.   HENT:      Head: Normocephalic and atraumatic.   Eyes:      General: No scleral icterus.     Conjunctiva/sclera: Conjunctivae normal.   Neck:      Musculoskeletal: Normal range of motion and neck supple.   Cardiovascular:      Rate and Rhythm: Normal rate and regular rhythm.      Heart sounds: No murmur.   Pulmonary:      Effort: Pulmonary effort is normal. No respiratory distress.      Breath sounds: Normal breath sounds. No wheezing.   Abdominal:      General: Bowel sounds are normal. There is no distension.      Palpations: Abdomen is soft.       Tenderness: There is no right CVA tenderness or left CVA tenderness.   Musculoskeletal: Normal range of motion.         General: No tenderness.   Lymphadenopathy:      Cervical: No cervical adenopathy.   Skin:     General: Skin is warm and dry.      Findings: No erythema or rash.   Neurological:      Mental Status: She is alert and oriented to person, place, and time.      Coordination: Coordination normal.   Psychiatric:         Behavior: Behavior normal.             Assessment/Plan:       1. Acute cystitis without hematuria        46 y/o F h/o intraoperative bladder injury during hysterectomy in 2018 s/p repair with a few UTIs after this and now recurrent UTIs starting over past year. CT urogram 6/2020 with 1cm nonobstructing L lower polse stone, normal cysto in 11/2020 s/p  left ureteroscopy with LL for the large renal stone by Dr. Mitchell on 11/20 s/p stent placement. She was evaluated in the ED on 11/20 for continued pain which was likely stent related.  Stent now removed.  Seen 12/3 in  for b/l flank pain and malodorous urine given gentamycin injection x 3 started 12/3. Felt better after injections.  Still has slight L flank pain when holding urine in prior to going to urinate, still also has frequency but has good stream.  Drinking 3 bottles of water per day. Overall currently feeling well without significant LUTS  - hopefully with removal of stone pt will have less symptomatic UTIs  - counseled at length on hydration daily and increasing when mild symptoms begin  - counseled on not culturing or treating asymptomatic bacteriuria to avoid toxicities of antimicrobials as well as resistance  - pt is running out of antimicrobial options and understands this  - will send to urogyn for additional optimization to avoid LUTS  - start probiotic  - pt will reach out when symptomatic for therapy

## 2020-12-18 NOTE — LETTER
December 18, 2020      Meme Freedman, EVER  [address] [facility] [address]           [facility] - Infectious Disease 1st Fl  [address]  [place] [address]  Phone: [phone]  Fax: [phone]          Patient: Tasha Rush   MR Number: [id number]   YOB: 1975   Date of Visit: 12/18/2020       Dear Meme Freedman:    Thank you for referring Tasha Rush to me for evaluation. Attached you will find relevant portions of my assessment and plan of care.    If you have questions, please do not hesitate to call me. I look forward to following Tasha Rush along with you.    Sincerely,    Anand Fields MD    Enclosure  CC:  No Recipients    If you would like to receive this communication electronically, please contact [email or web address] or [phone] to request more information on [employer] access.    For providers and/or their staff who would like to refer a patient to [facility], please contact us through our one-stop-shop provider referral line, [facility], at [phone].    If you feel you have received this communication in error or would no longer like to receive these types of communications, please e-mail [email or web address]

## 2020-12-21 ENCOUNTER — TELEPHONE (OUTPATIENT)
Dept: UROLOGY | Facility: CLINIC | Age: 45
End: 2020-12-21

## 2021-01-13 ENCOUNTER — INITIAL CONSULT (OUTPATIENT)
Dept: UROGYNECOLOGY | Facility: CLINIC | Age: 46
End: 2021-01-13
Payer: COMMERCIAL

## 2021-01-13 VITALS
SYSTOLIC BLOOD PRESSURE: 135 MMHG | HEIGHT: 66 IN | BODY MASS INDEX: 29.69 KG/M2 | HEART RATE: 70 BPM | DIASTOLIC BLOOD PRESSURE: 83 MMHG | WEIGHT: 184.75 LBS

## 2021-01-13 DIAGNOSIS — K59.01 SLOW TRANSIT CONSTIPATION: ICD-10-CM

## 2021-01-13 DIAGNOSIS — N32.89 SPASM OF BLADDER: Primary | ICD-10-CM

## 2021-01-13 DIAGNOSIS — N89.8 VAGINAL DISCHARGE: ICD-10-CM

## 2021-01-13 DIAGNOSIS — N30.00 ACUTE CYSTITIS WITHOUT HEMATURIA: ICD-10-CM

## 2021-01-13 PROCEDURE — 87510 GARDNER VAG DNA DIR PROBE: CPT

## 2021-01-13 PROCEDURE — 99214 OFFICE O/P EST MOD 30 MIN: CPT | Mod: PBBFAC | Performed by: OBSTETRICS & GYNECOLOGY

## 2021-01-13 PROCEDURE — 87481 CANDIDA DNA AMP PROBE: CPT | Mod: 59

## 2021-01-13 PROCEDURE — 87086 URINE CULTURE/COLONY COUNT: CPT

## 2021-01-13 PROCEDURE — 99999 PR PBB SHADOW E&M-EST. PATIENT-LVL IV: ICD-10-PCS | Mod: PBBFAC,,, | Performed by: OBSTETRICS & GYNECOLOGY

## 2021-01-13 PROCEDURE — 99999 PR PBB SHADOW E&M-EST. PATIENT-LVL IV: CPT | Mod: PBBFAC,,, | Performed by: OBSTETRICS & GYNECOLOGY

## 2021-01-13 PROCEDURE — 99215 OFFICE O/P EST HI 40 MIN: CPT | Mod: S$GLB,,, | Performed by: OBSTETRICS & GYNECOLOGY

## 2021-01-13 PROCEDURE — 87660 TRICHOMONAS VAGIN DIR PROBE: CPT

## 2021-01-13 PROCEDURE — 99215 PR OFFICE/OUTPT VISIT, EST, LEVL V, 40-54 MIN: ICD-10-PCS | Mod: S$GLB,,, | Performed by: OBSTETRICS & GYNECOLOGY

## 2021-01-13 RX ORDER — DOCUSATE SODIUM 100 MG/1
100 CAPSULE, LIQUID FILLED ORAL 2 TIMES DAILY
Qty: 60 CAPSULE | Refills: 11 | Status: SHIPPED | OUTPATIENT
Start: 2021-01-13 | End: 2021-02-12

## 2021-01-13 RX ORDER — METHENAMINE, SODIUM PHOSPHATE, MONOBASIC, MONOHYDRATE, PHENYL SALICYLATE, METHYLENE BLUE, AND HYOSCYAMINE SULFATE 118; 40.8; 36; 10; .12 MG/1; MG/1; MG/1; MG/1; MG/1
1 CAPSULE ORAL 4 TIMES DAILY
Qty: 40 CAPSULE | Refills: 4 | Status: SHIPPED | OUTPATIENT
Start: 2021-01-13 | End: 2021-01-17

## 2021-01-15 LAB
BACTERIA UR CULT: NO GROWTH
CANDIDA RRNA VAG QL PROBE: NEGATIVE
G VAGINALIS RRNA GENITAL QL PROBE: POSITIVE
T VAGINALIS RRNA GENITAL QL PROBE: NEGATIVE

## 2021-01-19 ENCOUNTER — LAB VISIT (OUTPATIENT)
Dept: LAB | Facility: OTHER | Age: 46
End: 2021-01-19
Attending: OBSTETRICS & GYNECOLOGY
Payer: COMMERCIAL

## 2021-01-19 ENCOUNTER — PATIENT MESSAGE (OUTPATIENT)
Dept: UROGYNECOLOGY | Facility: CLINIC | Age: 46
End: 2021-01-19

## 2021-01-19 DIAGNOSIS — N32.89 SPASM OF BLADDER: ICD-10-CM

## 2021-01-19 LAB
ALBUMIN SERPL BCP-MCNC: 4.3 G/DL (ref 3.5–5.2)
ALP SERPL-CCNC: 103 U/L (ref 55–135)
ALT SERPL W/O P-5'-P-CCNC: 28 U/L (ref 10–44)
ANION GAP SERPL CALC-SCNC: 8 MMOL/L (ref 8–16)
AST SERPL-CCNC: 22 U/L (ref 10–40)
BASOPHILS # BLD AUTO: 0.07 K/UL (ref 0–0.2)
BASOPHILS NFR BLD: 0.9 % (ref 0–1.9)
BILIRUB SERPL-MCNC: 0.6 MG/DL (ref 0.1–1)
BUN SERPL-MCNC: 16 MG/DL (ref 6–20)
CALCIUM SERPL-MCNC: 9.4 MG/DL (ref 8.7–10.5)
CHLORIDE SERPL-SCNC: 104 MMOL/L (ref 95–110)
CO2 SERPL-SCNC: 26 MMOL/L (ref 23–29)
CREAT SERPL-MCNC: 0.8 MG/DL (ref 0.5–1.4)
DIFFERENTIAL METHOD: ABNORMAL
EOSINOPHIL # BLD AUTO: 0.1 K/UL (ref 0–0.5)
EOSINOPHIL NFR BLD: 1.6 % (ref 0–8)
ERYTHROCYTE [DISTWIDTH] IN BLOOD BY AUTOMATED COUNT: 14.7 % (ref 11.5–14.5)
EST. GFR  (AFRICAN AMERICAN): >60 ML/MIN/1.73 M^2
EST. GFR  (NON AFRICAN AMERICAN): >60 ML/MIN/1.73 M^2
GLUCOSE SERPL-MCNC: 104 MG/DL (ref 70–110)
HCT VFR BLD AUTO: 42.3 % (ref 37–48.5)
HGB BLD-MCNC: 13.1 G/DL (ref 12–16)
IMM GRANULOCYTES # BLD AUTO: 0.02 K/UL (ref 0–0.04)
IMM GRANULOCYTES NFR BLD AUTO: 0.3 % (ref 0–0.5)
LYMPHOCYTES # BLD AUTO: 3.1 K/UL (ref 1–4.8)
LYMPHOCYTES NFR BLD: 40 % (ref 18–48)
MCH RBC QN AUTO: 30 PG (ref 27–31)
MCHC RBC AUTO-ENTMCNC: 31 G/DL (ref 32–36)
MCV RBC AUTO: 97 FL (ref 82–98)
MONOCYTES # BLD AUTO: 0.8 K/UL (ref 0.3–1)
MONOCYTES NFR BLD: 10.1 % (ref 4–15)
NEUTROPHILS # BLD AUTO: 3.6 K/UL (ref 1.8–7.7)
NEUTROPHILS NFR BLD: 47.1 % (ref 38–73)
NRBC BLD-RTO: 0 /100 WBC
PLATELET # BLD AUTO: 248 K/UL (ref 150–350)
PMV BLD AUTO: 9.2 FL (ref 9.2–12.9)
POTASSIUM SERPL-SCNC: 5.1 MMOL/L (ref 3.5–5.1)
PROT SERPL-MCNC: 7.5 G/DL (ref 6–8.4)
RBC # BLD AUTO: 4.36 M/UL (ref 4–5.4)
SODIUM SERPL-SCNC: 138 MMOL/L (ref 136–145)
WBC # BLD AUTO: 7.7 K/UL (ref 3.9–12.7)

## 2021-01-19 PROCEDURE — 80053 COMPREHEN METABOLIC PANEL: CPT

## 2021-01-19 PROCEDURE — 85025 COMPLETE CBC W/AUTO DIFF WBC: CPT

## 2021-01-19 PROCEDURE — 36415 COLL VENOUS BLD VENIPUNCTURE: CPT

## 2021-01-20 ENCOUNTER — PATIENT MESSAGE (OUTPATIENT)
Dept: UROGYNECOLOGY | Facility: CLINIC | Age: 46
End: 2021-01-20

## 2021-01-20 ENCOUNTER — TELEPHONE (OUTPATIENT)
Dept: UROGYNECOLOGY | Facility: CLINIC | Age: 46
End: 2021-01-20

## 2021-01-20 RX ORDER — METRONIDAZOLE 7.5 MG/G
1 GEL VAGINAL 2 TIMES DAILY
Qty: 70 G | Refills: 0 | Status: SHIPPED | OUTPATIENT
Start: 2021-01-20 | End: 2021-03-11

## 2021-01-27 ENCOUNTER — PATIENT MESSAGE (OUTPATIENT)
Dept: INTERNAL MEDICINE | Facility: CLINIC | Age: 46
End: 2021-01-27

## 2021-01-27 DIAGNOSIS — Z20.822 ENCOUNTER FOR LABORATORY TESTING FOR COVID-19 VIRUS: ICD-10-CM

## 2021-01-28 ENCOUNTER — LAB VISIT (OUTPATIENT)
Dept: INTERNAL MEDICINE | Facility: CLINIC | Age: 46
End: 2021-01-28
Payer: COMMERCIAL

## 2021-01-28 DIAGNOSIS — Z20.822 ENCOUNTER FOR LABORATORY TESTING FOR COVID-19 VIRUS: ICD-10-CM

## 2021-01-28 PROCEDURE — U0003 INFECTIOUS AGENT DETECTION BY NUCLEIC ACID (DNA OR RNA); SEVERE ACUTE RESPIRATORY SYNDROME CORONAVIRUS 2 (SARS-COV-2) (CORONAVIRUS DISEASE [COVID-19]), AMPLIFIED PROBE TECHNIQUE, MAKING USE OF HIGH THROUGHPUT TECHNOLOGIES AS DESCRIBED BY CMS-2020-01-R: HCPCS

## 2021-01-29 ENCOUNTER — OFFICE VISIT (OUTPATIENT)
Dept: INTERNAL MEDICINE | Facility: CLINIC | Age: 46
End: 2021-01-29
Attending: INTERNAL MEDICINE
Payer: COMMERCIAL

## 2021-01-29 DIAGNOSIS — I10 HYPERTENSION, ESSENTIAL: ICD-10-CM

## 2021-01-29 DIAGNOSIS — R35.0 URINARY FREQUENCY: ICD-10-CM

## 2021-01-29 DIAGNOSIS — N20.0 NEPHROLITHIASIS: ICD-10-CM

## 2021-01-29 DIAGNOSIS — M54.50 ACUTE LOW BACK PAIN WITHOUT SCIATICA, UNSPECIFIED BACK PAIN LATERALITY: Primary | ICD-10-CM

## 2021-01-29 LAB — SARS-COV-2 RNA RESP QL NAA+PROBE: NOT DETECTED

## 2021-01-29 PROCEDURE — 99214 PR OFFICE/OUTPT VISIT, EST, LEVL IV, 30-39 MIN: ICD-10-PCS | Mod: 95,,, | Performed by: INTERNAL MEDICINE

## 2021-01-29 PROCEDURE — 99214 OFFICE O/P EST MOD 30 MIN: CPT | Mod: 95,,, | Performed by: INTERNAL MEDICINE

## 2021-02-01 ENCOUNTER — PATIENT MESSAGE (OUTPATIENT)
Dept: UROGYNECOLOGY | Facility: CLINIC | Age: 46
End: 2021-02-01

## 2021-02-01 DIAGNOSIS — N76.0 BACTERIAL VAGINITIS: Primary | ICD-10-CM

## 2021-02-01 DIAGNOSIS — B96.89 BACTERIAL VAGINITIS: Primary | ICD-10-CM

## 2021-02-01 RX ORDER — METRONIDAZOLE 500 MG/1
500 TABLET ORAL EVERY 12 HOURS
Qty: 14 TABLET | Refills: 0 | Status: SHIPPED | OUTPATIENT
Start: 2021-02-01 | End: 2021-03-11

## 2021-02-04 ENCOUNTER — IMMUNIZATION (OUTPATIENT)
Dept: INTERNAL MEDICINE | Facility: CLINIC | Age: 46
End: 2021-02-04
Payer: COMMERCIAL

## 2021-02-04 DIAGNOSIS — Z23 NEED FOR VACCINATION: Primary | ICD-10-CM

## 2021-02-04 PROCEDURE — 0001A PR IMMUNIZ ADMIN, SARS-COV-2 COVID-19 VACC, 30MCG/0.3ML, 1ST DOSE: CPT | Mod: PBBFAC | Performed by: INTERNAL MEDICINE

## 2021-02-04 PROCEDURE — 91300 PR SARS-COV- 2 COVID-19 VACCINE, NO PRSV, 30MCG/0.3ML, IM: CPT | Mod: PBBFAC | Performed by: INTERNAL MEDICINE

## 2021-02-04 RX ADMIN — RNA INGREDIENT BNT-162B2 0.3 ML: 0.23 INJECTION, SUSPENSION INTRAMUSCULAR at 11:02

## 2021-02-09 ENCOUNTER — PATIENT MESSAGE (OUTPATIENT)
Dept: UROGYNECOLOGY | Facility: CLINIC | Age: 46
End: 2021-02-09

## 2021-02-11 ENCOUNTER — LAB VISIT (OUTPATIENT)
Dept: PRIMARY CARE CLINIC | Facility: OTHER | Age: 46
End: 2021-02-11
Attending: INTERNAL MEDICINE
Payer: COMMERCIAL

## 2021-02-11 DIAGNOSIS — Z20.822 ENCOUNTER FOR LABORATORY TESTING FOR COVID-19 VIRUS: ICD-10-CM

## 2021-02-11 PROCEDURE — U0003 INFECTIOUS AGENT DETECTION BY NUCLEIC ACID (DNA OR RNA); SEVERE ACUTE RESPIRATORY SYNDROME CORONAVIRUS 2 (SARS-COV-2) (CORONAVIRUS DISEASE [COVID-19]), AMPLIFIED PROBE TECHNIQUE, MAKING USE OF HIGH THROUGHPUT TECHNOLOGIES AS DESCRIBED BY CMS-2020-01-R: HCPCS

## 2021-02-12 LAB — SARS-COV-2 RNA RESP QL NAA+PROBE: NOT DETECTED

## 2021-02-25 ENCOUNTER — IMMUNIZATION (OUTPATIENT)
Dept: INTERNAL MEDICINE | Facility: CLINIC | Age: 46
End: 2021-02-25
Payer: COMMERCIAL

## 2021-02-25 DIAGNOSIS — Z23 NEED FOR VACCINATION: Primary | ICD-10-CM

## 2021-02-25 PROCEDURE — 91300 COVID-19, MRNA, LNP-S, PF, 30 MCG/0.3 ML DOSE VACCINE: CPT | Mod: PBBFAC | Performed by: INTERNAL MEDICINE

## 2021-02-25 PROCEDURE — 0002A COVID-19, MRNA, LNP-S, PF, 30 MCG/0.3 ML DOSE VACCINE: CPT | Mod: PBBFAC | Performed by: INTERNAL MEDICINE

## 2021-03-04 ENCOUNTER — PATIENT MESSAGE (OUTPATIENT)
Dept: OBSTETRICS AND GYNECOLOGY | Facility: CLINIC | Age: 46
End: 2021-03-04

## 2021-03-04 DIAGNOSIS — N76.0 BV (BACTERIAL VAGINOSIS): Primary | ICD-10-CM

## 2021-03-04 DIAGNOSIS — B96.89 BACTERIAL VAGINITIS: ICD-10-CM

## 2021-03-04 DIAGNOSIS — B96.89 BV (BACTERIAL VAGINOSIS): Primary | ICD-10-CM

## 2021-03-04 DIAGNOSIS — N76.0 BACTERIAL VAGINITIS: ICD-10-CM

## 2021-03-09 ENCOUNTER — LAB VISIT (OUTPATIENT)
Dept: PRIMARY CARE CLINIC | Facility: OTHER | Age: 46
End: 2021-03-09
Payer: COMMERCIAL

## 2021-03-09 DIAGNOSIS — Z20.822 ENCOUNTER FOR LABORATORY TESTING FOR COVID-19 VIRUS: ICD-10-CM

## 2021-03-09 PROCEDURE — U0003 INFECTIOUS AGENT DETECTION BY NUCLEIC ACID (DNA OR RNA); SEVERE ACUTE RESPIRATORY SYNDROME CORONAVIRUS 2 (SARS-COV-2) (CORONAVIRUS DISEASE [COVID-19]), AMPLIFIED PROBE TECHNIQUE, MAKING USE OF HIGH THROUGHPUT TECHNOLOGIES AS DESCRIBED BY CMS-2020-01-R: HCPCS | Performed by: INTERNAL MEDICINE

## 2021-03-10 LAB — SARS-COV-2 RNA RESP QL NAA+PROBE: NOT DETECTED

## 2021-03-11 RX ORDER — METRONIDAZOLE 500 MG/1
500 TABLET ORAL EVERY 12 HOURS
Qty: 28 TABLET | Refills: 0 | Status: SHIPPED | OUTPATIENT
Start: 2021-03-11 | End: 2021-07-12 | Stop reason: SDUPTHER

## 2021-03-17 ENCOUNTER — TELEPHONE (OUTPATIENT)
Dept: UROGYNECOLOGY | Facility: CLINIC | Age: 46
End: 2021-03-17

## 2021-04-02 ENCOUNTER — PATIENT MESSAGE (OUTPATIENT)
Dept: INTERNAL MEDICINE | Facility: CLINIC | Age: 46
End: 2021-04-02

## 2021-04-02 ENCOUNTER — PATIENT MESSAGE (OUTPATIENT)
Dept: UROGYNECOLOGY | Facility: CLINIC | Age: 46
End: 2021-04-02

## 2021-04-08 ENCOUNTER — LAB VISIT (OUTPATIENT)
Dept: INTERNAL MEDICINE | Facility: CLINIC | Age: 46
End: 2021-04-08
Payer: COMMERCIAL

## 2021-04-08 ENCOUNTER — PATIENT MESSAGE (OUTPATIENT)
Dept: INTERNAL MEDICINE | Facility: CLINIC | Age: 46
End: 2021-04-08

## 2021-04-08 DIAGNOSIS — Z01.84 IMMUNITY STATUS TESTING: Primary | ICD-10-CM

## 2021-04-08 DIAGNOSIS — Z20.822 ENCOUNTER FOR LABORATORY TESTING FOR COVID-19 VIRUS: ICD-10-CM

## 2021-04-08 PROCEDURE — U0005 INFEC AGEN DETEC AMPLI PROBE: HCPCS | Performed by: INTERNAL MEDICINE

## 2021-04-08 PROCEDURE — U0003 INFECTIOUS AGENT DETECTION BY NUCLEIC ACID (DNA OR RNA); SEVERE ACUTE RESPIRATORY SYNDROME CORONAVIRUS 2 (SARS-COV-2) (CORONAVIRUS DISEASE [COVID-19]), AMPLIFIED PROBE TECHNIQUE, MAKING USE OF HIGH THROUGHPUT TECHNOLOGIES AS DESCRIBED BY CMS-2020-01-R: HCPCS | Performed by: INTERNAL MEDICINE

## 2021-04-09 LAB — SARS-COV-2 RNA RESP QL NAA+PROBE: NOT DETECTED

## 2021-04-22 ENCOUNTER — TELEPHONE (OUTPATIENT)
Dept: UROGYNECOLOGY | Facility: CLINIC | Age: 46
End: 2021-04-22

## 2021-04-22 DIAGNOSIS — R30.0 DYSURIA: Primary | ICD-10-CM

## 2021-04-23 ENCOUNTER — LAB VISIT (OUTPATIENT)
Dept: LAB | Facility: OTHER | Age: 46
End: 2021-04-23
Attending: OBSTETRICS & GYNECOLOGY
Payer: COMMERCIAL

## 2021-04-23 DIAGNOSIS — R30.0 DYSURIA: ICD-10-CM

## 2021-04-23 LAB
BILIRUB UR QL STRIP: NEGATIVE
CLARITY UR: CLEAR
COLOR UR: YELLOW
GLUCOSE UR QL STRIP: NEGATIVE
HGB UR QL STRIP: NEGATIVE
KETONES UR QL STRIP: NEGATIVE
LEUKOCYTE ESTERASE UR QL STRIP: NEGATIVE
NITRITE UR QL STRIP: NEGATIVE
PH UR STRIP: 6 [PH] (ref 5–8)
PROT UR QL STRIP: NEGATIVE
SP GR UR STRIP: >=1.03 (ref 1–1.03)
URN SPEC COLLECT METH UR: ABNORMAL
UROBILINOGEN UR STRIP-ACNC: NEGATIVE EU/DL

## 2021-04-23 PROCEDURE — 81003 URINALYSIS AUTO W/O SCOPE: CPT | Performed by: OBSTETRICS & GYNECOLOGY

## 2021-04-23 PROCEDURE — 87086 URINE CULTURE/COLONY COUNT: CPT | Performed by: OBSTETRICS & GYNECOLOGY

## 2021-04-25 LAB — BACTERIA UR CULT: NORMAL

## 2021-04-26 ENCOUNTER — PATIENT MESSAGE (OUTPATIENT)
Dept: UROGYNECOLOGY | Facility: CLINIC | Age: 46
End: 2021-04-26

## 2021-05-03 ENCOUNTER — LAB VISIT (OUTPATIENT)
Dept: PRIMARY CARE CLINIC | Facility: OTHER | Age: 46
End: 2021-05-03
Payer: COMMERCIAL

## 2021-05-03 DIAGNOSIS — Z20.822 ENCOUNTER FOR LABORATORY TESTING FOR COVID-19 VIRUS: ICD-10-CM

## 2021-05-03 PROCEDURE — U0003 INFECTIOUS AGENT DETECTION BY NUCLEIC ACID (DNA OR RNA); SEVERE ACUTE RESPIRATORY SYNDROME CORONAVIRUS 2 (SARS-COV-2) (CORONAVIRUS DISEASE [COVID-19]), AMPLIFIED PROBE TECHNIQUE, MAKING USE OF HIGH THROUGHPUT TECHNOLOGIES AS DESCRIBED BY CMS-2020-01-R: HCPCS

## 2021-05-04 LAB — SARS-COV-2 RNA RESP QL NAA+PROBE: NOT DETECTED

## 2021-06-14 ENCOUNTER — LAB VISIT (OUTPATIENT)
Dept: PRIMARY CARE CLINIC | Facility: OTHER | Age: 46
End: 2021-06-14
Attending: INTERNAL MEDICINE
Payer: OTHER GOVERNMENT

## 2021-06-14 DIAGNOSIS — Z20.822 ENCOUNTER FOR LABORATORY TESTING FOR COVID-19 VIRUS: ICD-10-CM

## 2021-06-14 PROCEDURE — U0003 INFECTIOUS AGENT DETECTION BY NUCLEIC ACID (DNA OR RNA); SEVERE ACUTE RESPIRATORY SYNDROME CORONAVIRUS 2 (SARS-COV-2) (CORONAVIRUS DISEASE [COVID-19]), AMPLIFIED PROBE TECHNIQUE, MAKING USE OF HIGH THROUGHPUT TECHNOLOGIES AS DESCRIBED BY CMS-2020-01-R: HCPCS | Performed by: INTERNAL MEDICINE

## 2021-06-15 LAB — SARS-COV-2 RNA RESP QL NAA+PROBE: NOT DETECTED

## 2021-06-22 ENCOUNTER — OFFICE VISIT (OUTPATIENT)
Dept: INTERNAL MEDICINE | Facility: CLINIC | Age: 46
End: 2021-06-22
Attending: INTERNAL MEDICINE
Payer: COMMERCIAL

## 2021-06-22 ENCOUNTER — PATIENT MESSAGE (OUTPATIENT)
Dept: INTERNAL MEDICINE | Facility: CLINIC | Age: 46
End: 2021-06-22

## 2021-06-22 VITALS
WEIGHT: 183.88 LBS | OXYGEN SATURATION: 98 % | BODY MASS INDEX: 29.55 KG/M2 | SYSTOLIC BLOOD PRESSURE: 143 MMHG | DIASTOLIC BLOOD PRESSURE: 83 MMHG | HEIGHT: 66 IN | HEART RATE: 75 BPM

## 2021-06-22 DIAGNOSIS — R35.0 URINARY FREQUENCY: Primary | ICD-10-CM

## 2021-06-22 DIAGNOSIS — I10 HYPERTENSION, ESSENTIAL: ICD-10-CM

## 2021-06-22 LAB
BILIRUB UR QL STRIP: NEGATIVE
CLARITY UR REFRACT.AUTO: CLEAR
COLOR UR AUTO: YELLOW
GLUCOSE UR QL STRIP: NEGATIVE
HGB UR QL STRIP: NEGATIVE
KETONES UR QL STRIP: NEGATIVE
LEUKOCYTE ESTERASE UR QL STRIP: NEGATIVE
NITRITE UR QL STRIP: NEGATIVE
PH UR STRIP: 6 [PH] (ref 5–8)
PROT UR QL STRIP: NEGATIVE
SP GR UR STRIP: 1.01 (ref 1–1.03)
URN SPEC COLLECT METH UR: NORMAL

## 2021-06-22 PROCEDURE — 99214 PR OFFICE/OUTPT VISIT, EST, LEVL IV, 30-39 MIN: ICD-10-PCS | Mod: S$GLB,,, | Performed by: INTERNAL MEDICINE

## 2021-06-22 PROCEDURE — 99213 OFFICE O/P EST LOW 20 MIN: CPT | Mod: PBBFAC | Performed by: INTERNAL MEDICINE

## 2021-06-22 PROCEDURE — 99214 OFFICE O/P EST MOD 30 MIN: CPT | Mod: S$GLB,,, | Performed by: INTERNAL MEDICINE

## 2021-06-22 PROCEDURE — 99999 PR PBB SHADOW E&M-EST. PATIENT-LVL III: ICD-10-PCS | Mod: PBBFAC,,, | Performed by: INTERNAL MEDICINE

## 2021-06-22 PROCEDURE — 99999 PR PBB SHADOW E&M-EST. PATIENT-LVL III: CPT | Mod: PBBFAC,,, | Performed by: INTERNAL MEDICINE

## 2021-06-22 PROCEDURE — 81003 URINALYSIS AUTO W/O SCOPE: CPT | Performed by: INTERNAL MEDICINE

## 2021-06-22 RX ORDER — AMLODIPINE BESYLATE 10 MG/1
TABLET ORAL
Qty: 90 TABLET | Refills: 0 | Status: SHIPPED | OUTPATIENT
Start: 2021-06-22 | End: 2021-09-17

## 2021-06-22 RX ORDER — HYDROCHLOROTHIAZIDE 25 MG/1
25 TABLET ORAL DAILY
Qty: 90 TABLET | Refills: 0 | Status: SHIPPED | OUTPATIENT
Start: 2021-06-22 | End: 2021-09-17

## 2021-07-13 ENCOUNTER — LAB VISIT (OUTPATIENT)
Dept: PRIMARY CARE CLINIC | Facility: OTHER | Age: 46
End: 2021-07-13
Payer: COMMERCIAL

## 2021-07-13 DIAGNOSIS — Z20.822 ENCOUNTER FOR LABORATORY TESTING FOR COVID-19 VIRUS: ICD-10-CM

## 2021-07-13 PROCEDURE — U0003 INFECTIOUS AGENT DETECTION BY NUCLEIC ACID (DNA OR RNA); SEVERE ACUTE RESPIRATORY SYNDROME CORONAVIRUS 2 (SARS-COV-2) (CORONAVIRUS DISEASE [COVID-19]), AMPLIFIED PROBE TECHNIQUE, MAKING USE OF HIGH THROUGHPUT TECHNOLOGIES AS DESCRIBED BY CMS-2020-01-R: HCPCS | Performed by: INTERNAL MEDICINE

## 2021-07-14 LAB
SARS-COV-2 RNA RESP QL NAA+PROBE: NOT DETECTED
SARS-COV-2- CYCLE NUMBER: -1

## 2021-07-21 ENCOUNTER — HOSPITAL ENCOUNTER (OUTPATIENT)
Dept: RADIOLOGY | Facility: OTHER | Age: 46
Discharge: HOME | End: 2021-07-21
Attending: INTERNAL MEDICINE
Payer: COMMERCIAL

## 2021-07-21 ENCOUNTER — OFFICE VISIT (OUTPATIENT)
Dept: INTERNAL MEDICINE | Facility: CLINIC | Age: 46
End: 2021-07-21
Attending: INTERNAL MEDICINE
Payer: COMMERCIAL

## 2021-07-21 ENCOUNTER — PATIENT MESSAGE (OUTPATIENT)
Dept: INTERNAL MEDICINE | Facility: CLINIC | Age: 46
End: 2021-07-21

## 2021-07-21 VITALS
HEIGHT: 66 IN | BODY MASS INDEX: 30.54 KG/M2 | OXYGEN SATURATION: 98 % | HEART RATE: 68 BPM | DIASTOLIC BLOOD PRESSURE: 84 MMHG | SYSTOLIC BLOOD PRESSURE: 124 MMHG | WEIGHT: 190.06 LBS

## 2021-07-21 DIAGNOSIS — Z01.818 PRE-OP EVALUATION: Primary | ICD-10-CM

## 2021-07-21 DIAGNOSIS — Z01.818 PRE-OP EVALUATION: ICD-10-CM

## 2021-07-21 DIAGNOSIS — I10 BENIGN ESSENTIAL HYPERTENSION: ICD-10-CM

## 2021-07-21 DIAGNOSIS — Z72.0 TOBACCO ABUSE: ICD-10-CM

## 2021-07-21 DIAGNOSIS — E66.9 OBESITY, UNSPECIFIED CLASSIFICATION, UNSPECIFIED OBESITY TYPE, UNSPECIFIED WHETHER SERIOUS COMORBIDITY PRESENT: ICD-10-CM

## 2021-07-21 PROCEDURE — 71046 X-RAY EXAM CHEST 2 VIEWS: CPT | Mod: 26,,, | Performed by: RADIOLOGY

## 2021-07-21 PROCEDURE — 99213 OFFICE O/P EST LOW 20 MIN: CPT | Mod: PBBFAC | Performed by: INTERNAL MEDICINE

## 2021-07-21 PROCEDURE — 71046 XR CHEST PA AND LATERAL: ICD-10-PCS | Mod: 26,,, | Performed by: RADIOLOGY

## 2021-07-21 PROCEDURE — 99214 OFFICE O/P EST MOD 30 MIN: CPT | Mod: S$GLB,,, | Performed by: INTERNAL MEDICINE

## 2021-07-21 PROCEDURE — 71046 X-RAY EXAM CHEST 2 VIEWS: CPT | Mod: TC,FY

## 2021-07-21 PROCEDURE — 99214 PR OFFICE/OUTPT VISIT, EST, LEVL IV, 30-39 MIN: ICD-10-PCS | Mod: S$GLB,,, | Performed by: INTERNAL MEDICINE

## 2021-07-21 PROCEDURE — 99999 PR PBB SHADOW E&M-EST. PATIENT-LVL III: CPT | Mod: PBBFAC,,, | Performed by: INTERNAL MEDICINE

## 2021-07-21 PROCEDURE — 99999 PR PBB SHADOW E&M-EST. PATIENT-LVL III: ICD-10-PCS | Mod: PBBFAC,,, | Performed by: INTERNAL MEDICINE

## 2021-07-22 DIAGNOSIS — Z12.31 OTHER SCREENING MAMMOGRAM: ICD-10-CM

## 2021-07-23 ENCOUNTER — TELEPHONE (OUTPATIENT)
Dept: INTERNAL MEDICINE | Facility: CLINIC | Age: 46
End: 2021-07-23

## 2021-07-24 ENCOUNTER — PATIENT MESSAGE (OUTPATIENT)
Dept: INTERNAL MEDICINE | Facility: CLINIC | Age: 46
End: 2021-07-24

## 2021-07-24 DIAGNOSIS — Z01.818 PRE-OP EVALUATION: Primary | ICD-10-CM

## 2021-07-26 ENCOUNTER — HOSPITAL ENCOUNTER (OUTPATIENT)
Dept: CARDIOLOGY | Facility: OTHER | Age: 46
Discharge: HOME | End: 2021-07-26
Attending: INTERNAL MEDICINE
Payer: COMMERCIAL

## 2021-07-26 ENCOUNTER — HOSPITAL ENCOUNTER (OUTPATIENT)
Dept: RADIOLOGY | Facility: OTHER | Age: 46
Discharge: HOME | End: 2021-07-26
Attending: FAMILY MEDICINE
Payer: COMMERCIAL

## 2021-07-26 ENCOUNTER — TELEPHONE (OUTPATIENT)
Dept: INTERNAL MEDICINE | Facility: CLINIC | Age: 46
End: 2021-07-26

## 2021-07-26 DIAGNOSIS — Z01.818 PRE-OP EVALUATION: ICD-10-CM

## 2021-07-26 DIAGNOSIS — Z12.31 OTHER SCREENING MAMMOGRAM: ICD-10-CM

## 2021-07-26 PROCEDURE — 77067 MAMMO DIGITAL SCREENING BILAT WITH TOMO: ICD-10-PCS | Mod: 26,,, | Performed by: RADIOLOGY

## 2021-07-26 PROCEDURE — 93005 ELECTROCARDIOGRAM TRACING: CPT

## 2021-07-26 PROCEDURE — 77063 MAMMO DIGITAL SCREENING BILAT WITH TOMO: ICD-10-PCS | Mod: 26,,, | Performed by: RADIOLOGY

## 2021-07-26 PROCEDURE — 77063 BREAST TOMOSYNTHESIS BI: CPT | Mod: 26,,, | Performed by: RADIOLOGY

## 2021-07-26 PROCEDURE — 77067 SCR MAMMO BI INCL CAD: CPT | Mod: TC

## 2021-07-26 PROCEDURE — 93010 EKG 12-LEAD: ICD-10-PCS | Mod: ,,, | Performed by: INTERNAL MEDICINE

## 2021-07-26 PROCEDURE — 93010 ELECTROCARDIOGRAM REPORT: CPT | Mod: ,,, | Performed by: INTERNAL MEDICINE

## 2021-07-26 PROCEDURE — 77067 SCR MAMMO BI INCL CAD: CPT | Mod: 26,,, | Performed by: RADIOLOGY

## 2021-07-27 ENCOUNTER — TELEPHONE (OUTPATIENT)
Dept: INTERNAL MEDICINE | Facility: CLINIC | Age: 46
End: 2021-07-27

## 2021-07-29 ENCOUNTER — PATIENT MESSAGE (OUTPATIENT)
Dept: INTERNAL MEDICINE | Facility: CLINIC | Age: 46
End: 2021-07-29

## 2021-09-17 ENCOUNTER — OFFICE VISIT (OUTPATIENT)
Dept: INTERNAL MEDICINE | Facility: CLINIC | Age: 46
End: 2021-09-17
Payer: COMMERCIAL

## 2021-09-17 ENCOUNTER — LAB VISIT (OUTPATIENT)
Dept: LAB | Facility: OTHER | Age: 46
End: 2021-09-17
Attending: STUDENT IN AN ORGANIZED HEALTH CARE EDUCATION/TRAINING PROGRAM
Payer: COMMERCIAL

## 2021-09-17 VITALS
DIASTOLIC BLOOD PRESSURE: 91 MMHG | BODY MASS INDEX: 28.98 KG/M2 | SYSTOLIC BLOOD PRESSURE: 132 MMHG | HEIGHT: 66 IN | WEIGHT: 180.31 LBS | HEART RATE: 63 BPM

## 2021-09-17 DIAGNOSIS — Z13.6 SCREENING FOR CARDIOVASCULAR CONDITION: ICD-10-CM

## 2021-09-17 DIAGNOSIS — Z12.11 SCREENING FOR COLORECTAL CANCER: ICD-10-CM

## 2021-09-17 DIAGNOSIS — R07.9 CHEST PAIN, UNSPECIFIED TYPE: Primary | ICD-10-CM

## 2021-09-17 DIAGNOSIS — R82.90 CLOUDY URINE: ICD-10-CM

## 2021-09-17 DIAGNOSIS — I10 HYPERTENSION, UNSPECIFIED TYPE: ICD-10-CM

## 2021-09-17 DIAGNOSIS — Z11.59 ENCOUNTER FOR HEPATITIS C SCREENING TEST FOR LOW RISK PATIENT: ICD-10-CM

## 2021-09-17 DIAGNOSIS — E87.5 HYPERKALEMIA: ICD-10-CM

## 2021-09-17 DIAGNOSIS — E87.6 HYPOKALEMIA: ICD-10-CM

## 2021-09-17 DIAGNOSIS — Z00.00 HEALTH MAINTENANCE EXAMINATION: ICD-10-CM

## 2021-09-17 DIAGNOSIS — Z12.12 SCREENING FOR COLORECTAL CANCER: ICD-10-CM

## 2021-09-17 LAB
ANION GAP SERPL CALC-SCNC: 11 MMOL/L (ref 8–16)
BILIRUB SERPL-MCNC: ABNORMAL MG/DL
BLOOD URINE, POC: ABNORMAL
BUN SERPL-MCNC: 7 MG/DL (ref 6–20)
CALCIUM SERPL-MCNC: 9 MG/DL (ref 8.7–10.5)
CHLORIDE SERPL-SCNC: 106 MMOL/L (ref 95–110)
CHOLEST SERPL-MCNC: 144 MG/DL (ref 120–199)
CHOLEST/HDLC SERPL: 3 {RATIO} (ref 2–5)
CLARITY, POC UA: ABNORMAL
CO2 SERPL-SCNC: 22 MMOL/L (ref 23–29)
COLOR, POC UA: ABNORMAL
CREAT SERPL-MCNC: 0.7 MG/DL (ref 0.5–1.4)
EST. GFR  (AFRICAN AMERICAN): >60 ML/MIN/1.73 M^2
EST. GFR  (NON AFRICAN AMERICAN): >60 ML/MIN/1.73 M^2
GLUCOSE SERPL-MCNC: 107 MG/DL (ref 70–110)
GLUCOSE UR QL STRIP: NORMAL
HDLC SERPL-MCNC: 48 MG/DL (ref 40–75)
HDLC SERPL: 33.3 % (ref 20–50)
KETONES UR QL STRIP: ABNORMAL
LDLC SERPL CALC-MCNC: 75.2 MG/DL (ref 63–159)
LEUKOCYTE ESTERASE URINE, POC: ABNORMAL
NITRITE, POC UA: ABNORMAL
NONHDLC SERPL-MCNC: 96 MG/DL
PH, POC UA: 5
POTASSIUM SERPL-SCNC: 3.8 MMOL/L (ref 3.5–5.1)
PROTEIN, POC: ABNORMAL
SODIUM SERPL-SCNC: 139 MMOL/L (ref 136–145)
SPECIFIC GRAVITY, POC UA: 1.02
TRIGL SERPL-MCNC: 104 MG/DL (ref 30–150)
TSH SERPL DL<=0.005 MIU/L-ACNC: 0.68 UIU/ML (ref 0.4–4)
UROBILINOGEN, POC UA: NORMAL

## 2021-09-17 PROCEDURE — 86803 HEPATITIS C AB TEST: CPT | Performed by: STUDENT IN AN ORGANIZED HEALTH CARE EDUCATION/TRAINING PROGRAM

## 2021-09-17 PROCEDURE — 99213 OFFICE O/P EST LOW 20 MIN: CPT | Mod: PBBFAC | Performed by: STUDENT IN AN ORGANIZED HEALTH CARE EDUCATION/TRAINING PROGRAM

## 2021-09-17 PROCEDURE — 99999 PR PBB SHADOW E&M-EST. PATIENT-LVL III: CPT | Mod: PBBFAC,,, | Performed by: STUDENT IN AN ORGANIZED HEALTH CARE EDUCATION/TRAINING PROGRAM

## 2021-09-17 PROCEDURE — 99999 PR PBB SHADOW E&M-EST. PATIENT-LVL III: ICD-10-PCS | Mod: PBBFAC,,, | Performed by: STUDENT IN AN ORGANIZED HEALTH CARE EDUCATION/TRAINING PROGRAM

## 2021-09-17 PROCEDURE — 36415 COLL VENOUS BLD VENIPUNCTURE: CPT | Performed by: STUDENT IN AN ORGANIZED HEALTH CARE EDUCATION/TRAINING PROGRAM

## 2021-09-17 PROCEDURE — 99215 PR OFFICE/OUTPT VISIT, EST, LEVL V, 40-54 MIN: ICD-10-PCS | Mod: S$GLB,,, | Performed by: STUDENT IN AN ORGANIZED HEALTH CARE EDUCATION/TRAINING PROGRAM

## 2021-09-17 PROCEDURE — 84443 ASSAY THYROID STIM HORMONE: CPT | Performed by: STUDENT IN AN ORGANIZED HEALTH CARE EDUCATION/TRAINING PROGRAM

## 2021-09-17 PROCEDURE — 81002 URINALYSIS NONAUTO W/O SCOPE: CPT | Mod: PBBFAC | Performed by: STUDENT IN AN ORGANIZED HEALTH CARE EDUCATION/TRAINING PROGRAM

## 2021-09-17 PROCEDURE — 80048 BASIC METABOLIC PNL TOTAL CA: CPT | Performed by: STUDENT IN AN ORGANIZED HEALTH CARE EDUCATION/TRAINING PROGRAM

## 2021-09-17 PROCEDURE — 99215 OFFICE O/P EST HI 40 MIN: CPT | Mod: S$GLB,,, | Performed by: STUDENT IN AN ORGANIZED HEALTH CARE EDUCATION/TRAINING PROGRAM

## 2021-09-17 PROCEDURE — 84244 ASSAY OF RENIN: CPT | Performed by: STUDENT IN AN ORGANIZED HEALTH CARE EDUCATION/TRAINING PROGRAM

## 2021-09-17 PROCEDURE — 80061 LIPID PANEL: CPT | Performed by: STUDENT IN AN ORGANIZED HEALTH CARE EDUCATION/TRAINING PROGRAM

## 2021-09-20 ENCOUNTER — LAB VISIT (OUTPATIENT)
Dept: PRIMARY CARE CLINIC | Facility: OTHER | Age: 46
End: 2021-09-20
Payer: COMMERCIAL

## 2021-09-20 DIAGNOSIS — Z20.822 ENCOUNTER FOR LABORATORY TESTING FOR COVID-19 VIRUS: ICD-10-CM

## 2021-09-20 LAB — HCV AB SERPL QL IA: NEGATIVE

## 2021-09-20 PROCEDURE — U0003 INFECTIOUS AGENT DETECTION BY NUCLEIC ACID (DNA OR RNA); SEVERE ACUTE RESPIRATORY SYNDROME CORONAVIRUS 2 (SARS-COV-2) (CORONAVIRUS DISEASE [COVID-19]), AMPLIFIED PROBE TECHNIQUE, MAKING USE OF HIGH THROUGHPUT TECHNOLOGIES AS DESCRIBED BY CMS-2020-01-R: HCPCS | Performed by: INTERNAL MEDICINE

## 2021-09-21 LAB
SARS-COV-2 RNA RESP QL NAA+PROBE: NOT DETECTED
SARS-COV-2- CYCLE NUMBER: NORMAL

## 2021-09-22 ENCOUNTER — HOSPITAL ENCOUNTER (OUTPATIENT)
Dept: RADIOLOGY | Facility: OTHER | Age: 46
Discharge: HOME | End: 2021-09-22
Attending: STUDENT IN AN ORGANIZED HEALTH CARE EDUCATION/TRAINING PROGRAM
Payer: COMMERCIAL

## 2021-09-22 DIAGNOSIS — I10 HYPERTENSION: ICD-10-CM

## 2021-09-22 PROCEDURE — 93975 VASCULAR STUDY: CPT | Mod: TC

## 2021-09-22 PROCEDURE — 93975 US RENAL ARTERY STENOSIS HYPERTEN (XPD): ICD-10-PCS | Mod: 26,,, | Performed by: RADIOLOGY

## 2021-09-22 PROCEDURE — 93975 VASCULAR STUDY: CPT | Mod: 26,,, | Performed by: RADIOLOGY

## 2021-09-23 ENCOUNTER — PATIENT MESSAGE (OUTPATIENT)
Dept: INTERNAL MEDICINE | Facility: CLINIC | Age: 46
End: 2021-09-23

## 2021-09-23 LAB
ALDOST SERPL-MCNC: <3 NG/DL
ALDOST/RENIN PLAS-RTO: <4.3 RATIO
RENIN PLAS-CCNC: 0.7 NG/ML/HR

## 2021-10-01 ENCOUNTER — OFFICE VISIT (OUTPATIENT)
Dept: INTERNAL MEDICINE | Facility: CLINIC | Age: 46
End: 2021-10-01
Payer: COMMERCIAL

## 2021-10-01 VITALS
DIASTOLIC BLOOD PRESSURE: 82 MMHG | WEIGHT: 178.81 LBS | BODY MASS INDEX: 28.74 KG/M2 | OXYGEN SATURATION: 99 % | SYSTOLIC BLOOD PRESSURE: 137 MMHG | HEIGHT: 66 IN | HEART RATE: 67 BPM

## 2021-10-01 DIAGNOSIS — R07.9 CHEST PAIN, UNSPECIFIED TYPE: ICD-10-CM

## 2021-10-01 DIAGNOSIS — I10 HYPERTENSION, UNSPECIFIED TYPE: ICD-10-CM

## 2021-10-01 DIAGNOSIS — N89.8 VAGINAL DISCHARGE: Primary | ICD-10-CM

## 2021-10-01 DIAGNOSIS — R25.2 MUSCLE CRAMP: ICD-10-CM

## 2021-10-01 PROCEDURE — 99999 PR PBB SHADOW E&M-EST. PATIENT-LVL III: ICD-10-PCS | Mod: PBBFAC,,, | Performed by: STUDENT IN AN ORGANIZED HEALTH CARE EDUCATION/TRAINING PROGRAM

## 2021-10-01 PROCEDURE — 99214 OFFICE O/P EST MOD 30 MIN: CPT | Mod: S$GLB,,, | Performed by: STUDENT IN AN ORGANIZED HEALTH CARE EDUCATION/TRAINING PROGRAM

## 2021-10-01 PROCEDURE — 87480 CANDIDA DNA DIR PROBE: CPT | Performed by: STUDENT IN AN ORGANIZED HEALTH CARE EDUCATION/TRAINING PROGRAM

## 2021-10-01 PROCEDURE — 99999 PR PBB SHADOW E&M-EST. PATIENT-LVL III: CPT | Mod: PBBFAC,,, | Performed by: STUDENT IN AN ORGANIZED HEALTH CARE EDUCATION/TRAINING PROGRAM

## 2021-10-01 PROCEDURE — 99214 PR OFFICE/OUTPT VISIT, EST, LEVL IV, 30-39 MIN: ICD-10-PCS | Mod: S$GLB,,, | Performed by: STUDENT IN AN ORGANIZED HEALTH CARE EDUCATION/TRAINING PROGRAM

## 2021-10-01 PROCEDURE — 87481 CANDIDA DNA AMP PROBE: CPT | Mod: 59 | Performed by: STUDENT IN AN ORGANIZED HEALTH CARE EDUCATION/TRAINING PROGRAM

## 2021-10-01 RX ORDER — FLUCONAZOLE 150 MG/1
150 TABLET ORAL DAILY
Qty: 1 TABLET | Refills: 0 | Status: SHIPPED | OUTPATIENT
Start: 2021-10-01 | End: 2021-10-02

## 2021-10-03 LAB
CANDIDA RRNA VAG QL PROBE: NEGATIVE
G VAGINALIS RRNA GENITAL QL PROBE: POSITIVE
T VAGINALIS RRNA GENITAL QL PROBE: NEGATIVE

## 2021-10-04 DIAGNOSIS — N76.0 RECURRENT VAGINITIS: Primary | ICD-10-CM

## 2021-10-04 RX ORDER — METRONIDAZOLE 500 MG/1
500 TABLET ORAL EVERY 12 HOURS
Qty: 14 TABLET | Refills: 0 | Status: SHIPPED | OUTPATIENT
Start: 2021-10-04 | End: 2021-10-11

## 2021-10-12 ENCOUNTER — PATIENT OUTREACH (OUTPATIENT)
Dept: ADMINISTRATIVE | Facility: OTHER | Age: 46
End: 2021-10-12

## 2021-10-13 ENCOUNTER — OFFICE VISIT (OUTPATIENT)
Dept: OBSTETRICS AND GYNECOLOGY | Facility: CLINIC | Age: 46
End: 2021-10-13
Attending: OBSTETRICS & GYNECOLOGY
Payer: COMMERCIAL

## 2021-10-13 VITALS
SYSTOLIC BLOOD PRESSURE: 120 MMHG | BODY MASS INDEX: 28.95 KG/M2 | WEIGHT: 180.13 LBS | DIASTOLIC BLOOD PRESSURE: 70 MMHG | HEIGHT: 66 IN

## 2021-10-13 DIAGNOSIS — N76.0 RECURRENT VAGINITIS: ICD-10-CM

## 2021-10-13 PROBLEM — Z90.710 S/P TAH (TOTAL ABDOMINAL HYSTERECTOMY): Status: RESOLVED | Noted: 2018-08-24 | Resolved: 2021-10-13

## 2021-10-13 PROCEDURE — 99213 OFFICE O/P EST LOW 20 MIN: CPT | Mod: S$GLB,,, | Performed by: OBSTETRICS & GYNECOLOGY

## 2021-10-13 PROCEDURE — 99999 PR PBB SHADOW E&M-EST. PATIENT-LVL III: ICD-10-PCS | Mod: PBBFAC,,, | Performed by: OBSTETRICS & GYNECOLOGY

## 2021-10-13 PROCEDURE — 99999 PR PBB SHADOW E&M-EST. PATIENT-LVL III: CPT | Mod: PBBFAC,,, | Performed by: OBSTETRICS & GYNECOLOGY

## 2021-10-13 PROCEDURE — 99213 PR OFFICE/OUTPT VISIT, EST, LEVL III, 20-29 MIN: ICD-10-PCS | Mod: S$GLB,,, | Performed by: OBSTETRICS & GYNECOLOGY

## 2021-10-13 RX ORDER — LACTIC ACID, L-, CITRIC ACID MONOHYDRATE, AND POTASSIUM BITARTRATE 90; 50; 20 MG/5G; MG/5G; MG/5G
1 GEL VAGINAL DAILY
Qty: 60 G | Refills: 12 | Status: SHIPPED | OUTPATIENT
Start: 2021-10-13 | End: 2021-10-25 | Stop reason: ALTCHOICE

## 2021-10-22 ENCOUNTER — PATIENT MESSAGE (OUTPATIENT)
Dept: INTERNAL MEDICINE | Facility: CLINIC | Age: 46
End: 2021-10-22
Payer: COMMERCIAL

## 2021-10-25 ENCOUNTER — OFFICE VISIT (OUTPATIENT)
Dept: INTERNAL MEDICINE | Facility: CLINIC | Age: 46
End: 2021-10-25
Attending: INTERNAL MEDICINE
Payer: COMMERCIAL

## 2021-10-25 ENCOUNTER — HOSPITAL ENCOUNTER (OUTPATIENT)
Dept: RADIOLOGY | Facility: OTHER | Age: 46
Discharge: HOME | End: 2021-10-25
Attending: STUDENT IN AN ORGANIZED HEALTH CARE EDUCATION/TRAINING PROGRAM
Payer: COMMERCIAL

## 2021-10-25 VITALS
HEART RATE: 72 BPM | DIASTOLIC BLOOD PRESSURE: 90 MMHG | WEIGHT: 180.56 LBS | SYSTOLIC BLOOD PRESSURE: 130 MMHG | BODY MASS INDEX: 29.14 KG/M2 | OXYGEN SATURATION: 98 %

## 2021-10-25 DIAGNOSIS — K21.9 GASTROESOPHAGEAL REFLUX DISEASE, UNSPECIFIED WHETHER ESOPHAGITIS PRESENT: ICD-10-CM

## 2021-10-25 DIAGNOSIS — I10 BENIGN ESSENTIAL HYPERTENSION: ICD-10-CM

## 2021-10-25 DIAGNOSIS — K43.2 INCISIONAL HERNIA, WITHOUT OBSTRUCTION OR GANGRENE: Primary | ICD-10-CM

## 2021-10-25 DIAGNOSIS — K43.2 INCISIONAL HERNIA, WITHOUT OBSTRUCTION OR GANGRENE: ICD-10-CM

## 2021-10-25 PROCEDURE — 76705 US ABDOMEN LIMITED_HERNIA: ICD-10-PCS | Mod: 26,,, | Performed by: RADIOLOGY

## 2021-10-25 PROCEDURE — 99213 OFFICE O/P EST LOW 20 MIN: CPT | Mod: S$GLB,,, | Performed by: STUDENT IN AN ORGANIZED HEALTH CARE EDUCATION/TRAINING PROGRAM

## 2021-10-25 PROCEDURE — 99999 PR PBB SHADOW E&M-EST. PATIENT-LVL III: ICD-10-PCS | Mod: PBBFAC,,, | Performed by: STUDENT IN AN ORGANIZED HEALTH CARE EDUCATION/TRAINING PROGRAM

## 2021-10-25 PROCEDURE — 76705 ECHO EXAM OF ABDOMEN: CPT | Mod: TC

## 2021-10-25 PROCEDURE — 99999 PR PBB SHADOW E&M-EST. PATIENT-LVL III: CPT | Mod: PBBFAC,,, | Performed by: STUDENT IN AN ORGANIZED HEALTH CARE EDUCATION/TRAINING PROGRAM

## 2021-10-25 PROCEDURE — 99213 PR OFFICE/OUTPT VISIT, EST, LEVL III, 20-29 MIN: ICD-10-PCS | Mod: S$GLB,,, | Performed by: STUDENT IN AN ORGANIZED HEALTH CARE EDUCATION/TRAINING PROGRAM

## 2021-10-25 PROCEDURE — 76705 ECHO EXAM OF ABDOMEN: CPT | Mod: 26,,, | Performed by: RADIOLOGY

## 2021-10-25 RX ORDER — LANOLIN ALCOHOL/MO/W.PET/CERES
100 CREAM (GRAM) TOPICAL DAILY
COMMUNITY

## 2021-10-25 RX ORDER — OMEPRAZOLE 20 MG/1
20 CAPSULE, DELAYED RELEASE ORAL DAILY
COMMUNITY

## 2021-10-26 ENCOUNTER — TELEPHONE (OUTPATIENT)
Dept: INTERNAL MEDICINE | Facility: CLINIC | Age: 46
End: 2021-10-26
Payer: COMMERCIAL

## 2021-11-01 ENCOUNTER — LAB VISIT (OUTPATIENT)
Dept: PRIMARY CARE CLINIC | Facility: OTHER | Age: 46
End: 2021-11-01
Payer: COMMERCIAL

## 2021-11-01 DIAGNOSIS — Z20.822 ENCOUNTER FOR LABORATORY TESTING FOR COVID-19 VIRUS: ICD-10-CM

## 2021-11-01 LAB
SARS-COV-2 RNA RESP QL NAA+PROBE: NOT DETECTED
SARS-COV-2- CYCLE NUMBER: NORMAL

## 2021-11-01 PROCEDURE — U0003 INFECTIOUS AGENT DETECTION BY NUCLEIC ACID (DNA OR RNA); SEVERE ACUTE RESPIRATORY SYNDROME CORONAVIRUS 2 (SARS-COV-2) (CORONAVIRUS DISEASE [COVID-19]), AMPLIFIED PROBE TECHNIQUE, MAKING USE OF HIGH THROUGHPUT TECHNOLOGIES AS DESCRIBED BY CMS-2020-01-R: HCPCS | Performed by: INTERNAL MEDICINE

## 2021-11-15 ENCOUNTER — PATIENT OUTREACH (OUTPATIENT)
Dept: ADMINISTRATIVE | Facility: OTHER | Age: 46
End: 2021-11-15
Payer: COMMERCIAL

## 2021-11-16 ENCOUNTER — OFFICE VISIT (OUTPATIENT)
Dept: SURGERY | Facility: CLINIC | Age: 46
End: 2021-11-16
Attending: SPECIALIST
Payer: COMMERCIAL

## 2021-11-16 VITALS
SYSTOLIC BLOOD PRESSURE: 132 MMHG | OXYGEN SATURATION: 99 % | BODY MASS INDEX: 28.03 KG/M2 | HEART RATE: 67 BPM | DIASTOLIC BLOOD PRESSURE: 83 MMHG | HEIGHT: 66 IN | WEIGHT: 174.38 LBS

## 2021-11-16 DIAGNOSIS — R10.10 PAIN OF UPPER ABDOMEN: ICD-10-CM

## 2021-11-16 DIAGNOSIS — K43.2 INCISIONAL HERNIA, WITHOUT OBSTRUCTION OR GANGRENE: ICD-10-CM

## 2021-11-16 PROCEDURE — 99999 PR PBB SHADOW E&M-EST. PATIENT-LVL III: ICD-10-PCS | Mod: PBBFAC,,, | Performed by: SPECIALIST

## 2021-11-16 PROCEDURE — 99202 OFFICE O/P NEW SF 15 MIN: CPT | Mod: S$GLB,,, | Performed by: SPECIALIST

## 2021-11-16 PROCEDURE — 99999 PR PBB SHADOW E&M-EST. PATIENT-LVL III: CPT | Mod: PBBFAC,,, | Performed by: SPECIALIST

## 2021-11-16 PROCEDURE — 99202 PR OFFICE/OUTPT VISIT, NEW, LEVL II, 15-29 MIN: ICD-10-PCS | Mod: S$GLB,,, | Performed by: SPECIALIST

## 2021-11-29 ENCOUNTER — HOSPITAL ENCOUNTER (OUTPATIENT)
Dept: RADIOLOGY | Facility: OTHER | Age: 46
Discharge: HOME | End: 2021-11-29
Attending: SPECIALIST
Payer: COMMERCIAL

## 2021-11-29 DIAGNOSIS — R10.10 PAIN OF UPPER ABDOMEN: ICD-10-CM

## 2021-12-13 ENCOUNTER — LAB VISIT (OUTPATIENT)
Dept: PRIMARY CARE CLINIC | Facility: OTHER | Age: 46
End: 2021-12-13
Payer: COMMERCIAL

## 2021-12-13 DIAGNOSIS — Z20.822 ENCOUNTER FOR LABORATORY TESTING FOR COVID-19 VIRUS: ICD-10-CM

## 2021-12-13 PROCEDURE — U0003 INFECTIOUS AGENT DETECTION BY NUCLEIC ACID (DNA OR RNA); SEVERE ACUTE RESPIRATORY SYNDROME CORONAVIRUS 2 (SARS-COV-2) (CORONAVIRUS DISEASE [COVID-19]), AMPLIFIED PROBE TECHNIQUE, MAKING USE OF HIGH THROUGHPUT TECHNOLOGIES AS DESCRIBED BY CMS-2020-01-R: HCPCS | Performed by: INTERNAL MEDICINE

## 2021-12-14 LAB
SARS-COV-2 RNA RESP QL NAA+PROBE: NOT DETECTED
SARS-COV-2- CYCLE NUMBER: NORMAL

## 2021-12-15 ENCOUNTER — HOSPITAL ENCOUNTER (OUTPATIENT)
Dept: RADIOLOGY | Facility: OTHER | Age: 46
Discharge: HOME | End: 2021-12-15
Attending: SPECIALIST
Payer: COMMERCIAL

## 2021-12-15 PROCEDURE — 74176 CT ABD & PELVIS W/O CONTRAST: CPT | Mod: TC

## 2021-12-15 PROCEDURE — 74176 CT ABDOMEN PELVIS WITHOUT CONTRAST: ICD-10-PCS | Mod: 26,,, | Performed by: RADIOLOGY

## 2021-12-15 PROCEDURE — 74176 CT ABD & PELVIS W/O CONTRAST: CPT | Mod: 26,,, | Performed by: RADIOLOGY

## 2022-01-04 ENCOUNTER — TELEPHONE (OUTPATIENT)
Dept: SURGERY | Facility: CLINIC | Age: 47
End: 2022-01-04
Payer: COMMERCIAL

## 2022-02-03 ENCOUNTER — PATIENT MESSAGE (OUTPATIENT)
Dept: OBSTETRICS AND GYNECOLOGY | Facility: CLINIC | Age: 47
End: 2022-02-03
Payer: COMMERCIAL

## 2022-02-04 ENCOUNTER — OFFICE VISIT (OUTPATIENT)
Dept: OBSTETRICS AND GYNECOLOGY | Facility: CLINIC | Age: 47
End: 2022-02-04
Payer: COMMERCIAL

## 2022-02-04 VITALS
WEIGHT: 178.13 LBS | SYSTOLIC BLOOD PRESSURE: 114 MMHG | HEIGHT: 66 IN | BODY MASS INDEX: 28.63 KG/M2 | DIASTOLIC BLOOD PRESSURE: 78 MMHG

## 2022-02-04 DIAGNOSIS — N76.1 SUBACUTE VAGINITIS: Primary | ICD-10-CM

## 2022-02-04 DIAGNOSIS — N76.0 RECURRENT VAGINITIS: ICD-10-CM

## 2022-02-04 DIAGNOSIS — R39.89 SUSPECTED UTI: ICD-10-CM

## 2022-02-04 LAB
BILIRUB SERPL-MCNC: NORMAL MG/DL
BLOOD URINE, POC: NORMAL
CLARITY, POC UA: CLEAR
COLOR, POC UA: NORMAL
GLUCOSE UR QL STRIP: NORMAL
KETONES UR QL STRIP: NORMAL
LEUKOCYTE ESTERASE URINE, POC: NORMAL
NITRITE, POC UA: NORMAL
PH, POC UA: 6
PROTEIN, POC: NORMAL
SPECIFIC GRAVITY, POC UA: 1.02
UROBILINOGEN, POC UA: NORMAL

## 2022-02-04 PROCEDURE — 87510 GARDNER VAG DNA DIR PROBE: CPT | Performed by: REGISTERED NURSE

## 2022-02-04 PROCEDURE — 99999 PR PBB SHADOW E&M-EST. PATIENT-LVL III: ICD-10-PCS | Mod: PBBFAC,,, | Performed by: REGISTERED NURSE

## 2022-02-04 PROCEDURE — 99214 PR OFFICE/OUTPT VISIT, EST, LEVL IV, 30-39 MIN: ICD-10-PCS | Mod: S$GLB,,, | Performed by: REGISTERED NURSE

## 2022-02-04 PROCEDURE — 81002 POCT URINE DIPSTICK WITHOUT MICROSCOPE: ICD-10-PCS | Mod: S$GLB,,, | Performed by: REGISTERED NURSE

## 2022-02-04 PROCEDURE — 99214 OFFICE O/P EST MOD 30 MIN: CPT | Mod: S$GLB,,, | Performed by: REGISTERED NURSE

## 2022-02-04 PROCEDURE — 99999 PR PBB SHADOW E&M-EST. PATIENT-LVL III: CPT | Mod: PBBFAC,,, | Performed by: REGISTERED NURSE

## 2022-02-04 PROCEDURE — 87086 URINE CULTURE/COLONY COUNT: CPT | Performed by: REGISTERED NURSE

## 2022-02-04 PROCEDURE — 81002 URINALYSIS NONAUTO W/O SCOPE: CPT | Mod: S$GLB,,, | Performed by: REGISTERED NURSE

## 2022-02-04 RX ORDER — METRONIDAZOLE 7.5 MG/G
1 GEL VAGINAL DAILY
Qty: 70 G | Refills: 4 | Status: SHIPPED | OUTPATIENT
Start: 2022-02-04 | End: 2022-02-09

## 2022-02-04 NOTE — PROGRESS NOTES
CC: Vaginitis and Dysuria    HPI: Pt is a 46 y.o. female who presents for evaluation of her UTI symptoms.   The patient presents today with frequency, and urgency for the past 2 weeks. The patient denies burning with urination, flank pain, fever, nausea, vomiting, and hematuria. She denies frequent or recurrent UTIs.     She also complains of vaginal discharge that began around that time.  She denies itching.  denies odor.  She states the discharge is slight white.  She finished 7 days of Flagyl about 1 week ago. Reports history of recurrent bacterial vaginosis. She takes showers (not baths), wears full-bottomed cotton underwear and does not use scented products. No new sexual partners. She has had a hysterectomy.     ROS:  GENERAL: Feeling well overall. Denies fever or chills.   SKIN: Denies rash or lesions.   HEAD: Denies head injury or headache.   NODES: Denies enlarged lymph nodes.   CHEST: Denies chest pain or shortness of breath.   CARDIOVASCULAR: Denies palpitations or left sided chest pain.   ABDOMEN: No abdominal pain, constipation, diarrhea, nausea, vomiting or rectal bleeding.   URINARY: + urinary frequency and urgency; no dysuria, hematuria, or burning on urination.  VULVAR: No pain, no lesions and no itching.  VAGINAL: No relaxation, no itching, + slight white discharge, no abnormal bleeding and no lesions.    PHYSICAL EXAM:  ABDOMEN: No suprapubic tenderness  MUSCULOSKELETAL: No CVA tenderness  VULVA: normal appearing vulva with no masses, tenderness or lesions   VAGINA: normal appearing vagina with normal color and + thin, white discharge, no lesions   CERVIX: surgically absent.  UTERUS: surgically absent.  ADNEXA: normal adnexa in size, nontender and no masses    Diagnosis:  1. Subacute vaginitis    2. Suspected UTI    3. Recurrent vaginitis        Plan:   Urine dip- negative  Urine culture  Affirm  Metrogel- no alcohol while on medication    -UTI prevention including   a. perineal hygiene, wipe  front to back,  b. drink water prior to intercourse and urinate afterwards and avoid certain positions which could increase likelyhood of UTIs,  c. establish regular bladder habits,   d. avoid vulvovaginal irritants,   e. increase fluids and avoid caffeine and alcohol;  -signs of pylenephritis and to go to the ED if develops fever, chills, n/v, back pain, worsening dyuria, hematuria;   -pelvic rest until symptoms resolve;  -Macrobid use and potential side effects;  -A.C.S. Pap and pelvic exam guidelines (pap every 3 years), recomendations for yearly mammogram;  -to follow up with her PCP for other health maintenance.      Patient was counseled today on vaginitis prevention including :  a. avoiding feminine products such as deoderant soaps, body wash, bubble bath, douches, scented toilet paper, deoderant tampons or pads, feminine wipes, chronic pad use, etc.  b. avoiding other vulvovaginal irritants such as long hot baths, humidity, tight, synthetic clothing, chlorine and sitting around in wet bathing suits  c. wearing cotton underwear, avoiding thong underwear and no underwear to bed  d. taking showers instead of baths and use a hair dryer on cool setting afterwards to dry  e. wearing cotton to exercise and shower immediately after exercise and change clothes  f. using polyurethane condoms without spermicide if sexually active and symptoms are triggered by intercourse    Follow-up PRN no resolution of symptoms.        DAYDAY Boyle

## 2022-02-06 LAB — BACTERIA UR CULT: NORMAL

## 2022-02-21 ENCOUNTER — LAB VISIT (OUTPATIENT)
Dept: PRIMARY CARE CLINIC | Facility: OTHER | Age: 47
End: 2022-02-21
Payer: COMMERCIAL

## 2022-02-21 DIAGNOSIS — Z20.822 ENCOUNTER FOR LABORATORY TESTING FOR COVID-19 VIRUS: ICD-10-CM

## 2022-02-21 PROCEDURE — U0003 INFECTIOUS AGENT DETECTION BY NUCLEIC ACID (DNA OR RNA); SEVERE ACUTE RESPIRATORY SYNDROME CORONAVIRUS 2 (SARS-COV-2) (CORONAVIRUS DISEASE [COVID-19]), AMPLIFIED PROBE TECHNIQUE, MAKING USE OF HIGH THROUGHPUT TECHNOLOGIES AS DESCRIBED BY CMS-2020-01-R: HCPCS

## 2022-02-22 LAB
SARS-COV-2 RNA RESP QL NAA+PROBE: NOT DETECTED
SARS-COV-2- CYCLE NUMBER: NORMAL

## 2022-03-02 DIAGNOSIS — I10 HYPERTENSION, ESSENTIAL: ICD-10-CM

## 2022-03-02 RX ORDER — AMLODIPINE BESYLATE 10 MG/1
TABLET ORAL
Qty: 90 TABLET | Refills: 0 | Status: SHIPPED | OUTPATIENT
Start: 2022-03-02 | End: 2022-08-02 | Stop reason: SDUPTHER

## 2022-03-02 RX ORDER — HYDROCHLOROTHIAZIDE 25 MG/1
25 TABLET ORAL DAILY
Qty: 90 TABLET | Refills: 0 | Status: SHIPPED | OUTPATIENT
Start: 2022-03-02 | End: 2022-08-02 | Stop reason: SDUPTHER

## 2022-03-02 NOTE — TELEPHONE ENCOUNTER
No new care gaps identified.  Powered by [employer]. Reference number: [id number].   3/02/2022 2:06:06 PM CST

## 2022-03-03 ENCOUNTER — TELEPHONE (OUTPATIENT)
Dept: SURGERY | Facility: CLINIC | Age: 47
End: 2022-03-03
Payer: COMMERCIAL

## 2022-03-03 ENCOUNTER — PATIENT MESSAGE (OUTPATIENT)
Dept: SURGERY | Facility: CLINIC | Age: 47
End: 2022-03-03
Payer: COMMERCIAL

## 2022-03-03 NOTE — TELEPHONE ENCOUNTER
Informed pt that a 3 month supply was sent in and that Dr. Cavanaugh would like to see her in the office in the next 3 months to continue refilling her mediation. Pt verbalized understanding and states that she does not want to schedule an appointment at this time. Pt has no further questions or concerns.

## 2022-03-04 ENCOUNTER — TELEPHONE (OUTPATIENT)
Dept: SURGERY | Facility: CLINIC | Age: 47
End: 2022-03-04
Payer: COMMERCIAL

## 2022-04-18 ENCOUNTER — LAB VISIT (OUTPATIENT)
Dept: PRIMARY CARE CLINIC | Facility: OTHER | Age: 47
End: 2022-04-18
Payer: COMMERCIAL

## 2022-04-18 DIAGNOSIS — Z20.822 ENCOUNTER FOR LABORATORY TESTING FOR COVID-19 VIRUS: ICD-10-CM

## 2022-04-18 PROCEDURE — U0003 INFECTIOUS AGENT DETECTION BY NUCLEIC ACID (DNA OR RNA); SEVERE ACUTE RESPIRATORY SYNDROME CORONAVIRUS 2 (SARS-COV-2) (CORONAVIRUS DISEASE [COVID-19]), AMPLIFIED PROBE TECHNIQUE, MAKING USE OF HIGH THROUGHPUT TECHNOLOGIES AS DESCRIBED BY CMS-2020-01-R: HCPCS | Performed by: INTERNAL MEDICINE

## 2022-04-19 LAB
SARS-COV-2 RNA RESP QL NAA+PROBE: NOT DETECTED
SARS-COV-2- CYCLE NUMBER: NORMAL

## 2022-05-05 ENCOUNTER — PATIENT MESSAGE (OUTPATIENT)
Dept: ORTHOPEDICS | Facility: CLINIC | Age: 47
End: 2022-05-05
Payer: COMMERCIAL

## 2022-05-16 NOTE — PLAN OF CARE
Problem: Occupational Therapy Goal  Goal: Occupational Therapy Goal  Goals to be met by: 2/26/2017    Patient will increase functional independence with ADLs by performing:    Toileting from toilet with [place] for hygiene and clothing management.   Bathing from shower chair/bench with [place].  Stand pivot transfers with [place].  Toilet transfer to toilet with [place].  Recall 3/3 words within 1 minute.   Outcome: Ongoing (interventions implemented as appropriate)  Pt continues to make progress towards goals. Pt presented with light tremors in BUE's and LE's (in standing)Pt demonstrated dynamic standing balance and tolerance weight shifting, reaching on high shelf, picking object off floor with self adjusted LOB. Pt completed dialing of various 9-7 digit numbers with errors dialing and recalling. Pt presented with more errors utilizing RUE. Pt presenting with dialing wrong number, omitting numbers. Pt c/o of not being able to type or text appropriately. Pt required verbal prompting to read from text as she read a few short lines with x3 repeat as pt directed reading with right pointer finger. Pt encouraged to read with out finger and presented with decreased errors. Pt also wears glasses which she did not have. Pt continues to benefit from skilled occupational therapy intervention for increased higher level cognitive tasks, functional mobility and independence in ADL's.           No

## 2022-05-18 ENCOUNTER — PATIENT OUTREACH (OUTPATIENT)
Dept: ADMINISTRATIVE | Facility: OTHER | Age: 47
End: 2022-05-18
Payer: COMMERCIAL

## 2022-05-18 ENCOUNTER — TELEPHONE (OUTPATIENT)
Dept: ORTHOPEDICS | Facility: CLINIC | Age: 47
End: 2022-05-18
Payer: COMMERCIAL

## 2022-05-18 DIAGNOSIS — M25.512 LEFT SHOULDER PAIN, UNSPECIFIED CHRONICITY: Primary | ICD-10-CM

## 2022-05-18 NOTE — PROGRESS NOTES
Health Maintenance Due   Topic Date Due    Pneumococcal Vaccines (Age 0-64) (1 - PCV) Never done    Colorectal Cancer Screening  Never done    COVID-19 Vaccine (3 - Booster for Pfizer series) 07/25/2021     Updates were requested from care everywhere.  Chart was reviewed for overdue Proactive [facility] Encounters (FEI) topics (CRS, Breast Cancer Screening, Eye exam)  Health Maintenance has been updated.  LINKS immunization registry triggered.  Immunizations were reconciled.

## 2022-06-01 ENCOUNTER — PATIENT MESSAGE (OUTPATIENT)
Dept: INTERNAL MEDICINE | Facility: CLINIC | Age: 47
End: 2022-06-01
Payer: COMMERCIAL

## 2022-06-03 ENCOUNTER — HOSPITAL ENCOUNTER (OUTPATIENT)
Dept: RADIOLOGY | Facility: OTHER | Age: 47
Discharge: HOME | End: 2022-06-03
Attending: PHYSICIAN ASSISTANT
Payer: COMMERCIAL

## 2022-06-03 ENCOUNTER — OFFICE VISIT (OUTPATIENT)
Dept: ORTHOPEDICS | Facility: CLINIC | Age: 47
End: 2022-06-03
Payer: COMMERCIAL

## 2022-06-03 ENCOUNTER — PATIENT MESSAGE (OUTPATIENT)
Dept: INTERNAL MEDICINE | Facility: CLINIC | Age: 47
End: 2022-06-03
Payer: COMMERCIAL

## 2022-06-03 ENCOUNTER — DOCUMENTATION ONLY (OUTPATIENT)
Dept: ORTHOPEDICS | Facility: CLINIC | Age: 47
End: 2022-06-03

## 2022-06-03 ENCOUNTER — TELEPHONE (OUTPATIENT)
Dept: ORTHOPEDICS | Facility: CLINIC | Age: 47
End: 2022-06-03
Payer: COMMERCIAL

## 2022-06-03 VITALS
WEIGHT: 178 LBS | HEART RATE: 59 BPM | BODY MASS INDEX: 28.61 KG/M2 | HEIGHT: 66 IN | SYSTOLIC BLOOD PRESSURE: 125 MMHG | DIASTOLIC BLOOD PRESSURE: 83 MMHG

## 2022-06-03 DIAGNOSIS — M79.602 LEFT ARM PAIN: Primary | ICD-10-CM

## 2022-06-03 DIAGNOSIS — R52 PAIN: Primary | ICD-10-CM

## 2022-06-03 DIAGNOSIS — R52 PAIN: ICD-10-CM

## 2022-06-03 DIAGNOSIS — R20.0 FINGER NUMBNESS: ICD-10-CM

## 2022-06-03 DIAGNOSIS — S46.212A STRAIN OF LEFT BICEPS, INITIAL ENCOUNTER: ICD-10-CM

## 2022-06-03 DIAGNOSIS — M77.12 LATERAL EPICONDYLITIS OF LEFT ELBOW: Primary | ICD-10-CM

## 2022-06-03 PROCEDURE — 99214 OFFICE O/P EST MOD 30 MIN: CPT | Mod: S$GLB,,, | Performed by: PHYSICIAN ASSISTANT

## 2022-06-03 PROCEDURE — 73030 XR SHOULDER TRAUMA 3 VIEW LEFT: ICD-10-PCS | Mod: 26,LT,, | Performed by: RADIOLOGY

## 2022-06-03 PROCEDURE — 73030 X-RAY EXAM OF SHOULDER: CPT | Mod: 26,LT,, | Performed by: RADIOLOGY

## 2022-06-03 PROCEDURE — 99999 PR PBB SHADOW E&M-EST. PATIENT-LVL IV: CPT | Mod: PBBFAC,,, | Performed by: PHYSICIAN ASSISTANT

## 2022-06-03 PROCEDURE — 73080 X-RAY EXAM OF ELBOW: CPT | Mod: TC,FY,LT

## 2022-06-03 PROCEDURE — 99999 PR PBB SHADOW E&M-EST. PATIENT-LVL IV: ICD-10-PCS | Mod: PBBFAC,,, | Performed by: PHYSICIAN ASSISTANT

## 2022-06-03 PROCEDURE — 73130 X-RAY EXAM OF HAND: CPT | Mod: TC,FY,LT

## 2022-06-03 PROCEDURE — 73080 X-RAY EXAM OF ELBOW: CPT | Mod: 26,LT,, | Performed by: RADIOLOGY

## 2022-06-03 PROCEDURE — 73080 XR ELBOW COMPLETE 3 VIEW LEFT: ICD-10-PCS | Mod: 26,LT,, | Performed by: RADIOLOGY

## 2022-06-03 PROCEDURE — 73130 XR HAND COMPLETE 3 VIEW LEFT: ICD-10-PCS | Mod: 26,LT,, | Performed by: RADIOLOGY

## 2022-06-03 PROCEDURE — 99214 PR OFFICE/OUTPT VISIT, EST, LEVL IV, 30-39 MIN: ICD-10-PCS | Mod: S$GLB,,, | Performed by: PHYSICIAN ASSISTANT

## 2022-06-03 PROCEDURE — 73130 X-RAY EXAM OF HAND: CPT | Mod: 26,LT,, | Performed by: RADIOLOGY

## 2022-06-03 PROCEDURE — 73030 X-RAY EXAM OF SHOULDER: CPT | Mod: TC,FY,LT

## 2022-06-03 NOTE — PROGRESS NOTES
Subjective:      Patient ID: Tasha Rush is a 46 y.o. female.    Chief Complaint: Pain of the Left Shoulder, Pain of the Left Elbow, and Pain of the Left Hand      HPI  Tasha Rush is a left hand dominant 46 y.o. female presenting today for evaluation of left arm pain.  Pain has been present for approximately 8-9 months.  There was not a history of trauma.  She reports pain is most intense in the left lateral elbow and forearm.  Pain is increased with certain motions, pressure over the area, and any weight-bearing.  She reports tightness in the left shoulder. She also has intermittent pain left upper arm in the biceps area, states that she has had pain intermittently for the past 20 years in this area with lifting or weight-bearing.  Her her pain seems to radiate intermittently through the entire left arm, down into the radial aspect of the palm. She was previously seen by Dr. Caldera in 2020 for shoulder pain, she underwent left shoulder steroid injection at that time and noticed improvement in her pain.  She reports intermittent tingling in the left hand at night, reports that it resolves quickly.  She does help care for and feet her brother who is disabled.      Review of patient's allergies indicates:   Allergen Reactions    Ace inhibitors Other (See Comments)     cough    Dilaudid [hydromorphone]      SOB + anxiety     Will take if needed.     Can take oxycodone         Current Outpatient Medications   Medication Sig Dispense Refill    amLODIPine (NORVASC) 10 MG tablet TAKE 1 TABLET BY MOUTH EVERY DAY 90 tablet 0    cyanocobalamin (VITAMIN B-12) 1000 MCG tablet Take 100 mcg by mouth once daily.      hydroCHLOROthiazide (HYDRODIURIL) 25 MG tablet Take 1 tablet (25 mg total) by mouth once daily. 90 tablet 0    omeprazole (PRILOSEC) 20 MG capsule Take 20 mg by mouth once daily.       No current facility-administered medications for this visit.       Past Medical History:   Diagnosis Date     "Abnormal Pap smear of cervix     Bacterial vaginosis     RECURRENT    Gastritis     Hypertension     Renal disorder     kidney stones       Past Surgical History:   Procedure Laterality Date    APPENDECTOMY      CERVICAL BIOPSY  W/ LOOP ELECTRODE EXCISION      CYSTOSCOPY W/ URETERAL STENT PLACEMENT  11/18/2020    Procedure: CYSTOSCOPY, WITH URETERAL STENT INSERTION;  Surgeon: Ej Mitchell MD;  Location: [facility];  Service: Urology;;    CYSTOTOMY FOR EXCISION OF BLADDER DIVERTICULUM N/A 8/24/2018    Procedure: CYSTOTOMY FOR BLADDER PERFORATION;  Surgeon: Hardy Bueno MD;  Location: [facility] OR;  Service: OB/GYN;  Laterality: N/A;    fibroid removal      HERNIA REPAIR  2009    umbilical    LAPAROSCOPIC TOTAL HYSTERECTOMY N/A 8/24/2018    converted to CALISTA    LASER LITHOTRIPSY  11/18/2020    Procedure: LITHOTRIPSY, USING LASER;  Surgeon: Ej Mitchell MD;  Location: [facility];  Service: Urology;;    MYOMECTOMY      TOTAL ABDOMINAL HYSTERECTOMY N/A 8/24/2018    CALISTA- AUB.  Hardy Bueno MD;     URETEROSCOPIC REMOVAL OF URETERIC CALCULUS Left 11/18/2020    Procedure: EXTRACTION-STONE-URETEROSCOPY;  Surgeon: Ej Mitchell MD;  Location: [facility];  Service: Urology;  Laterality: Left;         Review of Systems:  ROS:  Constitutional: no fever or chills  Skin: no rash or suspicious lesions  Musculoskeletal: See HPI.   Neurological: no headaches, lightheadedness, or dizziness. No numbness or tingling  Psychological/behavioral: no anxiety or depression      OBJECTIVE:     PHYSICAL EXAM:  Height: 5' 6" (167.6 cm) Weight: 80.7 kg (178 lb)  Vitals:    06/03/22 1117   BP: 125/83   Pulse: (!) 59   Weight: 80.7 kg (178 lb)   Height: 5' 6" (1.676 m)   PainSc:   2     Vitals reviewed.  Constitutional: NAD. Patient appears well-developed and well-nourished.   HENT:   Head: Normocephalic and atraumatic.   Neck: Normal range of motion.   Cardiovascular: Normal rate.    Pulmonary/Chest: Effort normal. No respiratory " distress.   Neurological: Patient is awake, alert, oriented.   Psychiatric: Patient has a normal mood and affect. Behavior is normal. Judgment and thought content normal.  Musculoskeletal:  No scars noted.  No significant edema.  Mildly tender to palpation over the lateral epicondyle of the left elbow.  Otherwise nontender.  Good finger, wrist, elbow, and shoulder range of motion.  She does report slight discomfort in the lateral left elbow with wrist hyperextension.  She also reports slight discomfort in the left shoulder at end range of forward flexion and abduction.  Neurovascularly intact-good sensation and motor function, good capillary refill, 2+ radial pulses.  Negative Tinel's and Durkan's.    RADIOGRAPHS:  Left shoulder XRay, 6/3/2022  FINDINGS:  No acute fracture or dislocation seen.  Osteophyte formation and joint space narrowing acromioclavicular joint.  No soft tissue edema or radiopaque retained foreign body.     Impression:  No acute osseous abnormality seen.    Left elbow XRay, 6/3/2022  FINDINGS:  No acute fracture or dislocation identified.  Soft tissues are intact with no elbow joint effusion.  No radiopaque retained foreign body.     Impression:  No acute osseous abnormality seen.    Left hand XRay, 6/3/2022  FINDINGS:  No acute fracture or dislocation seen.  No significant soft tissue edema or radiopaque retained foreign body.     Impression:  No acute osseous abnormality seen.    Comments: I have personally reviewed the imaging and I agree with the above radiologist's report.    MRI left shoulder 11/2020  Impression:  Minor insertional tears of the supraspinatus and subscapularis tendons.    ASSESSMENT/PLAN:   Tasha was seen today for pain, pain and pain.    Diagnoses and all orders for this visit:    Lateral epicondylitis of left elbow  -     Ambulatory referral/consult to Physical/Occupational Therapy; Future    Strain of left biceps, initial encounter  -     Ambulatory referral/consult to  Physical/Occupational Therapy; Future    Finger numbness  -     Ambulatory referral/consult to Physical/Occupational Therapy; Future           - We talked at length about the anatomy and pathophysiology of   Encounter Diagnoses   Name Primary?    Lateral epicondylitis of left elbow Yes    Strain of left biceps, initial encounter     Finger numbness        - discussed patient's symptoms and physical exam findings discussed her prior left shoulder treatment with steroid injection is and prior MRI showing minor insertional tears of the supraspinatus and subscapularis.  Discussed that her pain today is primarily in the left lateral elbow as well as intermittent pain in the biceps area.  - we discussed conservative treatment options, discussed rest, activity modification, NSAIDs, ice, OT  - OT orders placed  - follow-up in 6-8 weeks  - call with questions or concerns    Disclaimer: This note has been generated using voice-recognition software. There may be typographical errors that have been missed during proof-reading.

## 2022-06-03 NOTE — TELEPHONE ENCOUNTER
Spoke with pt informing her she will need xrays prior to her appt.  Pt stated she will head over to complete xray and report to the appt today.

## 2022-06-07 ENCOUNTER — CLINICAL SUPPORT (OUTPATIENT)
Dept: REHABILITATION | Facility: HOSPITAL | Age: 47
End: 2022-06-07
Payer: COMMERCIAL

## 2022-06-07 DIAGNOSIS — R53.1 WEAKNESS: ICD-10-CM

## 2022-06-07 DIAGNOSIS — R20.0 FINGER NUMBNESS: ICD-10-CM

## 2022-06-07 DIAGNOSIS — S46.212A STRAIN OF LEFT BICEPS, INITIAL ENCOUNTER: ICD-10-CM

## 2022-06-07 DIAGNOSIS — M25.60 DECREASED RANGE OF MOTION: ICD-10-CM

## 2022-06-07 DIAGNOSIS — M77.12 LATERAL EPICONDYLITIS OF LEFT ELBOW: ICD-10-CM

## 2022-06-07 PROCEDURE — 97165 OT EVAL LOW COMPLEX 30 MIN: CPT | Mod: PO

## 2022-06-07 PROCEDURE — 97110 THERAPEUTIC EXERCISES: CPT | Mod: PO

## 2022-06-07 NOTE — PATIENT INSTRUCTIONS
[facility] ([facility])           [facility] ([facility])           Lateral Epicondylitis Conservative Management  Joint Protection:  Use larger joints and muscles when possible (resting objects in crook of elbow)  Stop activities before the point of discomfort  Avoid activities that put strain on painful or stiff joints  Avoid a tight or prolonged grasp on objects  Avoid any lifting or gripping with elbow in extension  If you must lift, lift with elbows bent at side, object cloise to you, and hands turned palm up. elbow extension  Balance Rest and Activity:  Take frequent, short breaks to stretch  Avoid staying in one position for a long time  Alternate heavy and light activities  Eliminate unnecessary activities  Reduce the effort:  Avoid excessive loads. Don't be afraid to ask for help. Use carts and tabletops to make tasks easier.  Keep items nearby (such as keyboard)  Helpful Tips: slide objects; use your palms instead of fingers, keeps heavy items close to your body, use larger handles, pens and pencils, look for lightweight options, use wheels or carts to roll objects      Try to lift object with palm facing up, rather than palm down    Scheduling information:      -We work by set appointment , 30-45 minute sessions. Please be on time for your session as we can only treat you within the time frame of your session.    - If you have any concerns with your schedule, please contact the  ([phone]). I do not have any capability to make appointment adjustment.

## 2022-06-07 NOTE — PLAN OF CARE
[facility] Occupational Therapy  Hand and Wrist  Evaluation     Date: 6/7/2022  Name: Tasha Rush  Clinic Number: [id number]    Therapy Diagnosis:   Encounter Diagnoses   Name Primary?    Lateral epicondylitis of left elbow     Strain of left biceps, initial encounter     Finger numbness     Weakness     Decreased range of motion      Physician: Cecilia Salazar PA    Physician Orders: Evaluate and treat ; 2x/wk x 6 wks , Modalities prn  Medical Diagnosis: left lateral epicondylitis , finger numbness, left  bicep strain   Evaluation Date: 6/7/2022  Insurance Authorization Expiration date : 6/7/2023  Plan of Care Certification Period: 7/19/2022  Date of Return to MD:  No set date     Visit # / Visits authorized: 1 / 1  Time In: 2  Time Out: 245  Total Billable Time: 45 minutes    Precautions:  Standard    Subjective       Involved Side: left elbow   Dominant Side: Left  Date of Onset: 20+ years ago   History of Current Condition/Mechanism of Injury: Pain has been present for approximately 8-9 months.  There was not a history of trauma.  She reports pain is most intense in the left lateral elbow and forearm.  Pain is increased with certain motions, pressure over the area, and any weight-bearing.  She reports tightness in the left shoulder. She also has intermittent pain left upper arm in the biceps area, states that she has had pain intermittently for the past 20 years in this area with lifting or weight-bearing.   Imaging: Please see image report   Surgical Procedure and Date: N/A    Past Medical History/Physical Systems Review:   Tasha Rush  has a past medical history of Abnormal Pap smear of cervix, Bacterial vaginosis, Gastritis, Hypertension, and Renal disorder.    Tasha Rush  has a past surgical history that includes Cervical biopsy w/ loop electrode excision; Hernia repair (2009); fibroid removal; Myomectomy; Laparoscopic total hysterectomy (N/A, 8/24/2018); Total  abdominal hysterectomy (N/A, 8/24/2018); Cystotomy for excision of bladder diverticulum (N/A, 8/24/2018); Appendectomy; Ureteroscopic removal of ureteric calculus (Left, 11/18/2020); Cystoscopy w/ ureteral stent placement (11/18/2020); and Laser lithotripsy (11/18/2020).    Tasha has a current medication list which includes the following prescription(s): amlodipine, cyanocobalamin, hydrochlorothiazide, and omeprazole.    Review of patient's allergies indicates:   Allergen Reactions    Ace inhibitors Other (See Comments)     cough    Dilaudid [hydromorphone]      SOB + anxiety     Will take if needed.     Can take oxycodone            Pain:  Functional Pain Scale Rating 0-10:   2/10 on current  n/a/10 at best  n/a/10 at worst  Location: Left  Lateral elbow and radiates to hand     Patient's Goals for Therapy:  to increase motion, reduce pain, return to normal function of hand     Occupation: Medical assistant   Working presently: employed  Duties:  Injects medicine , types a little but not much     Functional Limitations/Social History:    Previous functional status includes: Independent with all ADLs.     Current FunctionalStatus   Home/Living environment : lives with their daughter      Limitation of Functional Status as follows:   ADLs/IADLs: Prior functional status of self care needs: was independent in feeding, dressing, grooming, leisure task                Current status of self care needs: Independent   Objective       Observation:    no numbness unless she is asleep , she reports no current pain         Range of Motion:  Full elbow motion    (L_ WE: 45 degrees  (R) WE: 60 degrees          Special test: cozen was positive        Strength: (IRLANDA Dynamometer in lbs.) Average 3 trials, Position II:     6/7/2022 6/7/2022   Rung 2  Right  Left   IRLANDA # 2 60# 35#     Elbow extended with  strength       Treatment     Treatment Time In/out  (separate from total time) : 10 minutes at the end of the hour      Home Exercise Program/Education:  Issued HEP (see patient instructions in EMR) and educated on modality use for pain management . Exercises were reviewed and Maru was able to demonstrate them prior to the end of the session.   Pt received a written copy of exercises to perform at home. Maru demonstrated good  understanding of the education provided.  Pt was advised to perform these exercises free of pain, and to stop performing them if pain occurs.    Patient/Family Education: role of OT, goals for OT, double booking , scheduling/cancellations - pt verbalized understanding. Discussed insurance limitations with patient.    Additional Education provided: role of CHT/OT, goals for CHT/OT, discussed insurance limitation with patient- patient verbalized understanding     We talked at length about the anatomy and pathophysiology of diagnosis    - Patient received moist heat x 5 minutes to left elbow  to increase tissue elasticity in preparation for treatment.   - supplied handout for elbow brace  - taught self massage for HEP along with exercises - pain-free    Assessment     This 46 y.o. female referred to Outpatient Occupational Therapy with diagnosis of   Encounter Diagnoses   Name Primary?    Lateral epicondylitis of left elbow     Strain of left biceps, initial encounter     Finger numbness     Weakness     Decreased range of motion     presents with limitations as described in problem list. Patient can benefit from Occupational Therapy services for Iontophoresis, Ultrasound, moist heat, PROM, AAROM, AROM, Theraputic exercises, home exercise program provied with written instructions, ice, strengthening, Theraband Ex and UBE and Orthosis, if deemed necessary . The following goals were discussed with the patient and she is in agreement with them as to be addressed in the treatment plan.     Problem List:   Decreased function of Left UE      Anticipated barriers to occupational therapy: 20+ years of pain in elbow    Pt has no cultural, educational or language barriers to learning provided.        Profile and History Assessment of Occupational Performance Level of Clinical Decision Making Complexity Score   Occupational Profile:   Tasha Rush is a 46 y.o. female who lives with their daughter and is currently employed Tasha Rush has difficulty with  ADLs and IADLs as listed previously, which  affecting his/her daily functional abilities.      Comorbidities:    has a past medical history of Abnormal Pap smear of cervix, Bacterial vaginosis, Gastritis, Hypertension, and Renal disorder.    Medical and Therapy History Review:   Expanded               Performance Deficits    Physical:  hand decrease motion, stiffness, decrease strength, pain      Cognitive:  No Deficits    Psychosocial:    No Deficits     Clinical Decision Making:  moderate    Assessment Process:  Problem-Focused Assessments    Modification/Need for Assistance:  Not Necessary    Intervention Selection:  Limited Treatment Options       low  Based on PMHX, co morbidities , data from assessments and functional level of assistance required with task and clinical presentation directly impacting function.         The following goals were discussed with the patient and patient is in agreement with them as to be addressed in the treatment plan.     Goals:   STG: 3-4 weeks :  1) Patient to be IND with HEP and modalities for pain managment.  2) Patient will  Increase Active Range of motion 8-10 degrees in hand/wrist to increase functional hand use for ADLs/work/leisure activities.    LTG:   Goals to be met in 6-8  weeks:    1) Patient to be IND with HEP and modalities for pain managment.  2) Patient will  Increase Active Range of motion 15-20 degrees in hand/wrist to increase functional hand use for ADLs/work/leisure activities.  3)Pt will increase  strength 10-20 lbs. to improve functional grasp for ADLs/work/leisure activities.   4)  Pt will wear elbow brace  or wrist immobilizer to reduce tendon stress which reduces pain        Plan       Certification Period/Plan of care expiration: 6/7/2022 to 7/20/2022.    Outpatient Occupational Therapy 1 times weekly for 6 weeks to include the following interventions: Paraffin, Fluidotherapy, Manual therapy/joint mobilizations, Modalities for pain management, US 3 mhz, Therapeutic exercises/activities. and Strengthening.      Vilma Camejo,_OTR/L, CHT  Occupational therapist, Certified Hand Therapist

## 2022-06-13 ENCOUNTER — LAB VISIT (OUTPATIENT)
Dept: PRIMARY CARE CLINIC | Facility: OTHER | Age: 47
End: 2022-06-13
Attending: INTERNAL MEDICINE
Payer: COMMERCIAL

## 2022-06-13 DIAGNOSIS — Z20.822 ENCOUNTER FOR LABORATORY TESTING FOR COVID-19 VIRUS: ICD-10-CM

## 2022-06-13 PROCEDURE — U0003 INFECTIOUS AGENT DETECTION BY NUCLEIC ACID (DNA OR RNA); SEVERE ACUTE RESPIRATORY SYNDROME CORONAVIRUS 2 (SARS-COV-2) (CORONAVIRUS DISEASE [COVID-19]), AMPLIFIED PROBE TECHNIQUE, MAKING USE OF HIGH THROUGHPUT TECHNOLOGIES AS DESCRIBED BY CMS-2020-01-R: HCPCS | Performed by: INTERNAL MEDICINE

## 2022-06-14 DIAGNOSIS — U07.1 COVID-19 VIRUS DETECTED: ICD-10-CM

## 2022-06-14 LAB
SARS-COV-2 RNA RESP QL NAA+PROBE: DETECTED
SARS-COV-2- CYCLE NUMBER: 35

## 2022-07-06 ENCOUNTER — PATIENT MESSAGE (OUTPATIENT)
Dept: ORTHOPEDICS | Facility: CLINIC | Age: 47
End: 2022-07-06
Payer: COMMERCIAL

## 2022-07-15 ENCOUNTER — DOCUMENTATION ONLY (OUTPATIENT)
Dept: ORTHOPEDICS | Facility: CLINIC | Age: 47
End: 2022-07-15
Payer: COMMERCIAL

## 2022-07-21 ENCOUNTER — PATIENT OUTREACH (OUTPATIENT)
Dept: INTERNAL MEDICINE | Facility: CLINIC | Age: 47
End: 2022-07-21
Payer: COMMERCIAL

## 2022-07-21 DIAGNOSIS — Z12.31 ENCOUNTER FOR SCREENING MAMMOGRAM FOR BREAST CANCER: Primary | ICD-10-CM

## 2022-08-11 ENCOUNTER — OFFICE VISIT (OUTPATIENT)
Dept: INTERNAL MEDICINE | Facility: CLINIC | Age: 47
End: 2022-08-11
Attending: FAMILY MEDICINE
Payer: COMMERCIAL

## 2022-08-11 VITALS
SYSTOLIC BLOOD PRESSURE: 120 MMHG | DIASTOLIC BLOOD PRESSURE: 86 MMHG | OXYGEN SATURATION: 98 % | HEART RATE: 71 BPM | BODY MASS INDEX: 28.86 KG/M2 | HEIGHT: 66 IN | WEIGHT: 179.56 LBS

## 2022-08-11 DIAGNOSIS — I10 BENIGN ESSENTIAL HYPERTENSION: ICD-10-CM

## 2022-08-11 DIAGNOSIS — M79.602 LEFT ARM PAIN: ICD-10-CM

## 2022-08-11 DIAGNOSIS — I10 HYPERTENSION, ESSENTIAL: ICD-10-CM

## 2022-08-11 DIAGNOSIS — Z00.00 PREVENTATIVE HEALTH CARE: Primary | ICD-10-CM

## 2022-08-11 PROCEDURE — 99999 PR PBB SHADOW E&M-EST. PATIENT-LVL IV: ICD-10-PCS | Mod: PBBFAC,,, | Performed by: FAMILY MEDICINE

## 2022-08-11 PROCEDURE — 99396 PREV VISIT EST AGE 40-64: CPT | Mod: S$GLB,,, | Performed by: FAMILY MEDICINE

## 2022-08-11 PROCEDURE — 99396 PR PREVENTIVE VISIT,EST,40-64: ICD-10-PCS | Mod: S$GLB,,, | Performed by: FAMILY MEDICINE

## 2022-08-11 PROCEDURE — 99999 PR PBB SHADOW E&M-EST. PATIENT-LVL IV: CPT | Mod: PBBFAC,,, | Performed by: FAMILY MEDICINE

## 2022-08-11 RX ORDER — AMLODIPINE BESYLATE 10 MG/1
TABLET ORAL
Qty: 90 TABLET | Refills: 3 | Status: SHIPPED | OUTPATIENT
Start: 2022-08-11 | End: 2023-01-04 | Stop reason: SDUPTHER

## 2022-08-11 RX ORDER — HYDROCHLOROTHIAZIDE 25 MG/1
25 TABLET ORAL DAILY
Qty: 90 TABLET | Refills: 3 | Status: SHIPPED | OUTPATIENT
Start: 2022-08-11 | End: 2023-01-04 | Stop reason: SDUPTHER

## 2022-08-11 NOTE — PROGRESS NOTES
"CHIEF COMPLAINT:  Annual    HISTORY OF PRESENT ILLNESS: The patient is a generally healthy 46 year-old BF.  The patient is left handed.  She is complaining of LHD left shoulder pain for months with decreased range of motion.  She also is complaining of several months of Left neck and trapezius pain.      Sees [facility] for frequent UTIs.  It has been a while since the patient has been seen.      The patient has a history of stable hypertension on current medications.  Patient denies chest pain or shortness of breath today.    REVIEW OF SYSTEMS:  GENERAL: No fever, chills, fatigability or weight loss.  SKIN: No rashes, itching or changes in color or texture of skin.  HEAD: No headaches or recent head trauma.  EYES: Visual acuity fine. No photophobia, ocular pain or diplopia.  EARS: Denies ear pain, discharge or vertigo.  NOSE: No loss of smell, no epistaxis or postnasal drip.  MOUTH & THROAT: No hoarseness or change in voice. No excessive gum bleeding.  NODES: Denies swollen glands.  CHEST: Denies GUTIERREZ, cyanosis, wheezing, cough and sputum production.  CARDIOVASCULAR: Denies chest pain, PND, orthopnea or reduced exercise tolerance.  ABDOMEN: Appetite fine. No weight loss. Denies diarrhea, abdominal pain, hematemesis or blood in stool.  URINARY: No flank pain, dysuria or hematuria.  PERIPHERAL VASCULAR: No claudication or cyanosis.  MUSCULOSKELETAL: No joint stiffness or swelling. Denies back pain.  NEUROLOGIC: No history of seizures, paralysis, alteration of gait or coordination.    SOCIAL HISTORY: The patient does smoke.  The patient consumes alcohol socially.  The patient is single.    PHYSICAL EXAMINATION:   Blood pressure 120/86, pulse 71, height 5' 6" (1.676 m), weight 81.4 kg (179 lb 9 oz), last menstrual period 08/14/2018, SpO2 98 %.    APPEARANCE: Well nourished, well developed, in no acute distress.    HEAD: Normocephalic, atraumatic.  EYES: PERRL. EOMI.  Conjunctivae without injection and  anicteric  NOSE: Mucosa " pink. Airway clear.  MOUTH & THROAT: No tonsillar enlargement. No pharyngeal erythema or exudate. No stridor.  NECK: Supple.   NODES: No cervical, axillary or inguinal lymph node enlargement.  CHEST: Lungs clear to auscultation.  No retractions are noted.  No rales or rhonchi are present.  CARDIOVASCULAR: Normal S1, S2. No rubs, murmurs or gallops.  ABDOMEN: Bowel sounds normal. Not distended. Soft. No tenderness or masses.  No ascites is noted.  MUSCULOSKELETAL:  There is no clubbing, cyanosis, or edema of the extremities x4.  There is full range of motion of the lumbar spine.  There is full range of motion of the extremities x4.  There is no deformity noted.    NEUROLOGIC:       Normal speech development.      Hearing normal.      Normal gait.      Motor and sensory exams grossly normal.  PSYCHIATRIC: Patient is alert and oriented x3.  Thought processes are all normal.  There is no homicidality.  There is no suicidality.  There is no evidence of psychosis.    LABORATORY/RADIOLOGY:   Chart reviewed.  We reviewed her recent blood work today.    ASSESSMENT:   Annual  LHD left shoulder and elbow pain for months d/t lateral epicondylitis  Sees [facility] for frequent UTI  HTN    PLAN:  Follow up Ortho and pain  Return to clinic in one year.

## 2022-08-24 ENCOUNTER — PATIENT MESSAGE (OUTPATIENT)
Dept: INTERNAL MEDICINE | Facility: CLINIC | Age: 47
End: 2022-08-24
Payer: COMMERCIAL

## 2022-09-19 ENCOUNTER — LAB VISIT (OUTPATIENT)
Dept: LAB | Facility: OTHER | Age: 47
End: 2022-09-19
Attending: FAMILY MEDICINE
Payer: COMMERCIAL

## 2022-09-19 ENCOUNTER — PATIENT MESSAGE (OUTPATIENT)
Dept: INTERNAL MEDICINE | Facility: CLINIC | Age: 47
End: 2022-09-19
Payer: COMMERCIAL

## 2022-09-19 ENCOUNTER — PATIENT MESSAGE (OUTPATIENT)
Dept: ADMINISTRATIVE | Facility: HOSPITAL | Age: 47
End: 2022-09-19
Payer: COMMERCIAL

## 2022-09-19 DIAGNOSIS — I10 BENIGN ESSENTIAL HYPERTENSION: ICD-10-CM

## 2022-09-19 DIAGNOSIS — Z00.00 PREVENTATIVE HEALTH CARE: ICD-10-CM

## 2022-09-19 LAB
ALBUMIN SERPL BCP-MCNC: 4.3 G/DL (ref 3.5–5.2)
ALP SERPL-CCNC: 112 U/L (ref 55–135)
ALT SERPL W/O P-5'-P-CCNC: 28 U/L (ref 10–44)
ANION GAP SERPL CALC-SCNC: 6 MMOL/L (ref 8–16)
AST SERPL-CCNC: 26 U/L (ref 10–40)
BILIRUB SERPL-MCNC: 0.8 MG/DL (ref 0.1–1)
BUN SERPL-MCNC: 11 MG/DL (ref 6–20)
CALCIUM SERPL-MCNC: 9.7 MG/DL (ref 8.7–10.5)
CHLORIDE SERPL-SCNC: 107 MMOL/L (ref 95–110)
CHOLEST SERPL-MCNC: 165 MG/DL (ref 120–199)
CHOLEST/HDLC SERPL: 3.8 {RATIO} (ref 2–5)
CO2 SERPL-SCNC: 24 MMOL/L (ref 23–29)
CREAT SERPL-MCNC: 0.7 MG/DL (ref 0.5–1.4)
EST. GFR  (NO RACE VARIABLE): >60 ML/MIN/1.73 M^2
ESTIMATED AVG GLUCOSE: 108 MG/DL (ref 68–131)
GLUCOSE SERPL-MCNC: 106 MG/DL (ref 70–110)
HBA1C MFR BLD: 5.4 % (ref 4–5.6)
HDLC SERPL-MCNC: 43 MG/DL (ref 40–75)
HDLC SERPL: 26.1 % (ref 20–50)
LDLC SERPL CALC-MCNC: 105.4 MG/DL (ref 63–159)
NONHDLC SERPL-MCNC: 122 MG/DL
POTASSIUM SERPL-SCNC: 3.9 MMOL/L (ref 3.5–5.1)
PROT SERPL-MCNC: 7.5 G/DL (ref 6–8.4)
SODIUM SERPL-SCNC: 137 MMOL/L (ref 136–145)
T4 FREE SERPL-MCNC: 0.91 NG/DL (ref 0.71–1.51)
TRIGL SERPL-MCNC: 83 MG/DL (ref 30–150)
TSH SERPL DL<=0.005 MIU/L-ACNC: 0.38 UIU/ML (ref 0.4–4)

## 2022-09-19 PROCEDURE — 80053 COMPREHEN METABOLIC PANEL: CPT | Performed by: FAMILY MEDICINE

## 2022-09-19 PROCEDURE — 84443 ASSAY THYROID STIM HORMONE: CPT | Performed by: FAMILY MEDICINE

## 2022-09-19 PROCEDURE — 83036 HEMOGLOBIN GLYCOSYLATED A1C: CPT | Performed by: FAMILY MEDICINE

## 2022-09-19 PROCEDURE — 84439 ASSAY OF FREE THYROXINE: CPT | Performed by: FAMILY MEDICINE

## 2022-09-19 PROCEDURE — 80061 LIPID PANEL: CPT | Performed by: FAMILY MEDICINE

## 2022-09-19 PROCEDURE — 36415 COLL VENOUS BLD VENIPUNCTURE: CPT | Performed by: FAMILY MEDICINE

## 2022-09-20 NOTE — TELEPHONE ENCOUNTER
TSH is slightly low but T4 is normal.  Nothing needs to be done about this.  Blood work otherwise looks good.

## 2022-09-21 DIAGNOSIS — Z12.11 COLON CANCER SCREENING: ICD-10-CM

## 2022-09-27 ENCOUNTER — PATIENT MESSAGE (OUTPATIENT)
Dept: INTERNAL MEDICINE | Facility: CLINIC | Age: 47
End: 2022-09-27
Payer: COMMERCIAL

## 2022-09-27 DIAGNOSIS — M79.602 PARESTHESIA AND PAIN OF BOTH UPPER EXTREMITIES: Primary | ICD-10-CM

## 2022-09-27 DIAGNOSIS — M79.601 PARESTHESIA AND PAIN OF BOTH UPPER EXTREMITIES: Primary | ICD-10-CM

## 2022-09-27 DIAGNOSIS — R20.2 PARESTHESIA AND PAIN OF BOTH UPPER EXTREMITIES: Primary | ICD-10-CM

## 2022-09-28 ENCOUNTER — TELEPHONE (OUTPATIENT)
Dept: NEUROLOGY | Facility: CLINIC | Age: 47
End: 2022-09-28
Payer: COMMERCIAL

## 2022-09-28 ENCOUNTER — PATIENT MESSAGE (OUTPATIENT)
Dept: ADMINISTRATIVE | Facility: OTHER | Age: 47
End: 2022-09-28
Payer: COMMERCIAL

## 2022-09-28 NOTE — TELEPHONE ENCOUNTER
The clinic received a message to call patient regarding sooner appointment times. I called and left Maru a voicemail letting her know that her current appt scheduled on 1/5/23 at 11:20am is the soonest availability with Dr. Cruz. I asked her to call or message us through [facility] to discuss further.

## 2022-09-29 ENCOUNTER — TELEPHONE (OUTPATIENT)
Dept: ADMINISTRATIVE | Facility: OTHER | Age: 47
End: 2022-09-29
Payer: COMMERCIAL

## 2022-09-29 NOTE — TELEPHONE ENCOUNTER
LM twice to try and confirm which Neurology appointment patient would like to keep. Left contact number to call back.

## 2022-10-10 ENCOUNTER — PATIENT MESSAGE (OUTPATIENT)
Dept: INTERNAL MEDICINE | Facility: CLINIC | Age: 47
End: 2022-10-10
Payer: COMMERCIAL

## 2022-11-07 ENCOUNTER — PATIENT MESSAGE (OUTPATIENT)
Dept: OBSTETRICS AND GYNECOLOGY | Facility: CLINIC | Age: 47
End: 2022-11-07

## 2022-11-07 ENCOUNTER — OFFICE VISIT (OUTPATIENT)
Dept: OBSTETRICS AND GYNECOLOGY | Facility: CLINIC | Age: 47
End: 2022-11-07
Payer: COMMERCIAL

## 2022-11-07 VITALS
SYSTOLIC BLOOD PRESSURE: 120 MMHG | HEIGHT: 66 IN | DIASTOLIC BLOOD PRESSURE: 86 MMHG | BODY MASS INDEX: 29.58 KG/M2 | WEIGHT: 184.06 LBS

## 2022-11-07 DIAGNOSIS — N76.0 ACUTE VAGINITIS: Primary | ICD-10-CM

## 2022-11-07 PROCEDURE — 99999 PR PBB SHADOW E&M-EST. PATIENT-LVL III: ICD-10-PCS | Mod: PBBFAC,,, | Performed by: NURSE PRACTITIONER

## 2022-11-07 PROCEDURE — 99213 PR OFFICE/OUTPT VISIT, EST, LEVL III, 20-29 MIN: ICD-10-PCS | Mod: S$GLB,,, | Performed by: NURSE PRACTITIONER

## 2022-11-07 PROCEDURE — 99213 OFFICE O/P EST LOW 20 MIN: CPT | Mod: S$GLB,,, | Performed by: NURSE PRACTITIONER

## 2022-11-07 PROCEDURE — 99999 PR PBB SHADOW E&M-EST. PATIENT-LVL III: CPT | Mod: PBBFAC,,, | Performed by: NURSE PRACTITIONER

## 2022-11-07 PROCEDURE — 87480 CANDIDA DNA DIR PROBE: CPT | Performed by: NURSE PRACTITIONER

## 2022-11-07 RX ORDER — FLUCONAZOLE 150 MG/1
150 TABLET ORAL DAILY
Qty: 1 TABLET | Refills: 1 | Status: SHIPPED | OUTPATIENT
Start: 2022-11-07 | End: 2022-11-08

## 2022-11-07 RX ORDER — METRONIDAZOLE 7.5 MG/G
1 GEL VAGINAL NIGHTLY
Qty: 70 G | Refills: 2 | Status: SHIPPED | OUTPATIENT
Start: 2022-11-07 | End: 2022-11-12

## 2022-11-07 NOTE — PROGRESS NOTES
CC: Vaginal Discharge    Tasha Rush is a 47 y.o. female  presents with complaint of vaginal discharge for 1 week.  She reports itching. She denies odor.  She states the discharge is white.  Alleviating factors: None. No new sexual partners.        ROS:  GENERAL: No fever, chills, fatigability or weight loss.  VULVAR: No pain, no lesions and no itching.  VAGINAL: No relaxation, no itching, no discharge, no abnormal bleeding and no lesions.  ABDOMEN: No abdominal pain. Denies nausea. Denies vomiting. No diarrhea. No constipation  BREAST: Denies pain. No lumps. No discharge.  URINARY: No incontinence, no nocturia, no frequency and no dysuria.  CARDIOVASCULAR: No chest pain. No shortness of breath. No leg cramps.  NEUROLOGICAL: No headaches. No vision changes.    PHYSICAL EXAM:  VULVA: normal appearing vulva with no masses, tenderness or lesions   VAGINA: normal appearing vagina with normal color and thin, white discharge, no lesions   CERVIX: Surgically absent  UTERUS: Surgically absent  ADNEXA: normal adnexa in size, nontender and no masses    ASSESSMENT and PLAN:    ICD-10-CM ICD-9-CM    1. Acute vaginitis  N76.0 616.10 metroNIDAZOLE (METROGEL) 0.75 % (37.5mg/5 gram) vaginal gel      fluconazole (DIFLUCAN) 150 MG Tab        Affirm  Metrogel, Diflucan prn    Patient was counseled today on vaginitis prevention including :  a. avoiding feminine products such as deoderant soaps, body wash, bubble bath, douches, scented toilet paper, deoderant tampons or pads, feminine wipes, chronic pad use, etc.  b. avoiding other vulvovaginal irritants such as long hot baths, humidity, tight, synthetic clothing, chlorine and sitting around in wet bathing suits  c. wearing cotton underwear, avoiding thong underwear and no underwear to bed  d. taking showers instead of baths and use a hair dryer on cool setting afterwards to dry  e. wearing cotton to exercise and shower immediately after exercise and change clothes  f. using  polyurethane condoms without spermicide if sexually active and symptoms are triggered by intercourse    FOLLOW UP: PRN lack of improvement.       FLAVIO Cleveland-C

## 2022-11-08 ENCOUNTER — TELEPHONE (OUTPATIENT)
Dept: ADMINISTRATIVE | Facility: OTHER | Age: 47
End: 2022-11-08
Payer: COMMERCIAL

## 2022-11-08 ENCOUNTER — PATIENT MESSAGE (OUTPATIENT)
Dept: ADMINISTRATIVE | Facility: OTHER | Age: 47
End: 2022-11-08
Payer: COMMERCIAL

## 2022-11-08 NOTE — TELEPHONE ENCOUNTER
LM for patient to contact our scheduling office to discuss rescheduling Neurology appointment of 11-02-22. Contact number provided.

## 2022-11-09 ENCOUNTER — PATIENT MESSAGE (OUTPATIENT)
Dept: INTERNAL MEDICINE | Facility: CLINIC | Age: 47
End: 2022-11-09
Payer: COMMERCIAL

## 2022-11-11 ENCOUNTER — OFFICE VISIT (OUTPATIENT)
Dept: NEUROLOGY | Facility: CLINIC | Age: 47
End: 2022-11-11
Payer: COMMERCIAL

## 2022-11-11 VITALS
BODY MASS INDEX: 30.32 KG/M2 | HEART RATE: 74 BPM | HEIGHT: 65 IN | WEIGHT: 182 LBS | DIASTOLIC BLOOD PRESSURE: 86 MMHG | SYSTOLIC BLOOD PRESSURE: 128 MMHG

## 2022-11-11 DIAGNOSIS — R20.2 PARESTHESIA AND PAIN OF BOTH UPPER EXTREMITIES: ICD-10-CM

## 2022-11-11 DIAGNOSIS — M79.601 PARESTHESIA AND PAIN OF BOTH UPPER EXTREMITIES: ICD-10-CM

## 2022-11-11 DIAGNOSIS — M79.602 PARESTHESIA AND PAIN OF BOTH UPPER EXTREMITIES: ICD-10-CM

## 2022-11-11 PROCEDURE — 99204 OFFICE O/P NEW MOD 45 MIN: CPT | Mod: S$GLB,,, | Performed by: PSYCHIATRY & NEUROLOGY

## 2022-11-11 PROCEDURE — 99999 PR PBB SHADOW E&M-EST. PATIENT-LVL III: CPT | Mod: PBBFAC,,, | Performed by: PSYCHIATRY & NEUROLOGY

## 2022-11-11 PROCEDURE — 99204 PR OFFICE/OUTPT VISIT, NEW, LEVL IV, 45-59 MIN: ICD-10-PCS | Mod: S$GLB,,, | Performed by: PSYCHIATRY & NEUROLOGY

## 2022-11-11 PROCEDURE — 99999 PR PBB SHADOW E&M-EST. PATIENT-LVL III: ICD-10-PCS | Mod: PBBFAC,,, | Performed by: PSYCHIATRY & NEUROLOGY

## 2022-11-11 NOTE — PROGRESS NOTES
"  Tasha Rush is a 47 y.o. year old L handed female that  presents for evaluation of numbness-tingling of upper extremities.  Chief Complaint   Patient presents with    Numbness       HPI:  Ms Rush has HTN, kidney stones, gastritis, and bilateral upper extremities paresthesias with a pattern that I am going to describe below.  Stated that these symptoms started gradually in the L arm approximately a year ago and then became very noticeable in the upper extremities from the shoulders down to her hands.  There is associated " ache pain " of her arm but no weakness. Likewise, she reports no neck pain or trauma of arms and neck.  Denies HA, vertigo, double vision, difficulty swallowing, focal weakness, imbalance, language or vision impairment.        Past Medical History:   Diagnosis Date    Abnormal Pap smear of cervix     Bacterial vaginosis     RECURRENT    Gastritis     Hypertension     Renal disorder     kidney stones     Social History     Socioeconomic History    Marital status: Single   Occupational History    Occupation: Medical asst at [employer]   Tobacco Use    Smoking status: Some Days     Packs/day: 0.25     Years: 1.00     Pack years: 0.25     Types: Cigarettes    Smokeless tobacco: Never    Tobacco comments:     1 pack / 2 weeks   Substance and Sexual Activity    Alcohol use: Yes     Alcohol/week: 0.0 standard drinks     Comment: social    Drug use: No    Sexual activity: Yes     Birth control/protection: See Surgical Hx     Comment: Hysterectomy   Social History Narrative    Single.  Lives w/adopted 5 yr old daughter.  Pt lives next door to her own parents.       Past Surgical History:   Procedure Laterality Date    APPENDECTOMY      CERVICAL BIOPSY  W/ LOOP ELECTRODE EXCISION      CYSTOSCOPY W/ URETERAL STENT PLACEMENT  11/18/2020    Procedure: CYSTOSCOPY, WITH URETERAL STENT INSERTION;  Surgeon: Ej Mitchell MD;  Location: [facility];  Service: Urology;;    CYSTOTOMY FOR EXCISION OF BLADDER " "DIVERTICULUM N/A 8/24/2018    Procedure: CYSTOTOMY FOR BLADDER PERFORATION;  Surgeon: Hardy Bueno MD;  Location: [facility] OR;  Service: OB/GYN;  Laterality: N/A;    fibroid removal      HERNIA REPAIR  2009    umbilical    LAPAROSCOPIC TOTAL HYSTERECTOMY N/A 8/24/2018    converted to CALISTA    LASER LITHOTRIPSY  11/18/2020    Procedure: LITHOTRIPSY, USING LASER;  Surgeon: Ej Mitchell MD;  Location: [facility] OR;  Service: Urology;;    MYOMECTOMY      TOTAL ABDOMINAL HYSTERECTOMY N/A 8/24/2018    CALISTA- AUB.  Hardy Bueno MD;     URETEROSCOPIC REMOVAL OF URETERIC CALCULUS Left 11/18/2020    Procedure: EXTRACTION-STONE-URETEROSCOPY;  Surgeon: Ej Mitchell MD;  Location: [facility] OR;  Service: Urology;  Laterality: Left;     Family History   Problem Relation Age of Onset    Diabetes Father     Hyperlipidemia Father     Diabetes Mother     Multiple sclerosis Mother     No Known Problems Brother     Colon cancer Paternal Uncle     Heart disease Maternal Grandmother     Breast cancer Neg Hx     Ovarian cancer Neg Hx            Review of Systems  General ROS: negative for chills, fever or weight loss  Psychological ROS: negative for hallucination, depression or suicidal ideation  Ophthalmic ROS: negative for blurry vision, photophobia or eye pain  ENT ROS: negative for epistaxis, sore throat or rhinorrhea  Respiratory ROS: no cough, shortness of breath, or wheezing  Cardiovascular ROS: no chest pain or dyspnea on exertion  Gastrointestinal ROS: no abdominal pain, change in bowel habits, or black/ bloody stools  Genito-Urinary ROS: no dysuria, trouble voiding, or hematuria  Musculoskeletal ROS: negative for gait disturbance or muscular weakness  Neurological ROS: no syncope or seizures; no ataxia  Dermatological ROS: negative for pruritis, rash and jaundice      Physical Exam:  /86   Pulse 74   Ht 5' 5" (1.651 m)   Wt 82.6 kg (181 lb 15.8 oz)   LMP 08/14/2018   BMI 30.28 kg/m²   General appearance: alert, " cooperative, no distress  Constitutional:Oriented to person, place, and time.appears well-developed and well-nourished.   HEENT: Normocephalic, atraumatic, neck symmetrical, no nasal discharge   Eyes: conjunctivae/corneas clear, PERRL, EOM's intact  Lungs: clear to auscultation bilaterally, no dullness to percussion bilaterally  Heart: regular rate and rhythm without rub; no displacement of the PMI   Abdomen: soft, non-tender; bowel sounds normoactive; no organomegaly  Extremities: extremities symmetric; no clubbing, cyanosis, or edema  Integument: Skin color, texture, turgor normal; no rashes; hair distrubution normal  Neurologic: Alert and oriented X 3, normal strength, normal coordination and gait  Psychiatric: no pressured speech; normal affect; no evidence of impaired cognition     LABS:    Complete Blood Count  Lab Results   Component Value Date    RBC 4.41 07/21/2021    HGB 13.5 07/21/2021    HCT 41.5 07/21/2021    MCV 94 07/21/2021    MCH 30.6 07/21/2021    MCHC 32.5 07/21/2021    RDW 14.0 07/21/2021     07/21/2021    MPV 9.4 07/21/2021    GRAN 4.8 07/21/2021    GRAN 58.8 07/21/2021    LYMPH 2.7 07/21/2021    LYMPH 32.8 07/21/2021    MONO 0.6 07/21/2021    MONO 7.4 07/21/2021    EOS 0.0 07/21/2021    BASO 0.05 07/21/2021    EOSINOPHIL 0.2 07/21/2021    BASOPHIL 0.6 07/21/2021    DIFFMETHOD Automated 07/21/2021       Comprehensive Metabolic Panel  Lab Results   Component Value Date     09/19/2022    BUN 11 09/19/2022    CREATININE 0.7 09/19/2022     09/19/2022    K 3.9 09/19/2022     09/19/2022    PROT 7.5 09/19/2022    ALBUMIN 4.3 09/19/2022    BILITOT 0.8 09/19/2022    AST 26 09/19/2022    ALKPHOS 112 09/19/2022    CO2 24 09/19/2022    ALT 28 09/19/2022    ANIONGAP 6 (L) 09/19/2022    EGFRNONAA >60 09/17/2021    ESTGFRAFRICA >60 09/17/2021       TSH  Lab Results   Component Value Date    TSH 0.380 (L) 09/19/2022         Assessment: 48 y/o with HTN, kidney stones, gastritis, and  "chronic intermittent bilateral upper extremities paresthesias with the pattern described above.  Neuro exam is unimpressive, but based on her pattern and distribution of symptoms will entertain the possibility of bilateral cervical radiculopathy, entrapment neuropathy, and less likely cervical cord pathology.  Recommended EMG/NCS and MRI cervical spine. Said that she prefers to undergo " nerve testing first because I am very afraid of MRI ".            ICD-10-CM ICD-9-CM    1. Paresthesia and pain of both upper extremities  R20.2 782.0 Ambulatory referral/consult to Neurology    M79.601 729.5 EMG W/ ULTRASOUND AND NERVE CONDUCTION TEST 2 Extremities    M79.602          The encounter diagnosis was Paresthesia and pain of both upper extremities.      Plan:  1) Chronic bilateral upper extremities paresthesias: as above  2) HTN  3) Gastritis  4) Kidney stones  Orders Placed This Encounter   Procedures    EMG W/ ULTRASOUND AND NERVE CONDUCTION TEST 2 Extremities           Eulogio Giraldo MD      "

## 2022-11-14 ENCOUNTER — PATIENT MESSAGE (OUTPATIENT)
Dept: INTERNAL MEDICINE | Facility: CLINIC | Age: 47
End: 2022-11-14
Payer: COMMERCIAL

## 2022-11-14 DIAGNOSIS — L30.9 DERMATITIS: Primary | ICD-10-CM

## 2022-11-15 NOTE — TELEPHONE ENCOUNTER
Continue with HCTZ but she should check with her pharmacist to see if the lot she has is recalled.

## 2022-11-16 ENCOUNTER — OFFICE VISIT (OUTPATIENT)
Dept: OBSTETRICS AND GYNECOLOGY | Facility: CLINIC | Age: 47
End: 2022-11-16
Payer: COMMERCIAL

## 2022-11-16 VITALS — HEIGHT: 65 IN | BODY MASS INDEX: 30.01 KG/M2 | WEIGHT: 180.13 LBS

## 2022-11-16 DIAGNOSIS — Z01.419 WOMEN'S ANNUAL ROUTINE GYNECOLOGICAL EXAMINATION: Primary | ICD-10-CM

## 2022-11-16 DIAGNOSIS — L29.9 ITCH OF SKIN: ICD-10-CM

## 2022-11-16 DIAGNOSIS — N76.0 ACUTE VAGINITIS: ICD-10-CM

## 2022-11-16 PROCEDURE — 99999 PR PBB SHADOW E&M-EST. PATIENT-LVL II: ICD-10-PCS | Mod: PBBFAC,,, | Performed by: REGISTERED NURSE

## 2022-11-16 PROCEDURE — 99396 PREV VISIT EST AGE 40-64: CPT | Mod: S$GLB,,, | Performed by: REGISTERED NURSE

## 2022-11-16 PROCEDURE — 99396 PR PREVENTIVE VISIT,EST,40-64: ICD-10-PCS | Mod: S$GLB,,, | Performed by: REGISTERED NURSE

## 2022-11-16 PROCEDURE — 81514 NFCT DS BV&VAGINITIS DNA ALG: CPT | Performed by: REGISTERED NURSE

## 2022-11-16 PROCEDURE — 99999 PR PBB SHADOW E&M-EST. PATIENT-LVL II: CPT | Mod: PBBFAC,,, | Performed by: REGISTERED NURSE

## 2022-11-16 RX ORDER — FLUCONAZOLE 150 MG/1
150 TABLET ORAL ONCE
Qty: 1 TABLET | Refills: 1 | Status: SHIPPED | OUTPATIENT
Start: 2022-11-16 | End: 2022-11-16

## 2022-11-16 RX ORDER — NYSTATIN 100000 U/G
CREAM TOPICAL 2 TIMES DAILY
Qty: 30 G | Refills: 2 | Status: SHIPPED | OUTPATIENT
Start: 2022-11-16 | End: 2023-01-04 | Stop reason: SDUPTHER

## 2022-11-16 NOTE — PROGRESS NOTES
"CC: Annual    HPI: Pt is a 47 y.o.  female who presents for routine annual exam. She has hysterectomy. She is not currently sexually active; she does not want STD screening.  She was diagnosed in women's walk in clinic on 11/7 with bacterial vaginosis. She has completed 1 days of the Metrogel 2 nights ago. She reports itching under her arms that began 1 month ago, itching to her inguinal area, and below her breasts. She reports intense scratching. She changed soaps from Dove to Dial Antibacterial. She wears cotton underwear. Denies other changes in products. She reports a "knot" under her left arm pit that is painful. It has improved recently. Denies any GYN complaints.  The patient participates in regular exercise: now and then.  The patient smokes also now and then.  The patient wears seatbelts.   Pt denies any domestic violence.     FH:  Breast cancer: none  Colon cancer: none  Ovarian cancer: none  Endometrial cancer: none    ROS:  GENERAL: Feeling well overall. Denies fever or chills.   SKIN: Denies rash or lesions; + intense itching to armpits, under breasts and to inner thighs/inguinal area.  HEAD: Denies head injury or headache.   NODES: Denies enlarged lymph nodes.   CHEST: Denies chest pain or shortness of breath.   CARDIOVASCULAR: Denies palpitations or left sided chest pain.   ABDOMEN: No abdominal pain, constipation, diarrhea, nausea, vomiting or rectal bleeding.   URINARY: No dysuria, hematuria, or burning on urination.  REPRODUCTIVE: See HPI.   BREASTS: Denies pain, lumps, or nipple discharge; + knot to left arm pit, TTP.  HEMATOLOGIC: No easy bruisability or excessive bleeding.   MUSCULOSKELETAL: Denies joint pain or swelling.   NEUROLOGIC: Denies syncope or weakness.   PSYCHIATRIC: Denies depression, anxiety or mood swings.    PE:   APPEARANCE: Well nourished, well developed, Black or  female in no acute distress.  NODES: no cervical, supraclavicular, or inguinal " lymphadenopathy  BREASTS: Symmetrical, no skin changes or visible lesions. No palpable masses, nipple discharge or adenopathy bilaterally; + <1 cm lump to left axilla, slightly TTP.  ABDOMEN: Soft. No tenderness or masses. No distention. No hernias palpated. No CVA tenderness.  VULVA: No lesions. Normal external female genitalia.  URETHRAL MEATUS: Normal size and location, no lesions, no prolapse.  URETHRA: No masses, tenderness, or prolapse.  VAGINA: Moist. No lesions or lacerations noted. + copious clumpy white discharge; also + gel-type discharge (? Old metrogel?) No odor present.   CERVIX: Surgically absent.  UTERUS: Surgically absent.  ADNEXA: No tenderness. No fullness or masses palpated in the adnexal regions.   ANUS PERINEUM: Normal.      Diagnosis:  1. Women's annual routine gynecological examination    2. Acute vaginitis    3. Itch of skin        Plan:   Affirm  Diflucan rx  Nystatin cream  Recommend changing back to unscented Dove soap. Discussed washing towels, bras etc after each use and in hot water. Wear loose fitting clothing as able and wear cotton, full bottomed underwear.  FU as scheduled with Derm  Discussed suspect swollen lymph node resolving to left axilla, but pt can schedule L breast ultrasound with ordered screening mammo; pt wishes to call to schedule both       Patient was counseled today on the new ACS guidelines for cervical cytology screening as well as the current recommendations for breast cancer screening. She was counseled to follow up with her PCP for other routine health maintenance. Counseling session lasted approximately 10 minutes, and all her questions were answered.    Follow-up with me in 1 year for routine exam.        DAYDAY Boyle

## 2022-11-18 LAB
BACTERIAL VAGINOSIS DNA: NEGATIVE
CANDIDA GLABRATA DNA: NEGATIVE
CANDIDA KRUSEI DNA: NEGATIVE
CANDIDA RRNA VAG QL PROBE: NEGATIVE
T VAGINALIS RRNA GENITAL QL PROBE: NEGATIVE

## 2022-11-21 ENCOUNTER — TELEPHONE (OUTPATIENT)
Dept: PHYSICAL MEDICINE AND REHAB | Facility: CLINIC | Age: 47
End: 2022-11-21
Payer: COMMERCIAL

## 2022-11-21 ENCOUNTER — TELEPHONE (OUTPATIENT)
Dept: NEUROLOGY | Facility: CLINIC | Age: 47
End: 2022-11-21
Payer: COMMERCIAL

## 2022-11-21 NOTE — TELEPHONE ENCOUNTER
Spoke to patient in regards to scheduling her EMG Procedure. Patient requested that it be scheduled at our [facility] location. The request was forwarded to Karime in the EMG Lab at [facility] requesting her assistance.  Patient is aware that she will receive a separate call to schedule.

## 2022-11-21 NOTE — TELEPHONE ENCOUNTER
----- Message from Reza Covington MA sent at 11/11/2022  3:21 PM CST -----  Regarding: EMG to Schedule  Good afternoon,     This pt needs to be scheduled for an EMG with ultrasound and nerve conduction test two extremities. Dr. Giraldo has put the orders in the system and I informed the pt you will be contacting them to schedule.     Thank you!  Reza

## 2022-11-21 NOTE — TELEPHONE ENCOUNTER
I was able to schedule the patient first available.  I placed a reminder letter in the mail for her.

## 2022-11-21 NOTE — TELEPHONE ENCOUNTER
----- Message from Roverto Chaves sent at 11/21/2022  1:21 PM CST -----  Regarding: FW: EMG to Schedule  Myron Schaffer,    I was just able to actually speak to this patient in regards to scheduling her EMG Procedure.  She asked that it please be scheduled at the [facility] location.  Can you please assist with this?  She is aware that she will be receiving a call from you.  Please let me know if I can assist further.      Thanks,  Roverto    ----- Message -----  From: Reza Covington MA  Sent: 11/11/2022   3:22 PM CST  To: Roverto Chaves  Subject: EMG to Schedule                                  Good afternoon,     This pt needs to be scheduled for an EMG with ultrasound and nerve conduction test two extremities. Dr. Giraldo has put the orders in the system and I informed the pt you will be contacting them to schedule.     Thank you!  Reza

## 2022-11-22 ENCOUNTER — PATIENT MESSAGE (OUTPATIENT)
Dept: INTERNAL MEDICINE | Facility: CLINIC | Age: 47
End: 2022-11-22
Payer: COMMERCIAL

## 2022-12-07 ENCOUNTER — PATIENT OUTREACH (OUTPATIENT)
Dept: INTERNAL MEDICINE | Facility: CLINIC | Age: 47
End: 2022-12-07
Payer: COMMERCIAL

## 2022-12-07 ENCOUNTER — PATIENT MESSAGE (OUTPATIENT)
Dept: INTERNAL MEDICINE | Facility: CLINIC | Age: 47
End: 2022-12-07
Payer: COMMERCIAL

## 2022-12-07 NOTE — PROGRESS NOTES
[employer] was given to patient on 12/7/2022 10:18 AM       Anabelle Morgan  Nurse Clinical Care Coordinator  Internal Medicine  [facility]\""  [phone]

## 2022-12-20 ENCOUNTER — PATIENT OUTREACH (OUTPATIENT)
Dept: INTERNAL MEDICINE | Facility: CLINIC | Age: 47
End: 2022-12-20
Payer: COMMERCIAL

## 2023-01-09 ENCOUNTER — TELEPHONE (OUTPATIENT)
Dept: ENDOSCOPY | Facility: HOSPITAL | Age: 48
End: 2023-01-09
Payer: COMMERCIAL

## 2023-01-09 ENCOUNTER — PATIENT MESSAGE (OUTPATIENT)
Dept: ENDOSCOPY | Facility: HOSPITAL | Age: 48
End: 2023-01-09
Payer: COMMERCIAL

## 2023-01-09 NOTE — TELEPHONE ENCOUNTER
----- Message from Margie Garcia sent at 1/9/2023  7:28 AM CST -----  Regarding: Colonoscopy  Contact: [phone]  Patient Tasha is calling. Patient stated she spoke with someone in ref to having a colonoscopy done. Patient stated she thought it was today. I do not see any documentation of an appt for today. Please call patient at [phone]    Thank You

## 2023-01-10 NOTE — TELEPHONE ENCOUNTER
----- Message from Margie Garcia sent at 1/9/2023  7:28 AM CST -----  Regarding: Colonoscopy  Contact: [phone]  Patient Tasha is calling. Patient stated she spoke with someone in ref to having a colonoscopy done. Patient stated she thought it was today. I do not see any documentation of an appt for today. Please call patient at [phone]    Thank You

## 2023-01-10 NOTE — TELEPHONE ENCOUNTER
No orders for colonoscopy in for pt. No receipt visits with pcp. Tried to contact pt and did not receive answer. Message sent to pt portal. Order in for screening colonoscopy and pat appt scheduled.

## 2023-01-14 NOTE — ASSESSMENT & PLAN NOTE
-On Rocpehin / Ayclovir  - Vanc discontinued   -Clinical suspicion was viral etiology but CSF gram stain with [facility]  -cont current Abx / Antivirals pending HSV PCR and CSF culture     Yes

## 2023-01-18 ENCOUNTER — PATIENT MESSAGE (OUTPATIENT)
Dept: ADMINISTRATIVE | Facility: HOSPITAL | Age: 48
End: 2023-01-18
Payer: COMMERCIAL

## 2023-03-02 ENCOUNTER — OFFICE VISIT (OUTPATIENT)
Dept: DERMATOLOGY | Facility: CLINIC | Age: 48
End: 2023-03-02
Payer: COMMERCIAL

## 2023-03-02 VITALS — BODY MASS INDEX: 29.95 KG/M2 | WEIGHT: 180 LBS

## 2023-03-02 DIAGNOSIS — L30.9 DERMATITIS: ICD-10-CM

## 2023-03-02 DIAGNOSIS — L21.9 SEBORRHEIC DERMATITIS: Primary | ICD-10-CM

## 2023-03-02 PROCEDURE — 1159F MED LIST DOCD IN RCRD: CPT | Mod: CPTII,S$GLB,, | Performed by: DERMATOLOGY

## 2023-03-02 PROCEDURE — 99999 PR PBB SHADOW E&M-EST. PATIENT-LVL III: ICD-10-PCS | Mod: PBBFAC,,, | Performed by: DERMATOLOGY

## 2023-03-02 PROCEDURE — 3008F BODY MASS INDEX DOCD: CPT | Mod: CPTII,S$GLB,, | Performed by: DERMATOLOGY

## 2023-03-02 PROCEDURE — 1160F RVW MEDS BY RX/DR IN RCRD: CPT | Mod: CPTII,S$GLB,, | Performed by: DERMATOLOGY

## 2023-03-02 PROCEDURE — 1159F PR MEDICATION LIST DOCUMENTED IN MEDICAL RECORD: ICD-10-PCS | Mod: CPTII,S$GLB,, | Performed by: DERMATOLOGY

## 2023-03-02 PROCEDURE — 1160F PR REVIEW ALL MEDS BY PRESCRIBER/CLIN PHARMACIST DOCUMENTED: ICD-10-PCS | Mod: CPTII,S$GLB,, | Performed by: DERMATOLOGY

## 2023-03-02 PROCEDURE — 3008F PR BODY MASS INDEX (BMI) DOCUMENTED: ICD-10-PCS | Mod: CPTII,S$GLB,, | Performed by: DERMATOLOGY

## 2023-03-02 PROCEDURE — 99203 OFFICE O/P NEW LOW 30 MIN: CPT | Mod: S$GLB,,, | Performed by: DERMATOLOGY

## 2023-03-02 PROCEDURE — 99203 PR OFFICE/OUTPT VISIT, NEW, LEVL III, 30-44 MIN: ICD-10-PCS | Mod: S$GLB,,, | Performed by: DERMATOLOGY

## 2023-03-02 PROCEDURE — 99999 PR PBB SHADOW E&M-EST. PATIENT-LVL III: CPT | Mod: PBBFAC,,, | Performed by: DERMATOLOGY

## 2023-03-02 RX ORDER — ALCLOMETASONE DIPROPIONATE 0.5 MG/G
CREAM TOPICAL
Qty: 60 G | Refills: 3 | Status: SHIPPED | OUTPATIENT
Start: 2023-03-02

## 2023-03-02 NOTE — PROGRESS NOTES
Subjective:       Patient ID:  Tasha Rush is a 47 y.o. female who presents for   Chief Complaint   Patient presents with    Rash     Face     Rash on face off and on no tx    Rash - Initial  Affected locations: face, left arm, right arm, right elbow and left elbow  Duration: 6 months  Signs / symptoms: itching  Aggravated by: nothing  Relieving factors/Treatments tried: Rx topical steroids    Review of Systems   Constitutional:  Negative for fever, chills, weight loss, weight gain, fatigue, night sweats and malaise.   Skin:  Positive for rash. Negative for daily sunscreen use, activity-related sunscreen use and wears hat.   Hematologic/Lymphatic: Does not bruise/bleed easily.      Objective:    Physical Exam   Constitutional: She appears well-developed and well-nourished.   Neurological: She is alert and oriented to person, place, and time.   Psychiatric: She has a normal mood and affect.   Skin:   Areas Examined (abnormalities noted in diagram):   Head / Face Inspection Performed  Neck Inspection Performed            Diagram Legend     Erythematous scaling macule/papule c/w actinic keratosis       Vascular papule c/w angioma      Pigmented verrucoid papule/plaque c/w seborrheic keratosis      Yellow umbilicated papule c/w sebaceous hyperplasia      Irregularly shaped tan macule c/w lentigo     1-2 mm smooth white papules consistent with Milia      Movable subcutaneous cyst with punctum c/w epidermal inclusion cyst      Subcutaneous movable cyst c/w pilar cyst      Firm pink to brown papule c/w dermatofibroma      Pedunculated fleshy papule(s) c/w skin tag(s)      Evenly pigmented macule c/w junctional nevus     Mildly variegated pigmented, slightly irregular-bordered macule c/w mildly atypical nevus      Flesh colored to evenly pigmented papule c/w intradermal nevus       Pink pearly papule/plaque c/w basal cell carcinoma      Erythematous hyperkeratotic cursted plaque c/w SCC      Surgical scar with no  sign of skin cancer recurrence      Open and closed comedones      Inflammatory papules and pustules      Verrucoid papule consistent consistent with wart     Erythematous eczematous patches and plaques     Dystrophic onycholytic nail with subungual debris c/w onychomycosis     Umbilicated papule    Erythematous-base heme-crusted tan verrucoid plaque consistent with inflamed seborrheic keratosis     Erythematous Silvery Scaling Plaque c/w Psoriasis     See annotation      Assessment / Plan:        Seborrheic dermatitis  Soap with zinc  Brochure provided    -     alclometasone (ACLOVATE) 0.05 % cream; AAA face bid prn redness or scaling or itching  Dispense: 60 g; Refill: 3    Dermatitis  -     Ambulatory referral/consult to Dermatology             Follow up if symptoms worsen or fail to improve.

## 2023-05-02 ENCOUNTER — LAB VISIT (OUTPATIENT)
Dept: LAB | Facility: HOSPITAL | Age: 48
End: 2023-05-02
Payer: COMMERCIAL

## 2023-05-02 ENCOUNTER — OFFICE VISIT (OUTPATIENT)
Dept: INTERNAL MEDICINE | Facility: CLINIC | Age: 48
End: 2023-05-02
Attending: FAMILY MEDICINE
Payer: COMMERCIAL

## 2023-05-02 VITALS
DIASTOLIC BLOOD PRESSURE: 66 MMHG | SYSTOLIC BLOOD PRESSURE: 112 MMHG | BODY MASS INDEX: 30.56 KG/M2 | HEIGHT: 65 IN | WEIGHT: 183.44 LBS

## 2023-05-02 DIAGNOSIS — R30.0 DYSURIA: ICD-10-CM

## 2023-05-02 DIAGNOSIS — Z12.31 SCREENING MAMMOGRAM, ENCOUNTER FOR: ICD-10-CM

## 2023-05-02 DIAGNOSIS — R23.2 HOT FLASHES: Primary | ICD-10-CM

## 2023-05-02 DIAGNOSIS — K46.9 ABDOMINAL HERNIA WITHOUT OBSTRUCTION AND WITHOUT GANGRENE, RECURRENCE NOT SPECIFIED, UNSPECIFIED HERNIA TYPE: ICD-10-CM

## 2023-05-02 DIAGNOSIS — R04.0 EPISTAXIS: ICD-10-CM

## 2023-05-02 LAB
BACTERIA #/AREA URNS AUTO: ABNORMAL /HPF
BILIRUB UR QL STRIP: NEGATIVE
CAOX CRY UR QL COMP ASSIST: ABNORMAL
CLARITY UR REFRACT.AUTO: ABNORMAL
COLOR UR AUTO: YELLOW
GLUCOSE UR QL STRIP: NEGATIVE
HGB UR QL STRIP: NEGATIVE
HYALINE CASTS UR QL AUTO: 1 /LPF
KETONES UR QL STRIP: ABNORMAL
LEUKOCYTE ESTERASE UR QL STRIP: ABNORMAL
MICROSCOPIC COMMENT: ABNORMAL
NITRITE UR QL STRIP: POSITIVE
PH UR STRIP: 6 [PH] (ref 5–8)
PROT UR QL STRIP: NEGATIVE
RBC #/AREA URNS AUTO: 0 /HPF (ref 0–4)
SP GR UR STRIP: 1.02 (ref 1–1.03)
SQUAMOUS #/AREA URNS AUTO: 12 /HPF
URN SPEC COLLECT METH UR: ABNORMAL
WBC #/AREA URNS AUTO: 9 /HPF (ref 0–5)

## 2023-05-02 PROCEDURE — 3008F PR BODY MASS INDEX (BMI) DOCUMENTED: ICD-10-PCS | Mod: CPTII,S$GLB,, | Performed by: PHYSICIAN ASSISTANT

## 2023-05-02 PROCEDURE — 1159F MED LIST DOCD IN RCRD: CPT | Mod: CPTII,S$GLB,, | Performed by: PHYSICIAN ASSISTANT

## 2023-05-02 PROCEDURE — 99214 PR OFFICE/OUTPT VISIT, EST, LEVL IV, 30-39 MIN: ICD-10-PCS | Mod: S$GLB,,, | Performed by: PHYSICIAN ASSISTANT

## 2023-05-02 PROCEDURE — 87077 CULTURE AEROBIC IDENTIFY: CPT | Performed by: PHYSICIAN ASSISTANT

## 2023-05-02 PROCEDURE — 99999 PR PBB SHADOW E&M-EST. PATIENT-LVL IV: CPT | Mod: PBBFAC,,, | Performed by: PHYSICIAN ASSISTANT

## 2023-05-02 PROCEDURE — 87086 URINE CULTURE/COLONY COUNT: CPT | Performed by: PHYSICIAN ASSISTANT

## 2023-05-02 PROCEDURE — 3078F DIAST BP <80 MM HG: CPT | Mod: CPTII,S$GLB,, | Performed by: PHYSICIAN ASSISTANT

## 2023-05-02 PROCEDURE — 99214 OFFICE O/P EST MOD 30 MIN: CPT | Mod: S$GLB,,, | Performed by: PHYSICIAN ASSISTANT

## 2023-05-02 PROCEDURE — 3074F PR MOST RECENT SYSTOLIC BLOOD PRESSURE < 130 MM HG: ICD-10-PCS | Mod: CPTII,S$GLB,, | Performed by: PHYSICIAN ASSISTANT

## 2023-05-02 PROCEDURE — 3074F SYST BP LT 130 MM HG: CPT | Mod: CPTII,S$GLB,, | Performed by: PHYSICIAN ASSISTANT

## 2023-05-02 PROCEDURE — 3078F PR MOST RECENT DIASTOLIC BLOOD PRESSURE < 80 MM HG: ICD-10-PCS | Mod: CPTII,S$GLB,, | Performed by: PHYSICIAN ASSISTANT

## 2023-05-02 PROCEDURE — 81001 URINALYSIS AUTO W/SCOPE: CPT | Performed by: PHYSICIAN ASSISTANT

## 2023-05-02 PROCEDURE — 3008F BODY MASS INDEX DOCD: CPT | Mod: CPTII,S$GLB,, | Performed by: PHYSICIAN ASSISTANT

## 2023-05-02 PROCEDURE — 99999 PR PBB SHADOW E&M-EST. PATIENT-LVL IV: ICD-10-PCS | Mod: PBBFAC,,, | Performed by: PHYSICIAN ASSISTANT

## 2023-05-02 PROCEDURE — 87088 URINE BACTERIA CULTURE: CPT | Performed by: PHYSICIAN ASSISTANT

## 2023-05-02 PROCEDURE — 87186 SC STD MICRODIL/AGAR DIL: CPT | Performed by: PHYSICIAN ASSISTANT

## 2023-05-02 PROCEDURE — 1159F PR MEDICATION LIST DOCUMENTED IN MEDICAL RECORD: ICD-10-PCS | Mod: CPTII,S$GLB,, | Performed by: PHYSICIAN ASSISTANT

## 2023-05-02 RX ORDER — CETIRIZINE HYDROCHLORIDE 10 MG/1
10 TABLET ORAL DAILY
Qty: 30 TABLET | Refills: 0 | Status: SHIPPED | OUTPATIENT
Start: 2023-05-02 | End: 2023-12-12

## 2023-05-02 NOTE — PROGRESS NOTES
INTERNAL MEDICINE URGENT VISIT NOTE    CHIEF COMPLAINT     Chief Complaint   Patient presents with    Consult     Hormones, mammogram, hernia       HPI     Tasha Rush is a 47 y.o. female who presents for an urgent visit today.    PCP is Speedy Cavanaugh MD, patient is new to me.     Patient presents with multiple complaints    Reports of hot flashes during the day and at night.  Patient suspect this is related to menopause.  She is status post remote hysterectomy so she does not have regular periods.  She reports no unexpected weight changes, headaches, dizziness or night sweats.  She is not currently established with an OB gyn.  Gyn referral will be placed for her  She is due for mammogram and is requesting order to be placed    She has history of a midline abdominal hernia just above the belly button that is nontender and is only mildly protruding.  She is requesting referral to General surgery to discuss having this surgically repaired.  Denies any pain, distention, swelling, bowel changes.  She has noticed some urinary frequency and vaginal discharge which she is worried could be a urinary tract infection.  Denies fever, chills, nausea or vomiting.  Is requesting urinalysis.  No empiric antibiotic treatment felt warranted at this time.  She reports occasional nosebleeds when she blows her nose.  Blood is streaked with clear nasal mucus.  Will start Zyrtec daily.    Patient presents with hot flashes        Past Medical History:  Past Medical History:   Diagnosis Date    Abnormal Pap smear of cervix     Bacterial vaginosis     RECURRENT    Gastritis     Hypertension     Renal disorder     kidney stones       Home Medications:  Prior to Admission medications    Medication Sig Start Date End Date Taking? Authorizing Provider   alclometasone (ACLOVATE) 0.05 % cream AAA face bid prn redness or scaling or itching 3/2/23  Yes Lita Chu MD   amLODIPine (NORVASC) 10 MG tablet TAKE 1 TABLET BY MOUTH EVERY  "DAY 1/5/23  Yes Speedy Cavanaugh MD   hydroCHLOROthiazide (HYDRODIURIL) 25 MG tablet Take 1 tablet (25 mg total) by mouth once daily. 1/5/23  Yes Speedy Cavanaugh MD   nystatin (MYCOSTATIN) cream Apply topically 2 (two) times daily. 1/5/23  Yes Sally Burkett NP   omeprazole (PRILOSEC) 20 MG capsule Take 20 mg by mouth once daily.   Yes Historical Provider   cyanocobalamin (VITAMIN B-12) 1000 MCG tablet Take 100 mcg by mouth once daily.    Historical Provider       Review of Systems:  Review of Systems   Constitutional:  Negative for chills and fever.   HENT:  Negative for sore throat and trouble swallowing.    Eyes:  Negative for visual disturbance.   Respiratory:  Negative for cough and shortness of breath.    Cardiovascular:  Negative for chest pain.   Gastrointestinal:  Negative for abdominal pain, constipation, diarrhea, nausea and vomiting.   Genitourinary:  Negative for dysuria and flank pain.   Musculoskeletal:  Negative for back pain, neck pain and neck stiffness.   Skin:  Negative for rash.   Neurological:  Negative for dizziness, syncope, weakness and headaches.   Psychiatric/Behavioral:  Negative for confusion.      Health Maintainence:   Immunizations:  Health Maintenance         Date Due Completion Date    Pneumococcal Vaccines (Age 0-64) (1 - PCV) Never done ---    Colorectal Cancer Screening Never done ---    COVID-19 Vaccine (3 - Booster for Pfizer series) 04/22/2021 2/25/2021    Mammogram 07/26/2022 7/26/2021    Hemoglobin A1c (Diabetic Prevention Screening) 09/19/2025 9/19/2022    Lipid Panel 09/19/2027 9/19/2022    TETANUS VACCINE 07/03/2028 7/3/2018             PHYSICAL EXAM     /66   Ht 5' 5" (1.651 m)   Wt 83.2 kg (183 lb 6.8 oz)   LMP 08/14/2018   BMI 30.52 kg/m²     Physical Exam  Vitals and nursing note reviewed.   Constitutional:       Appearance: Normal appearance.      Comments: Healthy appearing female in NAD or apparent pain. She makes good eye contact, " speaks in clear full sentences and ambulates with ease.        HENT:      Head: Normocephalic and atraumatic.      Nose: Nose normal.      Mouth/Throat:      Pharynx: Oropharynx is clear.   Eyes:      Conjunctiva/sclera: Conjunctivae normal.   Cardiovascular:      Rate and Rhythm: Normal rate and regular rhythm.      Pulses: Normal pulses.   Pulmonary:      Effort: No respiratory distress.   Abdominal:      Tenderness: There is no abdominal tenderness.   Musculoskeletal:         General: Normal range of motion.      Cervical back: No rigidity.   Skin:     General: Skin is warm and dry.      Capillary Refill: Capillary refill takes less than 2 seconds.      Findings: No rash.   Neurological:      General: No focal deficit present.      Mental Status: She is alert.      Gait: Gait normal.   Psychiatric:         Mood and Affect: Mood normal.       LABS     Lab Results   Component Value Date    HGBA1C 5.4 09/19/2022     CMP  Sodium   Date Value Ref Range Status   09/19/2022 137 136 - 145 mmol/L Final     Potassium   Date Value Ref Range Status   09/19/2022 3.9 3.5 - 5.1 mmol/L Final     Chloride   Date Value Ref Range Status   09/19/2022 107 95 - 110 mmol/L Final     CO2   Date Value Ref Range Status   09/19/2022 24 23 - 29 mmol/L Final     Glucose   Date Value Ref Range Status   09/19/2022 106 70 - 110 mg/dL Final     BUN   Date Value Ref Range Status   09/19/2022 11 6 - 20 mg/dL Final     Creatinine   Date Value Ref Range Status   09/19/2022 0.7 0.5 - 1.4 mg/dL Final     Calcium   Date Value Ref Range Status   09/19/2022 9.7 8.7 - 10.5 mg/dL Final     Total Protein   Date Value Ref Range Status   09/19/2022 7.5 6.0 - 8.4 g/dL Final     Albumin   Date Value Ref Range Status   09/19/2022 4.3 3.5 - 5.2 g/dL Final     Total Bilirubin   Date Value Ref Range Status   09/19/2022 0.8 0.1 - 1.0 mg/dL Final     Comment:     For infants and newborns, interpretation of results should be based  on gestational age, weight and in  agreement with clinical  observations.    Premature Infant recommended reference ranges:  Up to 24 hours.............<8.0 mg/dL  Up to 48 hours............<12.0 mg/dL  3-5 days..................<15.0 mg/dL  6-29 days.................<15.0 mg/dL       Alkaline Phosphatase   Date Value Ref Range Status   09/19/2022 112 55 - 135 U/L Final     AST   Date Value Ref Range Status   09/19/2022 26 10 - 40 U/L Final     ALT   Date Value Ref Range Status   09/19/2022 28 10 - 44 U/L Final     Anion Gap   Date Value Ref Range Status   09/19/2022 6 (L) 8 - 16 mmol/L Final     eGFR if    Date Value Ref Range Status   09/17/2021 >60 >60 mL/min/1.73 m^2 Final     eGFR if non    Date Value Ref Range Status   09/17/2021 >60 >60 mL/min/1.73 m^2 Final     Comment:     Calculation used to obtain the estimated glomerular filtration  rate (eGFR) is the CKD-EPI equation.        Lab Results   Component Value Date    WBC 8.09 07/21/2021    HGB 13.5 07/21/2021    HCT 41.5 07/21/2021    MCV 94 07/21/2021     07/21/2021     Lab Results   Component Value Date    CHOL 165 09/19/2022    CHOL 144 09/17/2021    CHOL 122 07/10/2018     Lab Results   Component Value Date    HDL 43 09/19/2022    HDL 48 09/17/2021    HDL 52 07/10/2018     Lab Results   Component Value Date    LDLCALC 105.4 09/19/2022    LDLCALC 75.2 09/17/2021    LDLCALC 51.6 (L) 07/10/2018     Lab Results   Component Value Date    TRIG 83 09/19/2022    TRIG 104 09/17/2021    TRIG 92 07/10/2018     Lab Results   Component Value Date    CHOLHDL 26.1 09/19/2022    CHOLHDL 33.3 09/17/2021    CHOLHDL 42.6 07/10/2018     Lab Results   Component Value Date    TSH 0.380 (L) 09/19/2022       ASSESSMENT/PLAN     Tasha Rush is a 47 y.o. female     Maruashleydanni was seen today for consult.    Diagnoses and all orders for this visit:    Hot flashes  -     Ambulatory referral/consult to Gynecology; Future    Screening mammogram, encounter for  -     Mammo  Digital Screening Bilat; Future    Dysuria    -     Urinalysis; Future  -     CULTURE, URINE; Future    Epistaxis   -start Zyrtec daily    Abdominal hernia without obstruction and without gangrene, recurrence not specified, unspecified hernia type   -referral to General surgery   -     Ambulatory referral/consult to General Surgery; Future    Other orders  -     cetirizine (ZYRTEC) 10 MG tablet; Take 1 tablet (10 mg total) by mouth once daily.         Patient was counseled on when and how to seek emergent care.       Tatianna Resendiz PA-C   Department of Internal Medicine - [facility]   12:32 PM

## 2023-05-03 RX ORDER — NITROFURANTOIN 25; 75 MG/1; MG/1
100 CAPSULE ORAL 2 TIMES DAILY
Qty: 10 CAPSULE | Refills: 0 | Status: SHIPPED | OUTPATIENT
Start: 2023-05-03 | End: 2023-05-08

## 2023-05-05 LAB — BACTERIA UR CULT: ABNORMAL

## 2023-05-09 ENCOUNTER — OFFICE VISIT (OUTPATIENT)
Dept: SURGERY | Facility: CLINIC | Age: 48
End: 2023-05-09
Attending: SPECIALIST
Payer: COMMERCIAL

## 2023-05-09 VITALS
SYSTOLIC BLOOD PRESSURE: 119 MMHG | HEIGHT: 65 IN | OXYGEN SATURATION: 100 % | DIASTOLIC BLOOD PRESSURE: 69 MMHG | WEIGHT: 181 LBS | HEART RATE: 83 BPM | BODY MASS INDEX: 30.16 KG/M2

## 2023-05-09 DIAGNOSIS — R30.0 DYSURIA: ICD-10-CM

## 2023-05-09 PROCEDURE — 99214 PR OFFICE/OUTPT VISIT, EST, LEVL IV, 30-39 MIN: ICD-10-PCS | Mod: S$GLB,,, | Performed by: SPECIALIST

## 2023-05-09 PROCEDURE — 3008F PR BODY MASS INDEX (BMI) DOCUMENTED: ICD-10-PCS | Mod: CPTII,S$GLB,, | Performed by: SPECIALIST

## 2023-05-09 PROCEDURE — 1160F PR REVIEW ALL MEDS BY PRESCRIBER/CLIN PHARMACIST DOCUMENTED: ICD-10-PCS | Mod: CPTII,S$GLB,, | Performed by: SPECIALIST

## 2023-05-09 PROCEDURE — 1159F MED LIST DOCD IN RCRD: CPT | Mod: CPTII,S$GLB,, | Performed by: SPECIALIST

## 2023-05-09 PROCEDURE — 1159F PR MEDICATION LIST DOCUMENTED IN MEDICAL RECORD: ICD-10-PCS | Mod: CPTII,S$GLB,, | Performed by: SPECIALIST

## 2023-05-09 PROCEDURE — 99999 PR PBB SHADOW E&M-EST. PATIENT-LVL IV: CPT | Mod: PBBFAC,,, | Performed by: SPECIALIST

## 2023-05-09 PROCEDURE — 3074F PR MOST RECENT SYSTOLIC BLOOD PRESSURE < 130 MM HG: ICD-10-PCS | Mod: CPTII,S$GLB,, | Performed by: SPECIALIST

## 2023-05-09 PROCEDURE — 3074F SYST BP LT 130 MM HG: CPT | Mod: CPTII,S$GLB,, | Performed by: SPECIALIST

## 2023-05-09 PROCEDURE — 99214 OFFICE O/P EST MOD 30 MIN: CPT | Mod: S$GLB,,, | Performed by: SPECIALIST

## 2023-05-09 PROCEDURE — 99999 PR PBB SHADOW E&M-EST. PATIENT-LVL IV: ICD-10-PCS | Mod: PBBFAC,,, | Performed by: SPECIALIST

## 2023-05-09 PROCEDURE — 3078F DIAST BP <80 MM HG: CPT | Mod: CPTII,S$GLB,, | Performed by: SPECIALIST

## 2023-05-09 PROCEDURE — 3008F BODY MASS INDEX DOCD: CPT | Mod: CPTII,S$GLB,, | Performed by: SPECIALIST

## 2023-05-09 PROCEDURE — 1160F RVW MEDS BY RX/DR IN RCRD: CPT | Mod: CPTII,S$GLB,, | Performed by: SPECIALIST

## 2023-05-09 PROCEDURE — 3078F PR MOST RECENT DIASTOLIC BLOOD PRESSURE < 80 MM HG: ICD-10-PCS | Mod: CPTII,S$GLB,, | Performed by: SPECIALIST

## 2023-05-09 NOTE — PROGRESS NOTES
History & Physical    SUBJECTIVE:     History of Present Illness:  Patient is a 47 y.o. female presents with recurrent incisional hernia.  History of hypertension and urolithiasis.  Patient for hernia repair    Chief Complaint   Patient presents with    Hernia       Review of patient's allergies indicates:   Allergen Reactions    Ace inhibitors Other (See Comments)     cough    Dilaudid [hydromorphone]      SOB + anxiety     Will take if needed.     Can take oxycodone       Current Outpatient Medications   Medication Sig Dispense Refill    alclometasone (ACLOVATE) 0.05 % cream AAA face bid prn redness or scaling or itching 60 g 3    amLODIPine (NORVASC) 10 MG tablet TAKE 1 TABLET BY MOUTH EVERY DAY 90 tablet 3    cetirizine (ZYRTEC) 10 MG tablet Take 1 tablet (10 mg total) by mouth once daily. 30 tablet 0    cyanocobalamin (VITAMIN B-12) 1000 MCG tablet Take 100 mcg by mouth once daily.      hydroCHLOROthiazide (HYDRODIURIL) 25 MG tablet Take 1 tablet (25 mg total) by mouth once daily. 90 tablet 3    nystatin (MYCOSTATIN) cream Apply topically 2 (two) times daily. 30 g 2    omeprazole (PRILOSEC) 20 MG capsule Take 20 mg by mouth once daily.       No current facility-administered medications for this visit.       Past Medical History:   Diagnosis Date    Abnormal Pap smear of cervix     Bacterial vaginosis     RECURRENT    Gastritis     Hypertension     Renal disorder     kidney stones     Past Surgical History:   Procedure Laterality Date    APPENDECTOMY      CERVICAL BIOPSY  W/ LOOP ELECTRODE EXCISION      CYSTOSCOPY W/ URETERAL STENT PLACEMENT  11/18/2020    Procedure: CYSTOSCOPY, WITH URETERAL STENT INSERTION;  Surgeon: Ej Mitchell MD;  Location: [facility] OR;  Service: Urology;;    CYSTOTOMY FOR EXCISION OF BLADDER DIVERTICULUM N/A 8/24/2018    Procedure: CYSTOTOMY FOR BLADDER PERFORATION;  Surgeon: Hardy Bueno MD;  Location: [facility] OR;  Service: OB/GYN;  Laterality: N/A;    fibroid removal      HERNIA REPAIR   "2009    umbilical    LAPAROSCOPIC TOTAL HYSTERECTOMY N/A 8/24/2018    converted to CALISTA    LASER LITHOTRIPSY  11/18/2020    Procedure: LITHOTRIPSY, USING LASER;  Surgeon: Ej Mitchell MD;  Location: [facility];  Service: Urology;;    MYOMECTOMY      TOTAL ABDOMINAL HYSTERECTOMY N/A 8/24/2018    CALISTA- AUB.  Hardy Bueno MD;     URETEROSCOPIC REMOVAL OF URETERIC CALCULUS Left 11/18/2020    Procedure: EXTRACTION-STONE-URETEROSCOPY;  Surgeon: Ej Mitchell MD;  Location: [facility];  Service: Urology;  Laterality: Left;     Family History   Problem Relation Age of Onset    Diabetes Father     Hyperlipidemia Father     Diabetes Mother     Multiple sclerosis Mother     No Known Problems Brother     Colon cancer Paternal Uncle     Heart disease Maternal Grandmother     Breast cancer Neg Hx     Ovarian cancer Neg Hx      Social History     Tobacco Use    Smoking status: Some Days     Packs/day: 0.25     Years: 1.00     Pack years: 0.25     Types: Cigarettes    Smokeless tobacco: Never    Tobacco comments:     1 pack / 2 weeks   Substance Use Topics    Alcohol use: Yes     Alcohol/week: 0.0 standard drinks     Comment: social    Drug use: No        Review of Systems:  Review of Systems   Constitutional: Negative.  Negative for fatigue and fever.   HENT: Negative.  Negative for sore throat and trouble swallowing.    Eyes: Negative.    Respiratory: Negative.     Cardiovascular: Negative.  Negative for chest pain.   Gastrointestinal: Negative.    Genitourinary: Negative.    Musculoskeletal: Negative.    Skin: Negative.    Neurological: Negative.    Psychiatric/Behavioral: Negative.       OBJECTIVE:     Vital Signs (Most Recent)  Pulse: 83 (05/09/23 1505)  BP: 119/69 (05/09/23 1505)  SpO2: 100 % (05/09/23 1505)  5' 5" (1.651 m)  82.1 kg (181 lb)     Physical Exam:  Physical Exam  Constitutional:       General: She is not in acute distress.     Appearance: Normal appearance. She is normal weight. She is not ill-appearing, " toxic-appearing or diaphoretic.   HENT:      Head: Normocephalic and atraumatic.   Eyes:      General: No scleral icterus.        Right eye: No discharge.         Left eye: No discharge.      Extraocular Movements: Extraocular movements intact.      Conjunctiva/sclera: Conjunctivae normal.   Neck:      Vascular: No carotid bruit.   Cardiovascular:      Rate and Rhythm: Normal rate and regular rhythm.      Pulses: Normal pulses.      Heart sounds: Normal heart sounds. No murmur heard.    No friction rub. No gallop.   Pulmonary:      Effort: Pulmonary effort is normal. No respiratory distress.      Breath sounds: Normal breath sounds. No stridor. No wheezing or rales.   Chest:      Chest wall: No tenderness.   Abdominal:      General: Bowel sounds are normal. There is no distension.      Palpations: Abdomen is soft. There is no mass.      Tenderness: There is no abdominal tenderness. There is no guarding or rebound.      Hernia: A hernia is present.      Comments: Upper midline incision with reducible bulges.   Musculoskeletal:         General: No swelling, tenderness, deformity or signs of injury. Normal range of motion.      Cervical back: Normal range of motion and neck supple. No rigidity or tenderness.      Right lower leg: No edema.      Left lower leg: No edema.   Lymphadenopathy:      Cervical: No cervical adenopathy.   Skin:     General: Skin is warm and dry.      Coloration: Skin is not jaundiced or pale.      Findings: No bruising, erythema, lesion or rash.   Neurological:      General: No focal deficit present.      Mental Status: She is alert and oriented to person, place, and time.      Motor: No weakness.      Coordination: Coordination normal.   Psychiatric:         Mood and Affect: Mood normal.         Behavior: Behavior normal.         Thought Content: Thought content normal.         Judgment: Judgment normal.         ASSESSMENT/PLAN:   Recurrent ventral hernia for surgical repair.  Therapeutic  options outlined, consent obtained for surgery.  Risks benefits and complications discussed

## 2023-06-06 ENCOUNTER — HOSPITAL ENCOUNTER (OUTPATIENT)
Dept: RADIOLOGY | Facility: OTHER | Age: 48
Discharge: HOME | End: 2023-06-06
Attending: PHYSICIAN ASSISTANT
Payer: COMMERCIAL

## 2023-06-06 ENCOUNTER — OFFICE VISIT (OUTPATIENT)
Dept: OBSTETRICS AND GYNECOLOGY | Facility: CLINIC | Age: 48
End: 2023-06-06
Payer: COMMERCIAL

## 2023-06-06 VITALS
SYSTOLIC BLOOD PRESSURE: 127 MMHG | WEIGHT: 175.69 LBS | HEIGHT: 65 IN | DIASTOLIC BLOOD PRESSURE: 89 MMHG | BODY MASS INDEX: 29.27 KG/M2

## 2023-06-06 DIAGNOSIS — R23.2 HOT FLASHES: ICD-10-CM

## 2023-06-06 DIAGNOSIS — Z78.0 MENOPAUSE: ICD-10-CM

## 2023-06-06 DIAGNOSIS — R35.0 URINARY FREQUENCY: ICD-10-CM

## 2023-06-06 DIAGNOSIS — N76.1 CHRONIC VAGINITIS: ICD-10-CM

## 2023-06-06 DIAGNOSIS — R30.0 DYSURIA: Primary | ICD-10-CM

## 2023-06-06 DIAGNOSIS — Z12.31 SCREENING MAMMOGRAM, ENCOUNTER FOR: ICD-10-CM

## 2023-06-06 DIAGNOSIS — R82.90 ABNORMAL URINE ODOR: ICD-10-CM

## 2023-06-06 PROCEDURE — 99213 OFFICE O/P EST LOW 20 MIN: CPT | Mod: S$GLB,,, | Performed by: REGISTERED NURSE

## 2023-06-06 PROCEDURE — 3074F PR MOST RECENT SYSTOLIC BLOOD PRESSURE < 130 MM HG: ICD-10-PCS | Mod: CPTII,S$GLB,, | Performed by: REGISTERED NURSE

## 2023-06-06 PROCEDURE — 3008F BODY MASS INDEX DOCD: CPT | Mod: CPTII,S$GLB,, | Performed by: REGISTERED NURSE

## 2023-06-06 PROCEDURE — 99213 PR OFFICE/OUTPT VISIT, EST, LEVL III, 20-29 MIN: ICD-10-PCS | Mod: S$GLB,,, | Performed by: REGISTERED NURSE

## 2023-06-06 PROCEDURE — 87086 URINE CULTURE/COLONY COUNT: CPT | Performed by: REGISTERED NURSE

## 2023-06-06 PROCEDURE — 3079F DIAST BP 80-89 MM HG: CPT | Mod: CPTII,S$GLB,, | Performed by: REGISTERED NURSE

## 2023-06-06 PROCEDURE — 77067 SCR MAMMO BI INCL CAD: CPT | Mod: TC

## 2023-06-06 PROCEDURE — 77063 MAMMO DIGITAL SCREENING BILAT WITH TOMO: ICD-10-PCS | Mod: 26,,, | Performed by: RADIOLOGY

## 2023-06-06 PROCEDURE — 3074F SYST BP LT 130 MM HG: CPT | Mod: CPTII,S$GLB,, | Performed by: REGISTERED NURSE

## 2023-06-06 PROCEDURE — 3008F PR BODY MASS INDEX (BMI) DOCUMENTED: ICD-10-PCS | Mod: CPTII,S$GLB,, | Performed by: REGISTERED NURSE

## 2023-06-06 PROCEDURE — 1160F RVW MEDS BY RX/DR IN RCRD: CPT | Mod: CPTII,S$GLB,, | Performed by: REGISTERED NURSE

## 2023-06-06 PROCEDURE — 77063 BREAST TOMOSYNTHESIS BI: CPT | Mod: 26,,, | Performed by: RADIOLOGY

## 2023-06-06 PROCEDURE — 3079F PR MOST RECENT DIASTOLIC BLOOD PRESSURE 80-89 MM HG: ICD-10-PCS | Mod: CPTII,S$GLB,, | Performed by: REGISTERED NURSE

## 2023-06-06 PROCEDURE — 99999 PR PBB SHADOW E&M-EST. PATIENT-LVL III: ICD-10-PCS | Mod: PBBFAC,,, | Performed by: REGISTERED NURSE

## 2023-06-06 PROCEDURE — 99999 PR PBB SHADOW E&M-EST. PATIENT-LVL III: CPT | Mod: PBBFAC,,, | Performed by: REGISTERED NURSE

## 2023-06-06 PROCEDURE — 77067 SCR MAMMO BI INCL CAD: CPT | Mod: 26,,, | Performed by: RADIOLOGY

## 2023-06-06 PROCEDURE — 77067 MAMMO DIGITAL SCREENING BILAT WITH TOMO: ICD-10-PCS | Mod: 26,,, | Performed by: RADIOLOGY

## 2023-06-06 PROCEDURE — 1160F PR REVIEW ALL MEDS BY PRESCRIBER/CLIN PHARMACIST DOCUMENTED: ICD-10-PCS | Mod: CPTII,S$GLB,, | Performed by: REGISTERED NURSE

## 2023-06-06 PROCEDURE — 1159F PR MEDICATION LIST DOCUMENTED IN MEDICAL RECORD: ICD-10-PCS | Mod: CPTII,S$GLB,, | Performed by: REGISTERED NURSE

## 2023-06-06 PROCEDURE — 1159F MED LIST DOCD IN RCRD: CPT | Mod: CPTII,S$GLB,, | Performed by: REGISTERED NURSE

## 2023-06-06 RX ORDER — PAROXETINE 10 MG/1
10 TABLET, FILM COATED ORAL EVERY MORNING
Qty: 30 TABLET | Refills: 11 | Status: SHIPPED | OUTPATIENT
Start: 2023-06-06 | End: 2023-12-12

## 2023-06-06 RX ORDER — NITROFURANTOIN 25; 75 MG/1; MG/1
100 CAPSULE ORAL 2 TIMES DAILY
Qty: 10 CAPSULE | Refills: 0 | Status: SHIPPED | OUTPATIENT
Start: 2023-06-06 | End: 2023-06-11

## 2023-06-06 RX ORDER — METRONIDAZOLE 7.5 MG/G
1 GEL VAGINAL DAILY
Qty: 70 G | Refills: 4 | Status: SHIPPED | OUTPATIENT
Start: 2023-06-06 | End: 2023-06-11

## 2023-06-06 NOTE — PROGRESS NOTES
CC: Dysuria and Hot Flashes    HPI: Pt is a 47 y.o.  female who presents for evaluation of her UTI symptoms.   The patient presents today with dysuria, frequency, and urgency for the past month. She was diagnosed with a UTI on 5/9 and treated with Macrobid, which showed to be effective with urine culture ans sensitivity. She reports that symptoms improved but that urinary frequency, cramping type feeling with urination and odor to urine persist. Denies burning with urination. The patient denies flank pain, fever, nausea, vomiting, and hematuria. She denies vaginal itching and odor. Reports white vaginal discharge and requests refill for Metrogel as she has recurrent bacterial vaginosis. She declines affirm today.    Additionally, she c/o hot flashes. She reports they wake her at night and she experiences them during the day as well. She has had total hysterectomy in the past.    ROS:  GENERAL: Feeling well overall. Denies fever or chills. + hot flashes.  SKIN: Denies rash or lesions.   HEAD: Denies head injury or headache.   NODES: Denies enlarged lymph nodes.   CHEST: Denies chest pain or shortness of breath.   CARDIOVASCULAR: Denies palpitations or left sided chest pain.   ABDOMEN: No abdominal pain, constipation, diarrhea, nausea, vomiting or rectal bleeding.   URINARY: + dysuria; no hematuria, or burning on urination.  REPRODUCTIVE: See HPI.   BREASTS: Denies pain, lumps, or nipple discharge.   HEMATOLOGIC: No easy bruisability or excessive bleeding.   MUSCULOSKELETAL: Denies joint pain or swelling.   NEUROLOGIC: Denies syncope or weakness.   PSYCHIATRIC: Denies depression, anxiety or mood swings.    PE:   APPEARANCE: Well nourished, well developed, Black or  female in no acute distress.  PELVIS: Deferred  ABDOMEN: No suprapubic tenderness  MUSCULOSKELETAL: No CVA tenderness      Diagnosis:  1. Dysuria    2. Hot flashes    3. Chronic vaginitis    4. Abnormal urine odor    5. Urinary frequency     6. Menopause        Plan:   Urine culture  Macrobid rx  Metrogel refill  Paxil 10 mg daily for hot flashes  Referral to [facility] for [facility]RT/menopause consult    -UTI prevention including   a. perineal hygiene, wipe front to back,  b. drink water prior to intercourse and urinate afterwards and avoid certain positions which could increase likelyhood of UTIs,  c. establish regular bladder habits,   d. avoid vulvovaginal irritants,   e. increase fluids and avoid caffeine and alcohol;  -signs of pylenephritis and to go to the ED if develops fever, chills, n/v, back pain, worsening dyuria, hematuria;   -pelvic rest until symptoms resolve;  -Macrobid use and potential side effects;  -A.C.S. Pap and pelvic exam guidelines (pap every 3 years), recomendations for yearly mammogram;  -to follow up with her PCP for other health maintenance.       Follow-up PRN no resolution of symptoms.          DAYDAY Boyle

## 2023-06-07 ENCOUNTER — PATIENT MESSAGE (OUTPATIENT)
Dept: OBSTETRICS AND GYNECOLOGY | Facility: CLINIC | Age: 48
End: 2023-06-07
Payer: COMMERCIAL

## 2023-06-07 LAB — BACTERIA UR CULT: NORMAL

## 2023-06-26 ENCOUNTER — HOSPITAL ENCOUNTER (OUTPATIENT)
Facility: OTHER | Age: 48
End: 2023-06-26
Attending: SPECIALIST | Admitting: SPECIALIST
Payer: COMMERCIAL

## 2023-07-05 ENCOUNTER — HOSPITAL ENCOUNTER (OUTPATIENT)
Dept: PREADMISSION TESTING | Facility: OTHER | Age: 48
Discharge: HOME | End: 2023-07-05
Attending: SPECIALIST
Payer: COMMERCIAL

## 2023-07-12 ENCOUNTER — PATIENT OUTREACH (OUTPATIENT)
Dept: ADMINISTRATIVE | Facility: HOSPITAL | Age: 48
End: 2023-07-12
Payer: COMMERCIAL

## 2023-07-12 NOTE — PRE ADMISSION SCREENING
Pt was a no show for pre admit appointment on 7/5; several calls made and messages left.  Inadalberto sent to Dr. Perez's staff.

## 2023-07-14 ENCOUNTER — HOSPITAL ENCOUNTER (EMERGENCY)
Facility: OTHER | Age: 48
Discharge: HOME | End: 2023-07-14
Attending: EMERGENCY MEDICINE
Payer: COMMERCIAL

## 2023-07-14 VITALS
WEIGHT: 180 LBS | BODY MASS INDEX: 29.99 KG/M2 | TEMPERATURE: 98 F | DIASTOLIC BLOOD PRESSURE: 106 MMHG | HEIGHT: 65 IN | OXYGEN SATURATION: 100 % | RESPIRATION RATE: 18 BRPM | HEART RATE: 65 BPM | SYSTOLIC BLOOD PRESSURE: 177 MMHG

## 2023-07-14 DIAGNOSIS — R07.9 CHEST PAIN: ICD-10-CM

## 2023-07-14 DIAGNOSIS — I10 HYPERTENSION, UNSPECIFIED TYPE: Primary | ICD-10-CM

## 2023-07-14 DIAGNOSIS — G51.0 BELL'S PALSY: ICD-10-CM

## 2023-07-14 LAB
ALBUMIN SERPL BCP-MCNC: 4.4 G/DL (ref 3.5–5.2)
ALP SERPL-CCNC: 123 U/L (ref 55–135)
ALT SERPL W/O P-5'-P-CCNC: 23 U/L (ref 10–44)
ANION GAP SERPL CALC-SCNC: 13 MMOL/L (ref 8–16)
AST SERPL-CCNC: 31 U/L (ref 10–40)
BASOPHILS # BLD AUTO: 0.05 K/UL (ref 0–0.2)
BASOPHILS NFR BLD: 0.6 % (ref 0–1.9)
BILIRUB SERPL-MCNC: 0.6 MG/DL (ref 0.1–1)
BUN SERPL-MCNC: 9 MG/DL (ref 6–20)
CALCIUM SERPL-MCNC: 9.9 MG/DL (ref 8.7–10.5)
CHLORIDE SERPL-SCNC: 104 MMOL/L (ref 95–110)
CO2 SERPL-SCNC: 21 MMOL/L (ref 23–29)
CREAT SERPL-MCNC: 0.8 MG/DL (ref 0.5–1.4)
DIFFERENTIAL METHOD: NORMAL
EOSINOPHIL # BLD AUTO: 0.1 K/UL (ref 0–0.5)
EOSINOPHIL NFR BLD: 0.8 % (ref 0–8)
ERYTHROCYTE [DISTWIDTH] IN BLOOD BY AUTOMATED COUNT: 14.3 % (ref 11.5–14.5)
EST. GFR  (NO RACE VARIABLE): >60 ML/MIN/1.73 M^2
GLUCOSE SERPL-MCNC: 93 MG/DL (ref 70–110)
HCT VFR BLD AUTO: 40.8 % (ref 37–48.5)
HCV AB SERPL QL IA: NEGATIVE
HGB BLD-MCNC: 13.2 G/DL (ref 12–16)
HIV 1+2 AB+HIV1 P24 AG SERPL QL IA: NEGATIVE
IMM GRANULOCYTES # BLD AUTO: 0.02 K/UL (ref 0–0.04)
IMM GRANULOCYTES NFR BLD AUTO: 0.3 % (ref 0–0.5)
LYMPHOCYTES # BLD AUTO: 3.2 K/UL (ref 1–4.8)
LYMPHOCYTES NFR BLD: 41 % (ref 18–48)
MCH RBC QN AUTO: 30.3 PG (ref 27–31)
MCHC RBC AUTO-ENTMCNC: 32.4 G/DL (ref 32–36)
MCV RBC AUTO: 94 FL (ref 82–98)
MONOCYTES # BLD AUTO: 0.6 K/UL (ref 0.3–1)
MONOCYTES NFR BLD: 7.8 % (ref 4–15)
NEUTROPHILS # BLD AUTO: 3.9 K/UL (ref 1.8–7.7)
NEUTROPHILS NFR BLD: 49.5 % (ref 38–73)
NRBC BLD-RTO: 0 /100 WBC
PLATELET # BLD AUTO: 261 K/UL (ref 150–450)
PMV BLD AUTO: 9.3 FL (ref 9.2–12.9)
POTASSIUM SERPL-SCNC: 3.9 MMOL/L (ref 3.5–5.1)
PROT SERPL-MCNC: 7.8 G/DL (ref 6–8.4)
RBC # BLD AUTO: 4.35 M/UL (ref 4–5.4)
SODIUM SERPL-SCNC: 138 MMOL/L (ref 136–145)
TROPONIN I SERPL DL<=0.01 NG/ML-MCNC: <0.006 NG/ML (ref 0–0.03)
WBC # BLD AUTO: 7.8 K/UL (ref 3.9–12.7)

## 2023-07-14 PROCEDURE — 99285 EMERGENCY DEPT VISIT HI MDM: CPT | Mod: 25

## 2023-07-14 PROCEDURE — 80053 COMPREHEN METABOLIC PANEL: CPT | Performed by: NURSE PRACTITIONER

## 2023-07-14 PROCEDURE — 86803 HEPATITIS C AB TEST: CPT | Performed by: EMERGENCY MEDICINE

## 2023-07-14 PROCEDURE — 85025 COMPLETE CBC W/AUTO DIFF WBC: CPT | Performed by: NURSE PRACTITIONER

## 2023-07-14 PROCEDURE — 93005 ELECTROCARDIOGRAM TRACING: CPT

## 2023-07-14 PROCEDURE — 87389 HIV-1 AG W/HIV-1&-2 AB AG IA: CPT | Performed by: EMERGENCY MEDICINE

## 2023-07-14 PROCEDURE — 25000003 PHARM REV CODE 250: Performed by: EMERGENCY MEDICINE

## 2023-07-14 PROCEDURE — 93010 ELECTROCARDIOGRAM REPORT: CPT | Mod: ,,, | Performed by: INTERNAL MEDICINE

## 2023-07-14 PROCEDURE — 84484 ASSAY OF TROPONIN QUANT: CPT | Performed by: NURSE PRACTITIONER

## 2023-07-14 PROCEDURE — 93010 EKG 12-LEAD: ICD-10-PCS | Mod: ,,, | Performed by: INTERNAL MEDICINE

## 2023-07-14 PROCEDURE — 63600175 PHARM REV CODE 636 W HCPCS: Performed by: EMERGENCY MEDICINE

## 2023-07-14 RX ORDER — PREDNISONE 20 MG/1
60 TABLET ORAL DAILY
Qty: 21 TABLET | Refills: 0 | Status: SHIPPED | OUTPATIENT
Start: 2023-07-14 | End: 2023-07-21

## 2023-07-14 RX ORDER — AMLODIPINE BESYLATE 5 MG/1
10 TABLET ORAL
Status: DISCONTINUED | OUTPATIENT
Start: 2023-07-14 | End: 2023-07-14

## 2023-07-14 RX ORDER — VALACYCLOVIR HYDROCHLORIDE 1 G/1
1000 TABLET, FILM COATED ORAL 3 TIMES DAILY
Qty: 21 TABLET | Refills: 0 | Status: SHIPPED | OUTPATIENT
Start: 2023-07-14 | End: 2023-12-12

## 2023-07-14 RX ORDER — ACETAMINOPHEN 500 MG
1000 TABLET ORAL
Status: COMPLETED | OUTPATIENT
Start: 2023-07-14 | End: 2023-07-14

## 2023-07-14 RX ORDER — PREDNISONE 20 MG/1
60 TABLET ORAL
Status: COMPLETED | OUTPATIENT
Start: 2023-07-14 | End: 2023-07-14

## 2023-07-14 RX ORDER — VALACYCLOVIR HYDROCHLORIDE 500 MG/1
1000 TABLET, FILM COATED ORAL 2 TIMES DAILY
Status: DISCONTINUED | OUTPATIENT
Start: 2023-07-14 | End: 2023-07-14 | Stop reason: HOSPADM

## 2023-07-14 RX ADMIN — ACETAMINOPHEN 1000 MG: 500 TABLET ORAL at 02:07

## 2023-07-14 RX ADMIN — PREDNISONE 60 MG: 20 TABLET ORAL at 03:07

## 2023-07-14 RX ADMIN — VALACYCLOVIR HYDROCHLORIDE 1000 MG: 500 TABLET, FILM COATED ORAL at 03:07

## 2023-07-14 NOTE — FIRST PROVIDER EVALUATION
Emergency Department TeleTriage Encounter Note      CHIEF COMPLAINT    Chief Complaint   Patient presents with    Hypertension     Reports elevated BP at work today. BP at work reading 151/100. Endorses HA and blurred vision yesterday, denies SOB or CP.       VITAL SIGNS   Initial Vitals [07/14/23 1226]   BP Pulse Resp Temp SpO2   (!) 188/112 81 18 98 °F (36.7 °C) 100 %      MAP       --            ALLERGIES    Review of patient's allergies indicates:   Allergen Reactions    Ace inhibitors Other (See Comments)     cough    Dilaudid [hydromorphone]      SOB + anxiety     Will take if needed.     Can take oxycodone       PROVIDER TRIAGE NOTE  This is a teletriage evaluation of a 47 y.o. female presenting to the ED complaining of elevated BP. Has been out of Norvasc for the past few days. Reports headache and blurry vision. Pt was given her usual dose of Norvasc today but headache continues. Denies CP and SOB.     Pt is alert, no distress.     Initial orders will be placed and care will be transferred to an alternate provider when patient is roomed for a full evaluation. Any additional orders and the final disposition will be determined by that provider.         ORDERS  Labs Reviewed   HIV 1 / 2 ANTIBODY   HEPATITIS C ANTIBODY   CBC W/ AUTO DIFFERENTIAL   COMPREHENSIVE METABOLIC PANEL   TROPONIN I   URINALYSIS, REFLEX TO URINE CULTURE       ED Orders (720h ago, onward)      Start Ordered     Status Ordering Provider    07/14/23 1243 07/14/23 1242  CT Head Without Contrast  1 time imaging         Ordered MARY JO MANDUJANO N.    07/14/23 1242 07/14/23 1242  Vital signs  Every 15 min         Ordered MARY JO MANDUJANO N.    07/14/23 1242 07/14/23 1242  Cardiac Monitoring - Adult  Continuous        Comments: Notify Physician If:    Ordered MARY JO MANDUJANO N.    07/14/23 1242 07/14/23 1242  Pulse Oximetry Continuous  Continuous         Ordered MARY JO MANDUJANO N.    07/14/23 1242 07/14/23 1242  Diet  NPO  Diet effective now         Ordered DELBERT MARY JO N.    07/14/23 1242 07/14/23 1242  Saline lock IV  Once         Ordered DELBERT MARY JO N.    07/14/23 1242 07/14/23 1242  EKG 12-lead  Once        Comments: Do not perform if previously done during this visit/ triage    Ordered ZENAANGELA MARY JO N.    07/14/23 1242 07/14/23 1242  CBC auto differential  STAT         Ordered ZENAANGELA MARY JO N.    07/14/23 1242 07/14/23 1242  Comprehensive metabolic panel  STAT         Ordered DELBERT MARY JO N.    07/14/23 1242 07/14/23 1242  Troponin I #1  STAT         Ordered DELBERT MARY JO N.    07/14/23 1242 07/14/23 1242  Urinalysis, Reflex to Urine Culture Urine, Clean Catch  STAT         Ordered HERMINIOVINH MARY JO N.    07/14/23 1229 07/14/23 1229  HIV 1/2 Ag/Ab (4th Gen)  STAT         Ordered MADELIN YEE    07/14/23 1229 07/14/23 1229  Hepatitis C Antibody  STAT         Ordered MADELIN YEE              Virtual Visit Note: The provider triage portion of this emergency department evaluation and documentation was performed via [employer], a HIPAA-compliant telemedicine application, in concert with a tele-presenter in the room. A face to face patient evaluation with one of my colleagues will occur once the patient is placed in an emergency department room.      DISCLAIMER: This note was prepared with [employer] voice recognition transcription software. Garbled syntax, mangled pronouns, and other bizarre constructions may be attributed to that software system.

## 2023-07-14 NOTE — ED PROVIDER NOTES
Encounter Date: 7/14/2023    SCRIBE #1 NOTE: I, Amy Bowers, am scribing for, and in the presence of,  Madalyn Gomes MD. I have scribed the following portions of the note - Other sections scribed: HPI, ROS, PE.     History     Chief Complaint   Patient presents with    Hypertension     Reports elevated BP at work today. BP at work reading 151/100. Endorses HA and blurred vision yesterday, denies SOB or CP.     Time seen by provider: 2:31 PM    This is a 47 y.o. female with HTN who presents with complaint of facial asymmetry and recent elevated blood pressure. Patient ran out of her Norvasc four days ago and has not been able to refill it. She has had two days of right posterior and upper headaches but has not taken any Tylenol or Motrin. 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 She was able to look in the mirror and agrees her face is asymmetric. Patient denies any runny nose or sore throat. She has had a prior episode of slurred speech and right-sided headaches several years ago. Stroke was ruled out by imaging but she recalls being told she may have had Bell's Palsy. This is the extent of the patient's complaints at this time.    The history is provided by the patient.   Review of patient's allergies indicates:   Allergen Reactions    Ace inhibitors Other (See Comments)     cough    Dilaudid [hydromorphone]      SOB + anxiety     Will take if needed.     Can take oxycodone     Past Medical History:   Diagnosis Date    Abnormal Pap smear of cervix     Bacterial vaginosis     RECURRENT    Gastritis     Hypertension     Renal disorder     kidney stones     Past Surgical History:   Procedure Laterality Date    APPENDECTOMY      CERVICAL BIOPSY  W/ LOOP ELECTRODE EXCISION      CYSTOSCOPY W/ URETERAL STENT PLACEMENT  11/18/2020    Procedure: CYSTOSCOPY, WITH URETERAL STENT INSERTION;   Surgeon: Ej Mitchell MD;  Location: [facility] OR;  Service: Urology;;    CYSTOTOMY FOR EXCISION OF BLADDER DIVERTICULUM N/A 8/24/2018    Procedure: CYSTOTOMY FOR BLADDER PERFORATION;  Surgeon: Hardy Bueno MD;  Location: [facility] OR;  Service: OB/GYN;  Laterality: N/A;    fibroid removal      HERNIA REPAIR  2009    umbilical    LAPAROSCOPIC TOTAL HYSTERECTOMY N/A 8/24/2018    converted to CALISTA    LASER LITHOTRIPSY  11/18/2020    Procedure: LITHOTRIPSY, USING LASER;  Surgeon: Ej Mitchell MD;  Location: [facility] OR;  Service: Urology;;    MYOMECTOMY      TOTAL ABDOMINAL HYSTERECTOMY N/A 8/24/2018    CALISTA- AUB.  Hardy Bueno MD;     URETEROSCOPIC REMOVAL OF URETERIC CALCULUS Left 11/18/2020    Procedure: EXTRACTION-STONE-URETEROSCOPY;  Surgeon: Ej Mitchell MD;  Location: [facility];  Service: Urology;  Laterality: Left;     Family History   Problem Relation Age of Onset    Diabetes Father     Hyperlipidemia Father     Diabetes Mother     Multiple sclerosis Mother     No Known Problems Brother     Colon cancer Paternal Uncle     Heart disease Maternal Grandmother     Breast cancer Neg Hx     Ovarian cancer Neg Hx      Social History     Tobacco Use    Smoking status: Some Days     Packs/day: 0.25     Years: 1.00     Pack years: 0.25     Types: Cigarettes    Smokeless tobacco: Never    Tobacco comments:     1 pack / 2 weeks   Substance Use Topics    Alcohol use: Yes     Alcohol/week: 0.0 standard drinks     Comment: social    Drug use: No     Review of Systems   Constitutional:  Negative for activity change, appetite change, chills, diaphoresis and fever.   HENT:  Positive for facial swelling. Negative for congestion, sore throat and trouble swallowing.    Eyes:  Negative for photophobia and visual disturbance.   Respiratory:  Negative for cough, chest tightness and shortness of breath.    Cardiovascular:  Negative for chest pain.   Gastrointestinal:  Negative for abdominal pain, nausea and vomiting.   Endocrine:  Negative for polydipsia and polyuria.   Genitourinary:  Negative for difficulty urinating and flank pain.   Musculoskeletal:  Negative for back pain and neck pain.   Skin:  Negative for rash.   Neurological:  Positive for facial asymmetry and headaches. Negative for weakness.   Psychiatric/Behavioral:  Negative for confusion.      Physical Exam     Initial Vitals [07/14/23 1226]   BP Pulse Resp Temp SpO2   (!) 188/112 81 18 98 °F (36.7 °C) 100 %      MAP       --         Physical Exam    Constitutional: She appears well-developed. She is cooperative.   HENT:   Head: Atraumatic.   Eyes: Conjunctivae and lids are normal.   Neck:   Normal range of motion.  Cardiovascular:  Normal rate.           Musculoskeletal:         General: Normal range of motion.      Cervical back: Normal range of motion.     Neurological: She is alert.   Slight flattening of the nasal labial fold on the right. Symmetric sensation to the face intact. Normal speech.   Skin: No rash noted.   Psychiatric: She has a normal mood and affect. Her speech is normal and behavior is normal.       ED Course   Procedures  Labs Reviewed   COMPREHENSIVE METABOLIC PANEL - Abnormal; Notable for the following components:       Result Value    CO2 21 (*)     All other components within normal limits    Narrative:     Release to patient->Immediate   HIV 1 / 2 ANTIBODY    Narrative:     Release to patient->Immediate   HEPATITIS C ANTIBODY    Narrative:     Release to patient->Immediate   CBC W/ AUTO DIFFERENTIAL    Narrative:     Release to patient->Immediate   TROPONIN I    Narrative:     Release to patient->Immediate   URINALYSIS, REFLEX TO URINE CULTURE     EKG Readings: (Independently Interpreted)   Normal sinus rhythm at a rate of 61, normal QRS, no STEMI.     Imaging Results              CT Head Without Contrast (Final result)  Result time 07/14/23 13:31:10      Final result by Nahum Singer MD (07/14/23 13:31:10)                   Impression:      No  acute intracranial findings.      Electronically signed by: Nahum Singer  Date:    07/14/2023  Time:    13:31               Narrative:    EXAMINATION:  CT HEAD WITHOUT CONTRAST    CLINICAL HISTORY:  Headache, chronic, new features or increased frequency;    TECHNIQUE:  Low dose axial images were obtained through the head.  Coronal and sagittal reformations were also performed. Contrast was not administered.    COMPARISON:  CT head 02/15/2017.  MRI brain 02/15/2017.    FINDINGS:  Blood: No acute intracranial hemorrhage.    Parenchyma: No definite loss of gray-white differentiation to suggest acute or subacute transcortical infarct.    Ventricles/Extra-axial spaces: No abnormal extra-axial fluid collection. Basal cisterns are patent.    Vessels: Minimal atherosclerotic calcification.    Orbits: Unremarkable.    Scalp: Unremarkable.    Skull: There are no depressed skull fractures or destructive bone lesions.    Sinuses and mastoids: Essentially clear.    Other findings: None                                       Medications   valACYclovir tablet 1,000 mg (1,000 mg Oral Given 7/14/23 1509)   acetaminophen tablet 1,000 mg (1,000 mg Oral Given 7/14/23 1442)   predniSONE tablet 60 mg (60 mg Oral Given 7/14/23 1509)     Medical Decision Making:   History:   Old Medical Records: I decided to obtain old medical records.  Initial Assessment:   Urgent evaluation a 47-year-old female here with concern for elevated blood pressure and facial asymmetry.  And does report right-sided headache.  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  On examination the patient does have flattening of right nasolabial fold, preservation of extraocular movements and lid elevation and closure, tongue midline.  Suspect peripheral etiology with concern for Bell's palsy with plan to administer steroids, antivirals, expectant management discussed  with strict return precautions.  Independently Interpreted Test(s):   I have ordered and independently interpreted EKG Reading(s) - see prior notes  Clinical Tests:   Lab Tests: Ordered and Reviewed  Radiological Study: Ordered and Reviewed  Medical Tests: Ordered and Reviewed        Scribe Attestation:   Scribe #1: I performed the above scribed service and the documentation accurately describes the services I performed. I attest to the accuracy of the note.  Physician Attestation for Scribe: I, Eliseo, reviewed documentation as scribed in my presence, which is both accurate and complete.                     Clinical Impression:   Final diagnoses:  [R07.9] Chest pain  [I10] Hypertension, unspecified type (Primary)  [G51.0] Bell's palsy        ED Disposition Condition    Discharge Stable          ED Prescriptions       Medication Sig Dispense Start Date End Date Auth. Provider    predniSONE (DELTASONE) 20 MG tablet Take 3 tablets (60 mg total) by mouth once daily. for 7 days 21 tablet 7/14/2023 7/21/2023 Madalyn Gomes MD    valACYclovir (VALTREX) 1000 MG tablet Take 1 tablet (1,000 mg total) by mouth 3 (three) times daily. for 7 days 21 tablet 7/14/2023 7/21/2023 Madalyn Gomes MD          Follow-up Information       Follow up With Specialties Details Why Contact Info    Speedy Cavanaugh MD Family Medicine Schedule an appointment as soon as possible for a visit   [address]  [facility] [address]  [facility] [address]  [phone]               Madalyn Gomes MD  07/14/23 7950

## 2023-07-14 NOTE — ED TRIAGE NOTES
Ran out of Norvasc 4 days ago - states Rx called in yesterday but has not picked up. Reports yesterday having intermittent blurred vision with headache. BP checked at work this morning and found to be high. States her face felt flushed this morning and she has had persistent headache. Presents awake, alert. No distress.

## 2023-10-27 ENCOUNTER — OFFICE VISIT (OUTPATIENT)
Dept: OBSTETRICS AND GYNECOLOGY | Facility: CLINIC | Age: 48
End: 2023-10-27
Payer: COMMERCIAL

## 2023-10-27 VITALS
SYSTOLIC BLOOD PRESSURE: 142 MMHG | WEIGHT: 179.25 LBS | BODY MASS INDEX: 29.83 KG/M2 | DIASTOLIC BLOOD PRESSURE: 98 MMHG

## 2023-10-27 DIAGNOSIS — N89.8 VAGINAL IRRITATION: ICD-10-CM

## 2023-10-27 DIAGNOSIS — Z20.2 POTENTIAL EXPOSURE TO STD: ICD-10-CM

## 2023-10-27 DIAGNOSIS — R35.0 URINARY FREQUENCY: Primary | ICD-10-CM

## 2023-10-27 LAB
BILIRUB UR QL STRIP: NEGATIVE
GLUCOSE UR QL STRIP: NEGATIVE
KETONES UR QL STRIP: NEGATIVE
LEUKOCYTE ESTERASE UR QL STRIP: NEGATIVE
PH, POC UA: 8
POC BLOOD, URINE: NEGATIVE
POC NITRATES, URINE: NEGATIVE
PROT UR QL STRIP: NEGATIVE
SP GR UR STRIP: 1.02 (ref 1–1.03)
UROBILINOGEN UR STRIP-ACNC: NORMAL (ref 0.1–1.1)

## 2023-10-27 PROCEDURE — 3008F PR BODY MASS INDEX (BMI) DOCUMENTED: ICD-10-PCS | Mod: CPTII,S$GLB,, | Performed by: FAMILY MEDICINE

## 2023-10-27 PROCEDURE — 87591 N.GONORRHOEAE DNA AMP PROB: CPT | Performed by: FAMILY MEDICINE

## 2023-10-27 PROCEDURE — 87491 CHLMYD TRACH DNA AMP PROBE: CPT | Performed by: FAMILY MEDICINE

## 2023-10-27 PROCEDURE — 81514 NFCT DS BV&VAGINITIS DNA ALG: CPT | Performed by: FAMILY MEDICINE

## 2023-10-27 PROCEDURE — 1160F PR REVIEW ALL MEDS BY PRESCRIBER/CLIN PHARMACIST DOCUMENTED: ICD-10-PCS | Mod: CPTII,S$GLB,, | Performed by: FAMILY MEDICINE

## 2023-10-27 PROCEDURE — 87077 CULTURE AEROBIC IDENTIFY: CPT | Performed by: FAMILY MEDICINE

## 2023-10-27 PROCEDURE — 3080F PR MOST RECENT DIASTOLIC BLOOD PRESSURE >= 90 MM HG: ICD-10-PCS | Mod: CPTII,S$GLB,, | Performed by: FAMILY MEDICINE

## 2023-10-27 PROCEDURE — 3077F PR MOST RECENT SYSTOLIC BLOOD PRESSURE >= 140 MM HG: ICD-10-PCS | Mod: CPTII,S$GLB,, | Performed by: FAMILY MEDICINE

## 2023-10-27 PROCEDURE — 99999 PR PBB SHADOW E&M-EST. PATIENT-LVL III: ICD-10-PCS | Mod: PBBFAC,,, | Performed by: FAMILY MEDICINE

## 2023-10-27 PROCEDURE — 81003 POCT URINALYSIS, DIPSTICK, AUTOMATED, W/O SCOPE: ICD-10-PCS | Mod: QW,S$GLB,, | Performed by: FAMILY MEDICINE

## 2023-10-27 PROCEDURE — 1159F MED LIST DOCD IN RCRD: CPT | Mod: CPTII,S$GLB,, | Performed by: FAMILY MEDICINE

## 2023-10-27 PROCEDURE — 3077F SYST BP >= 140 MM HG: CPT | Mod: CPTII,S$GLB,, | Performed by: FAMILY MEDICINE

## 2023-10-27 PROCEDURE — 1160F RVW MEDS BY RX/DR IN RCRD: CPT | Mod: CPTII,S$GLB,, | Performed by: FAMILY MEDICINE

## 2023-10-27 PROCEDURE — 99213 OFFICE O/P EST LOW 20 MIN: CPT | Mod: S$GLB,,, | Performed by: FAMILY MEDICINE

## 2023-10-27 PROCEDURE — 3080F DIAST BP >= 90 MM HG: CPT | Mod: CPTII,S$GLB,, | Performed by: FAMILY MEDICINE

## 2023-10-27 PROCEDURE — 87186 SC STD MICRODIL/AGAR DIL: CPT | Performed by: FAMILY MEDICINE

## 2023-10-27 PROCEDURE — 99999 PR PBB SHADOW E&M-EST. PATIENT-LVL III: CPT | Mod: PBBFAC,,, | Performed by: FAMILY MEDICINE

## 2023-10-27 PROCEDURE — 3008F BODY MASS INDEX DOCD: CPT | Mod: CPTII,S$GLB,, | Performed by: FAMILY MEDICINE

## 2023-10-27 PROCEDURE — 99213 PR OFFICE/OUTPT VISIT, EST, LEVL III, 20-29 MIN: ICD-10-PCS | Mod: S$GLB,,, | Performed by: FAMILY MEDICINE

## 2023-10-27 PROCEDURE — 87088 URINE BACTERIA CULTURE: CPT | Performed by: FAMILY MEDICINE

## 2023-10-27 PROCEDURE — 87086 URINE CULTURE/COLONY COUNT: CPT | Performed by: FAMILY MEDICINE

## 2023-10-27 PROCEDURE — 81003 URINALYSIS AUTO W/O SCOPE: CPT | Mod: QW,S$GLB,, | Performed by: FAMILY MEDICINE

## 2023-10-27 PROCEDURE — 1159F PR MEDICATION LIST DOCUMENTED IN MEDICAL RECORD: ICD-10-PCS | Mod: CPTII,S$GLB,, | Performed by: FAMILY MEDICINE

## 2023-10-27 NOTE — PROGRESS NOTES
CC: Vaginitis  HPI: Patient presents for evaluation of an abnormal vaginal irritation. Symptoms have been present for 1 week. Vaginal symptoms: dryness, burning, local irritation, and urinary symptoms of dysuria and urinary frequency. Contraception: status post hysterectomy. She denies discharge, odor, and urinary symptoms of hematuria and fever . Sexually transmitted infection risk: very low risk of STD exposure. SA male partner, does not use condoms. This is the extent of the patient's complaints at this time.     ROS:  GENERAL: No fever, chills, fatigability or weight loss.  VULVAR: No pain, no lesions and no itching.  VAGINAL: No relaxation, no itching, no discharge, no abnormal bleeding and no lesions. +irritation/burning  URINARY: No incontinence, no nocturia, no frequency and + dysuria.     PHYSICAL EXAM:  Physical Exam:   Constitutional: She appears well-developed and well-nourished. She does not appear ill. No distress.               Genitourinary:    Right adnexa and left adnexa normal.      Pelvic exam was performed with patient supine.   The external female genitalia was normal.   Labial bartholins normal.There is no rash, tenderness or lesion on the right labia. There is no rash, tenderness or lesion on the left labia. Right adnexum displays no mass, no tenderness and no fullness. Left adnexum displays no mass, no tenderness and no fullness. Vaginal cuff normal.  There is vaginal discharge (scant thin white) in the vagina. No tenderness, bleeding, rectocele, cystocele or unspecified prolapse of vaginal walls in the vagina.    No foreign body in the vagina.   Cervix is absent.Uterus is absent. Normal urethral meatus.Urethra findings: no urethral mass              Neurological: She is alert. GCS eye subscore is 4. GCS verbal subscore is 5. GCS motor subscore is 6.           Past Medical History:   Diagnosis Date    Abnormal Pap smear of cervix     Bacterial vaginosis     RECURRENT    Gastritis      Hypertension     Renal disorder     kidney stones       Past Surgical History:   Procedure Laterality Date    APPENDECTOMY      CERVICAL BIOPSY  W/ LOOP ELECTRODE EXCISION      CYSTOSCOPY W/ URETERAL STENT PLACEMENT  11/18/2020    Procedure: CYSTOSCOPY, WITH URETERAL STENT INSERTION;  Surgeon: Ej Mitchell MD;  Location: [facility];  Service: Urology;;    CYSTOTOMY FOR EXCISION OF BLADDER DIVERTICULUM N/A 8/24/2018    Procedure: CYSTOTOMY FOR BLADDER PERFORATION;  Surgeon: Hardy Bueno MD;  Location: [facility] OR;  Service: OB/GYN;  Laterality: N/A;    fibroid removal      HERNIA REPAIR  2009    umbilical    LAPAROSCOPIC TOTAL HYSTERECTOMY N/A 8/24/2018    converted to CALISTA    LASER LITHOTRIPSY  11/18/2020    Procedure: LITHOTRIPSY, USING LASER;  Surgeon: Ej Mitchell MD;  Location: [facility];  Service: Urology;;    MYOMECTOMY      TOTAL ABDOMINAL HYSTERECTOMY N/A 8/24/2018    CALISTA- AUB.  Hardy Bueno MD;     URETEROSCOPIC REMOVAL OF URETERIC CALCULUS Left 11/18/2020    Procedure: EXTRACTION-STONE-URETEROSCOPY;  Surgeon: Ej Mitchell MD;  Location: [facility];  Service: Urology;  Laterality: Left;       Family History   Problem Relation Age of Onset    Diabetes Father     Hyperlipidemia Father     Diabetes Mother     Multiple sclerosis Mother     No Known Problems Brother     Colon cancer Paternal Uncle     Heart disease Maternal Grandmother     Breast cancer Neg Hx     Ovarian cancer Neg Hx        Social History     Socioeconomic History    Marital status: Single   Occupational History    Occupation: Medical asst at [employer]   Tobacco Use    Smoking status: Some Days     Current packs/day: 0.25     Average packs/day: 0.3 packs/day for 1 year (0.3 ttl pk-yrs)     Types: Cigarettes    Smokeless tobacco: Never    Tobacco comments:     1 pack / 2 weeks   Substance and Sexual Activity    Alcohol use: Yes     Alcohol/week: 0.0 standard drinks of alcohol     Comment: social    Drug use: No    Sexual activity: Yes      Birth control/protection: See Surgical Hx     Comment: Hysterectomy   Social History Narrative    Single.  Lives w/adopted 5 yr old daughter.  Pt lives next door to her own parents.         Current Outpatient Medications   Medication Sig Dispense Refill    amLODIPine (NORVASC) 10 MG tablet TAKE 1 TABLET BY MOUTH EVERY DAY 90 tablet 3    hydroCHLOROthiazide (HYDRODIURIL) 25 MG tablet Take 1 tablet (25 mg total) by mouth once daily. 90 tablet 3    paroxetine (PAXIL) 10 MG tablet Take 1 tablet (10 mg total) by mouth every morning. 30 tablet 11    alclometasone (ACLOVATE) 0.05 % cream AAA face bid prn redness or scaling or itching (Patient not taking: Reported on 10/27/2023) 60 g 3    cetirizine (ZYRTEC) 10 MG tablet Take 1 tablet (10 mg total) by mouth once daily. 30 tablet 0    cyanocobalamin (VITAMIN B-12) 1000 MCG tablet Take 100 mcg by mouth once daily.      nystatin (MYCOSTATIN) cream Apply topically 2 (two) times daily. (Patient not taking: Reported on 10/27/2023) 30 g 2    omeprazole (PRILOSEC) 20 MG capsule Take 20 mg by mouth once daily.      valACYclovir (VALTREX) 1000 MG tablet Take 1 tablet (1,000 mg total) by mouth 3 (three) times daily. for 7 days 21 tablet 0     No current facility-administered medications for this visit.       Review of patient's allergies indicates:   Allergen Reactions    Ace inhibitors Other (See Comments)     cough    Dilaudid [hydromorphone]      SOB + anxiety     Will take if needed.     Can take oxycodone          OB History    Para Term  AB Living   0 0 0 0 0 0   SAB IAB Ectopic Multiple Live Births   0 0 0 0     Obstetric Comments   Denies STD      Results for orders placed or performed in visit on 10/27/23   POCT Urinalysis, Dipstick, Automated, W/O Scope   Result Value Ref Range    POC Blood, Urine Negative Negative    POC Bilirubin, Urine Negative Negative    POC Urobilinogen, Urine Normal 0.1 - 1.1    POC Ketones, Urine Negative Negative    POC Protein, Urine  Negative Negative    POC Nitrates, Urine Negative Negative    POC Glucose, Urine Negative Negative    pH, UA 8     POC Specific Gravity, Urine 1.025 1.003 - 1.029    POC Leukocytes, Urine Negative Negative         Assessment/Plan:    Urinary frequency  -     POCT Urinalysis, Dipstick, Automated, W/O Scope  -     Urine culture    Vaginal irritation  -     Vaginosis Screen by DNA Probe    Potential exposure to STD  -     C. trachomatis/N. gonorrhoeae by AMP DNA [facility]; Vagina    Discussed unsure of insurance coverage with vaginosis swab. Pt would still like to do. Prefers metrogel if needed      Patient was counseled today on vaginitis prevention including :  a. avoiding feminine products such as deoderant soaps, body wash, bubble bath, douches, scented toilet paper, deoderant tampons or pads, feminine wipes, chronic pad use, etc.  b. avoiding other vulvovaginal irritants such as long hot baths, humidity, tight, synthetic clothing, chlorine and sitting around in wet bathing suits  c. wearing cotton underwear, avoiding thong underwear and no underwear to bed  d. taking showers instead of baths and use a hair dryer on cool setting afterwards to dry  e. wearing cotton to exercise and shower immediately after exercise and change clothes  f. using polyurethane condoms without spermicide if sexually active and symptoms are triggered by intercourse     FOLLOW UP: PRN/lack of improvement.

## 2023-10-28 ENCOUNTER — PATIENT MESSAGE (OUTPATIENT)
Dept: ADMINISTRATIVE | Facility: HOSPITAL | Age: 48
End: 2023-10-28
Payer: COMMERCIAL

## 2023-10-28 LAB
BACTERIAL VAGINOSIS DNA: POSITIVE
CANDIDA GLABRATA DNA: NEGATIVE
CANDIDA KRUSEI DNA: NEGATIVE
CANDIDA RRNA VAG QL PROBE: NEGATIVE
T VAGINALIS RRNA GENITAL QL PROBE: NEGATIVE

## 2023-10-29 LAB
C TRACH DNA SPEC QL NAA+PROBE: NOT DETECTED
N GONORRHOEA DNA SPEC QL NAA+PROBE: NOT DETECTED

## 2023-10-30 ENCOUNTER — PATIENT MESSAGE (OUTPATIENT)
Dept: OBSTETRICS AND GYNECOLOGY | Facility: CLINIC | Age: 48
End: 2023-10-30
Payer: COMMERCIAL

## 2023-10-30 ENCOUNTER — PATIENT OUTREACH (OUTPATIENT)
Dept: ADMINISTRATIVE | Facility: HOSPITAL | Age: 48
End: 2023-10-30
Payer: COMMERCIAL

## 2023-10-30 DIAGNOSIS — N76.0 BACTERIAL VAGINOSIS: Primary | ICD-10-CM

## 2023-10-30 DIAGNOSIS — B96.89 BACTERIAL VAGINOSIS: Primary | ICD-10-CM

## 2023-10-30 DIAGNOSIS — N30.00 ACUTE CYSTITIS WITHOUT HEMATURIA: ICD-10-CM

## 2023-10-30 LAB — BACTERIA UR CULT: ABNORMAL

## 2023-10-30 RX ORDER — METRONIDAZOLE 7.5 MG/G
1 GEL VAGINAL NIGHTLY
Qty: 70 G | Refills: 0 | Status: SHIPPED | OUTPATIENT
Start: 2023-10-30 | End: 2023-11-04

## 2023-10-30 RX ORDER — NITROFURANTOIN 25; 75 MG/1; MG/1
100 CAPSULE ORAL 2 TIMES DAILY
Qty: 10 CAPSULE | Refills: 0 | Status: SHIPPED | OUTPATIENT
Start: 2023-10-30 | End: 2023-11-04

## 2023-11-06 ENCOUNTER — PATIENT OUTREACH (OUTPATIENT)
Dept: ADMINISTRATIVE | Facility: HOSPITAL | Age: 48
End: 2023-11-06
Payer: COMMERCIAL

## 2023-11-30 ENCOUNTER — E-VISIT (OUTPATIENT)
Dept: FAMILY MEDICINE | Facility: CLINIC | Age: 48
End: 2023-11-30
Payer: COMMERCIAL

## 2023-11-30 DIAGNOSIS — N76.0 BACTERIAL VAGINOSIS: Primary | ICD-10-CM

## 2023-11-30 DIAGNOSIS — B96.89 BACTERIAL VAGINOSIS: Primary | ICD-10-CM

## 2023-11-30 PROCEDURE — 99422 OL DIG E/M SVC 11-20 MIN: CPT | Mod: ,,, | Performed by: FAMILY MEDICINE

## 2023-11-30 PROCEDURE — 99422 PR E&M, ONLINE DIGIT, EST, < 7 DAYS,  11-20 MINS: ICD-10-PCS | Mod: ,,, | Performed by: FAMILY MEDICINE

## 2023-11-30 RX ORDER — METRONIDAZOLE 500 MG/1
500 TABLET ORAL EVERY 12 HOURS
Qty: 14 TABLET | Refills: 0 | Status: SHIPPED | OUTPATIENT
Start: 2023-11-30 | End: 2023-12-07

## 2023-11-30 RX ORDER — NITROFURANTOIN 25; 75 MG/1; MG/1
100 CAPSULE ORAL 2 TIMES DAILY
Qty: 14 CAPSULE | Refills: 0 | Status: SHIPPED | OUTPATIENT
Start: 2023-11-30 | End: 2023-12-07

## 2023-11-30 NOTE — PROGRESS NOTES
The patient location is: Home  The chief complaint leading to consultation is:Vaginitis  Visit type: E visit  Total time spent with patient: 12 minutes  Each patient to whom he or she provides medical services by telemedicine is:  (1) informed of the relationship between the physician and patient and the respective role of any other health care provider with respect to management of the patient; and (2) notified that he or she may decline to receive medical services by telemedicine and may withdraw from such care at any time.    Notes:   Tasha Rush presents with moderate persistent symptoms after partial tx BV/UTI Denies nausea,vomiting,diarrhea or significant fever.    Past Medical History:   Diagnosis Date    Abnormal Pap smear of cervix     Bacterial vaginosis     RECURRENT    Gastritis     Hypertension     Renal disorder     kidney stones     Past Surgical History:   Procedure Laterality Date    APPENDECTOMY      CERVICAL BIOPSY  W/ LOOP ELECTRODE EXCISION      CYSTOSCOPY W/ URETERAL STENT PLACEMENT  11/18/2020    Procedure: CYSTOSCOPY, WITH URETERAL STENT INSERTION;  Surgeon: Ej Mitchell MD;  Location: [facility];  Service: Urology;;    CYSTOTOMY FOR EXCISION OF BLADDER DIVERTICULUM N/A 8/24/2018    Procedure: CYSTOTOMY FOR BLADDER PERFORATION;  Surgeon: Hardy Bueno MD;  Location: [facility] OR;  Service: OB/GYN;  Laterality: N/A;    fibroid removal      HERNIA REPAIR  2009    umbilical    LAPAROSCOPIC TOTAL HYSTERECTOMY N/A 8/24/2018    converted to CALISTA    LASER LITHOTRIPSY  11/18/2020    Procedure: LITHOTRIPSY, USING LASER;  Surgeon: Ej Mitchell MD;  Location: [facility];  Service: Urology;;    MYOMECTOMY      TOTAL ABDOMINAL HYSTERECTOMY N/A 8/24/2018    CALISTA- AUB.  Hardy Bueno MD;     URETEROSCOPIC REMOVAL OF URETERIC CALCULUS Left 11/18/2020    Procedure: EXTRACTION-STONE-URETEROSCOPY;  Surgeon: Ej Mitchell MD;  Location: [facility];  Service: Urology;  Laterality: Left;     Review of  patient's allergies indicates:   Allergen Reactions    Ace inhibitors Other (See Comments)     cough    Dilaudid [hydromorphone]      SOB + anxiety     Will take if needed.     Can take oxycodone     Current Outpatient Medications on File Prior to Visit   Medication Sig Dispense Refill    alclometasone (ACLOVATE) 0.05 % cream AAA face bid prn redness or scaling or itching (Patient not taking: Reported on 10/27/2023) 60 g 3    amLODIPine (NORVASC) 10 MG tablet TAKE 1 TABLET BY MOUTH EVERY DAY 90 tablet 3    cetirizine (ZYRTEC) 10 MG tablet Take 1 tablet (10 mg total) by mouth once daily. 30 tablet 0    cyanocobalamin (VITAMIN B-12) 1000 MCG tablet Take 100 mcg by mouth once daily.      hydroCHLOROthiazide (HYDRODIURIL) 25 MG tablet Take 1 tablet (25 mg total) by mouth once daily. 90 tablet 3    nystatin (MYCOSTATIN) cream Apply topically 2 (two) times daily. (Patient not taking: Reported on 10/27/2023) 30 g 2    omeprazole (PRILOSEC) 20 MG capsule Take 20 mg by mouth once daily.      paroxetine (PAXIL) 10 MG tablet Take 1 tablet (10 mg total) by mouth every morning. 30 tablet 11    valACYclovir (VALTREX) 1000 MG tablet Take 1 tablet (1,000 mg total) by mouth 3 (three) times daily. for 7 days 21 tablet 0     No current facility-administered medications on file prior to visit.     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  Lives w/adopted 5 yr old  daughter.  Pt lives next door to her own parents.       Family History   Problem Relation Age of Onset    Diabetes Father     Hyperlipidemia Father     Diabetes Mother     Multiple sclerosis Mother     No Known Problems Brother     Colon cancer Paternal Uncle     Heart disease Maternal Grandmother     Breast cancer Neg Hx     Ovarian cancer Neg Hx          ROS:  ABDOMEN:  No weight loss.No abdominal pain, no hematemesis or blood in stool.  URINARY: No flank pain, or hematuria.

## 2023-12-05 ENCOUNTER — TELEPHONE (OUTPATIENT)
Dept: OBSTETRICS AND GYNECOLOGY | Facility: CLINIC | Age: 48
End: 2023-12-05
Payer: COMMERCIAL

## 2023-12-05 NOTE — TELEPHONE ENCOUNTER
Spoke with pt and notified her that we sent a message to  staff to give her a call. Pt verbalized understanding.

## 2023-12-05 NOTE — TELEPHONE ENCOUNTER
Attempted to contact pt for follow up. No answer. Left message requesting call back.    Left message for pt to call back to schedule hormone consult with Garima Padilla NP

## 2023-12-05 NOTE — TELEPHONE ENCOUNTER
----- Message from Cecilia Tsai sent at 12/5/2023  1:01 PM CST -----  Regarding: call back  Name of caller: Mary       What is the requesting detail: pt is requesting a call back to regarding her upcoming appt.Please advise       Can the clinic reply by [facility]:       What number to call back:[phone]

## 2023-12-05 NOTE — TELEPHONE ENCOUNTER
----- Message from Maggie Vanessa MA sent at 12/5/2023  4:52 PM CST -----  Can someone reach out to this patient for a Hormone Consult.

## 2023-12-11 ENCOUNTER — PATIENT MESSAGE (OUTPATIENT)
Dept: ADMINISTRATIVE | Facility: OTHER | Age: 48
End: 2023-12-11
Payer: COMMERCIAL

## 2023-12-11 ENCOUNTER — LAB VISIT (OUTPATIENT)
Dept: LAB | Facility: OTHER | Age: 48
End: 2023-12-11
Attending: NURSE PRACTITIONER
Payer: COMMERCIAL

## 2023-12-11 ENCOUNTER — TELEPHONE (OUTPATIENT)
Dept: ADMINISTRATIVE | Facility: OTHER | Age: 48
End: 2023-12-11
Payer: COMMERCIAL

## 2023-12-11 ENCOUNTER — OFFICE VISIT (OUTPATIENT)
Dept: OBSTETRICS AND GYNECOLOGY | Facility: CLINIC | Age: 48
End: 2023-12-11
Payer: COMMERCIAL

## 2023-12-11 VITALS
SYSTOLIC BLOOD PRESSURE: 146 MMHG | HEIGHT: 65 IN | WEIGHT: 180.63 LBS | BODY MASS INDEX: 30.09 KG/M2 | DIASTOLIC BLOOD PRESSURE: 96 MMHG

## 2023-12-11 DIAGNOSIS — Z90.710 HISTORY OF HYSTERECTOMY: ICD-10-CM

## 2023-12-11 DIAGNOSIS — N95.1 MENOPAUSAL SYMPTOMS: Primary | ICD-10-CM

## 2023-12-11 DIAGNOSIS — N95.1 MENOPAUSAL SYMPTOMS: ICD-10-CM

## 2023-12-11 DIAGNOSIS — Z87.891 FORMER SMOKER: ICD-10-CM

## 2023-12-11 DIAGNOSIS — I10 HYPERTENSION, UNSPECIFIED TYPE: ICD-10-CM

## 2023-12-11 LAB
ALBUMIN SERPL BCP-MCNC: 4.3 G/DL (ref 3.5–5.2)
ALP SERPL-CCNC: 114 U/L (ref 55–135)
ALT SERPL W/O P-5'-P-CCNC: 45 U/L (ref 10–44)
ANION GAP SERPL CALC-SCNC: 10 MMOL/L (ref 8–16)
AST SERPL-CCNC: 37 U/L (ref 10–40)
BILIRUB SERPL-MCNC: 0.7 MG/DL (ref 0.1–1)
BUN SERPL-MCNC: 16 MG/DL (ref 6–20)
CALCIUM SERPL-MCNC: 8.9 MG/DL (ref 8.7–10.5)
CHLORIDE SERPL-SCNC: 109 MMOL/L (ref 95–110)
CO2 SERPL-SCNC: 23 MMOL/L (ref 23–29)
CREAT SERPL-MCNC: 0.7 MG/DL (ref 0.5–1.4)
EST. GFR  (NO RACE VARIABLE): >60 ML/MIN/1.73 M^2
ESTRADIOL SERPL-MCNC: 28 PG/ML
FSH SERPL-ACNC: 70.04 MIU/ML
GLUCOSE SERPL-MCNC: 93 MG/DL (ref 70–110)
POTASSIUM SERPL-SCNC: 3.8 MMOL/L (ref 3.5–5.1)
PROGEST SERPL-MCNC: 0.2 NG/ML
PROT SERPL-MCNC: 8 G/DL (ref 6–8.4)
SODIUM SERPL-SCNC: 142 MMOL/L (ref 136–145)
TESTOST SERPL-MCNC: 83 NG/DL (ref 5–73)

## 2023-12-11 PROCEDURE — 3008F PR BODY MASS INDEX (BMI) DOCUMENTED: ICD-10-PCS | Mod: CPTII,S$GLB,, | Performed by: NURSE PRACTITIONER

## 2023-12-11 PROCEDURE — 83001 ASSAY OF GONADOTROPIN (FSH): CPT | Performed by: NURSE PRACTITIONER

## 2023-12-11 PROCEDURE — 80053 COMPREHEN METABOLIC PANEL: CPT | Performed by: NURSE PRACTITIONER

## 2023-12-11 PROCEDURE — 84270 ASSAY OF SEX HORMONE GLOBUL: CPT | Performed by: NURSE PRACTITIONER

## 2023-12-11 PROCEDURE — 1160F PR REVIEW ALL MEDS BY PRESCRIBER/CLIN PHARMACIST DOCUMENTED: ICD-10-PCS | Mod: CPTII,S$GLB,, | Performed by: NURSE PRACTITIONER

## 2023-12-11 PROCEDURE — 3077F SYST BP >= 140 MM HG: CPT | Mod: CPTII,S$GLB,, | Performed by: NURSE PRACTITIONER

## 2023-12-11 PROCEDURE — 84402 ASSAY OF FREE TESTOSTERONE: CPT | Performed by: NURSE PRACTITIONER

## 2023-12-11 PROCEDURE — 36415 COLL VENOUS BLD VENIPUNCTURE: CPT | Performed by: NURSE PRACTITIONER

## 2023-12-11 PROCEDURE — 84144 ASSAY OF PROGESTERONE: CPT | Performed by: NURSE PRACTITIONER

## 2023-12-11 PROCEDURE — 3080F PR MOST RECENT DIASTOLIC BLOOD PRESSURE >= 90 MM HG: ICD-10-PCS | Mod: CPTII,S$GLB,, | Performed by: NURSE PRACTITIONER

## 2023-12-11 PROCEDURE — 1160F RVW MEDS BY RX/DR IN RCRD: CPT | Mod: CPTII,S$GLB,, | Performed by: NURSE PRACTITIONER

## 2023-12-11 PROCEDURE — 99999 PR PBB SHADOW E&M-EST. PATIENT-LVL III: CPT | Mod: PBBFAC,,, | Performed by: NURSE PRACTITIONER

## 2023-12-11 PROCEDURE — 84403 ASSAY OF TOTAL TESTOSTERONE: CPT | Performed by: NURSE PRACTITIONER

## 2023-12-11 PROCEDURE — 99999 PR PBB SHADOW E&M-EST. PATIENT-LVL III: ICD-10-PCS | Mod: PBBFAC,,, | Performed by: NURSE PRACTITIONER

## 2023-12-11 PROCEDURE — 82670 ASSAY OF TOTAL ESTRADIOL: CPT | Performed by: NURSE PRACTITIONER

## 2023-12-11 PROCEDURE — 1159F MED LIST DOCD IN RCRD: CPT | Mod: CPTII,S$GLB,, | Performed by: NURSE PRACTITIONER

## 2023-12-11 PROCEDURE — 99214 PR OFFICE/OUTPT VISIT, EST, LEVL IV, 30-39 MIN: ICD-10-PCS | Mod: S$GLB,,, | Performed by: NURSE PRACTITIONER

## 2023-12-11 PROCEDURE — 1159F PR MEDICATION LIST DOCUMENTED IN MEDICAL RECORD: ICD-10-PCS | Mod: CPTII,S$GLB,, | Performed by: NURSE PRACTITIONER

## 2023-12-11 PROCEDURE — 3077F PR MOST RECENT SYSTOLIC BLOOD PRESSURE >= 140 MM HG: ICD-10-PCS | Mod: CPTII,S$GLB,, | Performed by: NURSE PRACTITIONER

## 2023-12-11 PROCEDURE — 3008F BODY MASS INDEX DOCD: CPT | Mod: CPTII,S$GLB,, | Performed by: NURSE PRACTITIONER

## 2023-12-11 PROCEDURE — 99214 OFFICE O/P EST MOD 30 MIN: CPT | Mod: S$GLB,,, | Performed by: NURSE PRACTITIONER

## 2023-12-11 PROCEDURE — 3080F DIAST BP >= 90 MM HG: CPT | Mod: CPTII,S$GLB,, | Performed by: NURSE PRACTITIONER

## 2023-12-11 NOTE — TELEPHONE ENCOUNTER
JAKE with scheduled Cardiology appointment of 12-, and contact number provided for any questions. My Chart message to be sent.

## 2023-12-11 NOTE — PROGRESS NOTES
Subjective:      Tasha Rush is a 48 y.o. female who presents to discuss hormone replacement therapy.  History of CALISTA without BSO, onset of menopausal symptoms in the past 1 year. Patient is requesting hormone replacement therapy due to hot flashes, insomnia, moodiness, and no energy, dry skin, night sweats.The patient is not taking hormone replacement therapy. Patient denies post-menopausal vaginal bleeding. The patient is sexually active.  She denies the following contraindications to HRT:  Vaginal bleeding, history of VTE/PE, thrombophilia,  breast cancer, or active liver disease.       Use of Paxil in the past year with good control of hot flashes at 20 mg QD. Discontinued in the past 6 weeks due to concern for causing sleep disturbances and terrors.     Quit smoking 6 months ago. HTN, use of 2 agents for BP control.    Hemoglobin A1C   Date Value Ref Range Status   09/19/2022 5.4 4.0 - 5.6 % Final     Comment:     ADA Screening Guidelines:  5.7-6.4%  Consistent with prediabetes  >or=6.5%  Consistent with diabetes    High levels of fetal hemoglobin interfere with the HbA1C  assay. Heterozygous hemoglobin variants (HbS, HgC, etc)do  not significantly interfere with this assay.   However, presence of multiple variants may affect accuracy.     01/21/2016 5.1 4.5 - 6.2 % Final           Pap smear: 2015  Mammogram: 2023  DEXA: n/a    Lab Visit on 12/11/2023   Component Date Value Ref Range Status    Sodium 12/11/2023 142  136 - 145 mmol/L Final    Potassium 12/11/2023 3.8  3.5 - 5.1 mmol/L Final    Chloride 12/11/2023 109  95 - 110 mmol/L Final    CO2 12/11/2023 23  23 - 29 mmol/L Final    Glucose 12/11/2023 93  70 - 110 mg/dL Final    BUN 12/11/2023 16  6 - 20 mg/dL Final    Creatinine 12/11/2023 0.7  0.5 - 1.4 mg/dL Final    Calcium 12/11/2023 8.9  8.7 - 10.5 mg/dL Final    Total Protein 12/11/2023 8.0  6.0 - 8.4 g/dL Final    Albumin 12/11/2023 4.3  3.5 - 5.2 g/dL Final    Total Bilirubin 12/11/2023 0.7  0.1  - 1.0 mg/dL Final    Alkaline Phosphatase 12/11/2023 114  55 - 135 U/L Final    AST 12/11/2023 37  10 - 40 U/L Final    ALT 12/11/2023 45 (H)  10 - 44 U/L Final    eGFR 12/11/2023 >60  >60 mL/min/1.73 m^2 Final    Anion Gap 12/11/2023 10  8 - 16 mmol/L Final   Office Visit on 10/27/2023   Component Date Value Ref Range Status    POC Blood, Urine 10/27/2023 Negative  Negative Final    POC Bilirubin, Urine 10/27/2023 Negative  Negative Final    POC Urobilinogen, Urine 10/27/2023 Normal  0.1 - 1.1 Final    POC Ketones, Urine 10/27/2023 Negative  Negative Final    POC Protein, Urine 10/27/2023 Negative  Negative Final    POC Nitrates, Urine 10/27/2023 Negative  Negative Final    POC Glucose, Urine 10/27/2023 Negative  Negative Final    pH, UA 10/27/2023 8   Final    POC Specific Gravity, Urine 10/27/2023 1.025  1.003 - 1.029 Final    POC Leukocytes, Urine 10/27/2023 Negative  Negative Final    Chlamydia, Amplified DNA 10/27/2023 Not Detected  Not Detected Final    N gonorrhoeae, amplified DNA 10/27/2023 Not Detected  Not Detected Final    Trichomonas vaginalis 10/27/2023 Negative  Negative Final    Daniela sp 10/27/2023 Negative  Negative Final    Daniela glabrata DNA 10/27/2023 Negative  Negative Final    Daniela krusei DNA 10/27/2023 Negative  Negative Final    Bacterial vaginosis DNA 10/27/2023 Positive (A)  Negative Final    Urine Culture, Routine 10/27/2023  (A)   Final                    Value:ESCHERICHIA COLI  10,000 - 49,999 cfu/ml         Past Medical History:   Diagnosis Date    Abnormal Pap smear of cervix     Bacterial vaginosis     RECURRENT    Gastritis     Hypertension     Renal disorder     kidney stones     Past Surgical History:   Procedure Laterality Date    APPENDECTOMY      CERVICAL BIOPSY  W/ LOOP ELECTRODE EXCISION      CYSTOSCOPY W/ URETERAL STENT PLACEMENT  11/18/2020    Procedure: CYSTOSCOPY, WITH URETERAL STENT INSERTION;  Surgeon: Ej Mitchell MD;  Location: [facility];  Service: Urology;;     CYSTOTOMY FOR EXCISION OF BLADDER DIVERTICULUM N/A 2018    Procedure: CYSTOTOMY FOR BLADDER PERFORATION;  Surgeon: Hardy Bueno MD;  Location: [facility] OR;  Service: OB/GYN;  Laterality: N/A;    fibroid removal      HERNIA REPAIR  2009    umbilical    LAPAROSCOPIC TOTAL HYSTERECTOMY N/A 2018    converted to CALISTA    LASER LITHOTRIPSY  2020    Procedure: LITHOTRIPSY, USING LASER;  Surgeon: Ej Mitchell MD;  Location: [facility];  Service: Urology;;    MYOMECTOMY      TOTAL ABDOMINAL HYSTERECTOMY N/A 2018    CALISTA- AUB.  Hardy Bueno MD;     URETEROSCOPIC REMOVAL OF URETERIC CALCULUS Left 2020    Procedure: EXTRACTION-STONE-URETEROSCOPY;  Surgeon: Ej Mitchell MD;  Location: [facility];  Service: Urology;  Laterality: Left;     Social History     Tobacco Use    Smoking status: Former     Current packs/day: 0.25     Average packs/day: 0.3 packs/day for 1 year (0.3 ttl pk-yrs)     Types: Cigarettes    Smokeless tobacco: Never    Tobacco comments:     1 pack / 2 weeks. Quit completely in 2023   Substance Use Topics    Alcohol use: Yes     Alcohol/week: 0.0 standard drinks of alcohol     Comment: social    Drug use: No     Family History   Problem Relation Age of Onset    Heart disease Maternal Grandmother     Diabetes Father     Hyperlipidemia Father     Diabetes Mother     Multiple sclerosis Mother     No Known Problems Brother     Colon cancer Paternal Uncle     Breast cancer Neg Hx     Ovarian cancer Neg Hx     Cervical cancer Neg Hx     Uterine cancer Neg Hx      OB History    Para Term  AB Living   0 0 0 0 0 0   SAB IAB Ectopic Multiple Live Births   0 0 0 0     Obstetric Comments   Denies STD       Current Outpatient Medications:     alclometasone (ACLOVATE) 0.05 % cream, AAA face bid prn redness or scaling or itching, Disp: 60 g, Rfl: 3    amLODIPine (NORVASC) 10 MG tablet, TAKE 1 TABLET BY MOUTH EVERY DAY, Disp: 90 tablet, Rfl: 3    hydroCHLOROthiazide  "(HYDRODIURIL) 25 MG tablet, Take 1 tablet (25 mg total) by mouth once daily., Disp: 90 tablet, Rfl: 3    cetirizine (ZYRTEC) 10 MG tablet, Take 1 tablet (10 mg total) by mouth once daily. (Patient not taking: Reported on 12/11/2023), Disp: 30 tablet, Rfl: 0    cyanocobalamin (VITAMIN B-12) 1000 MCG tablet, Take 100 mcg by mouth once daily., Disp: , Rfl:     nystatin (MYCOSTATIN) cream, Apply topically 2 (two) times daily. (Patient not taking: Reported on 10/27/2023), Disp: 30 g, Rfl: 2    omeprazole (PRILOSEC) 20 MG capsule, Take 20 mg by mouth once daily., Disp: , Rfl:     paroxetine (PAXIL) 10 MG tablet, Take 1 tablet (10 mg total) by mouth every morning. (Patient not taking: Reported on 12/11/2023), Disp: 30 tablet, Rfl: 11    valACYclovir (VALTREX) 1000 MG tablet, Take 1 tablet (1,000 mg total) by mouth 3 (three) times daily. for 7 days (Patient not taking: Reported on 12/11/2023), Disp: 21 tablet, Rfl: 0    Vitals:    12/11/23 0932   BP: (!) 146/96   Weight: 81.9 kg (180 lb 9.6 oz)   Height: 5' 5" (1.651 m)   PainSc: 0-No pain     Body mass index is 30.05 kg/m².    Assessment:    Menopausal symptoms  -     Ambulatory referral/consult to Cardiology; Future; Expected date: 12/18/2023  -     FOLLICLE STIMULATING HORMONE; Future; Expected date: 12/11/2023  -     Estradiol; Future; Expected date: 12/11/2023  -     PROGESTERONE; Future; Expected date: 12/11/2023  -     Testosterone, free; Future; Expected date: 12/11/2023  -     TESTOSTERONE; Future; Expected date: 12/11/2023  -     SEX HORMONE BINDING GLOBULIN; Future; Expected date: 12/11/2023  -     COMPREHENSIVE METABOLIC PANEL; Future; Expected date: 12/11/2023    History of hysterectomy    Hypertension, unspecified type  -     Ambulatory referral/consult to Cardiology; Future; Expected date: 12/18/2023    Former smoker  -     Ambulatory referral/consult to Cardiology; Future; Expected date: 12/18/2023        Plan:   Risks and benefits of hormone replacement " therapy were discussed.  Hormone replacement therapy options, including bioidentical versus non-bioidentical hormones, as well as alternatives discussed.    HRT lab work, discussed Calm supplement for sleep.    Follow up in 4 weeks  Will recheck labs once on typical optimal dose or if having side effects.  Instructed patient to call if she experiences any side effects or has any questions.       DAVID Cleveland

## 2023-12-12 ENCOUNTER — OFFICE VISIT (OUTPATIENT)
Dept: CARDIOLOGY | Facility: CLINIC | Age: 48
End: 2023-12-12
Payer: COMMERCIAL

## 2023-12-12 VITALS
BODY MASS INDEX: 30.16 KG/M2 | WEIGHT: 181 LBS | HEIGHT: 65 IN | SYSTOLIC BLOOD PRESSURE: 128 MMHG | OXYGEN SATURATION: 96 % | DIASTOLIC BLOOD PRESSURE: 71 MMHG

## 2023-12-12 DIAGNOSIS — Z91.89 AT RISK FOR CORONARY ARTERY DISEASE: ICD-10-CM

## 2023-12-12 DIAGNOSIS — I10 BENIGN ESSENTIAL HYPERTENSION: Primary | ICD-10-CM

## 2023-12-12 DIAGNOSIS — Z72.0 TOBACCO ABUSE: ICD-10-CM

## 2023-12-12 DIAGNOSIS — Z90.710 HISTORY OF HYSTERECTOMY: ICD-10-CM

## 2023-12-12 PROCEDURE — 99999 PR PBB SHADOW E&M-EST. PATIENT-LVL III: ICD-10-PCS | Mod: PBBFAC,,, | Performed by: INTERNAL MEDICINE

## 2023-12-12 PROCEDURE — 3074F SYST BP LT 130 MM HG: CPT | Mod: CPTII,S$GLB,, | Performed by: INTERNAL MEDICINE

## 2023-12-12 PROCEDURE — 99999 PR PBB SHADOW E&M-EST. PATIENT-LVL III: CPT | Mod: PBBFAC,,, | Performed by: INTERNAL MEDICINE

## 2023-12-12 PROCEDURE — 99204 PR OFFICE/OUTPT VISIT, NEW, LEVL IV, 45-59 MIN: ICD-10-PCS | Mod: S$GLB,,, | Performed by: INTERNAL MEDICINE

## 2023-12-12 PROCEDURE — 1159F PR MEDICATION LIST DOCUMENTED IN MEDICAL RECORD: ICD-10-PCS | Mod: CPTII,S$GLB,, | Performed by: INTERNAL MEDICINE

## 2023-12-12 PROCEDURE — 3074F PR MOST RECENT SYSTOLIC BLOOD PRESSURE < 130 MM HG: ICD-10-PCS | Mod: CPTII,S$GLB,, | Performed by: INTERNAL MEDICINE

## 2023-12-12 PROCEDURE — 1160F PR REVIEW ALL MEDS BY PRESCRIBER/CLIN PHARMACIST DOCUMENTED: ICD-10-PCS | Mod: CPTII,S$GLB,, | Performed by: INTERNAL MEDICINE

## 2023-12-12 PROCEDURE — 3008F PR BODY MASS INDEX (BMI) DOCUMENTED: ICD-10-PCS | Mod: CPTII,S$GLB,, | Performed by: INTERNAL MEDICINE

## 2023-12-12 PROCEDURE — 99204 OFFICE O/P NEW MOD 45 MIN: CPT | Mod: S$GLB,,, | Performed by: INTERNAL MEDICINE

## 2023-12-12 PROCEDURE — 3078F PR MOST RECENT DIASTOLIC BLOOD PRESSURE < 80 MM HG: ICD-10-PCS | Mod: CPTII,S$GLB,, | Performed by: INTERNAL MEDICINE

## 2023-12-12 PROCEDURE — 3078F DIAST BP <80 MM HG: CPT | Mod: CPTII,S$GLB,, | Performed by: INTERNAL MEDICINE

## 2023-12-12 PROCEDURE — 3008F BODY MASS INDEX DOCD: CPT | Mod: CPTII,S$GLB,, | Performed by: INTERNAL MEDICINE

## 2023-12-12 PROCEDURE — 1159F MED LIST DOCD IN RCRD: CPT | Mod: CPTII,S$GLB,, | Performed by: INTERNAL MEDICINE

## 2023-12-12 PROCEDURE — 1160F RVW MEDS BY RX/DR IN RCRD: CPT | Mod: CPTII,S$GLB,, | Performed by: INTERNAL MEDICINE

## 2023-12-12 NOTE — PROGRESS NOTES
"Subjective:   Patient ID:  Tasha Rush is a 48 y.o. female is a new patient who presents for evaluation of Establish Care  HTN    HPI:  Ex smoker  1 pack/week for 15 year quit 6 yrs  Hysterectomy at age 43 due to fibroids   No f/h of heart disease  No children- never got pregnant  Hot flashes day and night and getting emotional with menopause which is why she needs the medication.   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  Medical assistant - active  C/o chest pain and thinks its related to stress      Patient Active Problem List   Diagnosis    Insomnia    Tobacco abuse    Benign essential hypertension    Hyponatremia    Headache    Hypokalemia    Aphasia    Atypical chest pain    Chronic pain disorder    Vitamin D deficiency    Other spondylosis, cervical region    Neck pain    DDD (degenerative disc disease), cervical    Chronic left shoulder pain    Recurrent UTI    Nephrolithiasis    Gastroesophageal reflux disease    Pain of upper abdomen    Weakness    Decreased range of motion    History of hysterectomy     /71   Ht 5' 5" (1.651 m)   Wt 82.1 kg (181 lb)   LMP 08/14/2018   SpO2 96%   BMI 30.12 kg/m²   Body mass index is 30.12 kg/m².  Estimated Creatinine Clearance: 101.7 mL/min (based on SCr of 0.7 mg/dL).    Lab Results   Component Value Date     12/11/2023    K 3.8 12/11/2023     12/11/2023    CO2 23 12/11/2023    BUN 16 12/11/2023    CREATININE 0.7 12/11/2023    GLU 93 12/11/2023    HGBA1C 5.4 09/19/2022    MG 1.6 03/06/2020    AST 37 12/11/2023    ALT 45 (H) 12/11/2023    ALBUMIN 4.3 12/11/2023    PROT 8.0 12/11/2023    BILITOT 0.7 12/11/2023    WBC 7.80 07/14/2023    HGB 13.2 07/14/2023    HCT 40.8 07/14/2023    MCV 94 07/14/2023     07/14/2023    INR 0.9 07/21/2021    TSH 0.380 (L) 09/19/2022    CHOL 165 09/19/2022    HDL 43 09/19/2022    LDLCALC 105.4 09/19/2022    TRIG 83 09/19/2022       Current Outpatient Medications   Medication Sig    alclometasone (ACLOVATE) 0.05 % cream AAA face bid " prn redness or scaling or itching    amLODIPine (NORVASC) 10 MG tablet TAKE 1 TABLET BY MOUTH EVERY DAY    hydroCHLOROthiazide (HYDRODIURIL) 25 MG tablet Take 1 tablet (25 mg total) by mouth once daily.    cyanocobalamin (VITAMIN B-12) 1000 MCG tablet Take 100 mcg by mouth once daily.    omeprazole (PRILOSEC) 20 MG capsule Take 20 mg by mouth once daily.     No current facility-administered medications for this visit.       Review of Systems   Constitutional: Negative for chills, decreased appetite, malaise/fatigue, night sweats, weight gain and weight loss.   Eyes:  Negative for blurred vision, double vision, visual disturbance and visual halos.   Cardiovascular:  Positive for chest pain. Negative for claudication, cyanosis, dyspnea on exertion, irregular heartbeat, leg swelling, near-syncope, orthopnea, palpitations, paroxysmal nocturnal dyspnea and syncope.   Respiratory:  Negative for cough, hemoptysis, snoring, sputum production and wheezing.    Endocrine: Negative for cold intolerance, heat intolerance, polydipsia and polyphagia.   Hematologic/Lymphatic: Negative for adenopathy and bleeding problem. Does not bruise/bleed easily.   Skin:  Negative for flushing, itching, poor wound healing and rash.   Musculoskeletal:  Negative for arthritis, back pain, falls, gout, joint pain, joint swelling, muscle cramps, muscle weakness, myalgias, neck pain and stiffness.   Gastrointestinal:  Negative for bloating, abdominal pain, anorexia, diarrhea, dysphagia, excessive appetite, flatus, hematemesis, jaundice, melena and nausea.   Genitourinary:  Negative for hesitancy and incomplete emptying.   Neurological:  Negative for aphonia, brief paralysis, difficulty with concentration, disturbances in coordination, excessive daytime sleepiness, dizziness, focal weakness, light-headedness, loss of balance and weakness.   Psychiatric/Behavioral:  Negative for altered mental status, depression, hallucinations, hypervigilance, memory  loss, substance abuse and suicidal ideas. The patient does not have insomnia and is not nervous/anxious.        Objective:   Physical Exam  Constitutional:       General: She is not in acute distress.     Appearance: She is well-developed. She is not diaphoretic.   HENT:      Head: Normocephalic and atraumatic.      Nose: Nose normal.      Mouth/Throat:      Pharynx: No oropharyngeal exudate.   Eyes:      General: No scleral icterus.        Right eye: No discharge.         Left eye: No discharge.      Conjunctiva/sclera: Conjunctivae normal.      Pupils: Pupils are equal, round, and reactive to light.   Neck:      Thyroid: No thyromegaly.      Vascular: No JVD.      Trachea: No tracheal deviation.   Cardiovascular:      Rate and Rhythm: Normal rate and regular rhythm.      Pulses: Intact distal pulses.      Heart sounds: Normal heart sounds. No murmur heard.     No friction rub. No gallop.   Pulmonary:      Effort: Pulmonary effort is normal. No respiratory distress.      Breath sounds: Normal breath sounds. No stridor. No wheezing or rales.   Chest:      Chest wall: No tenderness.   Abdominal:      General: Bowel sounds are normal. There is no distension.      Palpations: Abdomen is soft. There is no mass.      Tenderness: There is no abdominal tenderness. There is no guarding or rebound.   Musculoskeletal:         General: No tenderness. Normal range of motion.      Cervical back: Normal range of motion and neck supple.   Lymphadenopathy:      Cervical: No cervical adenopathy.   Skin:     General: Skin is warm.      Coloration: Skin is not pale.      Findings: No erythema or rash.   Neurological:      Mental Status: She is alert and oriented to person, place, and time.      Cranial Nerves: No cranial nerve deficit.      Motor: No abnormal muscle tone.      Coordination: Coordination normal.      Deep Tendon Reflexes: Reflexes are normal and symmetric. Reflexes normal.   Psychiatric:         Behavior: Behavior  normal.         Thought Content: Thought content normal.         Judgment: Judgment normal.         Assessment:     1. Benign essential hypertension    2. History of hysterectomy    3. Tobacco abuse      Plan:     Tasha was seen today for establish care.    Diagnoses and all orders for this visit:    Benign essential hypertension  -     CV Ultrasound Bilateral Doppler Carotid; Future    History of hysterectomy    Tobacco abuse  -     CV Ultrasound Bilateral Doppler Carotid; Future    At risk for coronary artery disease  -     CV Ultrasound Bilateral Doppler Carotid; Future  -     LIPOPROTEIN A (LPA); Future  -     Apolipoprotein B; Future  -     CRP, High Sensitivity; Future  -     Lipid Panel; Future      RTC 3 mo. No reservation to start HRT for now

## 2023-12-14 LAB
SHBG SERPL-SCNC: 51 NMOL/L
TESTOST FREE SERPL-MCNC: 2.8 PG/ML

## 2023-12-15 ENCOUNTER — HOSPITAL ENCOUNTER (OUTPATIENT)
Dept: CARDIOLOGY | Facility: HOSPITAL | Age: 48
Discharge: HOME | End: 2023-12-15
Attending: INTERNAL MEDICINE
Payer: COMMERCIAL

## 2023-12-15 DIAGNOSIS — I10 BENIGN ESSENTIAL HYPERTENSION: ICD-10-CM

## 2023-12-15 DIAGNOSIS — Z72.0 TOBACCO ABUSE: ICD-10-CM

## 2023-12-15 DIAGNOSIS — Z91.89 AT RISK FOR CORONARY ARTERY DISEASE: ICD-10-CM

## 2023-12-15 LAB
LEFT ARM DIASTOLIC BLOOD PRESSURE: 71 MMHG
LEFT ARM SYSTOLIC BLOOD PRESSURE: 128 MMHG
LEFT CBA DIAS: 21 CM/S
LEFT CBA SYS: 46 CM/S
LEFT CCA DIST DIAS: 22 CM/S
LEFT CCA DIST SYS: 58 CM/S
LEFT CCA MID DIAS: 23 CM/S
LEFT CCA MID SYS: 75 CM/S
LEFT CCA PROX DIAS: 27 CM/S
LEFT CCA PROX SYS: 93 CM/S
LEFT ECA DIAS: 21 CM/S
LEFT ECA SYS: 90 CM/S
LEFT ICA DIST DIAS: 42 CM/S
LEFT ICA DIST SYS: 87 CM/S
LEFT ICA MID DIAS: 35 CM/S
LEFT ICA MID SYS: 67 CM/S
LEFT ICA PROX DIAS: 19 CM/S
LEFT ICA PROX SYS: 50 CM/S
LEFT VERTEBRAL DIAS: 21 CM/S
LEFT VERTEBRAL SYS: 44 CM/S
OHS CV CAROTID RIGHT ICA EDV HIGHEST: 39
OHS CV CAROTID ULTRASOUND LEFT ICA/CCA RATIO: 1.5
OHS CV CAROTID ULTRASOUND RIGHT ICA/CCA RATIO: 2.07
OHS CV PV CAROTID LEFT HIGHEST CCA: 93
OHS CV PV CAROTID LEFT HIGHEST ICA: 87
OHS CV PV CAROTID RIGHT HIGHEST CCA: 89
OHS CV PV CAROTID RIGHT HIGHEST ICA: 93
OHS CV US CAROTID LEFT HIGHEST EDV: 42
RIGHT ARM DIASTOLIC BLOOD PRESSURE: 78 MMHG
RIGHT ARM SYSTOLIC BLOOD PRESSURE: 127 MMHG
RIGHT CBA DIAS: 20 CM/S
RIGHT CBA SYS: 54 CM/S
RIGHT CCA DIST DIAS: 18 CM/S
RIGHT CCA DIST SYS: 45 CM/S
RIGHT CCA MID DIAS: 19 CM/S
RIGHT CCA MID SYS: 46 CM/S
RIGHT CCA PROX DIAS: 26 CM/S
RIGHT CCA PROX SYS: 89 CM/S
RIGHT ECA DIAS: 28 CM/S
RIGHT ECA SYS: 148 CM/S
RIGHT ICA DIST DIAS: 39 CM/S
RIGHT ICA DIST SYS: 93 CM/S
RIGHT ICA MID DIAS: 21 CM/S
RIGHT ICA MID SYS: 45 CM/S
RIGHT ICA PROX DIAS: 14 CM/S
RIGHT ICA PROX SYS: 38 CM/S
RIGHT VERTEBRAL DIAS: 19 CM/S
RIGHT VERTEBRAL SYS: 44 CM/S

## 2023-12-15 PROCEDURE — 93880 CV US DOPPLER CAROTID (CUPID ONLY): ICD-10-PCS | Mod: 26,,, | Performed by: INTERNAL MEDICINE

## 2023-12-15 PROCEDURE — 93880 EXTRACRANIAL BILAT STUDY: CPT | Mod: 26,,, | Performed by: INTERNAL MEDICINE

## 2023-12-15 PROCEDURE — 93880 EXTRACRANIAL BILAT STUDY: CPT

## 2023-12-27 NOTE — PT/OT/SLP PROGRESS
"Speech Language Pathology      Tasha Rush  MRN: [id number]    Patient not seen today secondary to refusal of services related to resolving aphasia. Per chart review, patient has D/C orders for home health. Patient was fully dressed and seated on edge of bed "texting on cell phone" upon ST arrival. Patient reported aphasia is resolving and stated, "I don't think I need anymore speech therapy when I leave today." Speech was noted to be fluent in open ended conversation. ST instructed patient to follow up with PCP for ST "outpatient" order if in the future she felt continued ST services were needed.     ST will d/c services at this time.       NEL Sterling,CCC-SLP    " Pt called and stated that last Tuesday (a week ago) she was feeling some pain, burning, tingling, and numbness in the bottom of her feet.  Pt woke up on Wednesday and stated they were painful but went to work, about an hour into her shift they became unbearable and went home.  Pt stated that the symptoms worsen the longer she is on her feet and seem to get a little better when she is off of them.  Pt stated that they feel like they are burning but when she touches them they are cool to the touch.  Pt stated that it makes no difference on what type of shoes she wears. Pt offered a sooner appointment with a covering provider and declined stating she would like to stick with her PCP.   Pt scheduled with PCP on 01/05/2024.  Pt encouraged if things change or the pain becomes to much to reach out and we will try to get her in sooner with a covering provider.  Pt stated that she would.

## 2024-01-08 ENCOUNTER — TELEPHONE (OUTPATIENT)
Dept: OBSTETRICS AND GYNECOLOGY | Facility: CLINIC | Age: 49
End: 2024-01-08
Payer: COMMERCIAL

## 2024-01-12 ENCOUNTER — TELEPHONE (OUTPATIENT)
Dept: INTERNAL MEDICINE | Facility: CLINIC | Age: 49
End: 2024-01-12
Payer: COMMERCIAL

## 2024-01-12 NOTE — TELEPHONE ENCOUNTER
Spoke with the pt and scheduled a potential pre-op date for hernia surgery. Pt will call back to confirm surgery date.

## 2024-01-12 NOTE — TELEPHONE ENCOUNTER
----- Message from Jessica Lima sent at 1/12/2024  8:32 AM CST -----  Type:  Patient Returning Call    Who Called:pt   Who Left Message for Patient:  Does the patient know what this is regarding?:surgery  Would the patient rather a call back or a response via [facility]? No preference   Best Call Back Number:[phone]  Additional Information: pt states she is ready to schedule her surgery

## 2024-01-17 ENCOUNTER — OFFICE VISIT (OUTPATIENT)
Dept: INTERNAL MEDICINE | Facility: CLINIC | Age: 49
End: 2024-01-17
Attending: FAMILY MEDICINE
Payer: COMMERCIAL

## 2024-01-17 ENCOUNTER — PATIENT MESSAGE (OUTPATIENT)
Dept: DERMATOLOGY | Facility: CLINIC | Age: 49
End: 2024-01-17
Payer: COMMERCIAL

## 2024-01-17 VITALS
BODY MASS INDEX: 29.72 KG/M2 | HEART RATE: 86 BPM | SYSTOLIC BLOOD PRESSURE: 130 MMHG | HEIGHT: 65 IN | OXYGEN SATURATION: 98 % | DIASTOLIC BLOOD PRESSURE: 88 MMHG | WEIGHT: 178.38 LBS

## 2024-01-17 DIAGNOSIS — K21.9 GASTROESOPHAGEAL REFLUX DISEASE WITHOUT ESOPHAGITIS: ICD-10-CM

## 2024-01-17 DIAGNOSIS — I10 BENIGN ESSENTIAL HYPERTENSION: Primary | ICD-10-CM

## 2024-01-17 PROCEDURE — 99999 PR PBB SHADOW E&M-EST. PATIENT-LVL III: CPT | Mod: PBBFAC,,, | Performed by: FAMILY MEDICINE

## 2024-01-17 PROCEDURE — 3079F DIAST BP 80-89 MM HG: CPT | Mod: CPTII,S$GLB,, | Performed by: FAMILY MEDICINE

## 2024-01-17 PROCEDURE — 3008F BODY MASS INDEX DOCD: CPT | Mod: CPTII,S$GLB,, | Performed by: FAMILY MEDICINE

## 2024-01-17 PROCEDURE — 1160F RVW MEDS BY RX/DR IN RCRD: CPT | Mod: CPTII,S$GLB,, | Performed by: FAMILY MEDICINE

## 2024-01-17 PROCEDURE — 99215 OFFICE O/P EST HI 40 MIN: CPT | Mod: S$GLB,,, | Performed by: FAMILY MEDICINE

## 2024-01-17 PROCEDURE — 3075F SYST BP GE 130 - 139MM HG: CPT | Mod: CPTII,S$GLB,, | Performed by: FAMILY MEDICINE

## 2024-01-17 PROCEDURE — 1159F MED LIST DOCD IN RCRD: CPT | Mod: CPTII,S$GLB,, | Performed by: FAMILY MEDICINE

## 2024-01-17 NOTE — PROGRESS NOTES
"CHIEF COMPLAINT:  pre-op    HISTORY OF PRESENT ILLNESS: The patient is a generally healthy 48 year-old BF.  The patient is setup for hernia repair soon with Dr. Perez.  She has undergone multiple previous surgeries with general anesthesia.  No DVT or PONV.    Sees URO for frequent UTIs.  It has been a while since the patient has been seen.      The patient has a history of stable hypertension on current medications.  Patient denies chest pain or shortness of breath today.    REVIEW OF SYSTEMS:  GENERAL: No fever, chills, fatigability or weight loss.  SKIN: No rashes, itching or changes in color or texture of skin.  HEAD: No headaches or recent head trauma.  EYES: Visual acuity fine. No photophobia, ocular pain or diplopia.  EARS: Denies ear pain, discharge or vertigo.  NOSE: No loss of smell, no epistaxis or postnasal drip.  MOUTH & THROAT: No hoarseness or change in voice. No excessive gum bleeding.  NODES: Denies swollen glands.  CHEST: Denies GUTIERREZ, cyanosis, wheezing, cough and sputum production.  CARDIOVASCULAR: Denies chest pain, PND, orthopnea or reduced exercise tolerance.  ABDOMEN: Appetite fine. No weight loss. Denies diarrhea, abdominal pain, hematemesis or blood in stool.  URINARY: No flank pain, dysuria or hematuria.  PERIPHERAL VASCULAR: No claudication or cyanosis.  MUSCULOSKELETAL: No joint stiffness or swelling. Denies back pain.  NEUROLOGIC: No history of seizures, paralysis, alteration of gait or coordination.    SOCIAL HISTORY: The patient does smoke.  The patient consumes alcohol socially.  The patient is single.    PHYSICAL EXAMINATION:   Blood pressure 130/88, pulse 86, height 5' 5" (1.651 m), weight 80.9 kg (178 lb 5.6 oz), last menstrual period 08/14/2018, SpO2 98 %.    APPEARANCE: Well nourished, well developed, in no acute distress.    HEAD: Normocephalic, atraumatic.  EYES: PERRL. EOMI.  Conjunctivae without injection and  anicteric  NOSE: Mucosa pink. Airway clear.  MOUTH & THROAT: No " tonsillar enlargement. No pharyngeal erythema or exudate. No stridor.  NECK: Supple.   NODES: No cervical, axillary or inguinal lymph node enlargement.  CHEST: Lungs clear to auscultation.  No retractions are noted.  No rales or rhonchi are present.  CARDIOVASCULAR: Normal S1, S2. No rubs, murmurs or gallops.  ABDOMEN: Bowel sounds normal. Not distended. Soft. No tenderness or masses.  No ascites is noted.  UH and diastasis supraumbilical hernias are noted.  MUSCULOSKELETAL:  There is no clubbing, cyanosis, or edema of the extremities x4.  There is full range of motion of the lumbar spine.  There is full range of motion of the extremities x4.  There is no deformity noted.    NEUROLOGIC:       Normal speech development.      Hearing normal.      Normal gait.      Motor and sensory exams grossly normal.  PSYCHIATRIC: Patient is alert and oriented x3.  Thought processes are all normal.  There is no homicidality.  There is no suicidality.  There is no evidence of psychosis.    LABORATORY/RADIOLOGY:   Chart reviewed.  We reviewed her recent blood work today.    ASSESSMENT:   UH and diastasis supraumbilical hernias  LHD left shoulder and elbow pain for months d/t lateral epicondylitis  Sees [facility] for frequent UTI  HTN    PLAN:  She is medically optimized and cleared for hernia repair  Return to clinic in one year.

## 2024-01-23 ENCOUNTER — OFFICE VISIT (OUTPATIENT)
Dept: SURGERY | Facility: CLINIC | Age: 49
End: 2024-01-23
Attending: SPECIALIST
Payer: COMMERCIAL

## 2024-01-23 VITALS
OXYGEN SATURATION: 99 % | BODY MASS INDEX: 29.99 KG/M2 | HEART RATE: 80 BPM | WEIGHT: 180 LBS | SYSTOLIC BLOOD PRESSURE: 116 MMHG | DIASTOLIC BLOOD PRESSURE: 75 MMHG | HEIGHT: 65 IN

## 2024-01-23 DIAGNOSIS — K43.9 VENTRAL HERNIA WITHOUT OBSTRUCTION OR GANGRENE: Primary | ICD-10-CM

## 2024-01-23 DIAGNOSIS — I10 HYPERTENSION, ESSENTIAL: ICD-10-CM

## 2024-01-23 DIAGNOSIS — K43.2 INCISIONAL HERNIA, WITHOUT OBSTRUCTION OR GANGRENE: ICD-10-CM

## 2024-01-23 PROCEDURE — 1160F RVW MEDS BY RX/DR IN RCRD: CPT | Mod: CPTII,S$GLB,, | Performed by: SPECIALIST

## 2024-01-23 PROCEDURE — 3008F BODY MASS INDEX DOCD: CPT | Mod: CPTII,S$GLB,, | Performed by: SPECIALIST

## 2024-01-23 PROCEDURE — 1159F MED LIST DOCD IN RCRD: CPT | Mod: CPTII,S$GLB,, | Performed by: SPECIALIST

## 2024-01-23 PROCEDURE — 3078F DIAST BP <80 MM HG: CPT | Mod: CPTII,S$GLB,, | Performed by: SPECIALIST

## 2024-01-23 PROCEDURE — 99212 OFFICE O/P EST SF 10 MIN: CPT | Mod: S$GLB,,, | Performed by: SPECIALIST

## 2024-01-23 PROCEDURE — 99999 PR PBB SHADOW E&M-EST. PATIENT-LVL IV: CPT | Mod: PBBFAC,,, | Performed by: SPECIALIST

## 2024-01-23 PROCEDURE — 3074F SYST BP LT 130 MM HG: CPT | Mod: CPTII,S$GLB,, | Performed by: SPECIALIST

## 2024-01-23 RX ORDER — AMLODIPINE BESYLATE 10 MG/1
TABLET ORAL
Qty: 90 TABLET | Refills: 3 | Status: SHIPPED | OUTPATIENT
Start: 2024-01-23

## 2024-01-23 NOTE — TELEPHONE ENCOUNTER
Refill Encounter    PCP Visits: Recent Visits  Date Type Provider Dept   01/17/24 Office Visit Speedy Cavanaugh MD [facility] Internal Medicine   Showing recent visits within past 360 days and meeting all other requirements  Future Appointments  No visits were found meeting these conditions.  Showing future appointments within next 720 days and meeting all other requirements     Last 3 Blood Pressure:   BP Readings from Last 3 Encounters:   01/17/24 130/88   12/12/23 128/71   12/11/23 (!) 146/96     Preferred Pharmacy:    [id number] - [place] - [address] AT [facility] [address]  [address]  [place] [address]  Phone: [phone] Fax: [phone]    Requested RX:  Requested Prescriptions      No prescriptions requested or ordered in this encounter      RX Route: Normal

## 2024-01-23 NOTE — PROGRESS NOTES
48-year-old female with recurrent abdominal he was.  Patient noted hernias after undergoing liposuction     PE   Midline incision, multiple areas of prominent subcutaneous tissue    Impression/plan   Recurrent hernias possible lipomas and seroma was from liposuction  Will check CT abdomen

## 2024-01-23 NOTE — TELEPHONE ENCOUNTER
No care due was identified.  [facility] Embedded Care Due Messages. Reference number: [id number].   1/23/2024 9:20:21 AM CST

## 2024-01-23 NOTE — TELEPHONE ENCOUNTER
Refill Decision Note   Tasha Rush  is requesting a refill authorization.  Brief Assessment and Rationale for Refill:  Approve     Medication Therapy Plan:         Comments:     Note composed:12:33 PM 01/23/2024

## 2024-01-24 ENCOUNTER — HOSPITAL ENCOUNTER (OUTPATIENT)
Dept: RADIOLOGY | Facility: OTHER | Age: 49
Discharge: HOME | End: 2024-01-24
Attending: SPECIALIST
Payer: COMMERCIAL

## 2024-01-24 DIAGNOSIS — K43.9 VENTRAL HERNIA WITHOUT OBSTRUCTION OR GANGRENE: ICD-10-CM

## 2024-01-24 DIAGNOSIS — K43.2 INCISIONAL HERNIA, WITHOUT OBSTRUCTION OR GANGRENE: ICD-10-CM

## 2024-01-24 PROCEDURE — 74176 CT ABD & PELVIS W/O CONTRAST: CPT | Mod: 26,,, | Performed by: RADIOLOGY

## 2024-01-24 PROCEDURE — 74176 CT ABD & PELVIS W/O CONTRAST: CPT | Mod: TC

## 2024-01-26 ENCOUNTER — PATIENT MESSAGE (OUTPATIENT)
Dept: INTERNAL MEDICINE | Facility: CLINIC | Age: 49
End: 2024-01-26
Payer: COMMERCIAL

## 2024-01-26 RX ORDER — BENZONATATE 100 MG/1
100 CAPSULE ORAL 3 TIMES DAILY PRN
Qty: 30 CAPSULE | Refills: 0 | Status: SHIPPED | OUTPATIENT
Start: 2024-01-26 | End: 2024-02-05

## 2024-01-29 ENCOUNTER — TELEPHONE (OUTPATIENT)
Dept: SURGERY | Facility: CLINIC | Age: 49
End: 2024-01-29
Payer: COMMERCIAL

## 2024-02-07 ENCOUNTER — E-VISIT (OUTPATIENT)
Dept: FAMILY MEDICINE | Facility: CLINIC | Age: 49
End: 2024-02-07
Payer: COMMERCIAL

## 2024-02-07 DIAGNOSIS — N39.0 URINARY TRACT INFECTION WITHOUT HEMATURIA, SITE UNSPECIFIED: Primary | ICD-10-CM

## 2024-02-07 PROCEDURE — 99421 OL DIG E/M SVC 5-10 MIN: CPT | Mod: ,,, | Performed by: STUDENT IN AN ORGANIZED HEALTH CARE EDUCATION/TRAINING PROGRAM

## 2024-02-07 RX ORDER — NITROFURANTOIN 25; 75 MG/1; MG/1
100 CAPSULE ORAL 2 TIMES DAILY
Qty: 10 CAPSULE | Refills: 0 | Status: SHIPPED | OUTPATIENT
Start: 2024-02-07 | End: 2024-02-12

## 2024-02-07 NOTE — PROGRESS NOTES
Patient ID: Tasha Rush is a 48 y.o. female.    Chief Complaint: Urinary Tract Infection (Entered automatically based on patient selection in Patient Portal.)          274}  The patient initiated a request through [employer] on 2/7/2024 for evaluation and management with a chief complaint of Urinary Tract Infection (Entered automatically based on patient selection in Patient Portal.)     I evaluated the questionnaire submission on 02/07/2024 .    [facility] Peq Evisit Uti Questionnaire    2/7/2024 12:58 PM CST - Filed by Patient   Do you agree to participate in an E-Visit? Yes   If you have any of the following problems, please present to your local ER or call 911:  I acknowledge   What is the main issue that you would like for your doctor to address today? Frequent ufination , chills and sweats , cough  and body ache   Are you able to take your vital signs? No   Are you currently pregnant, could you be pregnant, or are you breast feeding? None of the above   What symptoms do you currently have? Change in urine appearance or smell   When did your symptoms first appear? 1/30/2024   List what you have done or taken to help your symptoms. Nyquil mucinex , dayquil , t pearls and promethizine   Please indicate whether you have had the following symptoms during the past 24 hours     Urgent urination (a sudden and uncontrollable urge to urinate) None   Frequent urination of small amounts of urine (going to the toilet very often) Severe   Burning pain when urinating None   Incomplete bladder emptying (still feel like you need to urinate again after urination) None   Pain not associated with urination in the lower abdomen below the belly button) Moderate   What does your urine look like? Clear   Blood seen in the urine None   Flank pain (pain in one or both sides of the lower back) None   Abnormal Vaginal Discharge (abnormal amount, color and/or odor) None   Discharge from the urethra (urinary opening) without urination None    High body temperature/fever? None-<99.5   Please rate how much discomfort you have experience because of the symptoms in the past 24 hours: Moderate   Please indicate how the symptoms have interfered with your every day activities/work in the past 24 hours: Severe   Please indicate how these symptoms have interfered with your social activities (visiting people, meeting with friends, etc.) in the past 24 hours? Severe   Are you a diabetic? No   Please indicate whether you have the following at the time of completion of this questionnaire: Signs of menopause syndrome (hot flashes)   Provide any information you feel is important to your history not asked above This  has been going on siince after anjali gonzalez the er  elizabeth taken meds it was leaving but  a week ago  my symtoms came back  with frequent urination chills and sweats head conjedtion and cough .   Please attach any relevant images or files (if you have performed a home test for UTI, please submit a photo of results)           Active Problem List with Overview Notes    Diagnosis Date Noted    History of hysterectomy 12/11/2023     Ovary sparing      Weakness 06/07/2022    Decreased range of motion 06/07/2022    Pain of upper abdomen 11/16/2021    Gastroesophageal reflux disease 10/25/2021    Recurrent UTI 11/18/2020    Nephrolithiasis 11/18/2020    Chronic left shoulder pain 11/04/2020    Other spondylosis, cervical region 11/03/2020    Neck pain 11/03/2020    DDD (degenerative disc disease), cervical 11/03/2020    Vitamin D deficiency 07/30/2019    Chronic pain disorder 08/26/2018    Atypical chest pain     Hypokalemia 02/16/2017    Aphasia 02/16/2017    Hyponatremia 02/15/2017    Headache 02/15/2017    Insomnia 01/21/2016    Tobacco abuse 01/21/2016    Benign essential hypertension 01/21/2016      Recent Labs Obtained:  Lab Results   Component Value Date    WBC 7.80 07/14/2023    HGB 13.2 07/14/2023    HCT 40.8 07/14/2023    MCV 94 07/14/2023    PLT  261 07/14/2023     12/11/2023    K 3.8 12/11/2023    GLU 93 12/11/2023    CREATININE 0.7 12/11/2023    EGFRNORACEVR >60 12/11/2023    HGBA1C 5.4 09/19/2022      Review of patient's allergies indicates:   Allergen Reactions    Ace inhibitors Other (See Comments)     cough    Dilaudid [hydromorphone]      SOB + anxiety     Will take if needed.     Can take oxycodone       Encounter Diagnosis   Name Primary?    Urinary tract infection without hematuria, site unspecified Yes        Orders Placed This Encounter   Procedures    Urinalysis, Reflex to Urine Culture Urine, Clean Catch     Standing Status:   Future     Standing Expiration Date:   4/7/2025     Order Specific Question:   Preferred Collection Type     Answer:   Urine, Clean Catch     Order Specific Question:   Specimen Source     Answer:   Urine      Medications Ordered This Encounter   Medications    nitrofurantoin, macrocrystal-monohydrate, (MACROBID) 100 MG capsule     Sig: Take 1 capsule (100 mg total) by mouth 2 (two) times daily. for 5 days     Dispense:  10 capsule     Refill:  0        E-Visit Time Tracking:    Day 1 Time (in minutes): 7    Total Time (in minutes): 7   This is the extent of this pleasant patient's concerns at this present time. She did not feel chest pain upon exertion. No unilateral leg swelling, calf tenderness, or calf pain.    274}

## 2024-02-08 ENCOUNTER — TELEPHONE (OUTPATIENT)
Dept: SURGERY | Facility: CLINIC | Age: 49
End: 2024-02-08
Payer: COMMERCIAL

## 2024-02-08 NOTE — TELEPHONE ENCOUNTER
Dr Perez spoke with patient regarding CT results. Patient tor return to clinic to schedule surgery.

## 2024-02-20 ENCOUNTER — PATIENT MESSAGE (OUTPATIENT)
Dept: ADMINISTRATIVE | Facility: HOSPITAL | Age: 49
End: 2024-02-20
Payer: COMMERCIAL

## 2024-03-20 ENCOUNTER — PATIENT MESSAGE (OUTPATIENT)
Dept: SURGERY | Facility: CLINIC | Age: 49
End: 2024-03-20
Payer: COMMERCIAL

## 2024-05-14 ENCOUNTER — OFFICE VISIT (OUTPATIENT)
Dept: OBSTETRICS AND GYNECOLOGY | Facility: CLINIC | Age: 49
End: 2024-05-14
Payer: COMMERCIAL

## 2024-05-14 VITALS — WEIGHT: 181.44 LBS | BODY MASS INDEX: 30.23 KG/M2 | HEIGHT: 65 IN

## 2024-05-14 DIAGNOSIS — N89.8 VAGINAL DISCHARGE: Primary | ICD-10-CM

## 2024-05-14 DIAGNOSIS — M54.9 BACK PAIN, UNSPECIFIED BACK LOCATION, UNSPECIFIED BACK PAIN LATERALITY, UNSPECIFIED CHRONICITY: ICD-10-CM

## 2024-05-14 PROCEDURE — 99213 OFFICE O/P EST LOW 20 MIN: CPT | Mod: S$GLB,,, | Performed by: ADVANCED PRACTICE MIDWIFE

## 2024-05-14 PROCEDURE — 3008F BODY MASS INDEX DOCD: CPT | Mod: CPTII,S$GLB,, | Performed by: ADVANCED PRACTICE MIDWIFE

## 2024-05-14 PROCEDURE — 87086 URINE CULTURE/COLONY COUNT: CPT | Performed by: ADVANCED PRACTICE MIDWIFE

## 2024-05-14 PROCEDURE — 99999 PR PBB SHADOW E&M-EST. PATIENT-LVL III: CPT | Mod: PBBFAC,,, | Performed by: ADVANCED PRACTICE MIDWIFE

## 2024-05-14 PROCEDURE — 1159F MED LIST DOCD IN RCRD: CPT | Mod: CPTII,S$GLB,, | Performed by: ADVANCED PRACTICE MIDWIFE

## 2024-05-14 PROCEDURE — 81514 NFCT DS BV&VAGINITIS DNA ALG: CPT | Performed by: ADVANCED PRACTICE MIDWIFE

## 2024-05-14 PROCEDURE — 87591 N.GONORRHOEAE DNA AMP PROB: CPT | Performed by: ADVANCED PRACTICE MIDWIFE

## 2024-05-14 NOTE — PROGRESS NOTES
Tasha Rush is a 48 y.o. female  presents [facility] with complaint of increased vaginal discharge. She also reports intermittent lower back apin and has concerns as to possible kidney infection.    ROS:  GENERAL: No fever, chills, fatigability or weight loss.  VULVAR: No pain, no lesions and no itching.  VAGINAL: No relaxation, no abnormal bleeding and no lesions.Reports increased vaginal discharge.  ABDOMEN: No abdominal pain. Denies nausea. Denies vomiting. No diarrhea. No constipation  URINARY: No incontinence, no nocturia, no frequency and no dysuria.    Review of patient's allergies indicates:   Allergen Reactions    Ace inhibitors Other (See Comments)     cough    Dilaudid [hydromorphone]      SOB + anxiety     Will take if needed.     Can take oxycodone       Current Outpatient Medications:     alclometasone (ACLOVATE) 0.05 % cream, AAA face bid prn redness or scaling or itching, Disp: 60 g, Rfl: 3    amLODIPine (NORVASC) 10 MG tablet, TAKE 1 TABLET BY MOUTH EVERY DAY, Disp: 90 tablet, Rfl: 3    cyanocobalamin (VITAMIN B-12) 1000 MCG tablet, Take 100 mcg by mouth once daily., Disp: , Rfl:     hydroCHLOROthiazide (HYDRODIURIL) 25 MG tablet, Take 1 tablet (25 mg total) by mouth once daily., Disp: 90 tablet, Rfl: 3    omeprazole (PRILOSEC) 20 MG capsule, Take 20 mg by mouth once daily., Disp: , Rfl:     rosuvastatin (CRESTOR) 5 MG tablet, Take 1 tablet (5 mg total) by mouth every evening., Disp: 90 tablet, Rfl: 3    Past Medical History:   Diagnosis Date    Abnormal Pap smear of cervix     Bacterial vaginosis     RECURRENT    Gastritis     Hypertension     Renal disorder     kidney stones     Past Surgical History:   Procedure Laterality Date    APPENDECTOMY      CERVICAL BIOPSY  W/ LOOP ELECTRODE EXCISION      CYSTOSCOPY W/ URETERAL STENT PLACEMENT  2020    Procedure: CYSTOSCOPY, WITH URETERAL STENT INSERTION;  Surgeon: Ej Mitchell MD;  Location: [facility];  Service: Urology;;  "   CYSTOTOMY FOR EXCISION OF BLADDER DIVERTICULUM N/A 2018    Procedure: CYSTOTOMY FOR BLADDER PERFORATION;  Surgeon: Hardy Bueno MD;  Location: [facility] OR;  Service: OB/GYN;  Laterality: N/A;    fibroid removal      HERNIA REPAIR  2009    umbilical    LAPAROSCOPIC TOTAL HYSTERECTOMY N/A 2018    converted to CALISTA    LASER LITHOTRIPSY  2020    Procedure: LITHOTRIPSY, USING LASER;  Surgeon: Ej Mtichell MD;  Location: [facility];  Service: Urology;;    MYOMECTOMY      TOTAL ABDOMINAL HYSTERECTOMY N/A 2018    CALISTA- AUB.  Hardy Bueno MD;     URETEROSCOPIC REMOVAL OF URETERIC CALCULUS Left 2020    Procedure: EXTRACTION-STONE-URETEROSCOPY;  Surgeon: Ej Mitchell MD;  Location: [facility];  Service: Urology;  Laterality: Left;     Social History     Tobacco Use    Smoking status: Former     Current packs/day: 0.25     Average packs/day: 0.3 packs/day for 1 year (0.3 ttl pk-yrs)     Types: Cigarettes    Smokeless tobacco: Never    Tobacco comments:     1 pack / 2 weeks. Quit completely in 2023   Substance Use Topics    Alcohol use: Yes     Alcohol/week: 0.0 standard drinks of alcohol     Comment: social    Drug use: No     OB History    Para Term  AB Living   0 0 0 0 0 0   SAB IAB Ectopic Multiple Live Births   0 0 0 0     Obstetric Comments   Denies STD       Ht 5' 5" (1.651 m)   Wt 82.3 kg (181 lb 7 oz)   LMP 2018   BMI 30.19 kg/m²     PHYSICAL EXAM:  GENERAL: Calm and appropriate affect, alert, oriented x4  SKIN: Color appropriate for race, warm and dry, clean and intact with no rashes.  RESP: Even, unlabored breathing  ABDOMEN: Soft, nontender, no masses.  :   Normal external female genitalia without lesions. Normal hair distribution. Adequate perineal body, normal urethral meatus.  Vagina pink and well rugated, no lesions, vaginal discharge - slight, no overt odor  No significant cystocele or rectocele.  Cervix -no cervical motion " tenderness.      ASSESSMENT / PLAN:    ICD-10-CM ICD-9-CM    1. Vaginal discharge  N89.8 623.5       2. Back pain, unspecified back location, unspecified back pain laterality, unspecified chronicity  M54.9 724.5         Vaginal discharge    Back pain, unspecified back location, unspecified back pain laterality, unspecified chronicity      Advised of negative urinalysis- advised will send urine culture as pt is requesting.      Patient was counseled today on vaginitis prevention including :  a. avoiding feminine products such as deoderant soaps, body wash, bubble bath, douches, scented toilet paper, deoderant tampons or pads, feminine wipes, chronic pad use, etc.  b. avoiding other vulvovaginal irritants such as long hot baths, humidity, tight, synthetic clothing, chlorine and sitting around in wet bathing suits  c. wearing cotton underwear, avoiding thong underwear and no underwear to bed  d. taking showers instead of baths and use a hair dryer on cool setting afterwards to dry  e. wearing cotton to exercise and shower immediately after exercise and change clothes  f. using polyurethane condoms without spermicide if sexually active and symptoms are triggered by intercourse  g. Discussed use of vagisil, along with repHresh and probiotics    FOLLOW UP:   Pending lab results, PRN lack of improvement.

## 2024-05-16 ENCOUNTER — OFFICE VISIT (OUTPATIENT)
Dept: DERMATOLOGY | Facility: CLINIC | Age: 49
End: 2024-05-16
Payer: COMMERCIAL

## 2024-05-16 DIAGNOSIS — L72.0 EPIDERMAL INCLUSION CYST: Primary | ICD-10-CM

## 2024-05-16 DIAGNOSIS — L21.9 SEBORRHEIC DERMATITIS: ICD-10-CM

## 2024-05-16 LAB
BACTERIA UR CULT: NORMAL
BACTERIAL VAGINOSIS DNA: NEGATIVE
CANDIDA GLABRATA DNA: NEGATIVE
CANDIDA KRUSEI DNA: NEGATIVE
CANDIDA RRNA VAG QL PROBE: NEGATIVE
T VAGINALIS RRNA GENITAL QL PROBE: NEGATIVE

## 2024-05-16 PROCEDURE — 1160F RVW MEDS BY RX/DR IN RCRD: CPT | Mod: CPTII,S$GLB,, | Performed by: DERMATOLOGY

## 2024-05-16 PROCEDURE — 99999 PR PBB SHADOW E&M-EST. PATIENT-LVL III: CPT | Mod: PBBFAC,,, | Performed by: DERMATOLOGY

## 2024-05-16 PROCEDURE — 1159F MED LIST DOCD IN RCRD: CPT | Mod: CPTII,S$GLB,, | Performed by: DERMATOLOGY

## 2024-05-16 PROCEDURE — 99213 OFFICE O/P EST LOW 20 MIN: CPT | Mod: S$GLB,,, | Performed by: DERMATOLOGY

## 2024-05-16 PROCEDURE — G2211 COMPLEX E/M VISIT ADD ON: HCPCS | Mod: S$GLB,,, | Performed by: DERMATOLOGY

## 2024-05-16 RX ORDER — KETOCONAZOLE 20 MG/G
CREAM TOPICAL 2 TIMES DAILY
Qty: 60 G | Refills: 5 | Status: SHIPPED | OUTPATIENT
Start: 2024-05-16

## 2024-05-16 NOTE — PROGRESS NOTES
Subjective:      Patient ID:  Tasha Rush is a 48 y.o. female who presents for   Chief Complaint   Patient presents with    Mass     R-cheek      Mass - Initial  Affected locations: R-cheek.  Duration: 3 months  Signs / symptoms: asymptomatic  Severity: mild  Timing: constant  Aggravated by: nothing  Relieving factors/Treatments tried: nothing  Improvement on treatment: no relief    Pt notes having the lesion lanced and drained in 2019 by [facility]     Patient with new are of concern:   Location: nose   Duration: 3 months, waxes and wanes in intensity   Symptoms: itchy    Previous treatments: none     Review of Systems   Constitutional:  Negative for fever, chills and fatigue.   HENT:  Negative for sore throat.    Respiratory:  Negative for cough.        Objective:   Physical Exam   Constitutional: She appears well-developed and well-nourished. No distress.   Neurological: She is alert and oriented to person, place, and time. She is not disoriented.   Psychiatric: She has a normal mood and affect.   Skin:   Areas Examined (abnormalities noted in diagram):   Head / Face Inspection Performed            Diagram Legend     Erythematous scaling macule/papule c/w actinic keratosis       Vascular papule c/w angioma      Pigmented verrucoid papule/plaque c/w seborrheic keratosis      Yellow umbilicated papule c/w sebaceous hyperplasia      Irregularly shaped tan macule c/w lentigo     1-2 mm smooth white papules consistent with Milia      Movable subcutaneous cyst with punctum c/w epidermal inclusion cyst      Subcutaneous movable cyst c/w pilar cyst      Firm pink to brown papule c/w dermatofibroma      Pedunculated fleshy papule(s) c/w skin tag(s)      Evenly pigmented macule c/w junctional nevus     Mildly variegated pigmented, slightly irregular-bordered macule c/w mildly atypical nevus      Flesh colored to evenly pigmented papule c/w intradermal nevus       Pink pearly papule/plaque c/w basal cell  carcinoma      Erythematous hyperkeratotic cursted plaque c/w SCC      Surgical scar with no sign of skin cancer recurrence      Open and closed comedones      Inflammatory papules and pustules      Verrucoid papule consistent consistent with wart     Erythematous eczematous patches and plaques     Dystrophic onycholytic nail with subungual debris c/w onychomycosis     Umbilicated papule    Erythematous-base heme-crusted tan verrucoid plaque consistent with inflamed seborrheic keratosis     Erythematous Silvery Scaling Plaque c/w Psoriasis     See annotation      Assessment / Plan:        Epidermal inclusion cyst  Discussed with patient the etiology and pathogenesis of the disease or skin lesion(s) and possible treatments and aggravators.    Chronic nature of this condition discussed with patient.  Had prev drainage with [facility].  Discussed with patient the problem of various physicians portraying themselves as dermatologists when they are not actually boarded in dermatology, which then calls into question any previously provided care.  Discussed with patient the likelihood that this lesion is an epidermal cyst caused by an involuted lining of skin with dead skin trapped inside.  Cysts do not have a malignant potential but can become infected and turn into abscesses with possible cellulitis.  Discussed the option of warm compresses if infected with oral antibiotics.  Discussed the option of surgical excision with scar, possible recurrence, hematoma, and infection.  Patient to consider these options.  Also option to see plastics for removal.  Patient to watch for recurrence or flares or worsening and to call the clinic for a follow up appointment for such.    Seborrheic dermatitis  Discussed with patient the etiology and pathogenesis of the disease or skin lesion(s) and possible treatments and aggravators.    Reviewed with patient different treatment options and associated risks.  Proper application of  medications and or care for affected area(s) and condition(s) reviewed.            ketoconazole (NIZORAL) 2 % cream         Apply topically 2 (two) times daily. Prn rash face, Starting Thu 5/16/2024, Normal       Follow up if symptoms worsen or fail to improve.

## 2024-05-16 NOTE — PATIENT INSTRUCTIONS
Discussed with patient the likelihood that this lesion is an epidermal cyst caused by an involuted lining of skin with dead skin trapped inside.  Cysts do not have a malignant potential but can become infected and turn into abscesses with possible cellulitis.  Discussed the option of warm compresses if infected with oral antibiotics.  Discussed the option of surgical excision with scar, possible recurrence, hematoma, and infection.  Patient to consider these options.    Seborrheic dermatitis is an ongoing (chronic) condition. It can go away and then come back. You will likely need to use shampoo, cream, or ointment with medicine once or twice a week. This can help to keep symptoms from coming back or getting worse.

## 2024-05-17 ENCOUNTER — PATIENT MESSAGE (OUTPATIENT)
Dept: OBSTETRICS AND GYNECOLOGY | Facility: CLINIC | Age: 49
End: 2024-05-17
Payer: COMMERCIAL

## 2024-05-18 LAB
C TRACH DNA SPEC QL NAA+PROBE: NOT DETECTED
N GONORRHOEA DNA SPEC QL NAA+PROBE: NOT DETECTED

## 2024-05-30 NOTE — PLAN OF CARE
Problem: Patient Care Overview  Goal: Plan of Care Review  Outcome: Ongoing (interventions implemented as appropriate)  VSS. NADN. Respirations even & unlabored. Patient encouraged to use her incentive spirometer as well as cough & deep breath. Patient is tolerating small amounts of food with no complaints of nausea & vomiting. Suppository administered to stimulate patient to have a bowel movement. 2 loose bowel movements with gas reported on this shift. Patient ambulated twice this shift in the hallways with another person. Order obtained for scheduled colace and prn simethicone. Colace scheduled to start tonight. Continuous fluids stopped as ordered. Patient encouraged to drink fluids. JORGE drain continues to put out serosanguinous fluid. Patient advised by MD that she will be discharged tomorrow with her sanchez in place. POC discussed with the patient, and the patient verbalized understanding.        89 yo female with PMH HTN, HLD, T2DM, TIA, CKD III, right hip IM nailing 11/25/23 presents to the ED for multiple falls and foul smelling urine. Daughter at bedside and provided collateral history. Pt states that she was diagnosed w/ UTI by her PCP x2 wks ago, had been taking cefuroxime for the past 2 weeks with no improvement in urinary symptoms. Pt noticed foul smelling urine this morning. Pt was hospitalized in November 2023 w/ UTI. Per daughter, pt w/ increased "confusion" which she describes as short-term memory loss -- daughter states this is typical for pt when she has a UTI. Additionally, pt reports several falls over the past week. Last Thursday, she dropped her walker on her L leg and subsequently noticed bruising and swelling. On Friday, she states that her walker tipped over, subsequently she fell on her R-side. She c/o R-sided abdominal pain which she attributes to her fall. CT HEAD: There is no acute intracranial hemorrhage or depressed calvarial fracture. Chronic findings as above. CT CERVICAL SPINE: No fracture or acute traumatic malalignment. Advanced multilevel degenerative changes of the cervical spine. CT Chest: Circumferential gallbladder wall thickening, Few bilateral pulmonary nodules. Pt admitted 5/29/24 with UTI.

## 2024-06-19 ENCOUNTER — PATIENT OUTREACH (OUTPATIENT)
Dept: ADMINISTRATIVE | Facility: HOSPITAL | Age: 49
End: 2024-06-19
Payer: COMMERCIAL

## 2024-06-19 NOTE — PROGRESS NOTES
Health Maintenance Due   Topic Date Due    Colorectal Cancer Screening  Never done    COVID-19 Vaccine (3 - 2023-24 season) 09/01/2023    Shingles Vaccine (1 of 2) Never done    Mammogram  06/06/2024    Health Maintenance reviewed, updated and links triggered. Mammogram and Colorectal screening due, Portal   Message sent for scheduling. (Fford) 6/19/24

## 2024-07-18 ENCOUNTER — PATIENT MESSAGE (OUTPATIENT)
Dept: ADMINISTRATIVE | Facility: HOSPITAL | Age: 49
End: 2024-07-18
Payer: COMMERCIAL

## 2024-07-22 ENCOUNTER — TELEPHONE (OUTPATIENT)
Dept: OBSTETRICS AND GYNECOLOGY | Facility: CLINIC | Age: 49
End: 2024-07-22
Payer: COMMERCIAL

## 2024-07-22 DIAGNOSIS — R30.0 DYSURIA: Primary | ICD-10-CM

## 2024-07-22 NOTE — TELEPHONE ENCOUNTER
----- Message from Emperatriz Rossi sent at 7/19/2024  4:03 PM CDT -----  Type:  Same Day Appointment Request     Caller is requesting a same day appointment.  Caller declined first available appointment listed below.     Name of Caller: DAX GAYLE [[id number]]    When is the first available appointment?     Reason for Visit: UTI check     Best Call Back Number: [phone]    Additional Information:

## 2024-07-24 ENCOUNTER — OFFICE VISIT (OUTPATIENT)
Dept: OBSTETRICS AND GYNECOLOGY | Facility: CLINIC | Age: 49
End: 2024-07-24
Payer: COMMERCIAL

## 2024-07-24 VITALS
BODY MASS INDEX: 29.72 KG/M2 | DIASTOLIC BLOOD PRESSURE: 79 MMHG | WEIGHT: 178.56 LBS | SYSTOLIC BLOOD PRESSURE: 117 MMHG

## 2024-07-24 DIAGNOSIS — N76.0 ACUTE VAGINITIS: ICD-10-CM

## 2024-07-24 DIAGNOSIS — R39.9 UTI SYMPTOMS: Primary | ICD-10-CM

## 2024-07-24 LAB
AMORPH CRY UR QL COMP ASSIST: ABNORMAL
BACTERIA #/AREA URNS AUTO: ABNORMAL /HPF
BILIRUB SERPL-MCNC: ABNORMAL MG/DL
BILIRUB UR QL STRIP: NEGATIVE
BLOOD URINE, POC: 250
CLARITY UR REFRACT.AUTO: ABNORMAL
CLARITY, POC UA: CLEAR
COLOR UR AUTO: YELLOW
COLOR, POC UA: YELLOW
GLUCOSE UR QL STRIP: NEGATIVE
GLUCOSE UR QL STRIP: NORMAL
HGB UR QL STRIP: ABNORMAL
KETONES UR QL STRIP: NEGATIVE
KETONES UR QL STRIP: NEGATIVE
LEUKOCYTE ESTERASE UR QL STRIP: NEGATIVE
LEUKOCYTE ESTERASE URINE, POC: NEGATIVE
MICROSCOPIC COMMENT: ABNORMAL
NITRITE UR QL STRIP: POSITIVE
NITRITE, POC UA: POSITIVE
PH UR STRIP: 6 [PH] (ref 5–8)
PH, POC UA: 5
PROT UR QL STRIP: ABNORMAL
PROTEIN, POC: ABNORMAL
RBC #/AREA URNS AUTO: 7 /HPF (ref 0–4)
SP GR UR STRIP: 1.03 (ref 1–1.03)
SPECIFIC GRAVITY, POC UA: 1.03
SQUAMOUS #/AREA URNS AUTO: 3 /HPF
URN SPEC COLLECT METH UR: ABNORMAL
UROBILINOGEN, POC UA: NORMAL
WBC #/AREA URNS AUTO: 5 /HPF (ref 0–5)

## 2024-07-24 PROCEDURE — 99999 PR PBB SHADOW E&M-EST. PATIENT-LVL III: CPT | Mod: PBBFAC,,,

## 2024-07-24 PROCEDURE — 3074F SYST BP LT 130 MM HG: CPT | Mod: CPTII,S$GLB,,

## 2024-07-24 PROCEDURE — 81514 NFCT DS BV&VAGINITIS DNA ALG: CPT

## 2024-07-24 PROCEDURE — 1159F MED LIST DOCD IN RCRD: CPT | Mod: CPTII,S$GLB,,

## 2024-07-24 PROCEDURE — 81002 URINALYSIS NONAUTO W/O SCOPE: CPT | Mod: S$GLB,,,

## 2024-07-24 PROCEDURE — 81001 URINALYSIS AUTO W/SCOPE: CPT

## 2024-07-24 PROCEDURE — 3008F BODY MASS INDEX DOCD: CPT | Mod: CPTII,S$GLB,,

## 2024-07-24 PROCEDURE — 99214 OFFICE O/P EST MOD 30 MIN: CPT | Mod: S$GLB,,,

## 2024-07-24 PROCEDURE — 3078F DIAST BP <80 MM HG: CPT | Mod: CPTII,S$GLB,,

## 2024-07-24 RX ORDER — SULFAMETHOXAZOLE AND TRIMETHOPRIM 800; 160 MG/1; MG/1
1 TABLET ORAL 2 TIMES DAILY
Qty: 6 TABLET | Refills: 0 | Status: SHIPPED | OUTPATIENT
Start: 2024-07-24 | End: 2024-07-27

## 2024-07-24 NOTE — PROGRESS NOTES
Past medical, surgical, social, family, and obstetric histories; medications; prior records and results; and available outside records were reviewed and updated in the EMR.  Pertinent findings were noted below.    Reason for Visit   Urinary Tract Infection and Vaginal Pain    HPI   48 y.o. female  who presents for dysuria, urinary urgency, and frequency. The patient endorses foul urine odor and back pain. She reports she has vaginal pruritus but denies discharge. She is sexually active with one partner and has no concern for STD infection at this time.    Patient's last menstrual period was 2018.    Contraception: s/p hysterectomy > 5 years ago for AUB  Pap: 10/20/2015    Exam   /79 (BP Location: Left arm, Patient Position: Sitting)   Wt 81 kg (178 lb 9.2 oz)   LMP 2018   BMI 29.72 kg/m²     Physical Exam  Constitutional:       Appearance: Normal appearance.     Genitourinary:    Vulva, uterus, right adnexa and left adnexa normal.   There is no tenderness on the right labia. There is no tenderness on the left labia. There is no rash and tenderness on the right vulva.There is no rash and tenderness on the left vulva.There is vaginal discharge (thin, white) in the vagina. Cervix is normal. HENT:      Head: Atraumatic.      Nose: Nose normal.   Eyes:      Extraocular Movements: Extraocular movements intact.   Pulmonary:      Effort: Pulmonary effort is normal. No respiratory distress.   Abdominal:      General: There is no distension.      Tenderness: There is no abdominal tenderness. There is no guarding or rebound.   Musculoskeletal:         General: Normal range of motion.   Neurological:      Mental Status: She is alert and oriented to person, place, and time.   Psychiatric:         Mood and Affect: Mood normal.         Thought Content: Thought content normal.       Assessment and Plan   UTI symptoms  -     POCT urine dipstick without microscope  -     Urinalysis, Reflex to Urine Culture  Urine, Clean Catch  -     sulfamethoxazole-trimethoprim 800-160mg (BACTRIM DS) 800-160 mg Tab; Take 1 tablet by mouth 2 (two) times daily. for 3 days  Dispense: 6 tablet; Refill: 0    Acute vaginitis  -     Vaginosis Screen by DNA Probe    Urine dipstick + nitrites and trace leukocytes  Will treat with TMP-SMX as ordered.  Will f/u with vaginal swab results.    Storm Cardoso MD PGY-3  Obstetrics and Gynecology

## 2024-07-26 DIAGNOSIS — N95.8 GENITOURINARY SYNDROME OF MENOPAUSE: ICD-10-CM

## 2024-07-26 DIAGNOSIS — N95.1 VAGINAL DRYNESS, MENOPAUSAL: Primary | ICD-10-CM

## 2024-08-06 DIAGNOSIS — R39.9 UTI SYMPTOMS: ICD-10-CM

## 2024-08-19 ENCOUNTER — PATIENT MESSAGE (OUTPATIENT)
Dept: ADMINISTRATIVE | Facility: HOSPITAL | Age: 49
End: 2024-08-19
Payer: COMMERCIAL

## 2024-08-19 RX ORDER — SULFAMETHOXAZOLE AND TRIMETHOPRIM 800; 160 MG/1; MG/1
1 TABLET ORAL 2 TIMES DAILY
Qty: 6 TABLET | Refills: 0 | Status: SHIPPED | OUTPATIENT
Start: 2024-08-19 | End: 2024-08-22

## 2024-08-20 ENCOUNTER — PATIENT OUTREACH (OUTPATIENT)
Dept: ADMINISTRATIVE | Facility: HOSPITAL | Age: 49
End: 2024-08-20
Payer: COMMERCIAL

## 2024-08-20 DIAGNOSIS — Z12.31 ENCOUNTER FOR SCREENING MAMMOGRAM FOR BREAST CANCER: Primary | ICD-10-CM

## 2024-08-21 ENCOUNTER — PATIENT OUTREACH (OUTPATIENT)
Dept: ADMINISTRATIVE | Facility: HOSPITAL | Age: 49
End: 2024-08-21
Payer: COMMERCIAL

## 2024-08-21 DIAGNOSIS — Z12.12 SCREENING FOR COLORECTAL CANCER: Primary | ICD-10-CM

## 2024-08-21 DIAGNOSIS — Z12.11 SCREENING FOR COLORECTAL CANCER: Primary | ICD-10-CM

## 2024-09-05 DIAGNOSIS — I10 HYPERTENSION, ESSENTIAL: ICD-10-CM

## 2024-09-05 NOTE — TELEPHONE ENCOUNTER
No care due was identified.  [facility] Embedded Care Due Messages. Reference number: [id number].   9/05/2024 11:19:02 AM CDT

## 2024-09-05 NOTE — TELEPHONE ENCOUNTER
Refill Routing Note   Medication(s) are not appropriate for processing by [facility] for the following reason(s):        Drug-disease interaction    [facility] action(s):  Defer        Medication Therapy Plan: hydroCHLOROthiazide and Hyponatremia    Pharmacist review requested: Yes     Appointments  past 12m or future 3m with PCP    Date Provider   Last Visit   1/17/2024 Speedy Cavanaugh MD   Next Visit   Visit date not found Speedy Cavanaugh MD   ED visits in past 90 days: 0        Note composed:3:02 PM 09/05/2024

## 2024-09-05 NOTE — TELEPHONE ENCOUNTER
Refill Routing Note   Medication(s) are not appropriate for processing by [facility] for the following reason(s):        Due for refill >6 months ago: last dispensed 10/25/23 for 90 day supply per Epic data     [facility] action(s):  Defer             Pharmacist review requested: Yes     Appointments  past 12m or future 3m with PCP    Date Provider   Last Visit   1/17/2024 Speedy Cavanaugh MD   Next Visit   Visit date not found Speedy Cavanaugh MD   ED visits in past 90 days: 0        Note composed:5:36 PM 09/05/2024

## 2024-09-06 RX ORDER — HYDROCHLOROTHIAZIDE 25 MG/1
25 TABLET ORAL DAILY
Qty: 90 TABLET | Refills: 1 | Status: SHIPPED | OUTPATIENT
Start: 2024-09-06

## 2024-09-26 ENCOUNTER — HOSPITAL ENCOUNTER (OUTPATIENT)
Dept: RADIOLOGY | Facility: OTHER | Age: 49
Discharge: HOME | End: 2024-09-26
Attending: FAMILY MEDICINE
Payer: COMMERCIAL

## 2024-09-26 DIAGNOSIS — Z12.31 ENCOUNTER FOR SCREENING MAMMOGRAM FOR BREAST CANCER: ICD-10-CM

## 2024-10-09 ENCOUNTER — OFFICE VISIT (OUTPATIENT)
Dept: SURGERY | Facility: CLINIC | Age: 49
End: 2024-10-09
Attending: SPECIALIST
Payer: COMMERCIAL

## 2024-10-09 ENCOUNTER — HOSPITAL ENCOUNTER (OUTPATIENT)
Dept: RADIOLOGY | Facility: OTHER | Age: 49
Discharge: HOME | End: 2024-10-09
Attending: FAMILY MEDICINE
Payer: COMMERCIAL

## 2024-10-09 VITALS
HEIGHT: 65 IN | BODY MASS INDEX: 29.99 KG/M2 | HEART RATE: 72 BPM | SYSTOLIC BLOOD PRESSURE: 126 MMHG | WEIGHT: 180 LBS | DIASTOLIC BLOOD PRESSURE: 77 MMHG | OXYGEN SATURATION: 98 %

## 2024-10-09 DIAGNOSIS — R22.2 ABDOMINAL WALL MASS: Primary | ICD-10-CM

## 2024-10-09 PROCEDURE — 99212 OFFICE O/P EST SF 10 MIN: CPT | Mod: S$GLB,,, | Performed by: SPECIALIST

## 2024-10-09 PROCEDURE — 77063 BREAST TOMOSYNTHESIS BI: CPT | Mod: TC

## 2024-10-09 PROCEDURE — 1160F RVW MEDS BY RX/DR IN RCRD: CPT | Mod: CPTII,S$GLB,, | Performed by: SPECIALIST

## 2024-10-09 PROCEDURE — 3074F SYST BP LT 130 MM HG: CPT | Mod: CPTII,S$GLB,, | Performed by: SPECIALIST

## 2024-10-09 PROCEDURE — 77067 SCR MAMMO BI INCL CAD: CPT | Mod: TC

## 2024-10-09 PROCEDURE — 3008F BODY MASS INDEX DOCD: CPT | Mod: CPTII,S$GLB,, | Performed by: SPECIALIST

## 2024-10-09 PROCEDURE — 1159F MED LIST DOCD IN RCRD: CPT | Mod: CPTII,S$GLB,, | Performed by: SPECIALIST

## 2024-10-09 PROCEDURE — 99999 PR PBB SHADOW E&M-EST. PATIENT-LVL IV: CPT | Mod: PBBFAC,,, | Performed by: SPECIALIST

## 2024-10-09 PROCEDURE — 3078F DIAST BP <80 MM HG: CPT | Mod: CPTII,S$GLB,, | Performed by: SPECIALIST

## 2024-10-09 NOTE — PROGRESS NOTES
48-year-old female with a history of liposuction and previous abdominal surgery through upper midline incision.  Patient now with complaint of abdominal wall mass    PE   Abdomen-soft, upper midline incision, diastasis recti.  Irregular contour below incision, but no definitive fascial defect or hernia appreciated.  Incarcerated umbilical hernia    Imaging   CT scan of abdomen in January of this year showed periumbilical hernia, no evidence of upper abdominal wall    Plan   Provoked ultrasound of abdominal wall
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
## 2024-11-08 ENCOUNTER — TELEPHONE (OUTPATIENT)
Dept: ENDOSCOPY | Facility: HOSPITAL | Age: 49
End: 2024-11-08

## 2024-11-08 ENCOUNTER — CLINICAL SUPPORT (OUTPATIENT)
Dept: ENDOSCOPY | Facility: HOSPITAL | Age: 49
End: 2024-11-08
Attending: FAMILY MEDICINE
Payer: COMMERCIAL

## 2024-11-08 DIAGNOSIS — Z12.11 SCREENING FOR COLORECTAL CANCER: ICD-10-CM

## 2024-11-08 DIAGNOSIS — Z12.12 SCREENING FOR COLORECTAL CANCER: ICD-10-CM

## 2024-11-08 NOTE — TELEPHONE ENCOUNTER
Attempted to contact patient for PAT appointment to schedule colonoscopy. Left voicemail message and sent message via portal to call Endoscopy Scheduling at # [phone].

## 2024-11-08 NOTE — PLAN OF CARE
We attempted to reach you for your scheduled PAT appointment to schedule your colonoscopy. Left voicemail message. Please contact Endoscopy Scheduling at # [phone].

## 2025-01-06 ENCOUNTER — LAB VISIT (OUTPATIENT)
Dept: LAB | Facility: OTHER | Age: 50
End: 2025-01-06
Attending: FAMILY MEDICINE
Payer: COMMERCIAL

## 2025-01-06 ENCOUNTER — OFFICE VISIT (OUTPATIENT)
Dept: INTERNAL MEDICINE | Facility: CLINIC | Age: 50
End: 2025-01-06
Attending: FAMILY MEDICINE
Payer: COMMERCIAL

## 2025-01-06 VITALS
SYSTOLIC BLOOD PRESSURE: 118 MMHG | WEIGHT: 183.44 LBS | HEART RATE: 83 BPM | BODY MASS INDEX: 30.56 KG/M2 | DIASTOLIC BLOOD PRESSURE: 80 MMHG | OXYGEN SATURATION: 98 % | HEIGHT: 65 IN

## 2025-01-06 DIAGNOSIS — Z12.11 ENCOUNTER FOR SCREENING COLONOSCOPY: ICD-10-CM

## 2025-01-06 DIAGNOSIS — E87.6 HYPOKALEMIA: ICD-10-CM

## 2025-01-06 DIAGNOSIS — Z00.00 PREVENTATIVE HEALTH CARE: Primary | ICD-10-CM

## 2025-01-06 DIAGNOSIS — I10 BENIGN ESSENTIAL HYPERTENSION: ICD-10-CM

## 2025-01-06 DIAGNOSIS — I10 HYPERTENSION, ESSENTIAL: ICD-10-CM

## 2025-01-06 DIAGNOSIS — K21.9 GASTROESOPHAGEAL REFLUX DISEASE WITHOUT ESOPHAGITIS: ICD-10-CM

## 2025-01-06 LAB
ALBUMIN SERPL BCP-MCNC: 4.2 G/DL (ref 3.5–5.2)
ALP SERPL-CCNC: 130 U/L (ref 40–150)
ALT SERPL W/O P-5'-P-CCNC: 38 U/L (ref 10–44)
ANION GAP SERPL CALC-SCNC: 13 MMOL/L (ref 8–16)
AST SERPL-CCNC: 35 U/L (ref 10–40)
BILIRUB SERPL-MCNC: 0.5 MG/DL (ref 0.1–1)
BUN SERPL-MCNC: 18 MG/DL (ref 6–20)
CALCIUM SERPL-MCNC: 9.6 MG/DL (ref 8.7–10.5)
CHLORIDE SERPL-SCNC: 105 MMOL/L (ref 95–110)
CHOLEST SERPL-MCNC: 159 MG/DL (ref 120–199)
CHOLEST/HDLC SERPL: 3.4 {RATIO} (ref 2–5)
CO2 SERPL-SCNC: 22 MMOL/L (ref 23–29)
CREAT SERPL-MCNC: 0.8 MG/DL (ref 0.5–1.4)
EST. GFR  (NO RACE VARIABLE): >60 ML/MIN/1.73 M^2
ESTIMATED AVG GLUCOSE: 108 MG/DL (ref 68–131)
GLUCOSE SERPL-MCNC: 119 MG/DL (ref 70–110)
HBA1C MFR BLD: 5.4 % (ref 4–5.6)
HDLC SERPL-MCNC: 47 MG/DL (ref 40–75)
HDLC SERPL: 29.6 % (ref 20–50)
LDLC SERPL CALC-MCNC: 85 MG/DL (ref 63–159)
NONHDLC SERPL-MCNC: 112 MG/DL
POTASSIUM SERPL-SCNC: 3.7 MMOL/L (ref 3.5–5.1)
PROT SERPL-MCNC: 7.5 G/DL (ref 6–8.4)
SODIUM SERPL-SCNC: 140 MMOL/L (ref 136–145)
TRIGL SERPL-MCNC: 135 MG/DL (ref 30–150)

## 2025-01-06 PROCEDURE — 80053 COMPREHEN METABOLIC PANEL: CPT | Performed by: FAMILY MEDICINE

## 2025-01-06 PROCEDURE — 3079F DIAST BP 80-89 MM HG: CPT | Mod: CPTII,S$GLB,, | Performed by: FAMILY MEDICINE

## 2025-01-06 PROCEDURE — 1159F MED LIST DOCD IN RCRD: CPT | Mod: CPTII,S$GLB,, | Performed by: FAMILY MEDICINE

## 2025-01-06 PROCEDURE — 3008F BODY MASS INDEX DOCD: CPT | Mod: CPTII,S$GLB,, | Performed by: FAMILY MEDICINE

## 2025-01-06 PROCEDURE — 83036 HEMOGLOBIN GLYCOSYLATED A1C: CPT | Performed by: FAMILY MEDICINE

## 2025-01-06 PROCEDURE — 1160F RVW MEDS BY RX/DR IN RCRD: CPT | Mod: CPTII,S$GLB,, | Performed by: FAMILY MEDICINE

## 2025-01-06 PROCEDURE — 80061 LIPID PANEL: CPT | Performed by: FAMILY MEDICINE

## 2025-01-06 PROCEDURE — 99396 PREV VISIT EST AGE 40-64: CPT | Mod: S$GLB,,, | Performed by: FAMILY MEDICINE

## 2025-01-06 PROCEDURE — 36415 COLL VENOUS BLD VENIPUNCTURE: CPT | Performed by: FAMILY MEDICINE

## 2025-01-06 PROCEDURE — 99999 PR PBB SHADOW E&M-EST. PATIENT-LVL III: CPT | Mod: PBBFAC,,, | Performed by: FAMILY MEDICINE

## 2025-01-06 PROCEDURE — 3074F SYST BP LT 130 MM HG: CPT | Mod: CPTII,S$GLB,, | Performed by: FAMILY MEDICINE

## 2025-01-06 RX ORDER — OMEPRAZOLE 20 MG/1
20 CAPSULE, DELAYED RELEASE ORAL DAILY
Qty: 30 CAPSULE | Refills: 1 | Status: SHIPPED | OUTPATIENT
Start: 2025-01-06

## 2025-01-06 RX ORDER — AMLODIPINE BESYLATE 10 MG/1
TABLET ORAL
Qty: 90 TABLET | Refills: 3 | Status: SHIPPED | OUTPATIENT
Start: 2025-01-06

## 2025-01-06 RX ORDER — HYDROCHLOROTHIAZIDE 25 MG/1
25 TABLET ORAL DAILY
Qty: 90 TABLET | Refills: 1 | Status: SHIPPED | OUTPATIENT
Start: 2025-01-06

## 2025-01-06 RX ORDER — ROSUVASTATIN CALCIUM 5 MG/1
5 TABLET, COATED ORAL NIGHTLY
Qty: 90 TABLET | Refills: 3 | Status: SHIPPED | OUTPATIENT
Start: 2025-01-06 | End: 2026-01-06

## 2025-01-06 NOTE — PROGRESS NOTES
"CHIEF COMPLAINT:  Annual    HISTORY OF PRESENT ILLNESS: The patient is a generally healthy 49 year-old BF.  The patient is left handed.      Sees URO for frequent UTIs.  It has been a while since the patient has been seen.      The patient has a history of stable hypertension on current medications.  Patient denies chest pain or shortness of breath today.    REVIEW OF SYSTEMS:  GENERAL: No fever, chills, fatigability or weight loss.  SKIN: No rashes, itching or changes in color or texture of skin.  HEAD: No headaches or recent head trauma.  EYES: Visual acuity fine. No photophobia, ocular pain or diplopia.  EARS: Denies ear pain, discharge or vertigo.  NOSE: No loss of smell, no epistaxis or postnasal drip.  MOUTH & THROAT: No hoarseness or change in voice. No excessive gum bleeding.  NODES: Denies swollen glands.  CHEST: Denies GUTIERREZ, cyanosis, wheezing, cough and sputum production.  CARDIOVASCULAR: Denies chest pain, PND, orthopnea or reduced exercise tolerance.  ABDOMEN: Appetite fine. No weight loss. Denies diarrhea, abdominal pain, hematemesis or blood in stool.  URINARY: No flank pain, dysuria or hematuria.  PERIPHERAL VASCULAR: No claudication or cyanosis.  MUSCULOSKELETAL: No joint stiffness or swelling. Denies back pain.  NEUROLOGIC: No history of seizures, paralysis, alteration of gait or coordination.    SOCIAL HISTORY: The patient does smoke.  The patient consumes alcohol socially.  The patient is single.    PHYSICAL EXAMINATION:   Blood pressure 118/80, pulse 83, height 5' 5" (1.651 m), weight 83.2 kg (183 lb 6.8 oz), last menstrual period 08/14/2018, SpO2 98%.    APPEARANCE: Well nourished, well developed, in no acute distress.    HEAD: Normocephalic, atraumatic.  EYES: PERRL. EOMI.  Conjunctivae without injection and  anicteric  NOSE: Mucosa pink. Airway clear.  MOUTH & THROAT: No tonsillar enlargement. No pharyngeal erythema or exudate. No stridor.  NECK: Supple.   NODES: No cervical, axillary or " inguinal lymph node enlargement.  CHEST: Lungs clear to auscultation.  No retractions are noted.  No rales or rhonchi are present.  CARDIOVASCULAR: Normal S1, S2. No rubs, murmurs or gallops.  ABDOMEN: Bowel sounds normal. Not distended. Soft. No tenderness or masses.  No ascites is noted.  MUSCULOSKELETAL:  There is no clubbing, cyanosis, or edema of the extremities x4.  There is full range of motion of the lumbar spine.  There is full range of motion of the extremities x4.  There is no deformity noted.    NEUROLOGIC:       Normal speech development.      Hearing normal.      Normal gait.      Motor and sensory exams grossly normal.  PSYCHIATRIC: Patient is alert and oriented x3.  Thought processes are all normal.  There is no homicidality.  There is no suicidality.  There is no evidence of psychosis.    LABORATORY/RADIOLOGY:   Chart reviewed.      ASSESSMENT:   Annual  Sees URO for frequent UTI  HTN    PLAN:  We will follow-up blood work which we expect to be normal.    Return to clinic in one year.

## 2025-01-15 ENCOUNTER — TELEPHONE (OUTPATIENT)
Dept: OBSTETRICS AND GYNECOLOGY | Facility: CLINIC | Age: 50
End: 2025-01-15
Payer: COMMERCIAL

## 2025-01-15 ENCOUNTER — PATIENT MESSAGE (OUTPATIENT)
Dept: OBSTETRICS AND GYNECOLOGY | Facility: CLINIC | Age: 50
End: 2025-01-15

## 2025-01-15 ENCOUNTER — OFFICE VISIT (OUTPATIENT)
Dept: OBSTETRICS AND GYNECOLOGY | Facility: CLINIC | Age: 50
End: 2025-01-15
Payer: COMMERCIAL

## 2025-01-15 VITALS
BODY MASS INDEX: 30.19 KG/M2 | WEIGHT: 181.19 LBS | SYSTOLIC BLOOD PRESSURE: 114 MMHG | HEIGHT: 65 IN | DIASTOLIC BLOOD PRESSURE: 80 MMHG

## 2025-01-15 DIAGNOSIS — Z12.31 BREAST CANCER SCREENING BY MAMMOGRAM: ICD-10-CM

## 2025-01-15 DIAGNOSIS — N30.00 ACUTE CYSTITIS WITHOUT HEMATURIA: ICD-10-CM

## 2025-01-15 DIAGNOSIS — N95.8 GENITOURINARY SYNDROME OF MENOPAUSE: ICD-10-CM

## 2025-01-15 DIAGNOSIS — Z12.4 PAP SMEAR FOR CERVICAL CANCER SCREENING: ICD-10-CM

## 2025-01-15 DIAGNOSIS — Z01.419 ENCOUNTER FOR WELL WOMAN EXAM: Primary | ICD-10-CM

## 2025-01-15 LAB
BILIRUB UR QL STRIP: NEGATIVE
GLUCOSE UR QL STRIP: NEGATIVE
KETONES UR QL STRIP: NEGATIVE
LEUKOCYTE ESTERASE UR QL STRIP: NEGATIVE
PH, POC UA: 5
POC BLOOD, URINE: NEGATIVE
POC NITRATES, URINE: POSITIVE
PROT UR QL STRIP: POSITIVE
SP GR UR STRIP: 1.02 (ref 1–1.03)
UROBILINOGEN UR STRIP-ACNC: NORMAL (ref 0.1–1.1)

## 2025-01-15 PROCEDURE — 3008F BODY MASS INDEX DOCD: CPT | Mod: CPTII,S$GLB,, | Performed by: FAMILY MEDICINE

## 2025-01-15 PROCEDURE — 87186 SC STD MICRODIL/AGAR DIL: CPT | Performed by: FAMILY MEDICINE

## 2025-01-15 PROCEDURE — 81003 URINALYSIS AUTO W/O SCOPE: CPT | Mod: QW,S$GLB,, | Performed by: FAMILY MEDICINE

## 2025-01-15 PROCEDURE — 1160F RVW MEDS BY RX/DR IN RCRD: CPT | Mod: CPTII,S$GLB,, | Performed by: FAMILY MEDICINE

## 2025-01-15 PROCEDURE — 88175 CYTOPATH C/V AUTO FLUID REDO: CPT | Performed by: FAMILY MEDICINE

## 2025-01-15 PROCEDURE — 99999 PR PBB SHADOW E&M-EST. PATIENT-LVL III: CPT | Mod: PBBFAC,,, | Performed by: FAMILY MEDICINE

## 2025-01-15 PROCEDURE — 87086 URINE CULTURE/COLONY COUNT: CPT | Performed by: FAMILY MEDICINE

## 2025-01-15 PROCEDURE — 3074F SYST BP LT 130 MM HG: CPT | Mod: CPTII,S$GLB,, | Performed by: FAMILY MEDICINE

## 2025-01-15 PROCEDURE — 1159F MED LIST DOCD IN RCRD: CPT | Mod: CPTII,S$GLB,, | Performed by: FAMILY MEDICINE

## 2025-01-15 PROCEDURE — 3079F DIAST BP 80-89 MM HG: CPT | Mod: CPTII,S$GLB,, | Performed by: FAMILY MEDICINE

## 2025-01-15 PROCEDURE — 87088 URINE BACTERIA CULTURE: CPT | Performed by: FAMILY MEDICINE

## 2025-01-15 PROCEDURE — 3044F HG A1C LEVEL LT 7.0%: CPT | Mod: CPTII,S$GLB,, | Performed by: FAMILY MEDICINE

## 2025-01-15 PROCEDURE — 87624 HPV HI-RISK TYP POOLED RSLT: CPT | Performed by: FAMILY MEDICINE

## 2025-01-15 PROCEDURE — 99396 PREV VISIT EST AGE 40-64: CPT | Mod: S$GLB,,, | Performed by: FAMILY MEDICINE

## 2025-01-15 RX ORDER — NITROFURANTOIN 25; 75 MG/1; MG/1
100 CAPSULE ORAL 2 TIMES DAILY
Qty: 10 CAPSULE | Refills: 0 | Status: SHIPPED | OUTPATIENT
Start: 2025-01-15 | End: 2025-01-20

## 2025-01-15 RX ORDER — ESTRADIOL 0.1 MG/G
CREAM VAGINAL
Qty: 42.5 G | Refills: 6 | Status: SHIPPED | OUTPATIENT
Start: 2025-01-15

## 2025-01-15 NOTE — PROGRESS NOTES
HISTORY OF PRESENT ILLNESS:    Tasha Rush is a 49 y.o. female, , Patient's last menstrual period was 2018.,  presents for a routine annual exam .   Last pap:  na CALISTA for AUB- LEEP in 2016, pt unsure where. Will try to remember so records can be requested  Last mammogram: 10/24 NL TC 7%  Last colon ca screening:  has cologuard kit to do   SA: male partner, does not use condoms  Contraception: status post hysterectomy.    Sexually transmitted infection risk: very low risk of STD exposure.   Menstrual flow: no vb or pelvic pain  -having dysuria, odor to urine. No hematuria, fever, unusual vd/itching/odor  This is the extent of the patient's complaints at this time.     Past Medical History:   Diagnosis Date    Abnormal Pap smear of cervix     Bacterial vaginosis     RECURRENT    Gastritis     Hypertension     Renal disorder     kidney stones       Past Surgical History:   Procedure Laterality Date    APPENDECTOMY      CERVICAL BIOPSY  W/ LOOP ELECTRODE EXCISION      CYSTOSCOPY W/ URETERAL STENT PLACEMENT  2020    Procedure: CYSTOSCOPY, WITH URETERAL STENT INSERTION;  Surgeon: Ej Mitchell MD;  Location: [facility];  Service: Urology;;    CYSTOTOMY FOR EXCISION OF BLADDER DIVERTICULUM N/A 2018    Procedure: CYSTOTOMY FOR BLADDER PERFORATION;  Surgeon: Hardy Bueno MD;  Location: [facility] OR;  Service: OB/GYN;  Laterality: N/A;    fibroid removal      HERNIA REPAIR  2009    umbilical    LAPAROSCOPIC TOTAL HYSTERECTOMY N/A 2018    converted to CALISTA    LASER LITHOTRIPSY  2020    Procedure: LITHOTRIPSY, USING LASER;  Surgeon: Ej Mitchell MD;  Location: [facility];  Service: Urology;;    MYOMECTOMY      TOTAL ABDOMINAL HYSTERECTOMY N/A 2018    CALISTA- AUB.  Hardy Bueno MD;     URETEROSCOPIC REMOVAL OF URETERIC CALCULUS Left 2020    Procedure: EXTRACTION-STONE-URETEROSCOPY;  Surgeon: Ej Mitchell MD;  Location: [facility];  Service: Urology;  Laterality: Left;        MEDICATIONS AND ALLERGIES:      Current Outpatient Medications:     amLODIPine (NORVASC) 10 MG tablet, TAKE 1 TABLET BY MOUTH EVERY DAY, Disp: 90 tablet, Rfl: 3    hydroCHLOROthiazide (HYDRODIURIL) 25 MG tablet, Take 1 tablet (25 mg total) by mouth once daily., Disp: 90 tablet, Rfl: 1    omeprazole (PRILOSEC) 20 MG capsule, Take 1 capsule (20 mg total) by mouth once daily., Disp: 30 capsule, Rfl: 1    rosuvastatin (CRESTOR) 5 MG tablet, Take 1 tablet (5 mg total) by mouth every evening., Disp: 90 tablet, Rfl: 3    estradioL (ESTRACE) 0.01 % (0.1 mg/gram) vaginal cream, 0.5 g of cream intravaginally administered nightly for 2 weeks, then reduce to twice weekly, Disp: 42.5 g, Rfl: 6    nitrofurantoin, macrocrystal-monohydrate, (MACROBID) 100 MG capsule, Take 1 capsule (100 mg total) by mouth 2 (two) times daily. for 5 days, Disp: 10 capsule, Rfl: 0    Review of patient's allergies indicates:   Allergen Reactions    Ace inhibitors Other (See Comments)     cough    Dilaudid [hydromorphone]      SOB + anxiety     Will take if needed.     Can take oxycodone       Family History   Problem Relation Name Age of Onset    Diabetes Mother Janny malave     Multiple sclerosis Mother Janny malave     Diabetes Father Maycol malave     Hyperlipidemia Father Maycol malave     No Known Problems Brother      Colon cancer Paternal Uncle      Heart disease Maternal Grandmother      Breast cancer Neg Hx      Ovarian cancer Neg Hx      Cervical cancer Neg Hx      Uterine cancer Neg Hx      Melanoma Neg Hx         Social History     Socioeconomic History    Marital status: Single   Occupational History    Occupation: Medical asst at [employer]   Tobacco Use    Smoking status: Some Days     Current packs/day: 0.25     Average packs/day: 0.3 packs/day for 1 year (0.3 ttl pk-yrs)     Types: Cigarettes    Smokeless tobacco: Never    Tobacco comments:     1 pack / 2 weeks. Quit completely in April 2023   Substance and Sexual Activity    Alcohol  "use: Yes     Alcohol/week: 0.0 standard drinks of alcohol     Comment: social    Drug use: No    Sexual activity: Yes     Partners: Male     Birth control/protection: See Surgical Hx     Comment: Hysterectomy   Social History Narrative    Single.  Lives w/adopted 5 yr old daughter.  Pt lives next door to her own parents.         OB History    Para Term  AB Living   0 0 0 0 0 0   SAB IAB Ectopic Multiple Live Births   0 0 0 0     Obstetric Comments   Denies STD          COMPREHENSIVE GYN HISTORY:  PAP History: + abnormal Paps.  Infection History: no STDs. Denies PID.  Benign History: + uterine fibroids. Denies ovarian cysts. Denies endometriosis. Denies other conditions.  Cancer History: Denies cervical cancer. Denies uterine cancer or hyperplasia. Denies ovarian cancer. Denies vulvar cancer or pre-cancer. Denies vaginal cancer or pre-cancer. Denies breast cancer. Denies colon cancer.      ROS:  GENERAL: No weight changes. No swelling. No fatigue. No fever.  BREASTS: No pain. No lumps. No discharge.  ABDOMEN: No pain. No nausea. No vomiting. No diarrhea. No constipation.  REPRODUCTIVE: No abnormal bleeding. No pelvic pain.   VULVA: No pain. No lesions. No itching.  VAGINA: No relaxation. No itching. No odor. No discharge. No lesions.  URINARY: No incontinence. No nocturia. No frequency. + dysuria.+odor to urine    /80 (Patient Position: Sitting)   Ht 5' 5" (1.651 m)   Wt 82.2 kg (181 lb 3.5 oz)   LMP 2018   BMI 30.16 kg/m²     PE:  Physical Exam:   Constitutional: She appears well-developed and well-nourished. She does not appear ill. No distress.        Pulmonary/Chest: Right breast exhibits no inverted nipple, no mass, no nipple discharge, no skin change, no tenderness and no swelling. Left breast exhibits no inverted nipple, no mass, no nipple discharge, no skin change, no tenderness and no swelling.          Genitourinary:    Urethra normal.      Pelvic exam was performed with " patient in the lithotomy position.   The external female genitalia was normal.   Genitalia hair distrobution normal .   There is no rash or lesion on the right labia. There is no rash or lesion on the left labia. There is an absent right adnexa and an absent left adnexa. There is vaginal discharge (thin white) in the vagina. No rectocele, cystocele or prolapse of vaginal walls in the vagina. Cervix is absent.   pap smear completedUterus is absent. Normal urethral meatus.              Neurological: GCS eye subscore is 4. GCS verbal subscore is 5. GCS motor subscore is 6.          PROCEDURES/ORDERS:  Pap- done today with hx of LEEP in 2016 and no records of results.pt will try to remember where it was performed to request records    Results for orders placed or performed in visit on 01/15/25   POCT Urinalysis, Dipstick, Automated, W/O Scope    Collection Time: 01/15/25  3:01 PM   Result Value Ref Range    POC Blood, Urine Negative Negative    POC Bilirubin, Urine Negative Negative    POC Urobilinogen, Urine Normal 0.1 - 1.1    POC Ketones, Urine Negative Negative    POC Protein, Urine Positive (A) Negative    POC Nitrates, Urine Positive (A) Negative    POC Glucose, Urine Negative Negative    pH, UA 5     POC Specific Gravity, Urine 1.020 1.003 - 1.029    POC Leukocytes, Urine Negative Negative         Assessment/Plan:    Encounter for well woman exam    Acute cystitis without hematuria  -     POCT Urinalysis, Dipstick, Automated, W/O Scope  -     nitrofurantoin, macrocrystal-monohydrate, (MACROBID) 100 MG capsule; Take 1 capsule (100 mg total) by mouth 2 (two) times daily. for 5 days  Dispense: 10 capsule; Refill: 0  -     Urine Culture High Risk    Breast cancer screening by mammogram  -     Mammo Digital Screening Bilat w/ [facility]; Future; Expected date: 10/10/2025    Pap smear for cervical cancer screening  -     HPV High Risk Genotypes, PCR  -     Liquid-Based Pap Smear, Screening    Genitourinary syndrome of  menopause  -     estradioL (ESTRACE) 0.01 % (0.1 mg/gram) vaginal cream; 0.5 g of cream intravaginally administered nightly for 2 weeks, then reduce to twice weekly  Dispense: 42.5 g; Refill: 6        COUNSELING:  The patient was counseled today on:  -A.C.S. Pap and pelvic exam guidelines, recomendations for yearly mammogram, monthly self breast exams and to follow up with her PCP for other health maintenance.    FOLLOW-UP  annually for [employer].

## 2025-01-17 LAB — BACTERIA UR CULT: ABNORMAL

## 2025-01-27 LAB
FINAL PATHOLOGIC DIAGNOSIS: NORMAL
Lab: NORMAL

## 2025-02-25 DIAGNOSIS — I10 HYPERTENSION, ESSENTIAL: ICD-10-CM

## 2025-02-25 RX ORDER — AMLODIPINE BESYLATE 10 MG/1
10 TABLET ORAL
Qty: 90 TABLET | Refills: 3 | Status: SHIPPED | OUTPATIENT
Start: 2025-02-25

## 2025-02-25 NOTE — TELEPHONE ENCOUNTER
Refill Decision Note   Tasha Rush  is requesting a refill authorization.  Brief Assessment and Rationale for Refill:  Approve     Medication Therapy Plan:         Comments:     Note composed:10:14 AM 02/25/2025

## 2025-02-25 NOTE — TELEPHONE ENCOUNTER
No care due was identified.  [facility] Embedded Care Due Messages. Reference number: [id number].   2/25/2025 7:52:10 AM CST

## 2025-02-28 ENCOUNTER — RESULTS FOLLOW-UP (OUTPATIENT)
Dept: INTERNAL MEDICINE | Facility: CLINIC | Age: 50
End: 2025-02-28

## 2025-05-29 DIAGNOSIS — I10 HYPERTENSION, ESSENTIAL: ICD-10-CM

## 2025-05-29 RX ORDER — HYDROCHLOROTHIAZIDE 25 MG/1
TABLET ORAL
Qty: 90 TABLET | Refills: 2 | Status: SHIPPED | OUTPATIENT
Start: 2025-05-29

## 2025-05-29 NOTE — TELEPHONE ENCOUNTER
Refill Routing Note   Medication(s) are not appropriate for processing by [facility] for the following reason(s):        Drug-disease interaction    [facility] action(s):  Defer      Medication Therapy Plan: Drug-Disease: hydroCHLOROthiazide and Hyponatremia    Pharmacist review requested: Yes     Appointments  past 12m or future 3m with PCP    Date Provider   Last Visit   1/6/2025 Speedy Cavanaugh MD   Next Visit   Visit date not found Speedy Cavanaugh MD   ED visits in past 90 days: 0        Note composed:8:35 AM 05/29/2025

## 2025-05-29 NOTE — TELEPHONE ENCOUNTER
Refill Decision Note   Tasha Rush  is requesting a refill authorization.  Brief Assessment and Rationale for Refill:  Approve     Medication Therapy Plan:         Pharmacist review requested: Yes   Extended chart review required: Yes   Comments:     Note composed:12:35 PM 05/29/2025

## 2025-05-29 NOTE — TELEPHONE ENCOUNTER
No care due was identified.  [facility] Embedded Care Due Messages. Reference number: [id number].   5/29/2025 4:09:07 AM CDT

## (undated) DEVICE — SOL IRR NACL .9% 3000ML

## (undated) DEVICE — SOL NS 1000CC

## (undated) DEVICE — ELECTRODE REM PLYHSV RETURN 9

## (undated) DEVICE — TROCAR ENDOPATH XCEL 11MM 10CM

## (undated) DEVICE — COVER OVERHEAD SURG LT BLUE

## (undated) DEVICE — UNDERGLOVES BIOGEL PI SZ 6 LF

## (undated) DEVICE — SUT CHROMIC 2-0 CT1 36IN BR

## (undated) DEVICE — PAD PREP 50/CA

## (undated) DEVICE — NDL INSUF ULTRA VERESS 120MM

## (undated) DEVICE — WIRE GUIDE 0.038OLD

## (undated) DEVICE — SPONGE GAUZE 16PLY 4X4

## (undated) DEVICE — PACK LAPAROSCOPY/PELVISCOPY II

## (undated) DEVICE — SOL 9P NACL IRR PIC IL

## (undated) DEVICE — GUIDEWIRE NITINOL HYBRID 150CM

## (undated) DEVICE — SEE MEDLINE ITEM 152487

## (undated) DEVICE — DRAPE STERI LONG

## (undated) DEVICE — DRESSING ABSRBNT ISLAND 3.6X8

## (undated) DEVICE — BRUSH SCRUB SURGICALW/BETADINE

## (undated) DEVICE — Device

## (undated) DEVICE — SUT ETHILON 2-0 FSLX 30 BLK

## (undated) DEVICE — DEVICE ENSEAL X1 LARGE JAW

## (undated) DEVICE — FIBER 272 MICRON HOLMIUM

## (undated) DEVICE — PAD ABD 8X10 STERILE

## (undated) DEVICE — OCCLUDER COLPO-PNEUMO STERILE

## (undated) DEVICE — STAPLER SKIN SUBCUTICULAR

## (undated) DEVICE — GLOVE SURG BIOGEL LATEX SZ 7.5

## (undated) DEVICE — CHLORAPREP W TINT 26ML APPL

## (undated) DEVICE — SUT 1 18IN COATED VICRYL U

## (undated) DEVICE — TUBING INSUFFLATION 10

## (undated) DEVICE — DRAIN SIL RND HUBLSS 19F TRCR

## (undated) DEVICE — EVACUATOR WOUND BULB 100CC

## (undated) DEVICE — CAUTERY PUSHBUTTON PENCIL

## (undated) DEVICE — SUT CTD VICRYL 0 UND BR CT

## (undated) DEVICE — SUT 0 18IN SILK BLK BRAIDE

## (undated) DEVICE — PACK ENDOSCOPY GENERAL

## (undated) DEVICE — TIP RUMI BLUE DISPOSABLE 5/BX

## (undated) DEVICE — CLOSURE SKIN STERI STRIP 1/2X4

## (undated) DEVICE — SLEEVE SCD EXPRESS CALF MEDIUM

## (undated) DEVICE — SET IRR URLGY 2LINE UNIV SPIKE

## (undated) DEVICE — TROCAR ENDOPATH XCEL 5X100MM

## (undated) DEVICE — SUT 2/0 27IN PLAIN GUT CT

## (undated) DEVICE — EXTRACTOR TIPLESS 2.4FRX1115CM

## (undated) DEVICE — GLOVE SURGICAL LATEX SZ 7

## (undated) DEVICE — SEE MEDLINE ITEM 157117

## (undated) DEVICE — GLOVE BIOGEL SKINSENSE PI 7.5

## (undated) DEVICE — MAT QUICK 40X30 FLOOR FLUID LF

## (undated) DEVICE — ELECTRODE BLADE TEFLON 6

## (undated) DEVICE — UNDERGLOVES BIOGEL PI SZ 7 LF

## (undated) DEVICE — BLANKET UPPER BODY 78.7X29.9IN

## (undated) DEVICE — SEE MEDLINE ITEM 146417

## (undated) DEVICE — CLEANER TIP ELECSURG 2X2IN

## (undated) DEVICE — SEE MEDLINE ITEM 154981

## (undated) DEVICE — SUT 2-0 VICRYL / SH (J417)

## (undated) DEVICE — CANISTER SUCTION 2 LTR

## (undated) DEVICE — BLADE SURG CARBON STEEL SZ11

## (undated) DEVICE — GUIDE WIRE MOTION .035 X 150CM

## (undated) DEVICE — SPONGE LAP 18X18 PREWASHED

## (undated) DEVICE — GAUZE SPONGE 4X4 12PLY

## (undated) DEVICE — DRESSING TRANS 2X2 TEGADERM

## (undated) DEVICE — SEE MEDLINE ITEM 146292

## (undated) DEVICE — IRRIGATOR ENDOSCOPY DISP.

## (undated) DEVICE — SEE MEDLINE ITEM 157150

## (undated) DEVICE — TRAY FOLEY 16FR INFECTION CONT

## (undated) DEVICE — SEE MEDLINE ITEM 152622

## (undated) DEVICE — SHEATH FLEXOR URETERAL 28CM

## (undated) DEVICE — CLIPPER BLADE MOD 4406 (CAREF)

## (undated) DEVICE — KIT ANTIFOG

## (undated) DEVICE — SYR 10CC LUER LOCK

## (undated) DEVICE — SYR 50CC LL

## (undated) DEVICE — SCISSOR CURVED ENDOPATH 5MM

## (undated) DEVICE — SUPPORT ULNA NERVE PROTECTOR

## (undated) DEVICE — SUT 3-0 CTD VICRYL 27IN PS

## (undated) DEVICE — DRESSING ADH ISLAND 2.5 X 3

## (undated) DEVICE — PAD SANITARY OB STERILE

## (undated) DEVICE — SOL CLEARIFY VISUALIZATION LAP